# Patient Record
Sex: FEMALE | Race: WHITE | NOT HISPANIC OR LATINO | Employment: OTHER | URBAN - NONMETROPOLITAN AREA
[De-identification: names, ages, dates, MRNs, and addresses within clinical notes are randomized per-mention and may not be internally consistent; named-entity substitution may affect disease eponyms.]

---

## 2017-01-05 ENCOUNTER — OFFICE VISIT (OUTPATIENT)
Dept: OBSTETRICS AND GYNECOLOGY | Facility: CLINIC | Age: 52
End: 2017-01-05

## 2017-01-05 VITALS
DIASTOLIC BLOOD PRESSURE: 64 MMHG | HEIGHT: 63 IN | SYSTOLIC BLOOD PRESSURE: 132 MMHG | WEIGHT: 135 LBS | BODY MASS INDEX: 23.92 KG/M2

## 2017-01-05 DIAGNOSIS — N83.201 CYST OF RIGHT OVARY: Primary | ICD-10-CM

## 2017-01-05 DIAGNOSIS — R10.2 PELVIC PAIN: Primary | ICD-10-CM

## 2017-01-05 DIAGNOSIS — Z87.42 HISTORY OF OVARIAN CYST: ICD-10-CM

## 2017-01-05 PROCEDURE — 76830 TRANSVAGINAL US NON-OB: CPT | Performed by: OBSTETRICS & GYNECOLOGY

## 2017-01-05 PROCEDURE — 99213 OFFICE O/P EST LOW 20 MIN: CPT | Performed by: NURSE PRACTITIONER

## 2017-01-05 RX ORDER — LORATADINE 10 MG/1
10 TABLET ORAL DAILY
COMMUNITY

## 2017-01-05 RX ORDER — ASPIRIN 81 MG/1
81 TABLET, CHEWABLE ORAL DAILY
COMMUNITY
End: 2018-04-03

## 2017-01-05 RX ORDER — ATORVASTATIN CALCIUM 40 MG/1
40 TABLET, FILM COATED ORAL DAILY
COMMUNITY
End: 2017-05-15 | Stop reason: SDUPTHER

## 2017-01-05 NOTE — MR AVS SNAPSHOT
Shawnee Suarez   1/5/2017 10:15 AM   Office Visit    Dept Phone:  [phone]   Encounter #:  [id number]    Provider:  MICHELLE Hassan   Department:  [facility]                Your Full Care Plan              Your Updated Medication List          This list is accurate as of: 1/5/17 11:46 AM.  Always use your most recent med list.                ALLERGY 10 MG tablet   Generic drug:  loratadine       aspirin 81 MG chewable tablet       atorvastatin 40 MG tablet   Commonly known as:  LIPITOR       clonazePAM 1 MG tablet   Commonly known as:  KlonoPIN   Take 1 tablet by mouth 2 (Two) Times a Day As Needed for seizures.       REPHRESH PRO-B PO       TRINTELLIX 20 MG tablet   Generic drug:  Vortioxetine HBr       WOMENS 50+ MULTI VITAMIN/MIN PO       ZOLMitriptan 2.5 MG tablet   Commonly known as:  ZOMIG               You Were Diagnosed With        Codes Comments    Pelvic pain    -  Primary ICD-10-CM: R10.2  ICD-9-CM: [id number]     History of ovarian cyst     ICD-10-CM: Z87.42  ICD-9-CM: V13.29       Instructions     None    Patient Instructions History      Upcoming Appointments     Visit Type Date Time Department    [facility] V[facility]2017 10:15 [facility]    [facility]    [facility] V[facility] 1/13/2017 11:30 AM [facility]      MyChart Signup     Our records indicate that you have declined [facility]. If you would like to sign up for [employer], please email [email or web address] at work or call [phone] to obtain an activation code.             Other Info from Your Visit           Your Appointments     Jan 13, 2017 11:30 AM CST   Office Visit with Ana Barrios MD   [facility] (--)    [place] [address] [id number] [phone]           Arrive 15 minutes prior to appointment.              Allergies     No Known Allergies      Reason for Visit     Abdominal  "Pain Pt had a difficult bowel movenment on Lj Day, Pt states she has had pain on her right side that would bother her, She had a CT done that showed a Rt Ovarian Cyst that had Ruptured , Pt is still having occasional soreness.        Vital Signs     Blood Pressure Height Weight Last Menstrual Period Breastfeeding? Body Mass Index    132/64 (BP Location: Left arm, Patient Position: Sitting) 63\" (160 cm) 135 lb (61.2 kg) 12/25/2016 (Exact Date) No 23.91 kg/m2    Smoking Status                   Never Smoker           Problems and Diagnoses Noted     Pelvic pain    -  Primary    History of ovarian cyst            "

## 2017-01-05 NOTE — PROGRESS NOTES
Stephanie Suarez is a 51 y.o. female.     HPI Comments: Right sided pelvic pain started on [place]. CT identified ovarian cyst.  Pain continues intermittently.        The following portions of the patient's history were reviewed and updated as appropriate: allergies, current medications, past family history, past medical history, past social history, past surgical history and problem list.    Review of Systems   Constitutional: Negative for activity change, appetite change, fatigue and fever.   Respiratory: Negative for apnea and shortness of breath.    Cardiovascular: Negative for chest pain and palpitations.   Gastrointestinal: Positive for constipation (started fiber supplement, BMs now approx every 3 days, before they were every 7 days). Negative for abdominal distention, abdominal pain, diarrhea, nausea and vomiting.   Endocrine: Negative for cold intolerance and heat intolerance.   Genitourinary: Positive for menstrual problem (periods continue, every 23 days for 10 days (2 days heavy bleeding pad and tampon)) and pelvic pain. Negative for difficulty urinating, frequency, vaginal discharge and vaginal pain.   Neurological: Negative for headaches.   Psychiatric/Behavioral: Negative for agitation and sleep disturbance.       Objective   Physical Exam   Constitutional: She is oriented to person, place, and time. She appears well-developed.   HENT:   Head: Normocephalic.   Neck: No tracheal deviation present.   Cardiovascular: Normal rate.    Pulmonary/Chest: Breath sounds normal. She has no wheezes.   Abdominal: Soft. There is no tenderness.   Genitourinary: Rectum normal. There is no rash, tenderness or lesion on the right labia. There is no rash, tenderness or lesion on the left labia. Uterus is tender. Uterus is not deviated, not enlarged and not fixed. Cervix exhibits no motion tenderness, no discharge and no friability. Right adnexum displays tenderness (mild). Right adnexum displays no mass and no  fullness. Left adnexum displays no mass, no tenderness and no fullness. No erythema, tenderness or bleeding in the vagina. No foreign body in the vagina. No signs of injury around the vagina. No vaginal discharge found.   Genitourinary Comments: Firm uterus     Lymphadenopathy:        Right: No inguinal adenopathy present.        Left: No inguinal adenopathy present.   Neurological: She is alert and oriented to person, place, and time. She has normal strength.   Skin: Skin is warm and dry. No rash noted. No cyanosis.   Psychiatric: She has a normal mood and affect. Her speech is normal and behavior is normal.       Assessment/Plan    US today indicates 11 mm endometrial lining, follicular cyst on right ovary.   Reviewed US with pt.   Pt desires to discuss all options to help with periods. Will schedule with Dr. Barrios for consult r/t to possible surgical options.     Shawnee was seen today for abdominal pain.    Diagnoses and all orders for this visit:    Pelvic pain    History of ovarian cyst

## 2017-01-13 ENCOUNTER — TELEPHONE (OUTPATIENT)
Dept: OBSTETRICS AND GYNECOLOGY | Facility: CLINIC | Age: 52
End: 2017-01-13

## 2017-01-13 ENCOUNTER — OFFICE VISIT (OUTPATIENT)
Dept: OBSTETRICS AND GYNECOLOGY | Facility: CLINIC | Age: 52
End: 2017-01-13

## 2017-01-13 VITALS
WEIGHT: 134 LBS | BODY MASS INDEX: 23.74 KG/M2 | HEIGHT: 63 IN | DIASTOLIC BLOOD PRESSURE: 76 MMHG | SYSTOLIC BLOOD PRESSURE: 104 MMHG

## 2017-01-13 DIAGNOSIS — N93.8 DYSFUNCTIONAL UTERINE BLEEDING: ICD-10-CM

## 2017-01-13 DIAGNOSIS — R93.89 THICKENED ENDOMETRIUM: Primary | ICD-10-CM

## 2017-01-13 DIAGNOSIS — Z78.9 NONSMOKER: ICD-10-CM

## 2017-01-13 PROCEDURE — 88305 TISSUE EXAM BY PATHOLOGIST: CPT | Performed by: OBSTETRICS & GYNECOLOGY

## 2017-01-13 PROCEDURE — 99214 OFFICE O/P EST MOD 30 MIN: CPT | Performed by: OBSTETRICS & GYNECOLOGY

## 2017-01-13 RX ORDER — SODIUM CHLORIDE 0.9 % (FLUSH) 0.9 %
1-10 SYRINGE (ML) INJECTION AS NEEDED
Status: CANCELLED | OUTPATIENT
Start: 2017-01-13

## 2017-01-13 NOTE — TELEPHONE ENCOUNTER
Called pt to let her know that surgery is scheduled for 2-6-17 @ 9:30am. Pt to arrive at 7:30am. Pt also has preop labs scheduled for 1-23-17 @ 8:00am. Pt understood. Sent surgery instructions to pt in the mail. ANGELA

## 2017-01-13 NOTE — PROGRESS NOTES
[facility]  Shawnee Suarez  : 1965  MRN: [id number]  CSN: [id number]    History and Physical    Subjective   Shawnee Suarez is a 51 y.o. year old  who present for consultation about surgery due to pelvic pain caused by a ruptured ovarian cyst on 12/15/16.  She reports a h/o surgery for ovarian cysts 30 years ago, but none since then.  She reports her last period was just in December, starting the day after she was in the ER.  The patient's predominant concern is that her bleeding has become dysfunctional.  Over the past couple of years her bleeding has become heavier and heavier; it is now heavier than it ever been in her entire life, especially the first 2 days.  In addition the patient's periods are also becoming progressively closer together, and for the last 2 years they have occurred at 21 day intervals.  The patient is not interested in having a hysterectomy, because she is worried about pelvic organ prolapse, but would like to discuss an endometrial ablation.    Past Medical History   Diagnosis Date   • Anxiety    • Depression    • Ovarian cyst      Past Surgical History   Procedure Laterality Date   • Cholecystectomy     • Ovarian cyst removal       Smoking status: Never Smoker                                                              Smokeless status: Never Used                          Current Outpatient Prescriptions:   •  aspirin 81 MG chewable tablet, Chew 81 mg Daily., Disp: , Rfl:   •  atorvastatin (LIPITOR) 40 MG tablet, Take 40 mg by mouth Daily., Disp: , Rfl:   •  clonazePAM (KlonoPIN) 1 MG tablet, Take 1 tablet by mouth 2 (Two) Times a Day As Needed for seizures., Disp: 60 tablet, Rfl: 2  •  Lactobacillus (REPHRESH PRO-B PO), Take  by mouth., Disp: , Rfl:   •  loratadine (ALLERGY) 10 MG tablet, Take 10 mg by mouth Daily., Disp: , Rfl:   •  Multiple Vitamins-Minerals (WOMENS 50+ MULTI VITAMIN/MIN PO), Take  by mouth., Disp: , Rfl:   •  TRINTELLIX 20 MG tablet, , Disp: , Rfl:   •   "ZOLMitriptan (ZOMIG) 2.5 MG tablet, , Disp: , Rfl:     No Known Allergies    Family History   Problem Relation Age of Onset   • No Known Problems Mother    • Brain cancer Father    • No Known Problems Brother    • No Known Problems Daughter    • No Known Problems Maternal Grandmother    • No Known Problems Maternal Grandfather    • No Known Problems Paternal Grandmother    • Brain cancer Paternal Grandfather    • Heart disease Brother    • No Known Problems Daughter      Review of Systems   Constitutional: Negative for activity change and unexpected weight change.   HENT: Negative for congestion.    Respiratory: Negative for shortness of breath.    Cardiovascular: Negative for chest pain.   Gastrointestinal: Negative for abdominal pain, blood in stool, constipation and diarrhea.   Endocrine: Negative for cold intolerance and heat intolerance.   Genitourinary: Negative for dyspareunia, pelvic pain and vaginal discharge.   Musculoskeletal: Negative for arthralgias, back pain, neck pain and neck stiffness.   Skin: Negative for rash.   Neurological: Negative for dizziness and headaches.   Psychiatric/Behavioral: Negative for sleep disturbance. The patient is not nervous/anxious.          Objective   Visit Vitals   • /76   • Ht 63\" (160 cm)   • Wt 134 lb (60.8 kg)   • LMP 12/25/2016 (Exact Date)   • BMI 23.74 kg/m2       Physical Exam   Physical Exam   Constitutional: She appears well-developed and well-nourished. No distress.   HENT:   Head: Normocephalic and atraumatic.   Pulmonary/Chest: Effort normal.   Abdominal: Soft. There is no tenderness.   Genitourinary: Vagina normal and uterus normal. Pelvic exam was performed with patient supine. There is no tenderness or lesion on the right labia. There is no tenderness or lesion on the left labia. Uterus is not enlarged and not tender. Cervix exhibits no motion tenderness, no discharge and no friability. Right adnexum displays no tenderness and no fullness. Left " adnexum displays no tenderness and no fullness. No tenderness or bleeding in the vagina. No signs of injury around the vagina. No vaginal discharge found.   Genitourinary Comments: The cx was grasp with a tenac and washed with betadine.  The endo bx pipelle was easily passed into the uterine cavity, and two passes were made in order to ensure adequate specimen was obtained.  The patient tolerated well and the tenac site was hemostatic.   Skin: Skin is warm and dry.   Psychiatric: She has a normal mood and affect. Her behavior is normal. Judgment and thought content normal.   Nursing note and vitals reviewed.      Labs  Lab Results   Component Value Date     12/25/2016    HGB 13.0 12/25/2016    HCT 38.9 12/25/2016    WBC 7.25 12/25/2016     12/25/2016    K 3.9 12/25/2016     12/25/2016    CO2 27.0 12/25/2016    BUN 17 12/25/2016    CREATININE 0.68 12/25/2016    GLUCOSE 87 12/25/2016    ALBUMIN 4.00 12/25/2016    CALCIUM 8.9 12/25/2016    AST 33 12/25/2016    ALT 42 12/25/2016    BILITOT 0.6 12/25/2016        Assessment & Plan  Shawnee was seen today for pelvic pain.    Diagnoses and all orders for this visit:    Thickened endometrium  -     Tissue Exam    Dysfunctional uterine bleeding: The patient has no risk factors for uterine cancer, however an endometrial biopsy was performed in the office today due to her age and changing bleeding pattern.  Patient is not interested in a hysterectomy due to increased risk of pelvic organ prolapse; however, she desires endometrial ablation due to the changes in her bleeding.  A NovaSure endometrial ablation was described to the patient and her , the postoperative course was reviewed, and all questions were answered to her satisfaction.  Patient is being scheduled for surgery on 2/6/17.  On u/s last week, the patient has a normal-sized uterus with no fibroids.  The endo lining is 11 mm.  Ovaries were unremarkable.  -     Tissue Exam    Nonsmoker    Body  mass index (BMI) 23.0-23.9, adult    There are no diagnoses linked to this encounter.    Ana Barrios MD  1/13/2017  12:15 PM

## 2017-01-13 NOTE — MR AVS SNAPSHOT
Shawnee Suarez   1/13/2017 11:30 AM   Office Visit    Dept Phone:  [phone]   Encounter #:  [id number]    Provider:  Ana Barrios MD   Department:  [facility]                Your Full Care Plan              Your Updated Medication List          This list is accurate as of: 1/13/17 12:41 PM.  Always use your most recent med list.                ALLERGY 10 MG tablet   Generic drug:  loratadine       aspirin 81 MG chewable tablet       atorvastatin 40 MG tablet   Commonly known as:  LIPITOR       clonazePAM 1 MG tablet   Commonly known as:  KlonoPIN   Take 1 tablet by mouth 2 (Two) Times a Day As Needed for seizures.       REPHRESH PRO-B PO       TRINTELLIX 20 MG tablet   Generic drug:  Vortioxetine HBr       WOMENS 50+ MULTI VITAMIN/MIN PO       ZOLMitriptan 2.5 MG tablet   Commonly known as:  ZOMIG               We Performed the Following     Tissue Exam       You Were Diagnosed With        Codes Comments    Thickened endometrium    -  Primary ICD-10-CM: R93.8  ICD-9-CM: 793.5     Dysfunctional uterine bleeding     ICD-10-CM: N93.8  ICD-9-CM: 626.8     Nonsmoker     ICD-10-CM: Z78.9  ICD-9-CM: V49.89     Body mass index (BMI) 23.0-23.9, adult     ICD-10-CM: Z68.23  ICD-9-CM: V85.1       Instructions     None    Patient Instructions History      Upcoming Appointments     Visit Type Date Time Department    OFFICE VISIT 1/13/2017 11:30 AM [facility]    POST-OP 2/21/2017 10:00 AM [facility] [place]      MyChart Signup     Our records indicate that you have declined [facility] MyChart signup. If you would like to sign up for [employer], please email [email or web address] or call [phone] to obtain an activation code.             Other Info from Your Visit           Your Appointments     Feb 21, 2017 10:00 AM CST   Post-Op with Ana Barrios MD   [facility] (--)    [address] [place] [address] [address]  [place] [address]   [phone]  "             Allergies     No Known Allergies      Reason for Visit     Pelvic Pain pt here today with c/o pelvic pain. pt says that it started 12-15-16 and says that it lasted for a couple of days. pt says that it hasn't happened as often and has just noticed it here and there. pt had ultrasound on 1-5-17. pt voices no other concerns.       Vital Signs     Blood Pressure Height Weight Last Menstrual Period Body Mass Index Smoking Status    104/76 63\" (160 cm) 134 lb (60.8 kg) 12/25/2016 (Exact Date) 23.74 kg/m2 Never Smoker      Problems and Diagnoses Noted     Thickened endometrium    -  Primary    Dysfunctional uterine bleeding        Nonsmoker        Body mass index between 19-24, adult            "

## 2017-01-17 ENCOUNTER — TELEPHONE (OUTPATIENT)
Dept: OBSTETRICS AND GYNECOLOGY | Facility: CLINIC | Age: 52
End: 2017-01-17

## 2017-01-17 LAB
CYTO UR: NORMAL
LAB AP CASE REPORT: NORMAL
LAB AP CLINICAL INFORMATION: NORMAL
Lab: NORMAL
PATH REPORT.FINAL DX SPEC: NORMAL
PATH REPORT.GROSS SPEC: NORMAL

## 2017-01-17 NOTE — TELEPHONE ENCOUNTER
Called pt to let her know that on the day of her surgery 2-6-17 that her surgery time got moved to later in the day because of it being Dr. Barrios robotic day. Pt surgery scheduled at 11:30am and pt to arrive at 9:30am. Pt understood. BS

## 2017-01-18 ENCOUNTER — RESULTS ENCOUNTER (OUTPATIENT)
Dept: OBSTETRICS AND GYNECOLOGY | Facility: CLINIC | Age: 52
End: 2017-01-18

## 2017-01-18 DIAGNOSIS — N93.8 DYSFUNCTIONAL UTERINE BLEEDING: ICD-10-CM

## 2017-01-23 ENCOUNTER — APPOINTMENT (OUTPATIENT)
Dept: PREADMISSION TESTING | Facility: HOSPITAL | Age: 52
End: 2017-01-23

## 2017-01-23 ENCOUNTER — TELEPHONE (OUTPATIENT)
Dept: OBSTETRICS AND GYNECOLOGY | Facility: CLINIC | Age: 52
End: 2017-01-23

## 2017-01-23 ENCOUNTER — HOSPITAL ENCOUNTER (OUTPATIENT)
Dept: GENERAL RADIOLOGY | Facility: HOSPITAL | Age: 52
Discharge: HOME | End: 2017-01-23
Admitting: OBSTETRICS & GYNECOLOGY

## 2017-01-23 VITALS
HEART RATE: 70 BPM | WEIGHT: 138 LBS | HEIGHT: 65 IN | OXYGEN SATURATION: 99 % | DIASTOLIC BLOOD PRESSURE: 67 MMHG | RESPIRATION RATE: 18 BRPM | BODY MASS INDEX: 22.99 KG/M2 | SYSTOLIC BLOOD PRESSURE: 102 MMHG

## 2017-01-23 DIAGNOSIS — N93.8 DYSFUNCTIONAL UTERINE BLEEDING: ICD-10-CM

## 2017-01-23 LAB
BASOPHILS # BLD AUTO: 0.03 10*3/MM3 (ref 0–0.2)
BASOPHILS NFR BLD AUTO: 0.6 % (ref 0–2)
DEPRECATED RDW RBC AUTO: 44 FL (ref 40–54)
EOSINOPHIL # BLD AUTO: 0.12 10*3/MM3 (ref 0–0.7)
EOSINOPHIL NFR BLD AUTO: 2.3 % (ref 0–4)
ERYTHROCYTE [DISTWIDTH] IN BLOOD BY AUTOMATED COUNT: 13.5 % (ref 12–15)
HCT VFR BLD AUTO: 40.7 % (ref 37–47)
HGB BLD-MCNC: 13.4 G/DL (ref 12–16)
IMM GRANULOCYTES # BLD: 0 10*3/MM3 (ref 0–0.03)
IMM GRANULOCYTES NFR BLD: 0 % (ref 0–5)
LYMPHOCYTES # BLD AUTO: 1.45 10*3/MM3 (ref 0.72–4.86)
LYMPHOCYTES NFR BLD AUTO: 27.9 % (ref 15–45)
MCH RBC QN AUTO: 29.6 PG (ref 28–32)
MCHC RBC AUTO-ENTMCNC: 32.9 G/DL (ref 33–36)
MCV RBC AUTO: 89.8 FL (ref 82–98)
MONOCYTES # BLD AUTO: 0.68 10*3/MM3 (ref 0.19–1.3)
MONOCYTES NFR BLD AUTO: 13.1 % (ref 4–12)
NEUTROPHILS # BLD AUTO: 2.91 10*3/MM3 (ref 1.87–8.4)
NEUTROPHILS NFR BLD AUTO: 56.1 % (ref 39–78)
PLATELET # BLD AUTO: 166 10*3/MM3 (ref 130–400)
PMV BLD AUTO: 13 FL (ref 6–12)
RBC # BLD AUTO: 4.53 10*6/MM3 (ref 4.2–5.4)
WBC NRBC COR # BLD: 5.19 10*3/MM3 (ref 4.8–10.8)

## 2017-01-23 PROCEDURE — 85025 COMPLETE CBC W/AUTO DIFF WBC: CPT | Performed by: OBSTETRICS & GYNECOLOGY

## 2017-01-23 PROCEDURE — 93005 ELECTROCARDIOGRAM TRACING: CPT

## 2017-01-23 PROCEDURE — 71010 HC CHEST PA OR AP: CPT

## 2017-01-23 PROCEDURE — 36415 COLL VENOUS BLD VENIPUNCTURE: CPT | Performed by: OBSTETRICS & GYNECOLOGY

## 2017-01-23 PROCEDURE — 93010 ELECTROCARDIOGRAM REPORT: CPT | Performed by: INTERNAL MEDICINE

## 2017-01-23 NOTE — TELEPHONE ENCOUNTER
Called pt to inform her that surgery had to be bumped down a little due to it being Dr. Barrios robotic day on 2-6-17. Pt surgery scheduled for 2:30pm and pt is to arrive at 12:30pm. Pt understood. BS

## 2017-01-23 NOTE — DISCHARGE INSTRUCTIONS
DAY OF SURGERY INSTRUCTIONS        YOUR SURGEON: CHRISTEL MANCIA    PROCEDURE: DILATION AND CURETTAGE, HYSTEROSCOPY,NOVASURE ENDOMETRIAL ABLATION    DATE OF SURGERY: 2/6/2017    ARRIVAL TIME: AS DIRECTED BY OFFICE    DAY OF SURGERY TAKE ONLY THESE MEDICATIONS:             BEFORE YOU COME TO THE HOSPITAL  (Pre-op instructions)  • Do not eat, drink, smoke or chew gum after midnight the night before surgery.  This also includes no mints.  • Morning of surgery take only the medicines you have been instructed with a sip of water unless otherwise instructed  by your physician.  • Do not shave, wear makeup or dark nail polish.  • Remove all jewelry including rings.  • Leave anything you consider valuable at home.  • Leave your suitcase in the car until after your surgery.  • Bring the following with you if applicable:  o Picture ID and insurance, Medicare or Medicaid cards  o Co-pay/deductible required by insurance (cash, check, credit card)  o Medications (no narcotics) in original bottles (not a list) including all over-the-counter medications.  o Copy of advance directive, living will or power-of- documents if not brought to PAT  o CPAP or BIPAP mask and tubing  o Skin prep instruction sheet  o Relaxation aids (MP3 player, book, magazine)  • Confirm your arrival time with you surgeon the day before your surgery (surgery times are subject to change)  • On the day of surgery check in at registration located at the main entrance of the hospital.       Outpatient Surgery Guidelines, Adult  Outpatient procedures are those for which the person having the procedure is allowed to go home the same day as the procedure. Various procedures are done on an outpatient basis. You should follow some general guidelines if you will be having an outpatient procedure.  LET YOUR HEALTH CARE PROVIDER KNOW ABOUT:  · Any allergies you have.  · All medicines you are taking, including vitamins, herbs, eye drops, creams, and  over-the-counter medicines.  · Previous problems you or members of your family have had with the use of anesthetics.  · Any blood disorders you have.  · Previous surgeries you have had.  · Medical conditions you have.  RISKS AND COMPLICATIONS  Your health care provider will discuss possible risks and complications with you before surgery. Common risks and complications include:    · Problems due to the use of anesthetics.  · Blood loss and replacement (does not apply to minor surgical procedures).  · Temporary increase in pain due to surgery.  · Uncorrected pain or problems that the surgery was meant to correct.  · Infection.  · New damage.  BEFORE THE PROCEDURE  · Ask your health care provider about changing or stopping your regular medicines. You may need to stop taking certain medicines in the days or weeks before the procedure.  · Stop smoking at least 2 weeks before surgery. This lowers your risk for complications during and after surgery. Ask your health care provider for help with this if needed.  · Eat your usual meals and a light supper the day before surgery. Continue fluid intake. Do not drink alcohol.  · Do not eat or drink after midnight the night before your surgery.   · Arrange for someone to take you home and to stay with you for 24 hours after the procedure. Medicine given for your procedure may affect your ability to drive or to care for yourself.  · Call your health care provider's office if you develop an illness or problem that may prevent you from safely having your procedure.  AFTER THE PROCEDURE  After surgery, you will be taken to a recovery area, where your progress will be monitored. If there are no complications, you will be allowed to go home when you are awake, stable, and taking fluids well. You may have numbness around the surgical site. Healing will take some time. You will have tenderness at the surgical site and may have some swelling and bruising. You may also have some  nausea.  HOME CARE INSTRUCTIONS  · Do not drive for 24 hours, or as directed by your health care provider. Do not drive while taking prescription pain medicines.  · Do not drink alcohol for 24 hours.  · Do not make important decisions or sign legal documents for 24 hours.  · You may resume a normal diet and activities as directed.  · Do not lift anything heavier than 10 pounds (4.5 kg) or play contact sports until your health care provider says it is okay.  · Change your bandages (dressings) as directed.  · Only take over-the-counter or prescription medicines as directed by your health care provider.  · Follow up with your health care provider as directed.  SEEK MEDICAL CARE IF:  · You have increased bleeding (more than a small spot) from the surgical site.  · You have redness, swelling, or increasing pain in the wound.  · You see pus coming from the wound.  · You have a fever.  · You notice a bad smell coming from the wound or dressing.  · You feel lightheaded or faint.  · You develop a rash.  · You have trouble breathing.  · You develop allergies.  MAKE SURE YOU:  · Understand these instructions.  · Will watch your condition.  · Will get help right away if you are not doing well or get worse.     This information is not intended to replace advice given to you by your health care provider. Make sure you discuss any questions you have with your health care provider.     Document Released: 09/12/2002 Document Revised: 05/03/2016 Document Reviewed: 05/22/2014  [employer] Interactive Patient Education ©2016 [employer]       Fall Prevention in Hospitals, Adult  As a hospital patient, your condition and the treatments you receive can increase your risk for falls. Some additional risk factors for falls in a hospital include:  · Being in an unfamiliar environment.  · Being on bed rest.  · Your surgery.  · Taking certain medicines.  · Your tubing requirements, such as intravenous (IV) therapy or catheters.  It is important  that you learn how to decrease fall risks while at the hospital. Below are important tips that can help prevent falls.  SAFETY TIPS FOR PREVENTING FALLS  Talk about your risk of falling.  · Ask your health care provider why you are at risk for falling. Is it your medicine, illness, tubing placement, or something else?  · Make a plan with your health care provider to keep you safe from falls.  · Ask your health care provider or pharmacist about side effects of your medicines. Some medicines can make you dizzy or affect your coordination.  Ask for help.  · Ask for help before getting out of bed. You may need to press your call button.  · Ask for assistance in getting safely to the toilet.  · Ask for a walker or cane to be put at your bedside. Ask that most of the side rails on your bed be placed up before your health care provider leaves the room.  · Ask family or friends to sit with you.  · Ask for things that are out of your reach, such as your glasses, hearing aids, telephone, bedside table, or call button.  Follow these tips to avoid falling:  · Stay lying or seated, rather than standing, while waiting for help.  · Wear rubber-soled slippers or shoes whenever you walk in the hospital.  · Avoid quick, sudden movements.  ¨ Change positions slowly.  ¨ Sit on the side of your bed before standing.  ¨ Stand up slowly and wait before you start to walk.  · Let your health care provider know if there is a spill on the floor.  · Pay careful attention to the medical equipment, electrical cords, and tubes around you.  · When you need help, use your call button by your bed or in the bathroom. Wait for one of your health care providers to help you.  · If you feel dizzy or unsure of your footing, return to bed and wait for assistance.  · Avoid being distracted by the TV, telephone, or another person in your room.  · Do not lean or support yourself on rolling objects, such as IV poles or bedside tables.     This information is  not intended to replace advice given to you by your health care provider. Make sure you discuss any questions you have with your health care provider.     Document Released: 12/15/2001 Document Revised: 01/08/2016 Document Reviewed: 08/25/2013  [employer] Interactive Patient Education ©2016 [employer]       Surgical Site Infections FAQs  What is a Surgical Site Infection (SSI)?  A surgical site infection is an infection that occurs after surgery in the part of the body where the surgery took place. Most patients who have surgery do not develop an infection. However, infections develop in about 1 to 3 out of every 100 patients who have surgery.  Some of the common symptoms of a surgical site infection are:  · Redness and pain around the area where you had surgery  · Drainage of cloudy fluid from your surgical wound  · Fever  Can SSIs be treated?  Yes. Most surgical site infections can be treated with antibiotics. The antibiotic given to you depends on the bacteria (germs) causing the infection. Sometimes patients with SSIs also need another surgery to treat the infection.  What are some of the things that hospitals are doing to prevent SSIs?  To prevent SSIs, doctors, nurses, and other healthcare providers:  · Clean their hands and arms up to their elbows with an antiseptic agent just before the surgery.  · Clean their hands with soap and water or an alcohol-based hand rub before and after caring for each patient.  · May remove some of your hair immediately before your surgery using electric clippers if the hair is in the same area where the procedure will occur. They should not shave you with a razor.  · Wear special hair covers, masks, gowns, and gloves during surgery to keep the surgery area clean.  · Give you antibiotics before your surgery starts. In most cases, you should get antibiotics within 60 minutes before the surgery starts and the antibiotics should be stopped within 24 hours after surgery.  · Clean  the skin at the site of your surgery with a special soap that kills germs.  What can I do to help prevent SSIs?  Before your surgery:  · Tell your doctor about other medical problems you may have. Health problems such as allergies, diabetes, and obesity could affect your surgery and your treatment.  · Quit smoking. Patients who smoke get more infections. Talk to your doctor about how you can quit before your surgery.  · Do not shave near where you will have surgery. Shaving with a razor can irritate your skin and make it easier to develop an infection.  At the time of your surgery:  · Speak up if someone tries to shave you with a razor before surgery. Ask why you need to be shaved and talk with your surgeon if you have any concerns.  · Ask if you will get antibiotics before surgery.  After your surgery:  · Make sure that your healthcare providers clean their hands before examining you, either with soap and water or an alcohol-based hand rub.  · If you do not see your providers clean their hands, please ask them to do so.  · Family and friends who visit you should not touch the surgical wound or dressings.  · Family and friends should clean their hands with soap and water or an alcohol-based hand rub before and after visiting you. If you do not see them clean their hands, ask them to clean their hands.  What do I need to do when I go home from the hospital?  · Before you go home, your doctor or nurse should explain everything you need to know about taking care of your wound. Make sure you understand how to care for your wound before you leave the hospital.  · Always clean your hands before and after caring for your wound.  · Before you go home, make sure you know who to contact if you have questions or problems after you get home.  · If you have any symptoms of an infection, such as redness and pain at the surgery site, drainage, or fever, call your doctor immediately.  If you have additional questions, please ask  your doctor or nurse.  Developed and co-sponsored by The Society for Healthcare Epidemiology of [place] (SHEA); Infectious Diseases Society of [place] (IDSA); [employer] Hospital Association; Association for Professionals in Infection Control and Epidemiology (APIC); Centers for Disease Control and Prevention (CDC); and The Joint Commission.     This information is not intended to replace advice given to you by your health care provider. Make sure you discuss any questions you have with your health care provider.     Document Released: 12/23/2014 Document Revised: 01/08/2016 Document Reviewed: 03/02/2016  [employer] Interactive Patient Education ©2016 [employer]     PATIENT/FAMILY/RESPONSIBLE PARTY VERBALIZES UNDERSTANDING OF ABOVE EDUCATION

## 2017-01-23 NOTE — MR AVS SNAPSHOT
"                        Shawnee Erick   1/23/2017 8:30 AM   Appointment    Provider:  [facility] PAD PAT 32 RM 3   Department:  [facility] PREADMISSION T   Dept Phone:  [phone]                Your Full Care Plan           To Do List     2/21/2017 10:00 AM     Appointment with Ana Barrios MD at [facility] ([phone])   [address] [place] [id number]            Your Updated Medication List          This list is accurate as of: 1/23/17  8:36 AM.  Always use your most recent med list.                ALLERGY 10 MG tablet   Generic drug:  loratadine       aspirin 81 MG chewable tablet       atorvastatin 40 MG tablet   Commonly known as:  LIPITOR       clonazePAM 1 MG tablet   Commonly known as:  KlonoPIN   Take 1 tablet by mouth 2 (Two) Times a Day As Needed for seizures.       REPHRESH PRO-B PO       TRINTELLIX 20 MG tablet   Generic drug:  Vortioxetine HBr       WOMENS 50+ MULTI VITAMIN/MIN PO       ZOLMitriptan 2.5 MG tablet   Commonly known as:  ZOMIG               Splitforcet Signup     Our records indicate that you have declined [facility] Splitforcet signup. If you would like to sign up for [employer], please email [email or web address] or call [phone] to obtain an activation code.             Other Info from Your Visit           Allergies     No Known Allergies      Vital Signs     Blood Pressure Pulse Respirations Height Weight Last Menstrual Period    102/67 (BP Location: Right arm, Patient Position: Sitting) 70 18 64.5\" (163.8 cm) 138 lb (62.6 kg) 01/22/2017    Oxygen Saturation Body Mass Index Smoking Status             99% 23.32 kg/m2 Never Smoker           Discharge Instructions       DAY OF SURGERY INSTRUCTIONS        YOUR SURGEON: ANA BARRIOS    PROCEDURE: DILATION AND CURETTAGE, HYSTEROSCOPY,NOVASURE ENDOMETRIAL ABLATION    DATE OF SURGERY: 2/6/2017    ARRIVAL TIME: AS DIRECTED BY OFFICE    DAY OF SURGERY TAKE ONLY THESE MEDICATIONS:           "             BEFORE YOU COME TO THE HOSPITAL  (Pre-op instructions)  • Do not eat, drink, smoke or chew gum after midnight the night before surgery.  This also includes no mints.  • Morning of surgery take only the medicines you have been instructed with a sip of water unless otherwise instructed  by your physician.  • Do not shave, wear makeup or dark nail polish.  • Remove all jewelry including rings.  • Leave anything you consider valuable at home.  • Leave your suitcase in the car until after your surgery.  • Bring the following with you if applicable:  o Picture ID and insurance, Medicare or Medicaid cards  o Co-pay/deductible required by insurance (cash, check, credit card)  o Medications (no narcotics) in original bottles (not a list) including all over-the-counter medications.  o Copy of advance directive, living will or power-of- documents if not brought to PAT  o CPAP or BIPAP mask and tubing  o Skin prep instruction sheet  o Relaxation aids (MP3 player, book, magazine)  • Confirm your arrival time with you surgeon the day before your surgery (surgery times are subject to change)  • On the day of surgery check in at registration located at the main entrance of the hospital.       Outpatient Surgery Guidelines, Adult  Outpatient procedures are those for which the person having the procedure is allowed to go home the same day as the procedure. Various procedures are done on an outpatient basis. You should follow some general guidelines if you will be having an outpatient procedure.  LET YOUR HEALTH CARE PROVIDER KNOW ABOUT:  · Any allergies you have.  · All medicines you are taking, including vitamins, herbs, eye drops, creams, and over-the-counter medicines.  · Previous problems you or members of your family have had with the use of anesthetics.  · Any blood disorders you have.  · Previous surgeries you have had.  · Medical conditions you have.  RISKS AND COMPLICATIONS  Your health care provider will  discuss possible risks and complications with you before surgery. Common risks and complications include:    · Problems due to the use of anesthetics.  · Blood loss and replacement (does not apply to minor surgical procedures).  · Temporary increase in pain due to surgery.  · Uncorrected pain or problems that the surgery was meant to correct.  · Infection.  · New damage.  BEFORE THE PROCEDURE  · Ask your health care provider about changing or stopping your regular medicines. You may need to stop taking certain medicines in the days or weeks before the procedure.  · Stop smoking at least 2 weeks before surgery. This lowers your risk for complications during and after surgery. Ask your health care provider for help with this if needed.  · Eat your usual meals and a light supper the day before surgery. Continue fluid intake. Do not drink alcohol.  · Do not eat or drink after midnight the night before your surgery.   · Arrange for someone to take you home and to stay with you for 24 hours after the procedure. Medicine given for your procedure may affect your ability to drive or to care for yourself.  · Call your health care provider's office if you develop an illness or problem that may prevent you from safely having your procedure.  AFTER THE PROCEDURE  After surgery, you will be taken to a recovery area, where your progress will be monitored. If there are no complications, you will be allowed to go home when you are awake, stable, and taking fluids well. You may have numbness around the surgical site. Healing will take some time. You will have tenderness at the surgical site and may have some swelling and bruising. You may also have some nausea.  HOME CARE INSTRUCTIONS  · Do not drive for 24 hours, or as directed by your health care provider. Do not drive while taking prescription pain medicines.  · Do not drink alcohol for 24 hours.  · Do not make important decisions or sign legal documents for 24 hours.  · You may  resume a normal diet and activities as directed.  · Do not lift anything heavier than 10 pounds (4.5 kg) or play contact sports until your health care provider says it is okay.  · Change your bandages (dressings) as directed.  · Only take over-the-counter or prescription medicines as directed by your health care provider.  · Follow up with your health care provider as directed.  SEEK MEDICAL CARE IF:  · You have increased bleeding (more than a small spot) from the surgical site.  · You have redness, swelling, or increasing pain in the wound.  · You see pus coming from the wound.  · You have a fever.  · You notice a bad smell coming from the wound or dressing.  · You feel lightheaded or faint.  · You develop a rash.  · You have trouble breathing.  · You develop allergies.  MAKE SURE YOU:  · Understand these instructions.  · Will watch your condition.  · Will get help right away if you are not doing well or get worse.     This information is not intended to replace advice given to you by your health care provider. Make sure you discuss any questions you have with your health care provider.     Document Released: 09/12/2002 Document Revised: 05/03/2016 Document Reviewed: 05/22/2014  [employer] Interactive Patient Education ©2016 [employer].       Fall Prevention in Hospitals, Adult  As a hospital patient, your condition and the treatments you receive can increase your risk for falls. Some additional risk factors for falls in a hospital include:  · Being in an unfamiliar environment.  · Being on bed rest.  · Your surgery.  · Taking certain medicines.  · Your tubing requirements, such as intravenous (IV) therapy or catheters.  It is important that you learn how to decrease fall risks while at the hospital. Below are important tips that can help prevent falls.  SAFETY TIPS FOR PREVENTING FALLS  Talk about your risk of falling.  · Ask your health care provider why you are at risk for falling. Is it your medicine, illness,  tubing placement, or something else?  · Make a plan with your health care provider to keep you safe from falls.  · Ask your health care provider or pharmacist about side effects of your medicines. Some medicines can make you dizzy or affect your coordination.  Ask for help.  · Ask for help before getting out of bed. You may need to press your call button.  · Ask for assistance in getting safely to the toilet.  · Ask for a walker or cane to be put at your bedside. Ask that most of the side rails on your bed be placed up before your health care provider leaves the room.  · Ask family or friends to sit with you.  · Ask for things that are out of your reach, such as your glasses, hearing aids, telephone, bedside table, or call button.  Follow these tips to avoid falling:  · Stay lying or seated, rather than standing, while waiting for help.  · Wear rubber-soled slippers or shoes whenever you walk in the hospital.  · Avoid quick, sudden movements.  ¨ Change positions slowly.  ¨ Sit on the side of your bed before standing.  ¨ Stand up slowly and wait before you start to walk.  · Let your health care provider know if there is a spill on the floor.  · Pay careful attention to the medical equipment, electrical cords, and tubes around you.  · When you need help, use your call button by your bed or in the bathroom. Wait for one of your health care providers to help you.  · If you feel dizzy or unsure of your footing, return to bed and wait for assistance.  · Avoid being distracted by the TV, telephone, or another person in your room.  · Do not lean or support yourself on rolling objects, such as IV poles or bedside tables.     This information is not intended to replace advice given to you by your health care provider. Make sure you discuss any questions you have with your health care provider.     Document Released: 12/15/2001 Document Revised: 01/08/2016 Document Reviewed: 08/25/2013  [employer] Interactive Patient Education  ©2016 [employer] Inc.       Surgical Site Infections FAQs  What is a Surgical Site Infection (SSI)?  A surgical site infection is an infection that occurs after surgery in the part of the body where the surgery took place. Most patients who have surgery do not develop an infection. However, infections develop in about 1 to 3 out of every 100 patients who have surgery.  Some of the common symptoms of a surgical site infection are:  · Redness and pain around the area where you had surgery  · Drainage of cloudy fluid from your surgical wound  · Fever  Can SSIs be treated?  Yes. Most surgical site infections can be treated with antibiotics. The antibiotic given to you depends on the bacteria (germs) causing the infection. Sometimes patients with SSIs also need another surgery to treat the infection.  What are some of the things that hospitals are doing to prevent SSIs?  To prevent SSIs, doctors, nurses, and other healthcare providers:  · Clean their hands and arms up to their elbows with an antiseptic agent just before the surgery.  · Clean their hands with soap and water or an alcohol-based hand rub before and after caring for each patient.  · May remove some of your hair immediately before your surgery using electric clippers if the hair is in the same area where the procedure will occur. They should not shave you with a razor.  · Wear special hair covers, masks, gowns, and gloves during surgery to keep the surgery area clean.  · Give you antibiotics before your surgery starts. In most cases, you should get antibiotics within 60 minutes before the surgery starts and the antibiotics should be stopped within 24 hours after surgery.  · Clean the skin at the site of your surgery with a special soap that kills germs.  What can I do to help prevent SSIs?  Before your surgery:  · Tell your doctor about other medical problems you may have. Health problems such as allergies, diabetes, and obesity could affect your surgery and  your treatment.  · Quit smoking. Patients who smoke get more infections. Talk to your doctor about how you can quit before your surgery.  · Do not shave near where you will have surgery. Shaving with a razor can irritate your skin and make it easier to develop an infection.  At the time of your surgery:  · Speak up if someone tries to shave you with a razor before surgery. Ask why you need to be shaved and talk with your surgeon if you have any concerns.  · Ask if you will get antibiotics before surgery.  After your surgery:  · Make sure that your healthcare providers clean their hands before examining you, either with soap and water or an alcohol-based hand rub.  · If you do not see your providers clean their hands, please ask them to do so.  · Family and friends who visit you should not touch the surgical wound or dressings.  · Family and friends should clean their hands with soap and water or an alcohol-based hand rub before and after visiting you. If you do not see them clean their hands, ask them to clean their hands.  What do I need to do when I go home from the hospital?  · Before you go home, your doctor or nurse should explain everything you need to know about taking care of your wound. Make sure you understand how to care for your wound before you leave the hospital.  · Always clean your hands before and after caring for your wound.  · Before you go home, make sure you know who to contact if you have questions or problems after you get home.  · If you have any symptoms of an infection, such as redness and pain at the surgery site, drainage, or fever, call your doctor immediately.  If you have additional questions, please ask your doctor or nurse.  Developed and co-sponsored by The Society for Healthcare Epidemiology of [place] (SHEA); Infectious Diseases Society of [place] (IDSA); American Hospital Association; Association for Professionals in Infection Control and Epidemiology (APIC); Centers for Disease  Control and Prevention (CDC); and The Joint Commission.     This information is not intended to replace advice given to you by your health care provider. Make sure you discuss any questions you have with your health care provider.     Document Released: 12/23/2014 Document Revised: 01/08/2016 Document Reviewed: 03/02/2016  [employer] Interactive Patient Education ©2016 [employer].     PATIENT/FAMILY/RESPONSIBLE PARTY VERBALIZES UNDERSTANDING OF ABOVE EDUCATION       SYMPTOMS OF A STROKE    Call 911 or have someone take you to the Emergency Department if you have any of the following:    · Sudden numbness or weakness of your face, arm or leg especially on one side of the body  · Sudden confusion, diffiiculty speaking or trouble understanding   · Changes in your vision or loss of sight in one eye  · Sudden severe headache with no known cause  · sudden dizziness, trouble walking, loss of balance or coordination    It is important to seek emergency care right away if you have further stroke symptoms. If you get emergency help quickly, the powerful clot-dissolving medicines can reduce the disabilities caused by a stroke.     For more information:    American Stroke Association  9-559-6-STROKE  www.strokeassociation.org           IF YOU SMOKE OR USE TOBACCO PLEASE READ THE FOLLOWING:    Why is smoking bad for me?  Smoking increases the risk of heart disease, lung disease, vascular disease, stroke, and cancer.     If you smoke, STOP!    If you would like more information on quitting smoking, please visit the [employer]WAX website: [email or web address]/[employer]/healthier-together/smoke   This link will provide additional resources including the QUIT line and the Beat the Pack support groups.     For more information:    American Cancer Society  ([phone]    American Heart Association  1-338.632.5640

## 2017-01-25 ENCOUNTER — OFFICE VISIT (OUTPATIENT)
Dept: FAMILY MEDICINE CLINIC | Facility: CLINIC | Age: 52
End: 2017-01-25

## 2017-01-25 VITALS
BODY MASS INDEX: 22.69 KG/M2 | WEIGHT: 136.2 LBS | HEIGHT: 65 IN | SYSTOLIC BLOOD PRESSURE: 100 MMHG | OXYGEN SATURATION: 98 % | HEART RATE: 80 BPM | DIASTOLIC BLOOD PRESSURE: 67 MMHG | TEMPERATURE: 99.1 F

## 2017-01-25 DIAGNOSIS — J06.9 ACUTE URI: Primary | ICD-10-CM

## 2017-01-25 PROCEDURE — 96372 THER/PROPH/DIAG INJ SC/IM: CPT | Performed by: FAMILY MEDICINE

## 2017-01-25 PROCEDURE — 99213 OFFICE O/P EST LOW 20 MIN: CPT | Performed by: FAMILY MEDICINE

## 2017-01-25 RX ORDER — AMOXICILLIN 500 MG/1
500 CAPSULE ORAL 3 TIMES DAILY
Qty: 21 CAPSULE | Refills: 0 | Status: SHIPPED | OUTPATIENT
Start: 2017-01-25 | End: 2017-02-01

## 2017-01-25 RX ORDER — METHYLPREDNISOLONE ACETATE 40 MG/ML
20 INJECTION, SUSPENSION INTRA-ARTICULAR; INTRALESIONAL; INTRAMUSCULAR; SOFT TISSUE ONCE
Status: COMPLETED | OUTPATIENT
Start: 2017-01-25 | End: 2017-01-25

## 2017-01-25 RX ADMIN — METHYLPREDNISOLONE ACETATE 20 MG: 40 INJECTION, SUSPENSION INTRA-ARTICULAR; INTRALESIONAL; INTRAMUSCULAR; SOFT TISSUE at 15:49

## 2017-01-25 NOTE — PROGRESS NOTES
"Sahwnee Suarez    1965    Chief Complaint   Patient presents with   • URI       Vitals:    01/25/17 1527   BP: 100/67   Pulse: 80   Temp: 99.1 °F (37.3 °C)   SpO2: 98%   Weight: 136 lb 3.2 oz (61.8 kg)   Height: 64.5\" (163.8 cm)       URI    This is a new problem. The current episode started in the past 7 days. The problem has been unchanged. The maximum temperature recorded prior to her arrival was 100.4 - 100.9 F. The fever has been present for 1 to 2 days. Associated symptoms include congestion, rhinorrhea, sinus pain and a sore throat. Pertinent negatives include no coughing. She has tried acetaminophen and antihistamine for the symptoms. The treatment provided no relief.       Review of Systems   HENT: Positive for congestion, rhinorrhea and sore throat.    Respiratory: Negative for cough, choking and shortness of breath.        Past Medical History   Diagnosis Date   • Anxiety    • Depression    • Ovarian cyst          Current Outpatient Prescriptions:   •  aspirin 81 MG chewable tablet, Chew 81 mg Daily., Disp: , Rfl:   •  atorvastatin (LIPITOR) 40 MG tablet, Take 40 mg by mouth Daily., Disp: , Rfl:   •  clonazePAM (KlonoPIN) 1 MG tablet, Take 1 tablet by mouth 2 (Two) Times a Day As Needed for seizures., Disp: 60 tablet, Rfl: 2  •  Lactobacillus (REPHRESH PRO-B PO), Take  by mouth., Disp: , Rfl:   •  loratadine (ALLERGY) 10 MG tablet, Take 10 mg by mouth Daily., Disp: , Rfl:   •  Multiple Vitamins-Minerals (WOMENS 50+ MULTI VITAMIN/MIN PO), Take  by mouth., Disp: , Rfl:   •  TRINTELLIX 20 MG tablet, Take 1 tablet by mouth Daily., Disp: , Rfl:   •  ZOLMitriptan (ZOMIG) 2.5 MG tablet, Take 2.5 mg by mouth 1 (One) Time As Needed., Disp: , Rfl:   •  amoxicillin (AMOXIL) 500 MG capsule, Take 1 capsule by mouth 3 (Three) Times a Day for 7 days., Disp: 21 capsule, Rfl: 0    No Known Allergies    There is no problem list on file for this patient.      Social History     Social History   • Marital status:  " "    Spouse name: N/A   • Number of children: N/A   • Years of education: N/A     Social History Main Topics   • Smoking status: Never Smoker   • Smokeless tobacco: Never Used   • Alcohol use No   • Drug use: No   • Sexual activity: Yes     Partners: Male     Birth control/ protection: None     Other Topics Concern   • None     Social History Narrative       Past Surgical History   Procedure Laterality Date   • Cholecystectomy     • Ovarian cyst removal     • Appendectomy         Physical Exam   Constitutional: She is oriented to person, place, and time. She appears well-developed and well-nourished.   HENT:   Head: Normocephalic.   Right Ear: External ear normal.   Left Ear: External ear normal.   Mouth/Throat: Oropharynx is clear and moist.   Eyes: Conjunctivae are normal.   Cardiovascular: Normal rate and regular rhythm.    Pulmonary/Chest: Effort normal and breath sounds normal.   Lymphadenopathy:     She has no cervical adenopathy.   Neurological: She is alert and oriented to person, place, and time.   Psychiatric: She has a normal mood and affect.   Vitals reviewed.      Vitals:    01/25/17 1527   BP: 100/67   Pulse: 80   Temp: 99.1 °F (37.3 °C)   SpO2: 98%   Weight: 136 lb 3.2 oz (61.8 kg)   Height: 64.5\" (163.8 cm)       Shawnee was seen today for uri.    Diagnoses and all orders for this visit:    Acute URI    Other orders  -     amoxicillin (AMOXIL) 500 MG capsule; Take 1 capsule by mouth 3 (Three) Times a Day for 7 days.        Problem List Items Addressed This Visit     None      Visit Diagnoses     Acute URI    -  Primary    Relevant Medications    amoxicillin (AMOXIL) 500 MG capsule          Sinusitis-above    Getting ready to have an endometrial ablation-no hyperplasia on biopsy                            Marlon Tran MD  1/25/2017  "

## 2017-01-25 NOTE — MR AVS SNAPSHOT
Shawnee Suarez   1/25/2017 3:30 PM   Office Visit    Dept Phone:  [phone]   Encounter #:  [id number]    Provider:  Marlon Tran MD   Department:  [facility]                Your Full Care Plan              Today's Medication Changes          These changes are accurate as of: 1/25/17  3:42 PM.  If you have any questions, ask your nurse or doctor.               New Medication(s)Ordered:     amoxicillin 500 MG capsule   Commonly known as:  AMOXIL   Take 1 capsule by mouth 3 (Three) Times a Day for 7 days.   Started by:  Marlon Tran MD            Where to Get Your Medications      These medications were sent to [facility] [address] [phone] [facility] [phone] [address] [address]     Phone:  [phone]     amoxicillin 500 MG capsule                  Your Updated Medication List          This list is accurate as of: 1/25/17  3:42 PM.  Always use your most recent med list.                ALLERGY 10 MG tablet   Generic drug:  loratadine       amoxicillin 500 MG capsule   Commonly known as:  AMOXIL   Take 1 capsule by mouth 3 (Three) Times a Day for 7 days.       aspirin 81 MG chewable tablet       atorvastatin 40 MG tablet   Commonly known as:  LIPITOR       clonazePAM 1 MG tablet   Commonly known as:  KlonoPIN   Take 1 tablet by mouth 2 (Two) Times a Day As Needed for seizures.       REPHRESH PRO-B PO       TRINTELLIX 20 MG tablet   Generic drug:  Vortioxetine HBr       WOMENS 50+ MULTI VITAMIN/MIN PO       ZOLMitriptan 2.5 MG tablet   Commonly known as:  ZOMIG               You Were Diagnosed With        Codes Comments    Acute URI    -  Primary ICD-10-CM: J06.9  ICD-9-CM: 465.9       Instructions     None    Patient Instructions History      Upcoming Appointments     Visit Type Date Time Department    OFFICE VISIT 1/25/2017  3:30 PM W HUMBERTO GONZALES    POST-OP 2/21/2017 10:00 AM W KYLIE  "[facility] Signup     Our records indicate that you have declined [facility] signup. If you would like to sign up for [employer], please email [facility] [email or web address] or call [phone] to obtain an activation code.             Other Info from Your Visit           Your Appointments     Feb 21, 2017 10:00 AM CST   Post-Op with Ana Barrios MD   [facility] (--)    [address] [place] [address]  [facility] [id number] [phone]              Allergies     No Known Allergies      Reason for Visit     URI           Vital Signs     Blood Pressure Pulse Temperature Height Weight Last Menstrual Period    100/67 80 99.1 °F (37.3 °C) 64.5\" (163.8 cm) 136 lb 3.2 oz (61.8 kg) 01/22/2017 (LMP Unknown)    Oxygen Saturation Body Mass Index Smoking Status             98% 23.02 kg/m2 Never Smoker         Problems and Diagnoses Noted     Acute upper respiratory infection    -  Primary        "

## 2017-02-06 ENCOUNTER — ANESTHESIA EVENT (OUTPATIENT)
Dept: PERIOP | Facility: HOSPITAL | Age: 52
End: 2017-02-06

## 2017-02-06 ENCOUNTER — HOSPITAL ENCOUNTER (OUTPATIENT)
Facility: HOSPITAL | Age: 52
Setting detail: HOSPITAL OUTPATIENT SURGERY
Discharge: HOME | End: 2017-02-06
Attending: OBSTETRICS & GYNECOLOGY | Admitting: OBSTETRICS & GYNECOLOGY

## 2017-02-06 ENCOUNTER — ANESTHESIA (OUTPATIENT)
Dept: PERIOP | Facility: HOSPITAL | Age: 52
End: 2017-02-06

## 2017-02-06 VITALS
TEMPERATURE: 98.6 F | RESPIRATION RATE: 16 BRPM | DIASTOLIC BLOOD PRESSURE: 71 MMHG | OXYGEN SATURATION: 97 % | HEART RATE: 79 BPM | SYSTOLIC BLOOD PRESSURE: 113 MMHG

## 2017-02-06 DIAGNOSIS — N93.8 DYSFUNCTIONAL UTERINE BLEEDING: ICD-10-CM

## 2017-02-06 LAB
ABO GROUP BLD: NORMAL
B-HCG UR QL: NEGATIVE
BLD GP AB SCN SERPL QL: NEGATIVE
RH BLD: POSITIVE

## 2017-02-06 PROCEDURE — 58563 HYSTEROSCOPY ABLATION: CPT | Performed by: OBSTETRICS & GYNECOLOGY

## 2017-02-06 PROCEDURE — 81025 URINE PREGNANCY TEST: CPT | Performed by: OBSTETRICS & GYNECOLOGY

## 2017-02-06 PROCEDURE — 86900 BLOOD TYPING SEROLOGIC ABO: CPT

## 2017-02-06 PROCEDURE — 88305 TISSUE EXAM BY PATHOLOGIST: CPT | Performed by: OBSTETRICS & GYNECOLOGY

## 2017-02-06 PROCEDURE — 86850 RBC ANTIBODY SCREEN: CPT

## 2017-02-06 PROCEDURE — 86901 BLOOD TYPING SEROLOGIC RH(D): CPT

## 2017-02-06 PROCEDURE — 25010000002 DEXAMETHASONE PER 1 MG: Performed by: ANESTHESIOLOGY

## 2017-02-06 PROCEDURE — 25010000002 MIDAZOLAM PER 1 MG: Performed by: ANESTHESIOLOGY

## 2017-02-06 PROCEDURE — 25010000002 PROPOFOL 10 MG/ML EMULSION: Performed by: NURSE ANESTHETIST, CERTIFIED REGISTERED

## 2017-02-06 PROCEDURE — 25010000002 FENTANYL CITRATE (PF) 100 MCG/2ML SOLUTION: Performed by: NURSE ANESTHETIST, CERTIFIED REGISTERED

## 2017-02-06 RX ORDER — PROPOFOL 10 MG/ML
VIAL (ML) INTRAVENOUS AS NEEDED
Status: DISCONTINUED | OUTPATIENT
Start: 2017-02-06 | End: 2017-02-06 | Stop reason: SURG

## 2017-02-06 RX ORDER — ONDANSETRON 2 MG/ML
4 INJECTION INTRAMUSCULAR; INTRAVENOUS ONCE AS NEEDED
Status: DISCONTINUED | OUTPATIENT
Start: 2017-02-06 | End: 2017-02-06 | Stop reason: HOSPADM

## 2017-02-06 RX ORDER — SODIUM CHLORIDE 0.9 % (FLUSH) 0.9 %
1-10 SYRINGE (ML) INJECTION AS NEEDED
Status: DISCONTINUED | OUTPATIENT
Start: 2017-02-06 | End: 2017-02-06 | Stop reason: HOSPADM

## 2017-02-06 RX ORDER — METOPROLOL TARTRATE 5 MG/5ML
2.5 INJECTION INTRAVENOUS
Status: DISCONTINUED | OUTPATIENT
Start: 2017-02-06 | End: 2017-02-06 | Stop reason: HOSPADM

## 2017-02-06 RX ORDER — DIPHENHYDRAMINE HYDROCHLORIDE 50 MG/ML
12.5 INJECTION INTRAMUSCULAR; INTRAVENOUS
Status: DISCONTINUED | OUTPATIENT
Start: 2017-02-06 | End: 2017-02-06 | Stop reason: HOSPADM

## 2017-02-06 RX ORDER — MORPHINE SULFATE 2 MG/ML
2 INJECTION, SOLUTION INTRAMUSCULAR; INTRAVENOUS
Status: DISCONTINUED | OUTPATIENT
Start: 2017-02-06 | End: 2017-02-06 | Stop reason: HOSPADM

## 2017-02-06 RX ORDER — IPRATROPIUM BROMIDE AND ALBUTEROL SULFATE 2.5; .5 MG/3ML; MG/3ML
3 SOLUTION RESPIRATORY (INHALATION) ONCE AS NEEDED
Status: DISCONTINUED | OUTPATIENT
Start: 2017-02-06 | End: 2017-02-06 | Stop reason: HOSPADM

## 2017-02-06 RX ORDER — OXYCODONE HYDROCHLORIDE AND ACETAMINOPHEN 5; 325 MG/1; MG/1
1 TABLET ORAL EVERY 4 HOURS PRN
Status: DISCONTINUED | OUTPATIENT
Start: 2017-02-06 | End: 2017-02-06 | Stop reason: HOSPADM

## 2017-02-06 RX ORDER — FENTANYL CITRATE 50 UG/ML
INJECTION, SOLUTION INTRAMUSCULAR; INTRAVENOUS AS NEEDED
Status: DISCONTINUED | OUTPATIENT
Start: 2017-02-06 | End: 2017-02-06 | Stop reason: SURG

## 2017-02-06 RX ORDER — MEPERIDINE HYDROCHLORIDE 25 MG/ML
12.5 INJECTION INTRAMUSCULAR; INTRAVENOUS; SUBCUTANEOUS
Status: DISCONTINUED | OUTPATIENT
Start: 2017-02-06 | End: 2017-02-06 | Stop reason: HOSPADM

## 2017-02-06 RX ORDER — DEXAMETHASONE SODIUM PHOSPHATE 4 MG/ML
4 INJECTION, SOLUTION INTRA-ARTICULAR; INTRALESIONAL; INTRAMUSCULAR; INTRAVENOUS; SOFT TISSUE ONCE AS NEEDED
Status: COMPLETED | OUTPATIENT
Start: 2017-02-06 | End: 2017-02-06

## 2017-02-06 RX ORDER — FENTANYL CITRATE 50 UG/ML
25 INJECTION, SOLUTION INTRAMUSCULAR; INTRAVENOUS
Status: DISCONTINUED | OUTPATIENT
Start: 2017-02-06 | End: 2017-02-06 | Stop reason: HOSPADM

## 2017-02-06 RX ORDER — DEXTROSE MONOHYDRATE 25 G/50ML
12.5 INJECTION, SOLUTION INTRAVENOUS AS NEEDED
Status: DISCONTINUED | OUTPATIENT
Start: 2017-02-06 | End: 2017-02-06 | Stop reason: HOSPADM

## 2017-02-06 RX ORDER — LIDOCAINE HYDROCHLORIDE 20 MG/ML
INJECTION, SOLUTION INFILTRATION; PERINEURAL AS NEEDED
Status: DISCONTINUED | OUTPATIENT
Start: 2017-02-06 | End: 2017-02-06 | Stop reason: SURG

## 2017-02-06 RX ORDER — HYDRALAZINE HYDROCHLORIDE 20 MG/ML
5 INJECTION INTRAMUSCULAR; INTRAVENOUS
Status: DISCONTINUED | OUTPATIENT
Start: 2017-02-06 | End: 2017-02-06 | Stop reason: HOSPADM

## 2017-02-06 RX ORDER — LABETALOL HYDROCHLORIDE 5 MG/ML
5 INJECTION, SOLUTION INTRAVENOUS
Status: DISCONTINUED | OUTPATIENT
Start: 2017-02-06 | End: 2017-02-06 | Stop reason: HOSPADM

## 2017-02-06 RX ORDER — MIDAZOLAM HYDROCHLORIDE 1 MG/ML
2 INJECTION INTRAMUSCULAR; INTRAVENOUS
Status: DISCONTINUED | OUTPATIENT
Start: 2017-02-06 | End: 2017-02-06 | Stop reason: HOSPADM

## 2017-02-06 RX ORDER — MIDAZOLAM HYDROCHLORIDE 1 MG/ML
1 INJECTION INTRAMUSCULAR; INTRAVENOUS
Status: DISCONTINUED | OUTPATIENT
Start: 2017-02-06 | End: 2017-02-06 | Stop reason: HOSPADM

## 2017-02-06 RX ORDER — OXYCODONE HYDROCHLORIDE AND ACETAMINOPHEN 5; 325 MG/1; MG/1
1-2 TABLET ORAL EVERY 4 HOURS PRN
Qty: 30 TABLET | Refills: 0 | Status: SHIPPED | OUTPATIENT
Start: 2017-02-06 | End: 2017-06-27

## 2017-02-06 RX ORDER — NALOXONE HCL 0.4 MG/ML
0.4 VIAL (ML) INJECTION AS NEEDED
Status: DISCONTINUED | OUTPATIENT
Start: 2017-02-06 | End: 2017-02-06 | Stop reason: HOSPADM

## 2017-02-06 RX ORDER — SCOLOPAMINE TRANSDERMAL SYSTEM 1 MG/1
1 PATCH, EXTENDED RELEASE TRANSDERMAL ONCE
Status: DISCONTINUED | OUTPATIENT
Start: 2017-02-06 | End: 2017-02-06 | Stop reason: HOSPADM

## 2017-02-06 RX ORDER — SODIUM CHLORIDE, SODIUM LACTATE, POTASSIUM CHLORIDE, CALCIUM CHLORIDE 600; 310; 30; 20 MG/100ML; MG/100ML; MG/100ML; MG/100ML
9 INJECTION, SOLUTION INTRAVENOUS CONTINUOUS
Status: DISCONTINUED | OUTPATIENT
Start: 2017-02-06 | End: 2017-02-06 | Stop reason: HOSPADM

## 2017-02-06 RX ADMIN — LIDOCAINE HYDROCHLORIDE 0.5 ML: 10 INJECTION, SOLUTION EPIDURAL; INFILTRATION; INTRACAUDAL; PERINEURAL at 14:14

## 2017-02-06 RX ADMIN — MIDAZOLAM HYDROCHLORIDE 2 MG: 1 INJECTION, SOLUTION INTRAMUSCULAR; INTRAVENOUS at 14:21

## 2017-02-06 RX ADMIN — FENTANYL CITRATE 50 MCG: 50 INJECTION, SOLUTION INTRAMUSCULAR; INTRAVENOUS at 14:31

## 2017-02-06 RX ADMIN — LIDOCAINE HYDROCHLORIDE 60 MG: 20 INJECTION, SOLUTION INFILTRATION; PERINEURAL at 14:31

## 2017-02-06 RX ADMIN — PROPOFOL 180 MG: 10 INJECTION, EMULSION INTRAVENOUS at 14:31

## 2017-02-06 RX ADMIN — SODIUM CHLORIDE, POTASSIUM CHLORIDE, SODIUM LACTATE AND CALCIUM CHLORIDE 9 ML/HR: 600; 310; 30; 20 INJECTION, SOLUTION INTRAVENOUS at 14:14

## 2017-02-06 RX ADMIN — DEXAMETHASONE SODIUM PHOSPHATE 4 MG: 4 INJECTION, SOLUTION INTRA-ARTICULAR; INTRALESIONAL; INTRAMUSCULAR; INTRAVENOUS; SOFT TISSUE at 14:16

## 2017-02-06 RX ADMIN — OXYCODONE HYDROCHLORIDE AND ACETAMINOPHEN 1 TABLET: 5; 325 TABLET ORAL at 16:52

## 2017-02-06 RX ADMIN — SCOPALAMINE 1 PATCH: 1 PATCH, EXTENDED RELEASE TRANSDERMAL at 14:15

## 2017-02-06 RX ADMIN — MIDAZOLAM HYDROCHLORIDE 2 MG: 1 INJECTION, SOLUTION INTRAMUSCULAR; INTRAVENOUS at 14:16

## 2017-02-06 NOTE — ANESTHESIA PREPROCEDURE EVALUATION
Anesthesia Evaluation     Patient summary reviewed    No history of anesthetic complications   Airway   Mallampati: II  TM distance: >3 FB  Neck ROM: full  Dental      Pulmonary    (-) asthma, sleep apnea, not a smoker  Cardiovascular - negative cardio ROS  Exercise tolerance: good (4-7 METS)    ECG reviewed    Neuro/Psych  (-) seizures, TIA, CVA  GI/Hepatic/Renal/Endo    (-) GERD, liver disease, renal disease, diabetes    Musculoskeletal     Abdominal    Substance History      OB/GYN          Other                             Anesthesia Plan    ASA 1     general     intravenous induction   Anesthetic plan and risks discussed with patient.

## 2017-02-06 NOTE — INTERVAL H&P NOTE
H&P reviewed. The patient was examined and there are no changes to the H&P.  No questions.  No concerns.

## 2017-02-06 NOTE — OP NOTE
[facility] Jacquelin Suarez  : 1965  MRN: [id number]  CSN: [id number]  Date: 2017    Operative Note    DILATATION AND CURETTAGE HYSTEROSCOPY NOVASURE ENDOMETRIAL ABLATION      Pre-op Diagnosis:  Dysfunctional uterine bleeding [N93.8]   Post-op Diagnosis:  Post-Op Diagnosis Codes:     * Dysfunctional uterine bleeding [N93.8]   Procedure: Procedure(s):  DILATATION AND CURETTAGE HYSTEROSCOPY NOVASURE ENDOMETRIAL ABLATION   Surgeon: Surgeon(s):  Ana Barrios MD       Anesthesia: General   Estimated Blood Loss: scant   mls   Fluids: See anesthesia record   UOP: See anesthesia record   ABx: none   Specimens:  ECC & EMC   Findings: Normal uterine cavity.  Both tubal ostia visualized   Complications: none     Description of Procedure:        After informed consent was obtained, the patient was taken to the operating room, where general anesthesia was administered. She was placed in the lithotomy [facility] [place], and her perineum and vagina were prepped and draped in normal sterile fashion.  Her bladder drained with a latex free catheter, and a bivalve speculum was placed in the vagina.   A tenaculum was placed on the anterior lip of the cervix.  The cervix was then gently dilated using serial Hegar dilators until a hysteroscope could be introduced.  Hysteroscopy of the uterine cavity revealed a normal cavity with proliferative endometrium.  Both tubal ostia were easily visualized.  The hysteroscope was then withdrawn.  Sharp curettage of the endocervical canal was performed first and labeled endocervical curettage.  Next, curettage of the uterine cavity was performed, and the specimen labeled endometrial curettage.  The sound provided with the NovaSure endometrial ablation device was then used to measure the cavity length at 5.5 cm.  The NovaSure device was inserted through the patient's cervical os, deployed, and used to measure the cavity width at 2.7 cm.  The uterine cavity was ablated per the  parameters of the power generator, with a power of 82w, for 1:29.  The NovaSure device was collapsed and withdrawn.  A good celeste was noted on both the anterior and posterior surface of the NovaSure device.  The tenaculum was removed and the anterior lip of the cervix and the site noted to be hemostatic.       The patient was then awakened and taken to the recovery room in stable condition.  She tolerated the procedure well and without complications.  All sponge needle and instrument counts were correct times 2 per the OR staff.      Ana Barrios MD   2/6/2017  3:05 PM

## 2017-02-06 NOTE — PLAN OF CARE
Problem: Patient Care Overview (Adult)  Goal: Plan of Care Review  Outcome: Outcome(s) achieved Date Met:  02/06/17 02/06/17 8984   Coping/Psychosocial Response Interventions   Plan Of Care Reviewed With spouse;patient   Patient Care Overview   Progress improving   Outcome Evaluation   Outcome Summary/Follow up Plan DISCHARED HOME

## 2017-02-06 NOTE — PLAN OF CARE
Problem: Perioperative Period (Adult)  Goal: Signs and Symptoms of Listed Potential Problems Will be Absent or Manageable (Perioperative Period)  Outcome: Outcome(s) achieved Date Met:  02/06/17 02/06/17 1425   Perioperative Period   Problems Present (Perioperative Period) none

## 2017-02-06 NOTE — H&P (VIEW-ONLY)
[facility]  Shawnee Suarez  : 1965  MRN: [id number]  CSN: [id number]     History and Physical     Subjective     Shawnee Suarez is a 51 y.o. year old  who present for consultation about surgery due to pelvic pain caused by a ruptured ovarian cyst on 12/15/16. She reports a h/o surgery for ovarian cysts 30 years ago, but none since then. She reports her last period was just in December, starting the day after she was in the ER. The patient's predominant concern is that her bleeding has become dysfunctional. Over the past couple of years her bleeding has become heavier and heavier; it is now heavier than it ever been in her entire life, especially the first 2 days. In addition the patient's periods are also becoming progressively closer together, and for the last 2 years they have occurred at 21 day intervals. The patient is not interested in having a hysterectomy, because she is worried about pelvic organ prolapse, but would like to discuss an endometrial ablation.      Medical History         Past Medical History   Diagnosis Date   • Anxiety     • Depression     • Ovarian cyst            Surgical History          Past Surgical History   Procedure Laterality Date   • Cholecystectomy       • Ovarian cyst removal             Smoking status: Never Smoker      Smokeless status: Never Used         Current Outpatient Prescriptions:   • aspirin 81 MG chewable tablet, Chew 81 mg Daily., Disp: , Rfl:   • atorvastatin (LIPITOR) 40 MG tablet, Take 40 mg by mouth Daily., Disp: , Rfl:   • clonazePAM (KlonoPIN) 1 MG tablet, Take 1 tablet by mouth 2 (Two) Times a Day As Needed for seizures., Disp: 60 tablet, Rfl: 2  • Lactobacillus (REPHRESH PRO-B PO), Take by mouth., Disp: , Rfl:   • loratadine (ALLERGY) 10 MG tablet, Take 10 mg by mouth Daily., Disp: , Rfl:   • Multiple Vitamins-Minerals (WOMENS 50+ MULTI VITAMIN/MIN PO), Take by mouth., Disp: , Rfl:   • TRINTELLIX 20 MG tablet, , Disp: , Rfl:   • ZOLMitriptan (ZOMIG)  "2.5 MG tablet, , Disp: , Rfl:      No Known Allergies           Family History   Problem Relation Age of Onset   • No Known Problems Mother     • Brain cancer Father     • No Known Problems Brother     • No Known Problems Daughter     • No Known Problems Maternal Grandmother     • No Known Problems Maternal Grandfather     • No Known Problems Paternal Grandmother     • Brain cancer Paternal Grandfather     • Heart disease Brother     • No Known Problems Daughter        Review of Systems   Constitutional: Negative for activity change and unexpected weight change.   HENT: Negative for congestion.   Respiratory: Negative for shortness of breath.   Cardiovascular: Negative for chest pain.   Gastrointestinal: Negative for abdominal pain, blood in stool, constipation and diarrhea.   Endocrine: Negative for cold intolerance and heat intolerance.   Genitourinary: Negative for dyspareunia, pelvic pain and vaginal discharge.   Musculoskeletal: Negative for arthralgias, back pain, neck pain and neck stiffness.   Skin: Negative for rash.   Neurological: Negative for dizziness and headaches.   Psychiatric/Behavioral: Negative for sleep disturbance. The patient is not nervous/anxious.          Objective          Visit Vitals   • /76   • Ht 63\" (160 cm)   • Wt 134 lb (60.8 kg)   • LMP 12/25/2016 (Exact Date)   • BMI 23.74 kg/m2         Physical Exam  Physical Exam   Constitutional: She appears well-developed and well-nourished. No distress.   HENT:   Head: Normocephalic and atraumatic.   Pulmonary/Chest: Effort normal.   Abdominal: Soft. There is no tenderness.   Genitourinary: Vagina normal and uterus normal. Pelvic exam was performed with patient supine. There is no tenderness or lesion on the right labia. There is no tenderness or lesion on the left labia. Uterus is not enlarged and not tender. Cervix exhibits no motion tenderness, no discharge and no friability. Right adnexum displays no tenderness and no fullness. Left " adnexum displays no tenderness and no fullness. No tenderness or bleeding in the vagina. No signs of injury around the vagina. No vaginal discharge found.   Genitourinary Comments: The cx was grasp with a tenac and washed with betadine. The endo bx pipelle was easily passed into the uterine cavity, and two passes were made in order to ensure adequate specimen was obtained. The patient tolerated well and the tenac site was hemostatic.   Skin: Skin is warm and dry.   Psychiatric: She has a normal mood and affect. Her behavior is normal. Judgment and thought content normal.   Nursing note and vitals reviewed.        Labs        Lab Results   Component Value Date      12/25/2016     HGB 13.0 12/25/2016     HCT 38.9 12/25/2016     WBC 7.25 12/25/2016      12/25/2016     K 3.9 12/25/2016      12/25/2016     CO2 27.0 12/25/2016     BUN 17 12/25/2016     CREATININE 0.68 12/25/2016     GLUCOSE 87 12/25/2016     ALBUMIN 4.00 12/25/2016     CALCIUM 8.9 12/25/2016     AST 33 12/25/2016     ALT 42 12/25/2016     BILITOT 0.6 12/25/2016         Assessment & Plan  Shawnee was seen today for pelvic pain.     Diagnoses and all orders for this visit:     Thickened endometrium  - Tissue Exam     Dysfunctional uterine bleeding: The patient has no risk factors for uterine cancer, however an endometrial biopsy was performed in the office today due to her age and changing bleeding pattern. Patient is not interested in a hysterectomy due to increased risk of pelvic organ prolapse; however, she desires endometrial ablation due to the changes in her bleeding. A NovaSure endometrial ablation was described to the patient and her , the postoperative course was reviewed, and all questions were answered to her satisfaction. Patient is being scheduled for surgery on 2/6/17. On u/s last week, the patient has a normal-sized uterus with no fibroids. The endo lining is 11 mm. Ovaries were unremarkable.  - Tissue  Exam     Nonsmoker     Body mass index (BMI) 23.0-23.9, adult     There are no diagnoses linked to this encounter.     Ana Barrios MD  1/13/2017  12:15 PM

## 2017-02-06 NOTE — ANESTHESIA POSTPROCEDURE EVALUATION
Patient: Shawnee Suarez    Procedure Summary     Date Anesthesia Start Anesthesia Stop Room / Location    02/06/17 1427 1513 [facility] OR 14 / [facility] OR       Procedure Diagnosis Surgeon Provider    DILATATION AND CURETTAGE HYSTEROSCOPY NOVASURE ENDOMETRIAL ABLATION (N/A Vagina) Dysfunctional uterine bleeding  (Dysfunctional uterine bleeding [N93.8]) MD Brennan Ortega CRNA          Anesthesia Type: general  Last vitals  /59 (02/06/17 1600)    Temp 98.6 °F (37 °C) (02/06/17 1600)    Pulse 67 (02/06/17 1600)   Resp 14 (02/06/17 1600)    SpO2 95 % (02/06/17 1600)      Post Anesthesia Care and Evaluation    Patient location during evaluation: PACU  Patient participation: complete - patient participated  Level of consciousness: awake and alert  Pain management: adequate  Airway patency: patent  Anesthetic complications: No anesthetic complications    Cardiovascular status: acceptable  Respiratory status: acceptable  Hydration status: acceptable

## 2017-02-06 NOTE — ANESTHESIA PROCEDURE NOTES
Airway  Urgency: elective    Date/Time: 2/6/2017 2:37 PM  Airway not difficult    General Information and Staff    Patient location during procedure: OR    Indications and Patient Condition  Indications for airway management: airway protection    Preoxygenated: yes  Mask difficulty assessment: 1 - vent by mask    Final Airway Details  Final airway type: supraglottic airway      Successful airway: classic  Size 4    Number of attempts at approach: 1

## 2017-02-06 NOTE — H&P
[facility]  Shawnee Suarez  : 1965  MRN: [id number]  CSN: [id number]     History and Physical     Subjective     Shawnee Suarez is a 51 y.o. year old  who present for consultation about surgery due to pelvic pain caused by a ruptured ovarian cyst on 12/15/16. She reports a h/o surgery for ovarian cysts 30 years ago, but none since then. She reports her last period was just in December, starting the day after she was in the ER. The patient's predominant concern is that her bleeding has become dysfunctional. Over the past couple of years her bleeding has become heavier and heavier; it is now heavier than it ever been in her entire life, especially the first 2 days. In addition the patient's periods are also becoming progressively closer together, and for the last 2 years they have occurred at 21 day intervals. The patient is not interested in having a hysterectomy, because she is worried about pelvic organ prolapse, but would like to discuss an endometrial ablation.      Medical History         Past Medical History   Diagnosis Date   • Anxiety     • Depression     • Ovarian cyst            Surgical History          Past Surgical History   Procedure Laterality Date   • Cholecystectomy       • Ovarian cyst removal             Smoking status: Never Smoker      Smokeless status: Never Used         Current Outpatient Prescriptions:   • aspirin 81 MG chewable tablet, Chew 81 mg Daily., Disp: , Rfl:   • atorvastatin (LIPITOR) 40 MG tablet, Take 40 mg by mouth Daily., Disp: , Rfl:   • clonazePAM (KlonoPIN) 1 MG tablet, Take 1 tablet by mouth 2 (Two) Times a Day As Needed for seizures., Disp: 60 tablet, Rfl: 2  • Lactobacillus (REPHRESH PRO-B PO), Take by mouth., Disp: , Rfl:   • loratadine (ALLERGY) 10 MG tablet, Take 10 mg by mouth Daily., Disp: , Rfl:   • Multiple Vitamins-Minerals (WOMENS 50+ MULTI VITAMIN/MIN PO), Take by mouth., Disp: , Rfl:   • TRINTELLIX 20 MG tablet, , Disp: , Rfl:   • ZOLMitriptan (ZOMIG)  "2.5 MG tablet, , Disp: , Rfl:      No Known Allergies           Family History   Problem Relation Age of Onset   • No Known Problems Mother     • Brain cancer Father     • No Known Problems Brother     • No Known Problems Daughter     • No Known Problems Maternal Grandmother     • No Known Problems Maternal Grandfather     • No Known Problems Paternal Grandmother     • Brain cancer Paternal Grandfather     • Heart disease Brother     • No Known Problems Daughter        Review of Systems   Constitutional: Negative for activity change and unexpected weight change.   HENT: Negative for congestion.   Respiratory: Negative for shortness of breath.   Cardiovascular: Negative for chest pain.   Gastrointestinal: Negative for abdominal pain, blood in stool, constipation and diarrhea.   Endocrine: Negative for cold intolerance and heat intolerance.   Genitourinary: Negative for dyspareunia, pelvic pain and vaginal discharge.   Musculoskeletal: Negative for arthralgias, back pain, neck pain and neck stiffness.   Skin: Negative for rash.   Neurological: Negative for dizziness and headaches.   Psychiatric/Behavioral: Negative for sleep disturbance. The patient is not nervous/anxious.          Objective          Visit Vitals   • /76   • Ht 63\" (160 cm)   • Wt 134 lb (60.8 kg)   • LMP 12/25/2016 (Exact Date)   • BMI 23.74 kg/m2         Physical Exam  Physical Exam   Constitutional: She appears well-developed and well-nourished. No distress.   HENT:   Head: Normocephalic and atraumatic.   Pulmonary/Chest: Effort normal.   Abdominal: Soft. There is no tenderness.   Genitourinary: Vagina normal and uterus normal. Pelvic exam was performed with patient supine. There is no tenderness or lesion on the right labia. There is no tenderness or lesion on the left labia. Uterus is not enlarged and not tender. Cervix exhibits no motion tenderness, no discharge and no friability. Right adnexum displays no tenderness and no fullness. Left " adnexum displays no tenderness and no fullness. No tenderness or bleeding in the vagina. No signs of injury around the vagina. No vaginal discharge found.   Genitourinary Comments: The cx was grasp with a tenac and washed with betadine. The endo bx pipelle was easily passed into the uterine cavity, and two passes were made in order to ensure adequate specimen was obtained. The patient tolerated well and the tenac site was hemostatic.   Skin: Skin is warm and dry.   Psychiatric: She has a normal mood and affect. Her behavior is normal. Judgment and thought content normal.   Nursing note and vitals reviewed.        Labs        Lab Results   Component Value Date      12/25/2016     HGB 13.0 12/25/2016     HCT 38.9 12/25/2016     WBC 7.25 12/25/2016      12/25/2016     K 3.9 12/25/2016      12/25/2016     CO2 27.0 12/25/2016     BUN 17 12/25/2016     CREATININE 0.68 12/25/2016     GLUCOSE 87 12/25/2016     ALBUMIN 4.00 12/25/2016     CALCIUM 8.9 12/25/2016     AST 33 12/25/2016     ALT 42 12/25/2016     BILITOT 0.6 12/25/2016         Assessment & Plan  Shawnee was seen today for pelvic pain.     Diagnoses and all orders for this visit:     Thickened endometrium  - Tissue Exam     Dysfunctional uterine bleeding: The patient has no risk factors for uterine cancer, however an endometrial biopsy was performed in the office today due to her age and changing bleeding pattern. Patient is not interested in a hysterectomy due to increased risk of pelvic organ prolapse; however, she desires endometrial ablation due to the changes in her bleeding. A NovaSure endometrial ablation was described to the patient and her , the postoperative course was reviewed, and all questions were answered to her satisfaction. Patient is being scheduled for surgery on 2/6/17. On u/s last week, the patient has a normal-sized uterus with no fibroids. The endo lining is 11 mm. Ovaries were unremarkable.  - Tissue  Exam     Nonsmoker     Body mass index (BMI) 23.0-23.9, adult     There are no diagnoses linked to this encounter.     nAa Barrios MD  1/13/2017  12:15 PM

## 2017-02-06 NOTE — DISCHARGE INSTRUCTIONS

## 2017-02-22 ENCOUNTER — OFFICE VISIT (OUTPATIENT)
Dept: OBSTETRICS AND GYNECOLOGY | Facility: CLINIC | Age: 52
End: 2017-02-22

## 2017-02-22 VITALS
SYSTOLIC BLOOD PRESSURE: 116 MMHG | BODY MASS INDEX: 22.33 KG/M2 | DIASTOLIC BLOOD PRESSURE: 86 MMHG | WEIGHT: 134 LBS | HEIGHT: 65 IN

## 2017-02-22 DIAGNOSIS — Z09 S/P GYNECOLOGICAL SURGERY, FOLLOW-UP EXAM: Primary | ICD-10-CM

## 2017-02-22 PROCEDURE — 99212 OFFICE O/P EST SF 10 MIN: CPT | Performed by: OBSTETRICS & GYNECOLOGY

## 2017-02-22 NOTE — PROGRESS NOTES
"Subjective   Chief Complaint   Patient presents with   • Post-op     pt is here today for 2 week post op d & c hysteroscopy. pt voices no concerns.      Shawnee Suarez is a 51 y.o. year old  presenting to be seen for her post-operative visit.  She had a hysteroscopy/D&C.  Currently she reports no problems or concerns.  She is still having scant discharge.    No Additional Complaints Reported    The following portions of the patient's history were reviewed and updated as appropriate:current medications, allergies and past surgical history    Review of Systems   Respiratory: Negative for shortness of breath.    Cardiovascular: Negative for chest pain.   Gastrointestinal: Negative for abdominal pain.   Genitourinary: Positive for vaginal bleeding and vaginal discharge. Negative for pelvic pain.         Objective   Visit Vitals   • /86   • Ht 64.5\" (163.8 cm)   • Wt 134 lb (60.8 kg)   • LMP 2017 (LMP Unknown)   • BMI 22.65 kg/m2       General:  well developed; well nourished  no acute distress   Abdomen: Not performed.   Pelvis: Not performed.          Assessment   1. Pt is 2 weeks s/p hysteroscopy/D&C for DUB  2. Patient does not desire a hysterectomy at this time, she would prefer to see how much D&C helps     Plan   1. Will RTO prn    No orders of the defined types were placed in this encounter.         This note was electronically signed.    Ana Barrios MD  2017  "

## 2017-03-27 ENCOUNTER — OFFICE VISIT (OUTPATIENT)
Dept: FAMILY MEDICINE CLINIC | Facility: CLINIC | Age: 52
End: 2017-03-27

## 2017-03-27 VITALS
TEMPERATURE: 98.2 F | HEART RATE: 79 BPM | HEIGHT: 65 IN | OXYGEN SATURATION: 97 % | SYSTOLIC BLOOD PRESSURE: 116 MMHG | WEIGHT: 136.4 LBS | DIASTOLIC BLOOD PRESSURE: 80 MMHG | BODY MASS INDEX: 22.73 KG/M2

## 2017-03-27 DIAGNOSIS — F41.9 ANXIETY: ICD-10-CM

## 2017-03-27 DIAGNOSIS — Z00.00 ROUTINE GENERAL MEDICAL EXAMINATION AT A HEALTH CARE FACILITY: Primary | ICD-10-CM

## 2017-03-27 PROCEDURE — 99212 OFFICE O/P EST SF 10 MIN: CPT | Performed by: FAMILY MEDICINE

## 2017-03-27 RX ORDER — ZOLMITRIPTAN 2.5 MG/1
2.5 TABLET, FILM COATED ORAL TAKE AS DIRECTED
Qty: 6 TABLET | Refills: 3 | Status: SHIPPED | OUTPATIENT
Start: 2017-03-27 | End: 2018-01-02 | Stop reason: SDUPTHER

## 2017-03-27 RX ORDER — VORTIOXETINE 20 MG/1
1 TABLET, FILM COATED ORAL DAILY
Qty: 90 TABLET | Refills: 2 | Status: SHIPPED | OUTPATIENT
Start: 2017-03-27 | End: 2017-05-15 | Stop reason: SDUPTHER

## 2017-03-27 RX ORDER — CLONAZEPAM 1 MG/1
1 TABLET ORAL 2 TIMES DAILY PRN
Qty: 60 TABLET | Refills: 2 | Status: SHIPPED | OUTPATIENT
Start: 2017-03-27 | End: 2017-06-27 | Stop reason: SDUPTHER

## 2017-03-28 LAB — HCV AB S/CO SERPL IA: <0.1 S/CO RATIO (ref 0–0.9)

## 2017-04-11 ENCOUNTER — OFFICE VISIT (OUTPATIENT)
Dept: FAMILY MEDICINE CLINIC | Facility: CLINIC | Age: 52
End: 2017-04-11

## 2017-04-11 VITALS
HEIGHT: 65 IN | HEART RATE: 86 BPM | OXYGEN SATURATION: 98 % | BODY MASS INDEX: 22.92 KG/M2 | TEMPERATURE: 98.5 F | SYSTOLIC BLOOD PRESSURE: 102 MMHG | WEIGHT: 137.6 LBS | DIASTOLIC BLOOD PRESSURE: 76 MMHG

## 2017-04-11 DIAGNOSIS — IMO0002 SKIN LACERATION: Primary | ICD-10-CM

## 2017-04-11 PROCEDURE — 99212 OFFICE O/P EST SF 10 MIN: CPT | Performed by: FAMILY MEDICINE

## 2017-04-11 NOTE — PROGRESS NOTES
1 x 3 cm skin flap Laceration of right forearm.  TD up-to-date, happened 3 days ago, skin flap looks viable, observation see what happens    Wound care instructions given

## 2017-05-11 RX ORDER — FLUCONAZOLE 150 MG/1
150 TABLET ORAL ONCE
Qty: 2 TABLET | Refills: 0 | Status: SHIPPED | OUTPATIENT
Start: 2017-05-11 | End: 2017-05-11

## 2017-05-15 RX ORDER — ATORVASTATIN CALCIUM 40 MG/1
40 TABLET, FILM COATED ORAL DAILY
Qty: 2 TABLET | Refills: 0 | Status: SHIPPED | OUTPATIENT
Start: 2017-05-15 | End: 2017-08-08 | Stop reason: SDUPTHER

## 2017-06-27 ENCOUNTER — OFFICE VISIT (OUTPATIENT)
Dept: FAMILY MEDICINE CLINIC | Facility: CLINIC | Age: 52
End: 2017-06-27

## 2017-06-27 VITALS
SYSTOLIC BLOOD PRESSURE: 100 MMHG | OXYGEN SATURATION: 97 % | HEART RATE: 70 BPM | HEIGHT: 65 IN | DIASTOLIC BLOOD PRESSURE: 70 MMHG | BODY MASS INDEX: 23.59 KG/M2 | WEIGHT: 141.6 LBS | TEMPERATURE: 98.3 F

## 2017-06-27 DIAGNOSIS — F41.9 ANXIETY: Primary | ICD-10-CM

## 2017-06-27 LAB
POC AMPHETAMINES: NEGATIVE
POC BARBITURATES: NEGATIVE
POC BENZODIAZEPHINES: POSITIVE
POC COCAINE: NEGATIVE
POC METHADONE: NEGATIVE
POC METHAMPHETAMINE SCREEN URINE: NEGATIVE
POC OPIATES: NEGATIVE
POC OXYCODONE: NEGATIVE
POC PHENCYCLIDINE: NEGATIVE
POC PROPOXYPHENE: NEGATIVE
POC THC: NEGATIVE
POC TRICYCLIC ANTIDEPRESSANTS: POSITIVE

## 2017-06-27 PROCEDURE — 80307 DRUG TEST PRSMV CHEM ANLYZR: CPT | Performed by: FAMILY MEDICINE

## 2017-06-27 PROCEDURE — 99213 OFFICE O/P EST LOW 20 MIN: CPT | Performed by: FAMILY MEDICINE

## 2017-06-27 RX ORDER — CLONAZEPAM 1 MG/1
1 TABLET ORAL 2 TIMES DAILY PRN
Qty: 60 TABLET | Refills: 2 | Status: SHIPPED | OUTPATIENT
Start: 2017-06-27 | End: 2017-10-02 | Stop reason: SDUPTHER

## 2017-06-27 NOTE — PROGRESS NOTES
"Shawnee Suarez    1965    Chief Complaint   Patient presents with   • Med Refill     Klonopin       Vitals:    06/27/17 1251   BP: 100/70   BP Location: Right arm   Patient Position: Sitting   Cuff Size: Adult   Pulse: 70   Temp: 98.3 °F (36.8 °C)   TempSrc: Oral   SpO2: 97%   Weight: 141 lb 9.6 oz (64.2 kg)   Height: 64.5\" (163.8 cm)       Anxiety   Presents for follow-up visit. Symptoms include nervous/anxious behavior and obsessions. Patient reports no suicidal ideas. Symptoms occur most days. The quality of sleep is good. Nighttime awakenings: occasional.           Review of Systems   Psychiatric/Behavioral: Negative for suicidal ideas. The patient is nervous/anxious.         Mood stable       Past Medical History:   Diagnosis Date   • Anxiety    • Depression    • Ovarian cyst          Current Outpatient Prescriptions:   •  aspirin 81 MG chewable tablet, Chew 81 mg Daily., Disp: , Rfl:   •  atorvastatin (LIPITOR) 40 MG tablet, Take 1 tablet by mouth Daily., Disp: 2 tablet, Rfl: 0  •  clonazePAM (KlonoPIN) 1 MG tablet, Take 1 tablet by mouth 2 (Two) Times a Day As Needed for Seizures., Disp: 60 tablet, Rfl: 2  •  FIBER SELECT GUMMIES PO, Take  by mouth Daily., Disp: , Rfl:   •  Lactobacillus (REPHRESH PRO-B PO), Take 1 tablet by mouth Daily., Disp: , Rfl:   •  loratadine (ALLERGY) 10 MG tablet, Take 10 mg by mouth Daily., Disp: , Rfl:   •  Multiple Vitamins-Minerals (WOMENS 50+ MULTI VITAMIN/MIN PO), Take  by mouth., Disp: , Rfl:   •  Vortioxetine HBr (TRINTELLIX) 20 MG tablet, Take 20 mg by mouth Daily., Disp: 2 tablet, Rfl: 0  •  ZOLMitriptan (ZOMIG) 2.5 MG tablet, Take 1 tablet by mouth Take As Directed., Disp: 6 tablet, Rfl: 3    No Known Allergies    There is no problem list on file for this patient.      Social History     Social History   • Marital status:      Spouse name: N/A   • Number of children: N/A   • Years of education: N/A     Social History Main Topics   • Smoking status: Never Smoker " "  • Smokeless tobacco: Never Used   • Alcohol use No   • Drug use: No   • Sexual activity: Defer     Other Topics Concern   • None     Social History Narrative       Past Surgical History:   Procedure Laterality Date   • APPENDECTOMY     • CHOLECYSTECTOMY     • D&C HYSTEROSCOPY ENDOMETRIAL ABLATION N/A 2/6/2017    Procedure: DILATATION AND CURETTAGE HYSTEROSCOPY NOVASURE ENDOMETRIAL ABLATION;  Surgeon: Ana Barrios MD;  Location: [facility];  Service:    • OVARIAN CYST REMOVAL         Physical Exam   Constitutional: She is oriented to person, place, and time. She appears well-developed and well-nourished.   Neurological: She is alert and oriented to person, place, and time.   Psychiatric: She has a normal mood and affect. Her behavior is normal. Thought content normal.   Vitals reviewed.      Vitals:    06/27/17 1251   BP: 100/70   BP Location: Right arm   Patient Position: Sitting   Cuff Size: Adult   Pulse: 70   Temp: 98.3 °F (36.8 °C)   TempSrc: Oral   SpO2: 97%   Weight: 141 lb 9.6 oz (64.2 kg)   Height: 64.5\" (163.8 cm)       Shawnee was seen today for med refill.    Diagnoses and all orders for this visit:    Anxiety    Other orders  -     clonazePAM (KlonoPIN) 1 MG tablet; Take 1 tablet by mouth 2 (Two) Times a Day As Needed for Seizures.        Problem List Items Addressed This Visit     None      Visit Diagnoses     Anxiety    -  Primary              Plan-stressed exercise, CBT therapy                        Marlon Tran MD  6/27/2017  "

## 2017-08-09 RX ORDER — ATORVASTATIN CALCIUM 40 MG/1
40 TABLET, FILM COATED ORAL NIGHTLY
Qty: 30 TABLET | Refills: 0 | Status: SHIPPED | OUTPATIENT
Start: 2017-08-09 | End: 2017-09-04 | Stop reason: SDUPTHER

## 2017-08-28 ENCOUNTER — LAB (OUTPATIENT)
Dept: FAMILY MEDICINE CLINIC | Facility: CLINIC | Age: 52
End: 2017-08-28

## 2017-08-28 DIAGNOSIS — Z00.00 ROUTINE GENERAL MEDICAL EXAMINATION AT A HEALTH CARE FACILITY: Primary | ICD-10-CM

## 2017-08-28 DIAGNOSIS — E55.9 UNSPECIFIED VITAMIN D DEFICIENCY: ICD-10-CM

## 2017-08-28 LAB
25(OH)D3+25(OH)D2 SERPL-MCNC: 29.5 NG/ML (ref 30–100)
ALBUMIN SERPL-MCNC: 4.5 G/DL (ref 3.5–5)
ALBUMIN/GLOB SERPL: 1.5 G/DL (ref 1.1–2.5)
ALP SERPL-CCNC: 52 U/L (ref 24–120)
ALT SERPL-CCNC: 41 U/L (ref 0–54)
AST SERPL-CCNC: 31 U/L (ref 7–45)
BASOPHILS # BLD AUTO: 0.03 10*3/MM3 (ref 0–0.2)
BASOPHILS NFR BLD AUTO: 0.5 % (ref 0–2)
BILIRUB SERPL-MCNC: 0.9 MG/DL (ref 0.1–1)
BUN SERPL-MCNC: 14 MG/DL (ref 5–21)
BUN/CREAT SERPL: 20 (ref 7–25)
CALCIUM SERPL-MCNC: 9.8 MG/DL (ref 8.4–10.4)
CHLORIDE SERPL-SCNC: 105 MMOL/L (ref 98–110)
CHOLEST SERPL-MCNC: 161 MG/DL (ref 130–200)
CO2 SERPL-SCNC: 29 MMOL/L (ref 24–31)
CREAT SERPL-MCNC: 0.7 MG/DL (ref 0.5–1.4)
EOSINOPHIL # BLD AUTO: 0.11 10*3/MM3 (ref 0–0.7)
EOSINOPHIL NFR BLD AUTO: 1.8 % (ref 0–4)
ERYTHROCYTE [DISTWIDTH] IN BLOOD BY AUTOMATED COUNT: 13.1 % (ref 12–15)
GLOBULIN SER CALC-MCNC: 3 GM/DL
GLUCOSE SERPL-MCNC: 93 MG/DL (ref 70–100)
HCT VFR BLD AUTO: 46.6 % (ref 37–47)
HDLC SERPL-MCNC: 73 MG/DL
HGB BLD-MCNC: 15.1 G/DL (ref 12–16)
IMM GRANULOCYTES # BLD: 0.01 10*3/MM3 (ref 0–0.03)
IMM GRANULOCYTES NFR BLD: 0.2 % (ref 0–5)
LDLC SERPL CALC-MCNC: 67 MG/DL (ref 0–99)
LYMPHOCYTES # BLD AUTO: 1.6 10*3/MM3 (ref 0.72–4.86)
LYMPHOCYTES NFR BLD AUTO: 25.9 % (ref 15–45)
MCH RBC QN AUTO: 30.9 PG (ref 28–32)
MCHC RBC AUTO-ENTMCNC: 32.4 G/DL (ref 33–36)
MCV RBC AUTO: 95.5 FL (ref 82–98)
MONOCYTES # BLD AUTO: 0.55 10*3/MM3 (ref 0.19–1.3)
MONOCYTES NFR BLD AUTO: 8.9 % (ref 4–12)
NEUTROPHILS # BLD AUTO: 3.88 10*3/MM3 (ref 1.87–8.4)
NEUTROPHILS NFR BLD AUTO: 62.7 % (ref 39–78)
PLATELET # BLD AUTO: 190 10*3/MM3 (ref 130–400)
POTASSIUM SERPL-SCNC: 5 MMOL/L (ref 3.5–5.3)
PROT SERPL-MCNC: 7.5 G/DL (ref 6.3–8.7)
RBC # BLD AUTO: 4.88 10*6/MM3 (ref 4.2–5.4)
SODIUM SERPL-SCNC: 143 MMOL/L (ref 135–145)
T4 FREE SERPL-MCNC: 0.98 NG/DL (ref 0.78–2.19)
TRIGL SERPL-MCNC: 104 MG/DL (ref 0–149)
TSH SERPL DL<=0.005 MIU/L-ACNC: 0.98 MIU/ML (ref 0.47–4.68)
VLDLC SERPL CALC-MCNC: 20.8 MG/DL
WBC # BLD AUTO: 6.18 10*3/MM3 (ref 4.8–10.8)

## 2017-08-29 ENCOUNTER — OFFICE VISIT (OUTPATIENT)
Dept: FAMILY MEDICINE CLINIC | Facility: CLINIC | Age: 52
End: 2017-08-29

## 2017-08-29 VITALS
HEART RATE: 82 BPM | WEIGHT: 140.8 LBS | TEMPERATURE: 98.5 F | SYSTOLIC BLOOD PRESSURE: 104 MMHG | DIASTOLIC BLOOD PRESSURE: 70 MMHG | HEIGHT: 65 IN | BODY MASS INDEX: 23.46 KG/M2 | OXYGEN SATURATION: 96 %

## 2017-08-29 DIAGNOSIS — Z00.00 ANNUAL PHYSICAL EXAM: Primary | ICD-10-CM

## 2017-08-29 DIAGNOSIS — Z12.12 SCREENING FOR MALIGNANT NEOPLASM OF THE RECTUM: ICD-10-CM

## 2017-08-29 DIAGNOSIS — Z23 NEED FOR PROPHYLACTIC VACCINATION AGAINST STREPTOCOCCUS PNEUMONIAE (PNEUMOCOCCUS): ICD-10-CM

## 2017-08-29 LAB
BILIRUB BLD-MCNC: NEGATIVE MG/DL
CLARITY, POC: CLEAR
COLOR UR: YELLOW
GLUCOSE UR STRIP-MCNC: NEGATIVE MG/DL
KETONES UR QL: NEGATIVE
LEUKOCYTE EST, POC: NEGATIVE
NITRITE UR-MCNC: NEGATIVE MG/ML
PH UR: 5 [PH] (ref 5–8)
PROT UR STRIP-MCNC: ABNORMAL MG/DL
RBC # UR STRIP: NEGATIVE /UL
SP GR UR: 1.03 (ref 1–1.03)
UROBILINOGEN UR QL: NORMAL

## 2017-08-29 PROCEDURE — 81003 URINALYSIS AUTO W/O SCOPE: CPT | Performed by: FAMILY MEDICINE

## 2017-08-29 PROCEDURE — 99396 PREV VISIT EST AGE 40-64: CPT | Performed by: FAMILY MEDICINE

## 2017-08-29 PROCEDURE — 90471 IMMUNIZATION ADMIN: CPT | Performed by: FAMILY MEDICINE

## 2017-08-29 PROCEDURE — 90732 PPSV23 VACC 2 YRS+ SUBQ/IM: CPT | Performed by: FAMILY MEDICINE

## 2017-08-29 NOTE — PROGRESS NOTES
Stephanie Suarez is a 52 y.o. female.     Hyperlipidemia   This is a chronic problem. The current episode started more than 1 year ago. The problem is controlled. Recent lipid tests were reviewed and are normal. Pertinent negatives include no chest pain. Current antihyperlipidemic treatment includes statins. The current treatment provides significant improvement of lipids. Compliance problems include adherence to exercise.  Risk factors for coronary artery disease include family history and post-menopausal.        The following portions of the patient's history were reviewed and updated as appropriate: allergies, current medications, past family history, past medical history, past social history, past surgical history and problem list.    Review of Systems   Cardiovascular: Negative for chest pain.   Genitourinary:        Mammograms gynecologic checks by other providers   Musculoskeletal: Negative.    Psychiatric/Behavioral:        Mood stable on antidepressants and minor tranquilizers   All other systems reviewed and are negative.      Objective   Physical Exam   Constitutional: She is oriented to person, place, and time. She appears well-developed and well-nourished.   HENT:   Head: Normocephalic.   Right Ear: External ear normal.   Left Ear: External ear normal.   Mouth/Throat: Oropharynx is clear and moist.   Eyes: EOM are normal. Pupils are equal, round, and reactive to light.   Neck: Normal range of motion. Neck supple. No thyromegaly present.   Cardiovascular: Normal rate and regular rhythm.    Pulmonary/Chest: Effort normal and breath sounds normal.   Abdominal: Soft. Bowel sounds are normal. She exhibits no distension. There is no tenderness.   Musculoskeletal: Normal range of motion.   Lymphadenopathy:     She has no cervical adenopathy.   Neurological: She is alert and oriented to person, place, and time.   Skin: Skin is warm and dry.   Psychiatric: She has a normal mood and affect. Her behavior  is normal. Judgment and thought content normal.   Vitals reviewed.      Assessment/Plan   Problems Addressed this Visit     None      Visit Diagnoses     Annual physical exam    -  Primary    Relevant Orders    POCT urinalysis dipstick, automated (Completed)    Screening for malignant neoplasm of the rectum        Relevant Orders    POC Occult Blood X 3, Stool    Need for prophylactic vaccination against Streptococcus pneumoniae (pneumococcus)        Relevant Orders    Pneumococcal Polysaccharide Vaccine 23-Valent Greater Than or Equal To 3yo Subcutaneous / IM          T walking same meds etc.

## 2017-09-05 RX ORDER — ATORVASTATIN CALCIUM 40 MG/1
40 TABLET, FILM COATED ORAL NIGHTLY
Qty: 90 TABLET | Refills: 1 | Status: SHIPPED | OUTPATIENT
Start: 2017-09-05 | End: 2017-09-28 | Stop reason: SDUPTHER

## 2017-09-18 ENCOUNTER — OFFICE VISIT (OUTPATIENT)
Dept: FAMILY MEDICINE CLINIC | Facility: CLINIC | Age: 52
End: 2017-09-18

## 2017-09-18 VITALS
TEMPERATURE: 98.5 F | HEART RATE: 77 BPM | SYSTOLIC BLOOD PRESSURE: 98 MMHG | HEIGHT: 65 IN | OXYGEN SATURATION: 97 % | BODY MASS INDEX: 23.66 KG/M2 | DIASTOLIC BLOOD PRESSURE: 64 MMHG | WEIGHT: 142 LBS

## 2017-09-18 DIAGNOSIS — J06.9 ACUTE URI: Primary | ICD-10-CM

## 2017-09-18 PROCEDURE — 99213 OFFICE O/P EST LOW 20 MIN: CPT | Performed by: FAMILY MEDICINE

## 2017-09-18 PROCEDURE — 96372 THER/PROPH/DIAG INJ SC/IM: CPT | Performed by: FAMILY MEDICINE

## 2017-09-18 RX ORDER — METHYLPREDNISOLONE ACETATE 40 MG/ML
40 INJECTION, SUSPENSION INTRA-ARTICULAR; INTRALESIONAL; INTRAMUSCULAR; SOFT TISSUE ONCE
Status: COMPLETED | OUTPATIENT
Start: 2017-09-18 | End: 2017-09-18

## 2017-09-18 RX ADMIN — METHYLPREDNISOLONE ACETATE 40 MG: 40 INJECTION, SUSPENSION INTRA-ARTICULAR; INTRALESIONAL; INTRAMUSCULAR; SOFT TISSUE at 09:13

## 2017-09-18 NOTE — PROGRESS NOTES
Subjective   Shawnee Suarez is a 52 y.o. female.     URI    This is a new problem. The current episode started 1 to 4 weeks ago. The problem has been unchanged. There has been no fever. Associated symptoms include congestion, sinus pain and sneezing. She has tried antihistamine and inhaler use for the symptoms. The treatment provided mild relief.       The following portions of the patient's history were reviewed and updated as appropriate: allergies, current medications, past family history, past medical history, past social history, past surgical history and problem list.    Review of Systems   Constitutional: Negative for chills, fatigue and fever.   HENT: Positive for congestion and sneezing.        Objective   Physical Exam   Constitutional: She is oriented to person, place, and time. She appears well-developed and well-nourished.   HENT:   Right Ear: External ear normal.   Left Ear: External ear normal.   Nose: Nose normal.   Mouth/Throat: Oropharynx is clear and moist.   Eyes: Conjunctivae are normal.   Cardiovascular: Normal rate and regular rhythm.    Pulmonary/Chest: Effort normal and breath sounds normal.   Lymphadenopathy:     She has no cervical adenopathy.   Neurological: She is alert and oriented to person, place, and time.   Skin: Skin is warm.   Psychiatric: She has a normal mood and affect. Her behavior is normal. Thought content normal.   Nursing note and vitals reviewed.      Assessment/Plan   Shawnee was seen today for uri.    Diagnoses and all orders for this visit:    Acute URI  -     methylPREDNISolone acetate (DEPO-medrol) injection 40 mg; Inject 1 mL into the shoulder, thigh, or buttocks 1 (One) Time.       Allergic sinusitis-above plus continue Zyrtec and Flonase

## 2017-09-28 RX ORDER — ATORVASTATIN CALCIUM 40 MG/1
40 TABLET, FILM COATED ORAL NIGHTLY
Qty: 90 TABLET | Refills: 1 | Status: SHIPPED | OUTPATIENT
Start: 2017-09-28 | End: 2018-04-13 | Stop reason: SDUPTHER

## 2017-10-02 ENCOUNTER — OFFICE VISIT (OUTPATIENT)
Dept: FAMILY MEDICINE CLINIC | Facility: CLINIC | Age: 52
End: 2017-10-02

## 2017-10-02 VITALS
HEIGHT: 65 IN | TEMPERATURE: 98.2 F | WEIGHT: 143.2 LBS | DIASTOLIC BLOOD PRESSURE: 68 MMHG | BODY MASS INDEX: 23.86 KG/M2 | OXYGEN SATURATION: 97 % | HEART RATE: 78 BPM | SYSTOLIC BLOOD PRESSURE: 106 MMHG

## 2017-10-02 DIAGNOSIS — Z23 NEED FOR INFLUENZA VACCINATION: ICD-10-CM

## 2017-10-02 DIAGNOSIS — F41.9 ANXIETY: Primary | ICD-10-CM

## 2017-10-02 PROCEDURE — 90630 INFLUENZA VAC INTRADERMAL QUADRIVALENT: CPT | Performed by: FAMILY MEDICINE

## 2017-10-02 PROCEDURE — 90471 IMMUNIZATION ADMIN: CPT | Performed by: FAMILY MEDICINE

## 2017-10-02 PROCEDURE — 99213 OFFICE O/P EST LOW 20 MIN: CPT | Performed by: FAMILY MEDICINE

## 2017-10-02 RX ORDER — CLONAZEPAM 1 MG/1
1 TABLET ORAL 2 TIMES DAILY PRN
Qty: 60 TABLET | Refills: 2 | Status: SHIPPED | OUTPATIENT
Start: 2017-10-02 | End: 2017-12-07 | Stop reason: SDUPTHER

## 2017-10-02 NOTE — PROGRESS NOTES
Subjective   Shawnee Suarez is a 52 y.o. female.     Anxiety   Presents for follow-up visit. Symptoms include depressed mood, excessive worry, nervous/anxious behavior and restlessness. Symptoms occur occasionally. The severity of symptoms is moderate. The quality of sleep is fair. Nighttime awakenings: occasional.           The following portions of the patient's history were reviewed and updated as appropriate: allergies, current medications, past family history, past medical history, past social history, past surgical history and problem list.    Review of Systems   Psychiatric/Behavioral: Positive for dysphoric mood. The patient is nervous/anxious.        Objective   Physical Exam   Constitutional: She is oriented to person, place, and time. She appears well-developed and well-nourished.   Neurological: She is alert and oriented to person, place, and time.   Psychiatric: She has a normal mood and affect. Her behavior is normal. Thought content normal.   Nursing note and vitals reviewed.      Assessment/Plan   Shawnee was seen today for med refill.    Diagnoses and all orders for this visit:    Anxiety    Other orders  -     clonazePAM (KlonoPIN) 1 MG tablet; Take 1 tablet by mouth 2 (Two) Times a Day As Needed for Seizures.       Much improved on combination of above medicines

## 2017-12-07 RX ORDER — CLONAZEPAM 1 MG/1
1 TABLET ORAL 2 TIMES DAILY PRN
Qty: 60 TABLET | Refills: 0 | Status: SHIPPED | OUTPATIENT
Start: 2017-12-07 | End: 2018-01-15 | Stop reason: SDUPTHER

## 2018-01-02 RX ORDER — ZOLMITRIPTAN 2.5 MG/1
TABLET, FILM COATED ORAL
Qty: 6 TABLET | Refills: 3 | Status: SHIPPED | OUTPATIENT
Start: 2018-01-02 | End: 2018-01-17 | Stop reason: SDUPTHER

## 2018-01-15 ENCOUNTER — OFFICE VISIT (OUTPATIENT)
Dept: FAMILY MEDICINE CLINIC | Facility: CLINIC | Age: 53
End: 2018-01-15

## 2018-01-15 VITALS
SYSTOLIC BLOOD PRESSURE: 110 MMHG | OXYGEN SATURATION: 95 % | WEIGHT: 150.4 LBS | TEMPERATURE: 98.1 F | DIASTOLIC BLOOD PRESSURE: 62 MMHG | BODY MASS INDEX: 25.68 KG/M2 | HEART RATE: 79 BPM | HEIGHT: 64 IN

## 2018-01-15 DIAGNOSIS — F41.9 ANXIETY: Primary | ICD-10-CM

## 2018-01-15 PROCEDURE — 99212 OFFICE O/P EST SF 10 MIN: CPT | Performed by: FAMILY MEDICINE

## 2018-01-15 RX ORDER — CLONAZEPAM 1 MG/1
1 TABLET ORAL 2 TIMES DAILY PRN
Qty: 60 TABLET | Refills: 0 | Status: SHIPPED | OUTPATIENT
Start: 2018-01-15 | End: 2018-04-03 | Stop reason: SDUPTHER

## 2018-01-15 NOTE — PROGRESS NOTES
Subjective   Shawnee Suarez is a 52 y.o. female.     Anxiety   Presents for follow-up visit. Symptoms include depressed mood, excessive worry, nervous/anxious behavior and obsessions. Patient reports no suicidal ideas. Symptoms occur occasionally. The severity of symptoms is mild. The quality of sleep is fair. Nighttime awakenings: occasional.           The following portions of the patient's history were reviewed and updated as appropriate: allergies, current medications, past family history, past medical history, past social history, past surgical history and problem list.    Review of Systems   Gastrointestinal: Negative for diarrhea.   Psychiatric/Behavioral: Positive for dysphoric mood. Negative for suicidal ideas. The patient is nervous/anxious.        Objective   Physical Exam   Constitutional: She is oriented to person, place, and time. She appears well-developed and well-nourished.   Neurological: She is alert and oriented to person, place, and time.   Psychiatric: She has a normal mood and affect. Her behavior is normal. Judgment and thought content normal.   Nursing note and vitals reviewed.      Assessment/Plan   Shawnee was seen today for med refill.    Diagnoses and all orders for this visit:    Anxiety    Other orders  -     clonazePAM (KlonoPIN) 1 MG tablet; Take 1 tablet by mouth 2 (Two) Times a Day As Needed for Seizures.     Plan-above

## 2018-01-17 RX ORDER — ZOLMITRIPTAN 2.5 MG/1
2.5 TABLET, FILM COATED ORAL 2 TIMES DAILY
Qty: 6 TABLET | Refills: 3 | Status: SHIPPED | OUTPATIENT
Start: 2018-01-17 | End: 2018-04-12 | Stop reason: SDUPTHER

## 2018-01-17 NOTE — TELEPHONE ENCOUNTER
REFILL ZOLMITRIPTAN 2.5MG TAKE AS DIRECTED BY MOUTH.    #6    FAX TO [facility]

## 2018-04-03 ENCOUNTER — OFFICE VISIT (OUTPATIENT)
Dept: FAMILY MEDICINE CLINIC | Facility: CLINIC | Age: 53
End: 2018-04-03

## 2018-04-03 VITALS
SYSTOLIC BLOOD PRESSURE: 110 MMHG | BODY MASS INDEX: 25.88 KG/M2 | OXYGEN SATURATION: 98 % | HEIGHT: 64 IN | TEMPERATURE: 98.4 F | DIASTOLIC BLOOD PRESSURE: 66 MMHG | WEIGHT: 151.6 LBS | HEART RATE: 81 BPM

## 2018-04-03 DIAGNOSIS — F41.9 ANXIETY: Primary | ICD-10-CM

## 2018-04-03 DIAGNOSIS — Z79.899 ENCOUNTER FOR LONG-TERM (CURRENT) USE OF MEDICATIONS: ICD-10-CM

## 2018-04-03 PROCEDURE — 99213 OFFICE O/P EST LOW 20 MIN: CPT | Performed by: FAMILY MEDICINE

## 2018-04-03 RX ORDER — CLONAZEPAM 1 MG/1
1 TABLET ORAL 2 TIMES DAILY PRN
Qty: 60 TABLET | Refills: 0 | Status: SHIPPED | OUTPATIENT
Start: 2018-04-03 | End: 2018-05-16 | Stop reason: SDUPTHER

## 2018-04-03 NOTE — PROGRESS NOTES
Subjective   Shawnee Suarez is a 52 y.o. female.     Anxiety   Presents for follow-up visit. Symptoms include depressed mood, excessive worry and nervous/anxious behavior. Patient reports no suicidal ideas. Symptoms occur most days. The quality of sleep is fair. Nighttime awakenings: occasional.     Compliance with medications is %.       The following portions of the patient's history were reviewed and updated as appropriate: allergies, current medications, past family history, past medical history, past social history, past surgical history and problem list.    Review of Systems   Genitourinary:        Mother has ovarian cancer talked about being tested for BRCA1 and 2   Psychiatric/Behavioral: Negative for suicidal ideas. The patient is nervous/anxious.         Extensive conversation about CBT therapy and mindfulness concept       Objective   Physical Exam   Constitutional: She is oriented to person, place, and time. She appears well-developed and well-nourished.   Neurological: She is alert and oriented to person, place, and time.   Psychiatric: She has a normal mood and affect. Her behavior is normal. Judgment and thought content normal.   Vitals reviewed.      Assessment/Plan   Shawnee was seen today for med refill.    Diagnoses and all orders for this visit:    Anxiety    Other orders  -     clonazePAM (KlonoPIN) 1 MG tablet; Take 1 tablet by mouth 2 (Two) Times a Day As Needed for Seizures.         Plan above-get time magazines addition of mindfulness

## 2018-04-06 LAB — DRUGS UR: NORMAL

## 2018-04-12 RX ORDER — ZOLMITRIPTAN 2.5 MG/1
2.5 TABLET, FILM COATED ORAL 2 TIMES DAILY
Qty: 6 TABLET | Refills: 3 | Status: SHIPPED | OUTPATIENT
Start: 2018-04-12 | End: 2018-06-20 | Stop reason: SDUPTHER

## 2018-04-13 RX ORDER — ATORVASTATIN CALCIUM 40 MG/1
40 TABLET, FILM COATED ORAL NIGHTLY
Qty: 90 TABLET | Refills: 0 | Status: SHIPPED | OUTPATIENT
Start: 2018-04-13 | End: 2018-04-17 | Stop reason: SDUPTHER

## 2018-04-17 RX ORDER — ATORVASTATIN CALCIUM 40 MG/1
40 TABLET, FILM COATED ORAL NIGHTLY
Qty: 90 TABLET | Refills: 1 | Status: SHIPPED | OUTPATIENT
Start: 2018-04-17 | End: 2018-06-20 | Stop reason: SDUPTHER

## 2018-04-18 ENCOUNTER — TELEPHONE (OUTPATIENT)
Dept: FAMILY MEDICINE CLINIC | Facility: CLINIC | Age: 53
End: 2018-04-18

## 2018-05-10 ENCOUNTER — OFFICE VISIT (OUTPATIENT)
Dept: OBSTETRICS AND GYNECOLOGY | Facility: CLINIC | Age: 53
End: 2018-05-10

## 2018-05-10 VITALS
HEIGHT: 64 IN | SYSTOLIC BLOOD PRESSURE: 102 MMHG | BODY MASS INDEX: 26.46 KG/M2 | WEIGHT: 155 LBS | DIASTOLIC BLOOD PRESSURE: 66 MMHG

## 2018-05-10 DIAGNOSIS — Z12.39 SCREENING FOR BREAST CANCER: ICD-10-CM

## 2018-05-10 DIAGNOSIS — Z13.820 SCREENING FOR OSTEOPOROSIS: ICD-10-CM

## 2018-05-10 DIAGNOSIS — Z80.41 FAMILY HISTORY OF OVARIAN CANCER: Primary | ICD-10-CM

## 2018-05-10 PROCEDURE — 99213 OFFICE O/P EST LOW 20 MIN: CPT | Performed by: NURSE PRACTITIONER

## 2018-05-10 NOTE — PROGRESS NOTES
"Subjective     Shawnee Suarez is a 53 y.o. female    Pt's mother was DX with Ovarian cancer and she wishes to have genetic screening. Pt is also due for yearly, Mammo and BD. That will be scheduled.       Other   This is a new problem. The current episode started 1 to 4 weeks ago. Pertinent negatives include no abdominal pain, anorexia, arthralgias, change in bowel habit, chest pain, chills, congestion, coughing, diaphoresis, fatigue, fever, headaches, joint swelling, myalgias, nausea, neck pain, numbness, rash, sore throat, swollen glands, urinary symptoms, vertigo, visual change, vomiting or weakness. Nothing aggravates the symptoms. She has tried nothing for the symptoms.         /66   Ht 162.6 cm (64\")   Wt 70.3 kg (155 lb)   LMP 01/10/2017 Comment: Ablation  BMI 26.61 kg/m²     Outpatient Encounter Prescriptions as of 5/10/2018   Medication Sig Dispense Refill   • atorvastatin (LIPITOR) 40 MG tablet Take 1 tablet by mouth Every Night. 90 tablet 1   • Cholecalciferol (VITAMIN D3) 5000 units capsule capsule Take 5,000 Units by mouth Daily.     • clonazePAM (KlonoPIN) 1 MG tablet Take 1 tablet by mouth 2 (Two) Times a Day As Needed for Seizures. 60 tablet 0   • FIBER SELECT GUMMIES PO Take  by mouth Daily.     • Lactobacillus (REPHRESH PRO-B PO) Take 1 tablet by mouth Daily.     • loratadine (ALLERGY) 10 MG tablet Take 10 mg by mouth Daily.     • Multiple Vitamins-Minerals (WOMENS 50+ MULTI VITAMIN/MIN PO) Take  by mouth.     • Vortioxetine HBr (TRINTELLIX) 20 MG tablet Take 20 mg by mouth Daily. 90 tablet 3   • ZOLMitriptan (ZOMIG) 2.5 MG tablet Take 1 tablet by mouth 2 (Two) Times a Day. 6 tablet 3     No facility-administered encounter medications on file as of 5/10/2018.        Surgical History  Past Surgical History:   Procedure Laterality Date   • APPENDECTOMY     • CHOLECYSTECTOMY     • D&C HYSTEROSCOPY ENDOMETRIAL ABLATION N/A 2/6/2017    Procedure: DILATATION AND CURETTAGE HYSTEROSCOPY NOVASURE " ENDOMETRIAL ABLATION;  Surgeon: Ana Barrios MD;  Location: [facility];  Service:    • OVARIAN CYST REMOVAL         Family History  Family History   Problem Relation Age of Onset   • Ovarian cancer Mother 72   • Brain cancer Father    • No Known Problems Brother    • No Known Problems Daughter    • No Known Problems Maternal Grandmother    • No Known Problems Maternal Grandfather    • No Known Problems Paternal Grandmother    • Brain cancer Paternal Grandfather    • Heart disease Brother    • No Known Problems Daughter    • Breast cancer Other 28   • Uterine cancer Neg Hx    • Colon cancer Neg Hx    • Melanoma Neg Hx    • Prostate cancer Neg Hx        The following portions of the patient's history were reviewed and updated as appropriate: allergies, current medications, past family history, past medical history, past social history, past surgical history and problem list.    Review of Systems   Constitutional: Negative for activity change, appetite change, chills, diaphoresis, fatigue, fever and unexpected weight change.   HENT: Negative for congestion, dental problem, drooling, ear discharge, ear pain, facial swelling, hearing loss, mouth sores, nosebleeds, postnasal drip, rhinorrhea, sinus pain, sinus pressure, sneezing, sore throat, tinnitus, trouble swallowing and voice change.    Eyes: Negative for photophobia, pain, discharge, redness, itching and visual disturbance.   Respiratory: Negative for apnea, cough, choking, chest tightness, shortness of breath, wheezing and stridor.    Cardiovascular: Negative for chest pain, palpitations and leg swelling.   Gastrointestinal: Negative for abdominal distention, abdominal pain, anal bleeding, anorexia, blood in stool, change in bowel habit, constipation, diarrhea, nausea, rectal pain and vomiting.   Endocrine: Negative for cold intolerance, heat intolerance, polydipsia, polyphagia and polyuria.   Genitourinary: Negative for decreased urine volume, difficulty  urinating, dyspareunia, dysuria, enuresis, flank pain, frequency, genital sores, hematuria, menstrual problem, pelvic pain, urgency, vaginal bleeding, vaginal discharge and vaginal pain.   Musculoskeletal: Negative for arthralgias, back pain, gait problem, joint swelling, myalgias, neck pain and neck stiffness.   Skin: Negative for color change, pallor, rash and wound.   Allergic/Immunologic: Negative for environmental allergies, food allergies and immunocompromised state.   Neurological: Negative for dizziness, vertigo, tremors, seizures, syncope, facial asymmetry, speech difficulty, weakness, light-headedness, numbness and headaches.   Hematological: Negative for adenopathy. Does not bruise/bleed easily.   Psychiatric/Behavioral: Negative for agitation, behavioral problems, confusion, decreased concentration, dysphoric mood, hallucinations, self-injury, sleep disturbance and suicidal ideas. The patient is not nervous/anxious and is not hyperactive.        Objective   Physical Exam   Constitutional: She is oriented to person, place, and time. She appears well-developed and well-nourished.   HENT:   Head: Normocephalic and atraumatic.   Eyes: Conjunctivae are normal. Right eye exhibits no discharge. Left eye exhibits no discharge.   Neck: Normal range of motion. Neck supple. No thyromegaly present.   Cardiovascular: Normal rate, regular rhythm and normal heart sounds.    Pulmonary/Chest: Effort normal and breath sounds normal.   Neurological: She is alert and oriented to person, place, and time.   Skin: Skin is warm and dry.   Psychiatric: She has a normal mood and affect. Her behavior is normal. Judgment and thought content normal.   Nursing note and vitals reviewed.      Assessment/Plan   Shawnee was seen today for genetic evaluation.    Diagnoses and all orders for this visit:    Family history of ovarian cancer  Comments:  Pt's mother was recently DX with Ovarian cancer and had surgery with Dr. Reddy. She is  doing well. Pt wishes to have genetic screening done.    Screening for osteoporosis  Comments:  Bone Density is scheduled and pt will RTO for yearly check up on the same day.  Orders:  -     DEXA Bone Density Axial; Future    Screening for breast cancer  Comments:  mammogram is scheduled at [facility].  Orders:  -     Mammo Screening Digital Tomosynthesis Bilateral With CAD; Future         Patient's Body mass index is 26.61 kg/m². BMI is above normal parameters. Recommendations include: educational material, exercise counseling and nutrition counseling.      Janett Davila, APRN  5/10/2018

## 2018-05-10 NOTE — PROGRESS NOTES
Attempted to obtain health maintenance information, patient unable to provide answers for the following items Mammogram, Pneumococcal Vaccine, HPV Vaccine, Chlamydia Screening  and Zoster Vaccine.

## 2018-05-10 NOTE — PATIENT INSTRUCTIONS

## 2018-05-14 ENCOUNTER — DOCUMENTATION (OUTPATIENT)
Dept: OBSTETRICS AND GYNECOLOGY | Facility: CLINIC | Age: 53
End: 2018-05-14

## 2018-05-16 RX ORDER — CLONAZEPAM 1 MG/1
1 TABLET ORAL 2 TIMES DAILY PRN
Qty: 60 TABLET | Refills: 1 | Status: SHIPPED | OUTPATIENT
Start: 2018-05-16 | End: 2018-05-16 | Stop reason: SDUPTHER

## 2018-05-16 RX ORDER — CLONAZEPAM 1 MG/1
1 TABLET ORAL 2 TIMES DAILY PRN
Qty: 60 TABLET | Refills: 1 | Status: SHIPPED | OUTPATIENT
Start: 2018-05-16 | End: 2018-06-20 | Stop reason: SDUPTHER

## 2018-05-30 ENCOUNTER — TELEPHONE (OUTPATIENT)
Dept: OBSTETRICS AND GYNECOLOGY | Facility: CLINIC | Age: 53
End: 2018-05-30

## 2018-05-30 NOTE — TELEPHONE ENCOUNTER
Talked with patient after she called back and is given results of genetic screening. Positive for colon cancer. Will call the Counsellor through [employer] and get her an appt to discuss with them. TRC

## 2018-06-07 ENCOUNTER — OFFICE VISIT (OUTPATIENT)
Dept: OBSTETRICS AND GYNECOLOGY | Facility: CLINIC | Age: 53
End: 2018-06-07

## 2018-06-07 ENCOUNTER — HOSPITAL ENCOUNTER (OUTPATIENT)
Dept: MAMMOGRAPHY | Facility: HOSPITAL | Age: 53
Discharge: HOME | End: 2018-06-07
Admitting: NURSE PRACTITIONER

## 2018-06-07 ENCOUNTER — HOSPITAL ENCOUNTER (OUTPATIENT)
Dept: BONE DENSITY | Facility: HOSPITAL | Age: 53
Discharge: HOME | End: 2018-06-07

## 2018-06-07 VITALS
HEIGHT: 64 IN | BODY MASS INDEX: 27.49 KG/M2 | SYSTOLIC BLOOD PRESSURE: 116 MMHG | DIASTOLIC BLOOD PRESSURE: 78 MMHG | WEIGHT: 161 LBS

## 2018-06-07 DIAGNOSIS — Z13.820 SCREENING FOR OSTEOPOROSIS: ICD-10-CM

## 2018-06-07 DIAGNOSIS — H92.03 OTALGIA OF BOTH EARS: ICD-10-CM

## 2018-06-07 DIAGNOSIS — D12.6 AUTOSOMAL RECESSIVE COLORECTAL ADENOMATOUS POLYPOSIS ASSOCIATED WITH MUTATION IN MUTYH GENE: ICD-10-CM

## 2018-06-07 DIAGNOSIS — Z12.39 SCREENING FOR BREAST CANCER: ICD-10-CM

## 2018-06-07 DIAGNOSIS — N95.1 MENOPAUSAL SYMPTOMS: ICD-10-CM

## 2018-06-07 DIAGNOSIS — Z12.11 SCREENING FOR COLON CANCER: ICD-10-CM

## 2018-06-07 DIAGNOSIS — Z01.419 WELL WOMAN EXAM WITH ROUTINE GYNECOLOGICAL EXAM: Primary | ICD-10-CM

## 2018-06-07 PROCEDURE — 77063 BREAST TOMOSYNTHESIS BI: CPT

## 2018-06-07 PROCEDURE — 99213 OFFICE O/P EST LOW 20 MIN: CPT | Performed by: NURSE PRACTITIONER

## 2018-06-07 PROCEDURE — 77080 DXA BONE DENSITY AXIAL: CPT

## 2018-06-07 PROCEDURE — 99396 PREV VISIT EST AGE 40-64: CPT | Performed by: NURSE PRACTITIONER

## 2018-06-07 PROCEDURE — 77067 SCR MAMMO BI INCL CAD: CPT

## 2018-06-07 PROCEDURE — G0123 SCREEN CERV/VAG THIN LAYER: HCPCS | Performed by: NURSE PRACTITIONER

## 2018-06-07 RX ORDER — AZITHROMYCIN 250 MG/1
TABLET, FILM COATED ORAL
Qty: 6 TABLET | Refills: 0 | Status: SHIPPED | OUTPATIENT
Start: 2018-06-07 | End: 2018-06-20

## 2018-06-07 RX ORDER — FLUTICASONE PROPIONATE 50 MCG
2 SPRAY, SUSPENSION (ML) NASAL DAILY
COMMUNITY

## 2018-06-07 NOTE — PROGRESS NOTES
Attempted to obtain health maintenance information, patient unable to provide answers for the following items Pneumococcal Vaccine, Diabetic Eye Exam, Diabetic Foot Exam, HPV Vaccine, Chlamydia Screening  and Zoster Vaccine.

## 2018-06-07 NOTE — PATIENT INSTRUCTIONS

## 2018-06-07 NOTE — PROGRESS NOTES
"Stephanie Suarez is a 53 y.o. female    Here for yearly check up. Has recently tested positive for the Colon cancer gene. Her mother was DX with ovarian cancer. She C/O an ear ache and sore throat.       Gynecologic Exam   The patient's pertinent negatives include no pelvic pain, vaginal bleeding or vaginal discharge. The patient is experiencing no pain. Associated symptoms include a sore throat. Pertinent negatives include no abdominal pain, anorexia, back pain, chills, constipation, diarrhea, discolored urine, dysuria, fever, flank pain, frequency, headaches, hematuria, joint pain, joint swelling, nausea, painful intercourse, rash, urgency or vomiting. She is sexually active. She is postmenopausal.   URI    This is a new problem. The current episode started in the past 7 days. The problem has been gradually worsening. There has been no fever. Associated symptoms include congestion, ear pain, a plugged ear sensation, sinus pain and a sore throat. Pertinent negatives include no abdominal pain, chest pain, coughing, diarrhea, dysuria, headaches, joint pain, joint swelling, nausea, neck pain, rash, rhinorrhea, sneezing, swollen glands, vomiting or wheezing. She has tried decongestant for the symptoms. The treatment provided no relief.         /78   Ht 162.6 cm (64\")   Wt 73 kg (161 lb)   LMP 01/07/2017 (Approximate)   BMI 27.64 kg/m²     Outpatient Encounter Prescriptions as of 6/7/2018   Medication Sig Dispense Refill   • atorvastatin (LIPITOR) 40 MG tablet Take 1 tablet by mouth Every Night. 90 tablet 1   • Cholecalciferol (VITAMIN D3) 5000 units capsule capsule Take 1,000 Units by mouth Daily.     • clonazePAM (KlonoPIN) 1 MG tablet Take 1 tablet by mouth 2 (Two) Times a Day As Needed for Seizures. 60 tablet 1   • FIBER SELECT GUMMIES PO Take  by mouth Daily.     • fluticasone (FLONASE) 50 MCG/ACT nasal spray 2 sprays into each nostril Daily.     • Lactobacillus (REPHRESH PRO-B PO) Take 1 tablet " by mouth Daily.     • loratadine (ALLERGY) 10 MG tablet Take 10 mg by mouth Daily.     • Multiple Vitamins-Minerals (WOMENS 50+ MULTI VITAMIN/MIN PO) Take  by mouth.     • Vortioxetine HBr (TRINTELLIX) 20 MG tablet Take 20 mg by mouth Daily. 90 tablet 3   • ZOLMitriptan (ZOMIG) 2.5 MG tablet Take 1 tablet by mouth 2 (Two) Times a Day. 6 tablet 3   • azithromycin (ZITHROMAX Z-VELVET) 250 MG tablet Take 2 tablets the first day, then 1 tablet daily for 4 days. 6 tablet 0     No facility-administered encounter medications on file as of 6/7/2018.        Surgical History  Past Surgical History:   Procedure Laterality Date   • APPENDECTOMY     • BREAST BIOPSY Right     unsure when   • CHOLECYSTECTOMY     • D&C HYSTEROSCOPY ENDOMETRIAL ABLATION N/A 2/6/2017    Procedure: DILATATION AND CURETTAGE HYSTEROSCOPY NOVASURE ENDOMETRIAL ABLATION;  Surgeon: Ana Barrios MD;  Location: [facility];  Service:    • OVARIAN CYST REMOVAL         Family History  Family History   Problem Relation Age of Onset   • Ovarian cancer Mother 72   • Brain cancer Father    • No Known Problems Brother    • No Known Problems Daughter    • No Known Problems Maternal Grandmother    • No Known Problems Maternal Grandfather    • No Known Problems Paternal Grandmother    • Brain cancer Paternal Grandfather    • Heart disease Brother    • No Known Problems Daughter    • Breast cancer Other 28   • No Known Problems Maternal Aunt    • No Known Problems Paternal Aunt    • Uterine cancer Neg Hx    • Colon cancer Neg Hx    • Melanoma Neg Hx    • Prostate cancer Neg Hx    • BRCA 1/2 Neg Hx    • Endometrial cancer Neg Hx        The following portions of the patient's history were reviewed and updated as appropriate: allergies, current medications, past family history, past medical history, past social history, past surgical history and problem list.    Review of Systems   Constitutional: Negative for activity change, appetite change, chills, diaphoresis, fatigue,  fever, unexpected weight gain and unexpected weight loss.   HENT: Positive for congestion, ear pain and sore throat. Negative for dental problem, drooling, ear discharge, facial swelling, hearing loss, mouth sores, nosebleeds, postnasal drip, rhinorrhea, sinus pressure, sneezing, tinnitus, trouble swallowing and voice change.    Eyes: Negative for blurred vision, double vision, photophobia, pain, discharge, redness, itching and visual disturbance.   Respiratory: Negative for apnea, cough, choking, chest tightness, shortness of breath, wheezing and stridor.    Cardiovascular: Negative for chest pain, palpitations and leg swelling.   Gastrointestinal: Negative for abdominal distention, abdominal pain, anal bleeding, anorexia, blood in stool, constipation, diarrhea, nausea, rectal pain, vomiting, GERD and indigestion.   Endocrine: Negative for cold intolerance, heat intolerance, polydipsia, polyphagia, polyuria and breast discharge.   Genitourinary: Negative for urinary incontinence, decreased libido, decreased urine volume, difficulty urinating, dyspareunia, dysuria, flank pain, frequency, genital sores, hematuria, menstrual problem, pelvic pain, urgency, vaginal bleeding, vaginal discharge, vaginal pain, breast lump and breast pain.   Musculoskeletal: Negative for arthralgias, back pain, gait problem, joint pain, joint swelling, myalgias, neck pain, neck stiffness and bursitis.   Skin: Negative for color change, dry skin, pallor, rash, skin lesions and bruise.   Allergic/Immunologic: Negative for environmental allergies, food allergies and immunocompromised state.   Neurological: Negative for dizziness, tremors, seizures, syncope, facial asymmetry, speech difficulty, weakness, light-headedness, numbness, headache, memory problem and confusion.   Hematological: Negative for adenopathy. Does not bruise/bleed easily.   Psychiatric/Behavioral: Negative for agitation, behavioral problems, decreased concentration,  dysphoric mood, hallucinations, self-injury, sleep disturbance, suicidal ideas, negative for hyperactivity, depressed mood and stress. The patient is not nervous/anxious.        Objective   Physical Exam   Constitutional: She is oriented to person, place, and time. She appears well-developed and well-nourished. No distress.   HENT:   Head: Normocephalic.   Right Ear: External ear normal. There is swelling and tenderness.   Left Ear: External ear normal. There is swelling and tenderness.   Nose: Nose normal.   Mouth/Throat: Posterior oropharyngeal erythema present.   Eyes: Conjunctivae are normal. Right eye exhibits no discharge. Left eye exhibits no discharge. No scleral icterus.   Neck: Normal range of motion. Neck supple. Carotid bruit is not present. No tracheal deviation present. No thyromegaly present.   Cardiovascular: Normal rate, regular rhythm, normal heart sounds and intact distal pulses.    No murmur heard.  Pulmonary/Chest: Effort normal and breath sounds normal. No respiratory distress. She has no wheezes. Right breast exhibits no inverted nipple, no mass, no nipple discharge, no skin change and no tenderness. Left breast exhibits no inverted nipple, no mass, no nipple discharge, no skin change and no tenderness. Breasts are symmetrical. There is no breast swelling.   Abdominal: Soft. She exhibits no distension and no mass. There is no tenderness. There is no guarding. No hernia. Hernia confirmed negative in the right inguinal area and confirmed negative in the left inguinal area.   Genitourinary: Rectum normal, vagina normal and uterus normal. Rectal exam shows no mass. No breast tenderness, discharge or bleeding. Pelvic exam was performed with patient supine. There is no rash, tenderness, lesion or injury on the right labia. There is no rash, tenderness, lesion or injury on the left labia. Uterus is not enlarged, not fixed and not tender. Cervix exhibits no motion tenderness, no discharge and no  friability. Right adnexum displays no mass, no tenderness and no fullness. Left adnexum displays no mass, no tenderness and no fullness. No erythema, tenderness or bleeding in the vagina. No foreign body in the vagina. No signs of injury around the vagina. No vaginal discharge found.   Genitourinary Comments:   BSU normal  Urethral meatus  Normal  Perineum  Normal   Musculoskeletal: Normal range of motion. She exhibits no edema or tenderness.   Lymphadenopathy:        Head (right side): No submental, no submandibular, no tonsillar, no preauricular, no posterior auricular and no occipital adenopathy present.        Head (left side): No submental, no submandibular, no tonsillar, no preauricular, no posterior auricular and no occipital adenopathy present.     She has no cervical adenopathy.        Right cervical: No superficial cervical, no deep cervical and no posterior cervical adenopathy present.       Left cervical: No superficial cervical, no deep cervical and no posterior cervical adenopathy present.     She has no axillary adenopathy.        Right: No inguinal adenopathy present.        Left: No inguinal adenopathy present.   Neurological: She is alert and oriented to person, place, and time. Coordination normal.   Skin: Skin is warm and dry. No bruising and no rash noted. She is not diaphoretic. No erythema.   Psychiatric: She has a normal mood and affect. Her behavior is normal. Judgment and thought content normal.   Nursing note and vitals reviewed.      Assessment/Plan   Shawnee was seen today for gynecologic exam.    Diagnoses and all orders for this visit:    Well woman exam with routine gynecological exam  Normal GYN exam. Will have lab work at PCP. Encouraged SBE, pt is aware how to do self breast exam and the importance of same. Discussed weight management and importance of maintaining a healthy weight. Discussed Vitamin D intake and the importance of adequate vitamin D for both Bone Health and a healthy  immune system.  Discussed Daily exercise and the importance of same, in regards to a healthy heart as well as helping to maintain her weight and improving her mental health.  BMI  27.6. Colonoscopy will be scheduled.  Mammogram and Bone Density were done today and are normal. Pap smear is done per [facility] guidelines.  -     Liquid-based Pap Smear, Screening    Menopausal symptoms  Comments:  Having a few hot flashes, but does not need any meds.    Screening for colon cancer  Comments:  Will see Gastro for Colonoscopy.  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 Will talk to Genetic Counselor.    Otalgia of both ears  Comments:  Both ears are red and fluid is noted. Zpack given.  Orders:  -     azithromycin (ZITHROMAX Z-VELVET) 250 MG tablet; Take 2 tablets the first day, then 1 tablet daily for 4 days.         Patient's Body mass index is 27.64 kg/m². BMI is above normal parameters. Recommendations include: educational material, exercise counseling and nutrition counseling.      Janett Davila, APRN  6/7/2018

## 2018-06-08 LAB
GEN CATEG CVX/VAG CYTO-IMP: NORMAL
LAB AP CASE REPORT: NORMAL
LAB AP GYN ADDITIONAL INFORMATION: NORMAL
Lab: NORMAL
PATH INTERP SPEC-IMP: NORMAL
STAT OF ADQ CVX/VAG CYTO-IMP: NORMAL

## 2018-06-20 ENCOUNTER — OFFICE VISIT (OUTPATIENT)
Dept: FAMILY MEDICINE CLINIC | Facility: CLINIC | Age: 53
End: 2018-06-20

## 2018-06-20 VITALS
DIASTOLIC BLOOD PRESSURE: 72 MMHG | BODY MASS INDEX: 27.35 KG/M2 | OXYGEN SATURATION: 98 % | HEIGHT: 64 IN | TEMPERATURE: 98.2 F | SYSTOLIC BLOOD PRESSURE: 110 MMHG | WEIGHT: 160.2 LBS | HEART RATE: 76 BPM

## 2018-06-20 DIAGNOSIS — F41.9 ANXIETY: Primary | ICD-10-CM

## 2018-06-20 PROCEDURE — 99213 OFFICE O/P EST LOW 20 MIN: CPT | Performed by: FAMILY MEDICINE

## 2018-06-20 RX ORDER — ATORVASTATIN CALCIUM 40 MG/1
40 TABLET, FILM COATED ORAL NIGHTLY
Qty: 90 TABLET | Refills: 0 | Status: SHIPPED | OUTPATIENT
Start: 2018-06-20 | End: 2018-09-10 | Stop reason: SDUPTHER

## 2018-06-20 RX ORDER — ZOLMITRIPTAN 2.5 MG/1
2.5 TABLET, FILM COATED ORAL 2 TIMES DAILY
Qty: 6 TABLET | Refills: 2 | Status: SHIPPED | OUTPATIENT
Start: 2018-06-20 | End: 2018-11-05 | Stop reason: SDUPTHER

## 2018-06-20 RX ORDER — CLONAZEPAM 1 MG/1
1 TABLET ORAL 2 TIMES DAILY PRN
Qty: 60 TABLET | Refills: 2 | Status: SHIPPED | OUTPATIENT
Start: 2018-06-20 | End: 2018-09-25 | Stop reason: SDUPTHER

## 2018-06-20 RX ORDER — CLONAZEPAM 1 MG/1
1 TABLET ORAL 2 TIMES DAILY PRN
Qty: 60 TABLET | Refills: 2 | Status: SHIPPED | OUTPATIENT
Start: 2018-06-20 | End: 2018-06-20 | Stop reason: SDUPTHER

## 2018-06-20 NOTE — PROGRESS NOTES
Subjective    CONTROLLED SUBSTANCE TRACKING 4/3/2018   Last [place] 4/3/2018   Report Number [id number]   Last UDS 4/3/2018   Last Controlled Substance Agreement 4/3/2018     Shawnee Suarez is a 53 y.o. female.     Anxiety   Presents for follow-up visit. Symptoms include depressed mood, excessive worry, nervous/anxious behavior and restlessness. Patient reports no suicidal ideas. Symptoms occur occasionally. The severity of symptoms is moderate. The quality of sleep is fair. Nighttime awakenings: occasional.     Side effects of treatment include headaches.       The following portions of the patient's history were reviewed and updated as appropriate: allergies, current medications, past family history, past medical history, past social history, past surgical history and problem list.    Review of Systems   Neurological: Positive for headaches.   Psychiatric/Behavioral: Positive for agitation. Negative for suicidal ideas. The patient is nervous/anxious.        Objective   Physical Exam   Constitutional: She is oriented to person, place, and time. She appears well-developed and well-nourished.   Overweight   Neurological: She is alert and oriented to person, place, and time.   Psychiatric: She has a normal mood and affect. Her behavior is normal. Thought content normal.   Nursing note and vitals reviewed.      Assessment/Plan   Shawnee was seen today for med refill.    Diagnoses and all orders for this visit:    Anxiety    Other orders  -     clonazePAM (KlonoPIN) 1 MG tablet; Take 1 tablet by mouth 2 (Two) Times a Day As Needed for Seizures.

## 2018-08-29 ENCOUNTER — OFFICE VISIT (OUTPATIENT)
Dept: GASTROENTEROLOGY | Facility: CLINIC | Age: 53
End: 2018-08-29

## 2018-08-29 VITALS
WEIGHT: 167 LBS | HEIGHT: 63 IN | BODY MASS INDEX: 29.59 KG/M2 | OXYGEN SATURATION: 98 % | DIASTOLIC BLOOD PRESSURE: 80 MMHG | SYSTOLIC BLOOD PRESSURE: 122 MMHG | HEART RATE: 79 BPM

## 2018-08-29 DIAGNOSIS — Z78.9 NONSMOKER: ICD-10-CM

## 2018-08-29 DIAGNOSIS — Z15.89 GENETIC SUSCEPTIBILITY TO OTHER DISEASE: Primary | ICD-10-CM

## 2018-08-29 PROBLEM — Z12.11 ENCOUNTER FOR SCREENING FOR MALIGNANT NEOPLASM OF COLON: Status: ACTIVE | Noted: 2018-08-29

## 2018-08-29 PROCEDURE — S0285 CNSLT BEFORE SCREEN COLONOSC: HCPCS | Performed by: CLINICAL NURSE SPECIALIST

## 2018-08-29 NOTE — PROGRESS NOTES
Shawnee Suarez  1965      8/29/2018  Chief Complaint   Patient presents with   • Colonoscopy     Subjective   HPI  Shawnee Suarez is a 53 y.o. female who presents as a referral for positive MUTYH-associated polyposis carrier with genetic testing performed by Janett Davila. Her last colonoscopy was in 2013. No polyps at that time. She has no complaints of nausea or vomiting. No change in bowels. No wt loss. No BRBPR. No melena. There is NO family hx for colon cancer. No abdominal pain.  Past Medical History:   Diagnosis Date   • Anxiety    • Depression    • Hyperlipidemia    • Ovarian cyst      Past Surgical History:   Procedure Laterality Date   • APPENDECTOMY     • BREAST BIOPSY Right     unsure when   • CHOLECYSTECTOMY     • COLONOSCOPY  11/22/2013    Normal exam repeat in 10 years   • D&C HYSTEROSCOPY ENDOMETRIAL ABLATION N/A 2/6/2017    Procedure: DILATATION AND CURETTAGE HYSTEROSCOPY NOVASURE ENDOMETRIAL ABLATION;  Surgeon: Ana Barrios MD;  Location: [facility] OR;  Service:    • OVARIAN CYST REMOVAL       Outpatient Prescriptions Marked as Taking for the 8/29/18 encounter (Office Visit) with Bree Fisher APRN   Medication Sig Dispense Refill   • atorvastatin (LIPITOR) 40 MG tablet Take 1 tablet by mouth Every Night. 90 tablet 0   • cholecalciferol (VITAMIN D3) 1000 units tablet Take 1,000 Units by mouth Daily.     • clonazePAM (KlonoPIN) 1 MG tablet Take 1 tablet by mouth 2 (Two) Times a Day As Needed for Seizures. 60 tablet 2   • FIBER SELECT GUMMIES PO Take  by mouth Daily.     • fluticasone (FLONASE) 50 MCG/ACT nasal spray 2 sprays into each nostril Daily.     • Lactobacillus (REPHRESH PRO-B PO) Take 1 tablet by mouth Daily.     • loratadine (ALLERGY) 10 MG tablet Take 10 mg by mouth Daily.     • Multiple Vitamins-Minerals (WOMENS 50+ MULTI VITAMIN/MIN PO) Take  by mouth.     • Vortioxetine HBr (TRINTELLIX) 20 MG tablet Take 20 mg by mouth Daily. 90 tablet 0   • ZOLMitriptan (ZOMIG) 2.5 MG tablet Take 1  tablet by mouth 2 (Two) Times a Day. 6 tablet 2     No Known Allergies  Social History     Social History   • Marital status:      Spouse name: N/A   • Number of children: N/A   • Years of education: N/A     Occupational History   • Not on file.     Social History Main Topics   • Smoking status: Never Smoker   • Smokeless tobacco: Never Used   • Alcohol use No   • Drug use: No   • Sexual activity: Yes     Birth control/ protection: Post-menopausal     Other Topics Concern   • Not on file     Social History Narrative   • No narrative on file     Family History   Problem Relation Age of Onset   • Ovarian cancer Mother 72   • Brain cancer Father    • No Known Problems Brother    • No Known Problems Daughter    • No Known Problems Maternal Grandmother    • No Known Problems Maternal Grandfather    • No Known Problems Paternal Grandmother    • Brain cancer Paternal Grandfather    • Heart disease Brother    • No Known Problems Daughter    • Breast cancer Other 28   • No Known Problems Maternal Aunt    • No Known Problems Paternal Aunt    • Uterine cancer Neg Hx    • Colon cancer Neg Hx    • Melanoma Neg Hx    • Prostate cancer Neg Hx    • BRCA 1/2 Neg Hx    • Endometrial cancer Neg Hx    • Colon polyps Neg Hx      Health Maintenance   Topic Date Due   • ZOSTER VACCINE (1 of 2) 04/15/2013   • LIPID PANEL  08/28/2018   • INFLUENZA VACCINE  08/01/2018   • ANNUAL PHYSICAL  08/30/2018   • MAMMOGRAM  06/07/2020   • PAP SMEAR  06/07/2021   • TDAP/TD VACCINES (2 - Td) 06/01/2025   • COLONOSCOPY  06/07/2028   • HEPATITIS C SCREENING  Completed       REVIEW OF SYSTEMS  General: well appearing, no fever chills or sweats, no unexplained wt loss  HEENT: no acute visual or hearing disturbances  Cardiovascular: No chest pain or palpitations  Pulmonary: No shortness of breath, coughing, wheezing or hemoptysis  : No burning, urgency, hematuria, or dysuria  Musculoskeletal: No joint pain or stiffness  Peripheral: no edema  Skin:  "No lesions or rashes  Neuro: No dizziness, headaches, stroke, syncope  Endocrine: No hot or cold intolerances  Hematological: No blood dyscrasias    Objective   Vitals:    08/29/18 0838   BP: 122/80   Pulse: 79   SpO2: 98%   Weight: 75.8 kg (167 lb)   Height: 160 cm (63\")     Body mass index is 29.58 kg/m².  Patient's Body mass index is 29.58 kg/m². BMI is above normal parameters. Recommendations include: no follow-up required.      PHYSICAL EXAM  General: age appropriate well nourished well appearing, no acute distress  Head: normocephalic and atraumatic  Global assessment-supple  Neck-No JVD noted, no lymphadenopathy  Pulmonary-clear to auscultation bilaterally, normal respiratory effort  Cardiovascular-normal rate and rhythm, normal heart sounds, S1 and S2 noted  Abdomen-soft, non tender, non distended, normal bowel sounds all 4 quadrants, no hepatosplenomegaly noted  Extremities-No clubbing cyanosis or edema  Neuro-Non focal, converses appropriately, awake, alert, oriented    Assessment/Plan     Shawnee was seen today for colonoscopy.    Diagnoses and all orders for this visit:    Genetic susceptibility to other disease  Comments:  Polyposis; increased risk for colon cancer  Orders:  -     Case Request; Standing  -     Follow Anesthesia Guidelines / Standing Orders; Future  -     Implement Anesthesia Orders Day of Procedure; Standing  -     Obtain Informed Consent; Standing  -     Verify bowel prep was successful; Standing  -     Case Request  -     Sod Picosulfate-Mag Ox-Cit Acd (CLENPIQ) 10-3.5-12 MG-GM -GM/160ML solution; Take 1 kit by mouth Take As Directed.        COLONOSCOPY WITH ANESTHESIA (N/A)  Body mass index is 29.58 kg/m².    Patient instructions on prep prior to procedure provided to the patient.    All risks, benefits, alternatives, and indications of colonoscopy procedure have been discussed with the patient. Risks to include perforation of the colon requiring possible surgery or colostomy, risk of " bleeding from biopsies or removal of colon tissue, possibility of missing a colon polyp or cancer, or adverse drug reaction.  Benefits to include the diagnosis and management of disease of the colon and rectum. Alternatives to include barium enema, radiographic evaluation, lab testing or no intervention. Pt verbalizes understanding and agrees.     Bree Fisher, MICHELLE  2018  9:01 AM      IF YOU SMOKE OR USE TOBACCO PLEASE READ THE FOLLOWIN minutes reading provided    Why is smoking bad for me?  Smoking increases the risk of heart disease, lung disease, vascular disease, stroke, and cancer.     If you smoke, STOP!    If you would like more information on quitting smoking, please visit the [employer] website: [email or web address]   This link will provide additional resources including the QUIT line and the Beat the Pack support groups.     For more information:    Quit Now [place]  -QUIT-NOW  [email or web address]/en-[email or web address]/    Obesity, Adult  Obesity is the condition of having too much total body fat. Being overweight or obese means that your weight is greater than what is considered healthy for your body size. Obesity is determined by a measurement called BMI. BMI is an estimate of body fat and is calculated from height and weight. For adults, a BMI of 30 or higher is considered obese.  Obesity can eventually lead to other health concerns and major illnesses, including:  · Stroke.  · Coronary artery disease (CAD).  · Type 2 diabetes.  · Some types of cancer, including cancers of the colon, breast, uterus, and gallbladder.  · Osteoarthritis.  · High blood pressure (hypertension).  · High cholesterol.  · Sleep apnea.  · Gallbladder stones.  · Infertility problems.  What are the causes?  The main cause of obesity is taking in (consuming) more calories than your body uses for energy. Other factors that contribute to this condition may  include:  · Being born with genes that make you more likely to become obese.  · Having a medical condition that causes obesity. These conditions include:  ¨ Hypothyroidism.  ¨ Polycystic ovarian syndrome (PCOS).  ¨ Binge-eating disorder.  ¨ Cushing syndrome.  · Taking certain medicines, such as steroids, antidepressants, and seizure medicines.  · Not being physically active (sedentary lifestyle).  · Living where there are limited places to exercise safely or buy healthy foods.  · Not getting enough sleep.  What increases the risk?  The following factors may increase your risk of this condition:  · Having a family history of obesity.  · Being a woman of -American descent.  · Being a man of  descent.  What are the signs or symptoms?  Having excessive body fat is the main symptom of this condition.  How is this diagnosed?  This condition may be diagnosed based on:  · Your symptoms.  · Your medical history.  · A physical exam. Your health care provider may measure:  ¨ Your BMI. If you are an adult with a BMI between 25 and less than 30, you are considered overweight. If you are an adult with a BMI of 30 or higher, you are considered obese.  ¨ The distances around your hips and your waist (circumferences). These may be compared to each other to help diagnose your condition.  ¨ Your skinfold thickness. Your health care provider may gently pinch a fold of your skin and measure it.  How is this treated?  Treatment for this condition often includes changing your lifestyle. Treatment may include some or all of the following:  · Dietary changes. Work with your health care provider and a dietitian to set a weight-loss goal that is healthy and reasonable for you. Dietary changes may include eating:  ¨ Smaller portions. A portion size is the amount of a particular food that is healthy for you to eat at one time. This varies from person to person.  ¨ Low-calorie or low-fat options.  ¨ More whole grains, fruits, and  vegetables.  · Regular physical activity. This may include aerobic activity (cardio) and strength training.  · Medicine to help you lose weight. Your health care provider may prescribe medicine if you are unable to lose 1 pound a week after 6 weeks of eating more healthily and doing more physical activity.  · Surgery. Surgical options may include gastric banding and gastric bypass. Surgery may be done if:  ¨ Other treatments have not helped to improve your condition.  ¨ You have a BMI of 40 or higher.  ¨ You have life-threatening health problems related to obesity.  Follow these instructions at home:     Eating and drinking     · Follow recommendations from your health care provider about what you eat and drink. Your health care provider may advise you to:  ¨ Limit fast foods, sweets, and processed snack foods.  ¨ Choose low-fat options, such as low-fat milk instead of whole milk.  ¨ Eat 5 or more servings of fruits or vegetables every day.  ¨ Eat at home more often. This gives you more control over what you eat.  ¨ Choose healthy foods when you eat out.  ¨ Learn what a healthy portion size is.  ¨ Keep low-fat snacks on hand.  ¨ Avoid sugary drinks, such as soda, fruit juice, iced tea sweetened with sugar, and flavored milk.  ¨ Eat a healthy breakfast.  · Drink enough water to keep your urine clear or pale yellow.  · Do not go without eating for long periods of time (do not fast) or follow a fad diet. Fasting and fad diets can be unhealthy and even dangerous.  Physical Activity   · Exercise regularly, as told by your health care provider. Ask your health care provider what types of exercise are safe for you and how often you should exercise.  · Warm up and stretch before being active.  · Cool down and stretch after being active.  · Rest between periods of activity.  Lifestyle   · Limit the time that you spend in front of your TV, computer, or video game system.  · Find ways to reward yourself that do not involve  food.  · Limit alcohol intake to no more than 1 drink a day for nonpregnant women and 2 drinks a day for men. One drink equals 12 oz of beer, 5 oz of wine, or 1½ oz of hard liquor.  General instructions   · Keep a weight loss journal to keep track of the food you eat and how much you exercise you get.  · Take over-the-counter and prescription medicines only as told by your health care provider.  · Take vitamins and supplements only as told by your health care provider.  · Consider joining a support group. Your health care provider may be able to recommend a support group.  · Keep all follow-up visits as told by your health care provider. This is important.  Contact a health care provider if:  · You are unable to meet your weight loss goal after 6 weeks of dietary and lifestyle changes.  This information is not intended to replace advice given to you by your health care provider. Make sure you discuss any questions you have with your health care provider.  Document Released: 01/25/2006 Document Revised: 05/22/2017 Document Reviewed: 10/05/2016  Elsevier Interactive Patient Education © 2017 Elsevier Inc.

## 2018-09-10 RX ORDER — ATORVASTATIN CALCIUM 40 MG/1
40 TABLET, FILM COATED ORAL NIGHTLY
Qty: 90 TABLET | Refills: 0 | Status: SHIPPED | OUTPATIENT
Start: 2018-09-10 | End: 2018-10-30

## 2018-09-10 NOTE — TELEPHONE ENCOUNTER
MED REFILL REQUEST      ATORVASTATIN 40MG (1) QHS  TRINTELLIX 20MG (1) QD      CONI        PT OVERDUE CPE-08/29/17

## 2018-09-25 ENCOUNTER — OFFICE VISIT (OUTPATIENT)
Dept: FAMILY MEDICINE CLINIC | Facility: CLINIC | Age: 53
End: 2018-09-25

## 2018-09-25 VITALS
SYSTOLIC BLOOD PRESSURE: 112 MMHG | HEIGHT: 63 IN | OXYGEN SATURATION: 97 % | WEIGHT: 165.2 LBS | HEART RATE: 84 BPM | TEMPERATURE: 98.2 F | BODY MASS INDEX: 29.27 KG/M2 | DIASTOLIC BLOOD PRESSURE: 77 MMHG

## 2018-09-25 DIAGNOSIS — Z00.00 ROUTINE GENERAL MEDICAL EXAMINATION AT A HEALTH CARE FACILITY: Primary | ICD-10-CM

## 2018-09-25 DIAGNOSIS — E55.9 VITAMIN D DEFICIENCY: ICD-10-CM

## 2018-09-25 DIAGNOSIS — Z00.00 ANNUAL PHYSICAL EXAM: ICD-10-CM

## 2018-09-25 DIAGNOSIS — Z23 NEED FOR SHINGLES VACCINE: ICD-10-CM

## 2018-09-25 DIAGNOSIS — Z23 NEED FOR INFLUENZA VACCINATION: ICD-10-CM

## 2018-09-25 LAB
25(OH)D3+25(OH)D2 SERPL-MCNC: 43.5 NG/ML (ref 30–100)
ALBUMIN SERPL-MCNC: 4.5 G/DL (ref 3.5–5)
ALBUMIN/GLOB SERPL: 1.5 G/DL (ref 1.1–2.5)
ALP SERPL-CCNC: 81 U/L (ref 24–120)
ALT SERPL-CCNC: 73 U/L (ref 0–54)
AST SERPL-CCNC: 41 U/L (ref 7–45)
BASOPHILS # BLD AUTO: 0.05 10*3/MM3 (ref 0–0.2)
BASOPHILS NFR BLD AUTO: 0.9 % (ref 0–2)
BILIRUB BLD-MCNC: NEGATIVE MG/DL
BILIRUB SERPL-MCNC: 0.8 MG/DL (ref 0.1–1)
BUN SERPL-MCNC: 18 MG/DL (ref 5–21)
BUN/CREAT SERPL: 27.7 (ref 7–25)
CALCIUM SERPL-MCNC: 10.2 MG/DL (ref 8.4–10.4)
CHLORIDE SERPL-SCNC: 103 MMOL/L (ref 98–110)
CHOLEST SERPL-MCNC: 208 MG/DL (ref 130–200)
CLARITY, POC: CLEAR
CO2 SERPL-SCNC: 26 MMOL/L (ref 24–31)
COLOR UR: YELLOW
CREAT SERPL-MCNC: 0.65 MG/DL (ref 0.5–1.4)
EOSINOPHIL # BLD AUTO: 0.13 10*3/MM3 (ref 0–0.7)
EOSINOPHIL NFR BLD AUTO: 2.4 % (ref 0–4)
ERYTHROCYTE [DISTWIDTH] IN BLOOD BY AUTOMATED COUNT: 12.6 % (ref 12–15)
GLOBULIN SER CALC-MCNC: 3.1 GM/DL
GLUCOSE SERPL-MCNC: 112 MG/DL (ref 70–100)
GLUCOSE UR STRIP-MCNC: NEGATIVE MG/DL
HCT VFR BLD AUTO: 49.2 % (ref 37–47)
HDLC SERPL-MCNC: 60 MG/DL
HGB BLD-MCNC: 16.1 G/DL (ref 12–16)
IMM GRANULOCYTES # BLD: 0.02 10*3/MM3 (ref 0–0.03)
IMM GRANULOCYTES NFR BLD: 0.4 % (ref 0–5)
KETONES UR QL: NEGATIVE
LDLC SERPL CALC-MCNC: 115 MG/DL (ref 0–99)
LEUKOCYTE EST, POC: NEGATIVE
LYMPHOCYTES # BLD AUTO: 1.57 10*3/MM3 (ref 0.72–4.86)
LYMPHOCYTES NFR BLD AUTO: 28.6 % (ref 15–45)
MCH RBC QN AUTO: 30.7 PG (ref 28–32)
MCHC RBC AUTO-ENTMCNC: 32.7 G/DL (ref 33–36)
MCV RBC AUTO: 93.7 FL (ref 82–98)
MONOCYTES # BLD AUTO: 0.62 10*3/MM3 (ref 0.19–1.3)
MONOCYTES NFR BLD AUTO: 11.3 % (ref 4–12)
NEUTROPHILS # BLD AUTO: 3.1 10*3/MM3 (ref 1.87–8.4)
NEUTROPHILS NFR BLD AUTO: 56.4 % (ref 39–78)
NITRITE UR-MCNC: NEGATIVE MG/ML
NRBC BLD AUTO-RTO: 0 /100 WBC (ref 0–0)
PH UR: 5 [PH] (ref 5–8)
PLATELET # BLD AUTO: 227 10*3/MM3 (ref 130–400)
POTASSIUM SERPL-SCNC: 4.5 MMOL/L (ref 3.5–5.3)
PROT SERPL-MCNC: 7.6 G/DL (ref 6.3–8.7)
PROT UR STRIP-MCNC: NEGATIVE MG/DL
RBC # BLD AUTO: 5.25 10*6/MM3 (ref 4.2–5.4)
RBC # UR STRIP: NEGATIVE /UL
SODIUM SERPL-SCNC: 143 MMOL/L (ref 135–145)
SP GR UR: 1.03 (ref 1–1.03)
T4 FREE SERPL-MCNC: 0.95 NG/DL (ref 0.78–2.19)
TRIGL SERPL-MCNC: 164 MG/DL (ref 0–149)
TSH SERPL DL<=0.005 MIU/L-ACNC: 0.26 MIU/ML (ref 0.47–4.68)
UROBILINOGEN UR QL: NORMAL
VLDLC SERPL CALC-MCNC: 32.8 MG/DL
WBC # BLD AUTO: 5.49 10*3/MM3 (ref 4.8–10.8)

## 2018-09-25 PROCEDURE — 90471 IMMUNIZATION ADMIN: CPT | Performed by: FAMILY MEDICINE

## 2018-09-25 PROCEDURE — 81003 URINALYSIS AUTO W/O SCOPE: CPT | Performed by: FAMILY MEDICINE

## 2018-09-25 PROCEDURE — 90674 CCIIV4 VAC NO PRSV 0.5 ML IM: CPT | Performed by: FAMILY MEDICINE

## 2018-09-25 PROCEDURE — 99396 PREV VISIT EST AGE 40-64: CPT | Performed by: FAMILY MEDICINE

## 2018-09-25 RX ORDER — CLONAZEPAM 1 MG/1
1 TABLET ORAL 2 TIMES DAILY PRN
Qty: 60 TABLET | Refills: 2 | Status: SHIPPED | OUTPATIENT
Start: 2018-09-25 | End: 2018-12-10 | Stop reason: SDUPTHER

## 2018-09-25 RX ORDER — CLONAZEPAM 1 MG/1
1 TABLET ORAL 2 TIMES DAILY PRN
Qty: 60 TABLET | Refills: 2 | Status: SHIPPED | OUTPATIENT
Start: 2018-09-25 | End: 2018-09-25 | Stop reason: SDUPTHER

## 2018-09-25 NOTE — PROGRESS NOTES
Stephanie Suarez is a 53 y.o. female.   CONTROLLED SUBSTANCE TRACKING 4/3/2018 9/25/2018   Last [place] 4/3/2018 9/25/2018   Report Number [id number]   Last UDS 4/3/2018 4/3/2018   Last Controlled Substance Agreement 4/3/2018 4/3/2018     Hyperlipidemia   This is a chronic problem. The current episode started more than 1 year ago. The problem is controlled. There are no known factors aggravating her hyperlipidemia. Pertinent negatives include no chest pain. Current antihyperlipidemic treatment includes diet change and statins. The current treatment provides moderate improvement of lipids. Compliance problems include adherence to diet and adherence to exercise.  Risk factors for coronary artery disease include dyslipidemia, family history, post-menopausal and a sedentary lifestyle.        The following portions of the patient's history were reviewed and updated as appropriate: allergies, current medications, past family history, past medical history, past social history, past surgical history and problem list.    Review of Systems   Cardiovascular: Negative for chest pain and leg swelling.   Psychiatric/Behavioral: The patient is nervous/anxious.    All other systems reviewed and are negative.      Objective   Physical Exam   Constitutional: She is oriented to person, place, and time.   Overweight   HENT:   Right Ear: External ear normal.   Left Ear: External ear normal.   Mouth/Throat: Oropharynx is clear and moist.   Eyes: Pupils are equal, round, and reactive to light. EOM are normal.   Neck: No thyromegaly present.   Good carotid pulses   Cardiovascular: Normal rate and regular rhythm.    Pulmonary/Chest: Effort normal and breath sounds normal.   Abdominal: Soft. Bowel sounds are normal.   Genitourinary:   Genitourinary Comments: GYN care by another provider   Musculoskeletal: She exhibits no edema.   Lymphadenopathy:     She has no cervical adenopathy.   Neurological: She is alert and oriented to  person, place, and time.   Skin: Skin is warm and dry. Capillary refill takes less than 2 seconds.   Psychiatric: She has a normal mood and affect. Her behavior is normal. Judgment and thought content normal.   Nursing note and vitals reviewed.      Assessment/Plan   Problems Addressed this Visit     None      Visit Diagnoses     Routine general medical examination at a health care facility    -  Primary    Relevant Orders    TSH    T4, free    CBC & Differential    Comprehensive Metabolic Panel    Lipid Panel    POC Urinalysis Dipstick, Automated    Vitamin D deficiency        Relevant Orders    Vitamin D 25 Hydroxy    Need for influenza vaccination        Relevant Orders    Flucelvax Quad=>4Years (PFS)    Annual physical exam            Plan above encouraged exercise-Mediterranean diet

## 2018-09-25 NOTE — PATIENT INSTRUCTIONS
"Fat and Cholesterol Restricted Diet  Getting too much fat and cholesterol in your diet may cause health problems. Following this diet helps keep your fat and cholesterol at normal levels. This can keep you from getting sick.  What types of fat should I choose?  · Choose monosaturated and polyunsaturated fats. These are found in foods such as olive oil, canola oil, flaxseeds, walnuts, almonds, and seeds.  · Eat more omega-3 fats. Good choices include salmon, mackerel, sardines, tuna, flaxseed oil, and ground flaxseeds.  · Limit saturated fats. These are in animal products such as meats, butter, and cream. They can also be in plant products such as palm oil, palm kernel oil, and coconut oil.  · Avoid foods with partially hydrogenated oils in them. These contain trans fats. Examples of foods that have trans fats are stick margarine, some tub margarines, cookies, crackers, and other baked goods.  What general guidelines do I need to follow?  · Check food labels. Look for the words \"trans fat\" and \"saturated fat.\"  · When preparing a meal:  ? Fill half of your plate with vegetables and green salads.  ? Fill one fourth of your plate with whole grains. Look for the word \"whole\" as the first word in the ingredient list.  ? Fill one fourth of your plate with lean protein foods.  · Eat more foods that have fiber, like apples, carrots, beans, peas, and barley.  · Eat more home-cooked foods. Eat less at restaurants and buffets.  · Limit or avoid alcohol.  · Limit foods high in starch and sugar.  · Limit fried foods.  · Cook foods without frying them. Baking, boiling, grilling, and broiling are all great options.  · Lose weight if you are overweight. Losing even a small amount of weight can help your overall health. It can also help prevent diseases such as diabetes and heart disease.  What foods can I eat?  Grains  Whole grains, such as whole wheat or whole grain breads, crackers, cereals, and pasta. Unsweetened oatmeal, " bulgur, barley, quinoa, or brown rice. Corn or whole wheat flour tortillas.  Vegetables  Fresh or frozen vegetables (raw, steamed, roasted, or grilled). Green salads.  Fruits  All fresh, canned (in natural juice), or frozen fruits.  Meat and Other Protein Products  Ground beef (85% or leaner), grass-fed beef, or beef trimmed of fat. Skinless chicken or turkey. Ground chicken or turkey. Pork trimmed of fat. All fish and seafood. Eggs. Dried beans, peas, or lentils. Unsalted nuts or seeds. Unsalted canned or dry beans.  Dairy  Low-fat dairy products, such as skim or 1% milk, 2% or reduced-fat cheeses, low-fat ricotta or cottage cheese, or plain low-fat yogurt.  Fats and Oils  Tub margarines without trans fats. Light or reduced-fat mayonnaise and salad dressings. Avocado. Olive, canola, sesame, or safflower oils. Natural peanut or almond butter (choose ones without added sugar and oil).  The items listed above may not be a complete list of recommended foods or beverages. Contact your dietitian for more options.  What foods are not recommended?  Grains  White bread. White pasta. White rice. Cornbread. Bagels, pastries, and croissants. Crackers that contain trans fat.  Vegetables  White potatoes. Corn. Creamed or fried vegetables. Vegetables in a cheese sauce.  Fruits  Dried fruits. Canned fruit in light or heavy syrup. Fruit juice.  Meat and Other Protein Products  Fatty cuts of meat. Ribs, chicken wings, gongora, sausage, bologna, salami, chitterlings, fatback, hot dogs, bratwurst, and packaged luncheon meats. Liver and organ meats.  Dairy  Whole or 2% milk, cream, half-and-half, and cream cheese. Whole milk cheeses. Whole-fat or sweetened yogurt. Full-fat cheeses. Nondairy creamers and whipped toppings. Processed cheese, cheese spreads, or cheese curds.  Sweets and Desserts  Corn syrup, sugars, honey, and molasses. Candy. Jam and jelly. Syrup. Sweetened cereals. Cookies, pies, cakes, donuts, muffins, and ice  cream.  Fats and Oils  Butter, stick margarine, lard, shortening, ghee, or gongora fat. Coconut, palm kernel, or palm oils.  Beverages  Alcohol. Sweetened drinks (such as sodas, lemonade, and fruit drinks or punches).  The items listed above may not be a complete list of foods and beverages to avoid. Contact your dietitian for more information.  This information is not intended to replace advice given to you by your health care provider. Make sure you discuss any questions you have with your health care provider.  Document Released: 06/18/2013 Document Revised: 08/24/2017 Document Reviewed: 03/19/2015  [employer] Interactive Patient Education © 2018 Elsevier Inc.

## 2018-09-26 DIAGNOSIS — R79.89 ELEVATED LFTS: Primary | ICD-10-CM

## 2018-09-27 ENCOUNTER — HOSPITAL ENCOUNTER (OUTPATIENT)
Dept: ULTRASOUND IMAGING | Facility: HOSPITAL | Age: 53
Discharge: HOME | End: 2018-09-27
Admitting: FAMILY MEDICINE

## 2018-09-27 DIAGNOSIS — R79.89 ELEVATED LFTS: Primary | ICD-10-CM

## 2018-09-27 DIAGNOSIS — R79.89 ELEVATED LFTS: ICD-10-CM

## 2018-09-27 PROCEDURE — 76705 ECHO EXAM OF ABDOMEN: CPT

## 2018-10-27 ENCOUNTER — RESULTS ENCOUNTER (OUTPATIENT)
Dept: FAMILY MEDICINE CLINIC | Facility: CLINIC | Age: 53
End: 2018-10-27

## 2018-10-27 DIAGNOSIS — R79.89 ELEVATED LFTS: ICD-10-CM

## 2018-10-29 LAB
ALBUMIN SERPL-MCNC: 4.6 G/DL (ref 3.5–5)
ALBUMIN/GLOB SERPL: 1.3 G/DL (ref 1.1–2.5)
ALP SERPL-CCNC: 76 U/L (ref 24–120)
ALT SERPL-CCNC: 73 U/L (ref 0–54)
AST SERPL-CCNC: 40 U/L (ref 7–45)
BILIRUB SERPL-MCNC: 0.7 MG/DL (ref 0.1–1)
BUN SERPL-MCNC: 16 MG/DL (ref 5–21)
BUN/CREAT SERPL: 20 (ref 7–25)
CALCIUM SERPL-MCNC: 10.8 MG/DL (ref 8.4–10.4)
CHLORIDE SERPL-SCNC: 103 MMOL/L (ref 98–110)
CHOLEST SERPL-MCNC: 363 MG/DL (ref 130–200)
CO2 SERPL-SCNC: 28 MMOL/L (ref 24–31)
CREAT SERPL-MCNC: 0.8 MG/DL (ref 0.5–1.4)
GLOBULIN SER CALC-MCNC: 3.5 GM/DL
GLUCOSE SERPL-MCNC: 102 MG/DL (ref 70–100)
HDLC SERPL-MCNC: 61 MG/DL
LDLC SERPL CALC-MCNC: 264 MG/DL (ref 0–99)
POTASSIUM SERPL-SCNC: 5.1 MMOL/L (ref 3.5–5.3)
PROT SERPL-MCNC: 8.1 G/DL (ref 6.3–8.7)
SODIUM SERPL-SCNC: 143 MMOL/L (ref 135–145)
TRIGL SERPL-MCNC: 190 MG/DL (ref 0–149)
VLDLC SERPL CALC-MCNC: 38 MG/DL

## 2018-10-30 DIAGNOSIS — E78.2 MIXED HYPERLIPIDEMIA: Primary | ICD-10-CM

## 2018-10-30 RX ORDER — ROSUVASTATIN CALCIUM 10 MG/1
10 TABLET, COATED ORAL DAILY
Qty: 90 TABLET | Refills: 0 | Status: SHIPPED | OUTPATIENT
Start: 2018-10-30 | End: 2018-12-17 | Stop reason: SDUPTHER

## 2018-11-05 RX ORDER — ZOLMITRIPTAN 2.5 MG/1
2.5 TABLET, FILM COATED ORAL 2 TIMES DAILY
Qty: 6 TABLET | Refills: 3 | Status: SHIPPED | OUTPATIENT
Start: 2018-11-05 | End: 2018-12-12 | Stop reason: SDUPTHER

## 2018-11-13 ENCOUNTER — ANESTHESIA EVENT (OUTPATIENT)
Dept: GASTROENTEROLOGY | Facility: HOSPITAL | Age: 53
End: 2018-11-13

## 2018-11-13 ENCOUNTER — HOSPITAL ENCOUNTER (OUTPATIENT)
Facility: HOSPITAL | Age: 53
Setting detail: HOSPITAL OUTPATIENT SURGERY
Discharge: HOME | End: 2018-11-13
Attending: INTERNAL MEDICINE | Admitting: INTERNAL MEDICINE

## 2018-11-13 ENCOUNTER — ANESTHESIA (OUTPATIENT)
Dept: GASTROENTEROLOGY | Facility: HOSPITAL | Age: 53
End: 2018-11-13

## 2018-11-13 VITALS
RESPIRATION RATE: 16 BRPM | TEMPERATURE: 97.2 F | HEART RATE: 73 BPM | BODY MASS INDEX: 28.68 KG/M2 | WEIGHT: 168 LBS | OXYGEN SATURATION: 96 % | SYSTOLIC BLOOD PRESSURE: 102 MMHG | HEIGHT: 64 IN | DIASTOLIC BLOOD PRESSURE: 68 MMHG

## 2018-11-13 DIAGNOSIS — Z15.89 GENETIC SUSCEPTIBILITY TO OTHER DISEASE: ICD-10-CM

## 2018-11-13 LAB — B-HCG UR QL: NEGATIVE

## 2018-11-13 PROCEDURE — 81025 URINE PREGNANCY TEST: CPT | Performed by: ANESTHESIOLOGY

## 2018-11-13 PROCEDURE — G0105 COLORECTAL SCRN; HI RISK IND: HCPCS | Performed by: INTERNAL MEDICINE

## 2018-11-13 PROCEDURE — 25010000002 PROPOFOL 10 MG/ML EMULSION: Performed by: NURSE ANESTHETIST, CERTIFIED REGISTERED

## 2018-11-13 RX ORDER — SODIUM CHLORIDE 9 MG/ML
500 INJECTION, SOLUTION INTRAVENOUS CONTINUOUS PRN
Status: DISCONTINUED | OUTPATIENT
Start: 2018-11-13 | End: 2018-11-13 | Stop reason: HOSPADM

## 2018-11-13 RX ORDER — SODIUM CHLORIDE 0.9 % (FLUSH) 0.9 %
3 SYRINGE (ML) INJECTION AS NEEDED
Status: DISCONTINUED | OUTPATIENT
Start: 2018-11-13 | End: 2018-11-13 | Stop reason: HOSPADM

## 2018-11-13 RX ORDER — PROPOFOL 10 MG/ML
VIAL (ML) INTRAVENOUS AS NEEDED
Status: DISCONTINUED | OUTPATIENT
Start: 2018-11-13 | End: 2018-11-13 | Stop reason: SURG

## 2018-11-13 RX ADMIN — PROPOFOL 50 MG: 10 INJECTION, EMULSION INTRAVENOUS at 10:48

## 2018-11-13 RX ADMIN — PROPOFOL 50 MG: 10 INJECTION, EMULSION INTRAVENOUS at 10:42

## 2018-11-13 RX ADMIN — SODIUM CHLORIDE 500 ML: 9 INJECTION, SOLUTION INTRAVENOUS at 10:30

## 2018-11-13 RX ADMIN — PROPOFOL 50 MG: 10 INJECTION, EMULSION INTRAVENOUS at 10:43

## 2018-11-13 RX ADMIN — PROPOFOL 50 MG: 10 INJECTION, EMULSION INTRAVENOUS at 10:41

## 2018-11-13 RX ADMIN — PROPOFOL 50 MG: 10 INJECTION, EMULSION INTRAVENOUS at 10:52

## 2018-11-13 RX ADMIN — PROPOFOL 50 MG: 10 INJECTION, EMULSION INTRAVENOUS at 10:45

## 2018-11-13 NOTE — ANESTHESIA POSTPROCEDURE EVALUATION
Patient: Shawnee Suarez    Procedure Summary     Date:  11/13/18 Room / Location:  [facility] ENDOSCOPY 2 / [facility]    Anesthesia Start:  1038 Anesthesia Stop:  1059    Procedure:  COLONOSCOPY WITH ANESTHESIA (N/A ) Diagnosis:       Encounter for screening for malignant neoplasm of colon      (Encounter for screening for malignant neoplasm of colon [Z12.11])    Surgeon:  Da Miles MD Provider:  Clyde Leyva CRNA    Anesthesia Type:  general ASA Status:  2          Anesthesia Type: general  Last vitals  BP   104/65 (11/13/18 1110)   Temp   97.2 °F (36.2 °C) (11/13/18 1011)   Pulse   84 (11/13/18 1110)   Resp   19 (11/13/18 1110)     SpO2   93 % (11/13/18 1110)     Post Anesthesia Care and Evaluation    Patient location during evaluation: PHASE II  Patient participation: complete - patient participated  Level of consciousness: awake and sleepy but conscious  Pain score: 0  Pain management: adequate  Airway patency: patent  Anesthetic complications: No anesthetic complications    Cardiovascular status: acceptable  Respiratory status: acceptable  Hydration status: acceptable

## 2018-11-13 NOTE — ANESTHESIA PREPROCEDURE EVALUATION
Anesthesia Evaluation     Patient summary reviewed   no history of anesthetic complications:  NPO Solid Status: > 8 hours             Airway   Mallampati: I  TM distance: >3 FB  Neck ROM: full  Dental      Pulmonary - negative pulmonary ROS   Cardiovascular   Exercise tolerance: excellent (>7 METS)    (+) hyperlipidemia,       Neuro/Psych- negative ROS  GI/Hepatic/Renal/Endo - negative ROS     Musculoskeletal     Abdominal    Substance History      OB/GYN          Other                        Anesthesia Plan    ASA 2     general     intravenous induction   Anesthetic plan, all risks, benefits, and alternatives have been provided, discussed and informed consent has been obtained with: patient.

## 2018-11-26 ENCOUNTER — TELEPHONE (OUTPATIENT)
Dept: GASTROENTEROLOGY | Facility: CLINIC | Age: 53
End: 2018-11-26

## 2018-12-05 ENCOUNTER — NURSE TRIAGE (OUTPATIENT)
Dept: CALL CENTER | Facility: HOSPITAL | Age: 53
End: 2018-12-05

## 2018-12-05 ENCOUNTER — OFFICE VISIT (OUTPATIENT)
Dept: FAMILY MEDICINE CLINIC | Facility: CLINIC | Age: 53
End: 2018-12-05

## 2018-12-05 VITALS
HEIGHT: 64 IN | DIASTOLIC BLOOD PRESSURE: 73 MMHG | BODY MASS INDEX: 28.95 KG/M2 | WEIGHT: 169.6 LBS | OXYGEN SATURATION: 98 % | HEART RATE: 80 BPM | TEMPERATURE: 98.5 F | SYSTOLIC BLOOD PRESSURE: 112 MMHG

## 2018-12-05 DIAGNOSIS — R52 PAIN: Primary | ICD-10-CM

## 2018-12-05 DIAGNOSIS — R53.1 WEAKNESS: ICD-10-CM

## 2018-12-05 PROCEDURE — 99214 OFFICE O/P EST MOD 30 MIN: CPT | Performed by: FAMILY MEDICINE

## 2018-12-05 NOTE — PROGRESS NOTES
Subjective   Shawnee Suarez is a 53 y.o. female.     Lower Extremity Issue   This is a new problem. The problem occurs daily. The problem has been gradually improving. Associated symptoms include arthralgias. Associated symptoms comments: Right ankle strain. The symptoms are aggravated by walking. She has tried ice for the symptoms. The treatment provided mild relief.       The following portions of the patient's history were reviewed and updated as appropriate: allergies, current medications, past family history, past medical history, past social history, past surgical history and problem list.    Review of Systems   Musculoskeletal: Positive for arthralgias.        Medial menisci tenderness bilaterally       Objective   Physical Exam   Constitutional: She is oriented to person, place, and time.   Overweight   Cardiovascular: Normal rate and regular rhythm.   Pulmonary/Chest: Effort normal and breath sounds normal.   Musculoskeletal: She exhibits tenderness. She exhibits no deformity.   Right ankle and gastrocnemius mechanism tenderness   Neurological: She is alert and oriented to person, place, and time.   Skin: Skin is warm.   Psychiatric: She has a normal mood and affect. Her behavior is normal. Judgment and thought content normal.   Nursing note and vitals reviewed.      Assessment/Plan   Shawnee was seen today for fall.    Diagnoses and all orders for this visit:    Pain           Plan-symptomatic treatment plus referred to physical therapy for an anaerobic exercise conditioning program

## 2018-12-06 NOTE — TELEPHONE ENCOUNTER
"Caller asking if she can give her prescribed headache medication to her daughter. Advised if daughter is having severe headache she should be taken to local ER.     Additional Information  • [1] Caller is not with the adult (patient) AND [2] reporting urgent symptoms    Answer Assessment - Initial Assessment Questions  1. REASON FOR CALL or QUESTION: \"What is your reason for calling today?\" or \"How can I best help you?\" or \"What question do you have that I can help answer?\"      Mother asking if she can give her daughter her prescribed headache medication for a headache? Mother reporting it's a bad headache and is unable to give further information. Advised if daughter is having severe headache someone should take her to the ER.    Protocols used: INFORMATION ONLY CALL-ADULT-      "

## 2018-12-10 RX ORDER — CLONAZEPAM 1 MG/1
1 TABLET ORAL 2 TIMES DAILY PRN
Qty: 60 TABLET | Refills: 2 | Status: SHIPPED | OUTPATIENT
Start: 2018-12-10 | End: 2018-12-12 | Stop reason: SDUPTHER

## 2018-12-11 ENCOUNTER — RESULTS ENCOUNTER (OUTPATIENT)
Dept: FAMILY MEDICINE CLINIC | Facility: CLINIC | Age: 53
End: 2018-12-11

## 2018-12-11 DIAGNOSIS — E78.2 MIXED HYPERLIPIDEMIA: ICD-10-CM

## 2018-12-12 RX ORDER — ZOLMITRIPTAN 2.5 MG/1
2.5 TABLET, FILM COATED ORAL 2 TIMES DAILY
Qty: 6 TABLET | Refills: 3 | Status: SHIPPED | OUTPATIENT
Start: 2018-12-12 | End: 2019-02-01 | Stop reason: SDUPTHER

## 2018-12-12 RX ORDER — CLONAZEPAM 1 MG/1
1 TABLET ORAL 2 TIMES DAILY PRN
Qty: 60 TABLET | Refills: 2 | Status: SHIPPED | OUTPATIENT
Start: 2018-12-12 | End: 2019-04-10 | Stop reason: SDUPTHER

## 2018-12-12 RX ORDER — CLONAZEPAM 1 MG/1
1 TABLET ORAL 2 TIMES DAILY PRN
Qty: 60 TABLET | Refills: 2 | Status: SHIPPED | OUTPATIENT
Start: 2018-12-12 | End: 2018-12-12 | Stop reason: SDUPTHER

## 2018-12-12 NOTE — TELEPHONE ENCOUNTER
MED REFILL REQUEST-CLONAZEPAM 1MG  (1) BID # 60.      DEMARCUS WAS NOTIFIED-SPOKE WITH PHILOMENA TO CANCEL REFILL SENT YESTERDAY.      PT STATES SHE HAS NOT RECEIVED ZOMIG RX-UPDATED PHARMACY INFO.  WILL SEND NEW RX'S FOR EACH MED.   VERBALIZED UNDERSTANDING.      EVELYN UPDATED THIS DATE-PT WAS SEEN RECENTLY.

## 2018-12-14 LAB
ALBUMIN SERPL-MCNC: 4.4 G/DL (ref 3.5–5)
ALBUMIN/GLOB SERPL: 1.4 G/DL (ref 1.1–2.5)
ALP SERPL-CCNC: 71 U/L (ref 24–120)
ALT SERPL-CCNC: 43 U/L (ref 0–54)
AST SERPL-CCNC: 30 U/L (ref 7–45)
BILIRUB SERPL-MCNC: 0.4 MG/DL (ref 0.1–1)
BUN SERPL-MCNC: 14 MG/DL (ref 5–21)
BUN/CREAT SERPL: 21.9 (ref 7–25)
CALCIUM SERPL-MCNC: 10 MG/DL (ref 8.4–10.4)
CHLORIDE SERPL-SCNC: 103 MMOL/L (ref 98–110)
CHOLEST SERPL-MCNC: 180 MG/DL (ref 130–200)
CK SERPL-CCNC: 58 U/L (ref 0–203)
CO2 SERPL-SCNC: 27 MMOL/L (ref 24–31)
CREAT SERPL-MCNC: 0.64 MG/DL (ref 0.5–1.4)
GLOBULIN SER CALC-MCNC: 3.2 GM/DL
GLUCOSE SERPL-MCNC: 101 MG/DL (ref 70–100)
HDLC SERPL-MCNC: 66 MG/DL
LDLC SERPL CALC-MCNC: 92 MG/DL (ref 0–99)
POTASSIUM SERPL-SCNC: 4.8 MMOL/L (ref 3.5–5.3)
PROT SERPL-MCNC: 7.6 G/DL (ref 6.3–8.7)
SODIUM SERPL-SCNC: 143 MMOL/L (ref 135–145)
TRIGL SERPL-MCNC: 109 MG/DL (ref 0–149)
VLDLC SERPL CALC-MCNC: 21.8 MG/DL

## 2018-12-17 RX ORDER — ROSUVASTATIN CALCIUM 10 MG/1
10 TABLET, COATED ORAL DAILY
Qty: 90 TABLET | Refills: 2 | Status: SHIPPED | OUTPATIENT
Start: 2018-12-17 | End: 2019-02-11 | Stop reason: SDUPTHER

## 2019-01-02 ENCOUNTER — HOSPITAL ENCOUNTER (OUTPATIENT)
Dept: PHYSICAL THERAPY | Facility: HOSPITAL | Age: 54
Setting detail: THERAPIES SERIES
Discharge: HOME | End: 2019-01-02

## 2019-01-02 DIAGNOSIS — M25.562 PAIN IN BOTH KNEES, UNSPECIFIED CHRONICITY: ICD-10-CM

## 2019-01-02 DIAGNOSIS — R53.1 WEAKNESS: Primary | ICD-10-CM

## 2019-01-02 DIAGNOSIS — M25.561 PAIN IN BOTH KNEES, UNSPECIFIED CHRONICITY: ICD-10-CM

## 2019-01-02 PROCEDURE — 97110 THERAPEUTIC EXERCISES: CPT | Performed by: PHYSICAL THERAPIST

## 2019-01-02 PROCEDURE — 97161 PT EVAL LOW COMPLEX 20 MIN: CPT | Performed by: PHYSICAL THERAPIST

## 2019-01-03 NOTE — THERAPY EVALUATION
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   • Nonsmoker   • Encounter for screening for malignant neoplasm of colon        Past Medical History:   Diagnosis Date   • Anxiety    • Depression    • History of transfusion    • Hyperlipidemia    • Ovarian cyst         Past Surgical History:   Procedure Laterality Date   • APPENDECTOMY     • BREAST BIOPSY Right     unsure when   • CHOLECYSTECTOMY     • COLONOSCOPY  2013    Normal exam repeat in 10 years   • COLONOSCOPY N/A 2018    Procedure: COLONOSCOPY WITH ANESTHESIA;  Surgeon: Da Miles MD;  Location: [facility] ENDOSCOPY;  Service: Gastroenterology   • D&C HYSTEROSCOPY ENDOMETRIAL ABLATION N/A 2017    Procedure: DILATATION AND CURETTAGE HYSTEROSCOPY NOVASURE ENDOMETRIAL ABLATION;  Surgeon: Ana Barrios MD;  Location: [facility] OR;  Service:    • OVARIAN CYST REMOVAL       No Known Allergies      Current Outpatient Medications:   •  cholecalciferol (VITAMIN D3) 1000 units tablet, Take 1,000 Units by mouth Daily., Disp: , Rfl:   •  clonazePAM (KlonoPIN) 1 MG tablet, Take 1 tablet by mouth 2 (Two) Times a Day As Needed for Seizures., Disp: 60 tablet, Rfl: 2  •  FIBER SELECT GUMMIES PO, Take  by mouth Daily., Disp: , Rfl:   •  fluticasone (FLONASE) 50 MCG/ACT nasal spray, 2 sprays into each nostril Daily., Disp: , Rfl:   •  Lactobacillus (REPHRESH PRO-B PO), Take 1 tablet by mouth Daily., Disp: , Rfl:   •  loratadine (ALLERGY) 10 MG tablet, Take 10 mg by mouth Daily., Disp: , Rfl:   •  Multiple Vitamins-Minerals (WOMENS 50+ MULTI VITAMIN/MIN PO), Take  by mouth., Disp: , Rfl:   •   rosuvastatin (CRESTOR) 10 MG tablet, Take 1 tablet by mouth Daily., Disp: 90 tablet, Rfl: 2  •  Sod Picosulfate-Mag Ox-Cit Acd (CLENPIQ) 10-3.5-12 MG-GM -GM/160ML solution, Take 1 kit by mouth Take As Directed., Disp: 2 bottle, Rfl: 0  •  Vortioxetine HBr (TRINTELLIX) 20 MG tablet, Take 20 mg by mouth Daily., Disp: 30 tablet, Rfl: 0  •  ZOLMitriptan (ZOMIG) 2.5 MG tablet, Take 1 tablet by mouth 2 (Two) Times a Day. As needed for headaches, Disp: 6 tablet, Rfl: 3      Visit Dx:     ICD-10-CM ICD-9-CM   1. Weakness R53.1 780.79   2. Pain in both knees, unspecified chronicity M25.561 719.46    M25.562        Patient History     Row Name 01/02/19 0900             History    Chief Complaint  Pain  -KG      Type of Pain  Knee pain  -KG      Date Current Problem(s) Began  -- ~3 months ago  -KG      Brief Description of Current Complaint  Pt presents with c/o bilateral knee pain R>L. Started about 3 months ago when she started exercising. Pt states she walks for exercise, pt states both of her knees have started to hurt, right one is worse. States it is really painful at nighttime. Pt states she has gained some weight over the past year which is what prompted her to start improving her health. States it's difficulty for her to participate in her daily walking activities due to her knee pain. States sitting for short periods causes increased stiffness in her knees when standing back up. States squatting has become difficult as well and sometimes needs to use her BUE's to help pull herself up back to a standing position. Pt states she injured both her knees while waterskiing when she was 16. States sometimes the right knee clicks. Pt lives in single level home with a basement. Doesn't go down to the basement much; only to exercise.  -KG      Patient/Caregiver Goals  Relieve pain;Improve mobility;Improve strength  -KG      Occupation/sports/leisure activities  Pt is retired; 35 minutes of walking per day prior to the holidays.   -KG      Results of Clinical Tests  No recent imaging completed on knees at this time.  -KG         Pain     Pain Location  Knee  -KG      Pain at Present  0  -KG      Pain at Best  0  -KG      Pain at Worst  5  -KG      Pain Frequency  Several days a week Pt states it hurts everyday; but has no pain at times  -KG      What Performance Factors Make the Current Problem(s) WORSE?  Squatting, going up/down stairs, walking on incline on treadmill,  -KG      What Performance Factors Make the Current Problem(s) BETTER?  Nothing; takes ibuprofen  -KG      Pain Comments  Pain worse/achey at night, kwan in R knee  -KG      Tolerance Time- Standing  No issues with static standing  -KG      Tolerance Time- Sitting  Increased stiffness/pain if sitting for long periods; pain getting back up  -KG      Tolerance Time- Walking  No issues for short periods; incline increases pain  -KG      Is your sleep disturbed?  Yes  -KG      Difficulties at work?  N/A; retired  -KG      Difficulties with ADL's?  Independent  -KG      Difficulties with recreational activities?  Able to complete but with increased pain  -KG        User Key  (r) = Recorded By, (t) = Taken By, (c) = Cosigned By    Initials Name Provider Type    KG Breanna Borja, PT Physical Therapist          PT Ortho     Row Name 01/02/19 0900       Subjective Comments    Subjective Comments  Refer to therapy pt history for details.  -KG       Precautions and Contraindications    Precautions/Limitations  no known precautions/limitations  -KG       Subjective Pain    Able to rate subjective pain?  yes  -KG    Pre-Treatment Pain Level  0  -KG       Posture/Observations    Posture/Observations Comments  No acute distress. Ambulates with non-antalgic gait with no AD. Very mild varus noted at knees bilaterally with some hyperextension present during stance.  -KG       Special Tests/Palpation    Special Tests/Palpation  Knee  -KG       Knee Palpation    Knee Palpation?  Yes  -KG     Pes Anserine Bursa  Right:;Tender  -KG    Medial Joint Line  Bilateral:;Tender R >L  -KG    ITB  Bilateral:;Tender;Guarded/taut R>L  -KG       Patellar Accessory Motions    Patellar Accessory Motions Tested?  Yes  -KG    Superior glide  Right:;Left:;WNL  -KG    Inferior glide  Right:;Left:;WNL  -KG    Medial glide  Left:;WNL Taut with medial glide on Right  -KG    Lateral glide  Right:;Left:;WNL  -KG       Knee Special Tests    Anterior drawer (ACL lesion)  Bilateral:;Negative  -KG    Posterior drawer (PCL lesion)  Bilateral:;Negative  -KG    Valgus stress (MCL lesion)  Bilateral:;Negative  -KG    Varus stress (LCL lesion)  Bilateral:;Negative  -KG    Mariaelena’s test (meniscal lesion)  Bilateral:;Negative Mild clicking R; no pain  -KG    Bounce home test (meniscal lesion)  Bilateral:;Negative  -KG    Patellar grind test (chondromalacia patella)  Right:;Positive;Left:;Negative  -KG       General ROM    RT Lower Ext  Rt Knee Extension/Flexion  -KG    LT Lower Ext  Lt Knee Extension/Flexion  -KG       Right Lower Ext    Rt Knee Extension/Flexion AROM  1-0-142  -KG    RT Lower Extremity Comments  Hip AROM WFL in all planes  -KG       Left Lower Ext    Lt Knee Extension/Flexion AROM  4-0-148  -KG    LT Lower Extremity Comments  Hip AROM WFL in all planes  -KG       MMT (Manual Muscle Testing)    Rt Lower Ext  Rt Hip Flexion;Rt Hip ABduction;Rt Hip ADduction;Rt Knee Extension;Rt Knee Flexion  -KG    Lt Lower Ext  Lt Hip Flexion;Lt Hip ABduction;Lt Hip ADduction;Lt Knee Extension;Lt Knee Flexion  -KG       MMT Right Lower Ext    Rt Hip Flexion MMT, Gross Movement  (4/5) good  -KG    Rt Hip ABduction MMT, Gross Movement  (4/5) good  -KG    Rt Hip ADduction MMT, Gross Movement  (4+/5) good plus  -KG    Rt Knee Extension MMT, Gross Movement  (4+/5) good plus  -KG    Rt Knee Flexion MMT, Gross Movement  (4/5) good  -KG       MMT Left Lower Ext    Lt Hip Flexion MMT, Gross Movement  (4+/5) good plus  -KG    Lt Hip ABduction  MMT, Gross Movement  (4+/5) good plus  -KG    Lt Hip ADduction MMT, Gross Movement  (4+/5) good plus  -KG    Lt Knee Extension MMT, Gross Movement  (5/5) normal  -KG    Lt Knee Flexion MMT, Gross Movement  (4+/5) good plus  -KG       Sensation    Sensation WNL?  WNL  -KG    Light Touch  No apparent deficits  -KG       Flexibility    Flexibility Tested?  Lower Extremity  -KG       Lower Extremity Flexibility    Hamstrings  Bilateral:;Mildly limited  -KG    ITB  Bilateral:;Mildly limited R>L  -KG       Gait/Stairs Assessment/Training    Comment (Gait/Stairs)  Able to ascend/descend stairs reciprocally. Does have increased pain in R knee anteriorly with increased reps (pt ascended/descended 5 training steps x 5)  -KG      User Key  (r) = Recorded By, (t) = Taken By, (c) = Cosigned By    Initials Name Provider Type    Breanna Moser, PT Physical Therapist                      Therapy Education  Education Details: POC education. Anatomy education related to condition. Educated to avoid increased intensity on treadmill with walking at this time. HEP: QS, SLR, SAQ, SL clamshells, SLR hip abd, hamstring stretch, gastroc stretch.  Given: HEP, Symptoms/condition management, Pain management, Posture/body mechanics  Program: New  How Provided: Verbal, Demonstration, Written  Provided to: Patient  Level of Understanding: Teach back education performed, Verbalized, Demonstrated     PT OP Goals     Row Name 01/02/19 0900          PT Short Term Goals    STG Date to Achieve  01/23/19  -KG     STG 1  Demonstrate independence/compliance with HEP  -KG     STG 1 Progress  New  -KG     STG 2  Tolerate 45 minute treatment session without increased pain  -KG     STG 2 Progress  New  -KG     STG 3  Perform active SLR flex on RLE 2x10 with no quad lag present, no increased pain, good eccentric control  -KG     STG 3 Progress  New  -KG     STG 4  Demonstrate R knee flex/ext MMT to 4+/5  -KG     STG 4 Progress  New  -KG        Long Term  Goals    LTG Date to Achieve  02/13/19  -KG     LTG 1  Subjectively report 60% improvement or greater  -KG     LTG 1 Progress  New  -KG     LTG 2  Improve LEFS score to 60/80 or greater  -KG     LTG 2 Progress  New  -KG     LTG 3  Demonstrate independence in aquatic treatment session for carryover into independent management  -KG     LTG 3 Progress  New  -KG     LTG 4  Demonstrate bilateral knee flex/ext MMT to 5/5  -KG     LTG 4 Progress  New  -KG     LTG 5  Ascend/descend 5 steps x5 with reciprocal pattern, no increased pain or compensation in LE's bilaterally  -KG     LTG 5 Progress  New  -KG     LTG 6  Demonstrate bilateral hip flex/abd MMT to 5/5  -KG     LTG 6 Progress  New  -KG     LTG 7  Demonstrate independence in final HEP and fitness/cybex equipment activities for carryover into independent management  -KG     LTG 7 Progress  New  -KG        Time Calculation    PT Goal Re-Cert Due Date  01/23/19  -KG       User Key  (r) = Recorded By, (t) = Taken By, (c) = Cosigned By    Initials Name Provider Type    KG Breanna Borja, PT Physical Therapist           PT Assessment/Plan     Row Name 01/02/19 0900          PT Assessment    Functional Limitations  Limitation in home management;Limitations in community activities;Limitations in functional capacity and performance;Performance in leisure activities;Impaired gait  -KG     Impairments  Balance;Impaired flexibility;Impaired muscle endurance;Muscle strength;Pain;Range of motion;Poor body mechanics  -KG     Assessment Comments  Pt is a 53 y.o. female presenting with bilateral knee pain R>L. Onset of increased knee pain began as pt has become more active and exercising. Knee stability testing negative at this time, possible arthritic issues present. Knee ROM is WFL but more painful in R knee vs L. Pt does demonstrate weakness in hips and knees bilaterally as well as decreased flexibility. Pt very interested in continuing her physical fitness progression but is  limited by her knee pain at this time. Pt has increased with ascending/descending stairs and managing incline surfaces. Pt will benefit skilled PT services to address these deficits for improvements in overall functional strength, mobility, and tolerance to daily activities. Pt will also benefit from addition of aquatic therapy for increased tolerance to standing activities as well as progression towards independent management and self care.  -KG     Please refer to paper survey for additional self-reported information  Yes  -KG     Rehab Potential  Good  -KG     Patient/caregiver participated in establishment of treatment plan and goals  Yes  -KG     Patient would benefit from skilled therapy intervention  Yes  -KG        PT Plan    PT Frequency  2x/week  -KG     Predicted Duration of Therapy Intervention (Therapy Eval)  4 weeks  -KG     Planned CPT's?  PT EVAL LOW COMPLEXITY: 91492;PT RE-EVAL: 08307;PT THER PROC EA 15 MIN: 65144;PT THER ACT EA 15 MIN: 39298;PT MANUAL THERAPY EA 15 MIN: 31258;PT NEUROMUSC RE-EDUCATION EA 15 MIN: 39900;PT AQUATIC THERAPY EA 15 MIN: 83936;PT SELF CARE/HOME MGMT/TRAIN EA 15: 70505;PT ELECTRICAL STIM UNATTEND: ;PT THER SUPP EA 15 MIN  -KG     Physical Therapy Interventions (Optional Details)  aquatics exercise;balance training;home exercise program;manual therapy techniques;modalities;neuromuscular re-education;patient/family education;ROM (Range of Motion);strengthening;stretching  -KG     PT Plan Comments  Will begin 1x/pool, 1x/land per week. Progress pt towards independent management in the pool and transtion towards all land therapy. Focus on overall knee & hip strengthening and stability. Hamstring/IT band stretching.  education. Transition towards independent management with fitness.  -KG       User Key  (r) = Recorded By, (t) = Taken By, (c) = Cosigned By    Initials Name Provider Type    KG Breanna Borja, PT Physical Therapist                Modalities   "   Row Name 01/02/19 0900             Ice    Patient denies application of Ice  Yes  -KG      Patient reports will apply ice at home to involved area  Yes  -KG        User Key  (r) = Recorded By, (t) = Taken By, (c) = Cosigned By    Initials Name Provider Type    Breanna Moser, PT Physical Therapist        Exercises     Row Name 01/02/19 0900             Precautions    Existing Precautions/Restrictions  no known precautions/restrictions  -KG         Subjective Comments    Subjective Comments  Refer to therapy pt history for details.  -KG         Subjective Pain    Able to rate subjective pain?  yes  -KG      Pre-Treatment Pain Level  0  -KG      Post-Treatment Pain Level  0  -KG         Aquatics    Aquatics performed?  No  -KG         Exercise 1    Exercise Name 1  Quad sets  -KG      Cueing 1  Verbal  -KG      Sets 1  1  -KG      Reps 1  10  -KG      Time 1  5\" hold  -KG      Additional Comments  Bilateral  -KG         Exercise 2    Exercise Name 2  SAQ  -KG      Cueing 2  Verbal  -KG      Sets 2  1  -KG      Reps 2  10  -KG      Additional Comments  Bilateral  -KG         Exercise 3    Exercise Name 3  SLR flex supine  -KG      Cueing 3  Verbal  -KG      Sets 3  1  -KG      Reps 3  10  -KG      Additional Comments  Bilateral  -KG         Exercise 4    Exercise Name 4  SL hip abd abd  -KG      Cueing 4  Verbal  -KG      Sets 4  1  -KG      Reps 4  10  -KG         Exercise 5    Exercise Name 5  SL clamshells  -KG      Cueing 5  Verbal  -KG      Sets 5  1  -KG      Reps 5  10  -KG         Exercise 6    Exercise Name 6  Long sitting hamstring stretch  -KG      Cueing 6  Verbal  -KG      Reps 6  2  -KG      Time 6  30\" hold  -KG         Exercise 7    Exercise Name 7  Gastroc stretch with strap  -KG      Cueing 7  Verbal  -KG      Reps 7  2  -KG      Time 7  30\" hold  -KG        User Key  (r) = Recorded By, (t) = Taken By, (c) = Cosigned By    Initials Name Provider Type    Breanna Moser, PT Physical " Therapist                        Outcome Measure Options: Lower Extremity Functional Scale (LEFS)  Lower Extremity Functional Index  Any of your usual work, housework or school activities: Moderate difficulty  Your usual hobbies, recreational or sporting activities: Quite a bit of difficulty  Getting into or out of the bath: Moderate difficulty  Walking between rooms: Moderate difficulty  Putting on your shoes or socks: No difficulty  Squatting: Extreme difficulty or unable to perform activity  Lifting an object, like a bag of groceries from the floor: Moderate difficulty  Performing light activities around your home: Moderate difficulty  Performing heavy activities around your home: Extreme difficulty or unable to perform activity  Getting into or out of a car: Quite a bit of difficulty  Walking 2 blocks: Quite a bit of difficulty  Walking a mile: Quite a bit of difficulty  Going up or down 10 stairs (about 1 flight of stairs): Extreme difficulty or unable to perform activity  Standing for 1 hour: Moderate difficulty  Sitting for 1 hour: Extreme difficulty or unable to perform activity  Running on even ground: Quite a bit of difficulty  Running on uneven ground: Extreme difficulty or unable to perform activity  Making sharp turns while running fast: Extreme difficulty or unable to perform activity  Hopping: Quite a bit of difficulty  Rolling over in bed: Quite a bit of difficulty  Total: 23      Time Calculation:     Therapy Suggested Charges     Code   Minutes Charges    None             Start Time: 0908  Stop Time: 0952  Time Calculation (min): 44 min  Total Timed Code Minutes- PT: 20 minute(s)     Therapy Charges for Today     Code Description Service Date Service Provider Modifiers Qty    53304471971 HC PT EVAL LOW COMPLEXITY 2 1/2/2019 Breanna Borja, PT GP 1    40446000650 HC PT THER PROC EA 15 MIN 1/2/2019 Breanna Borja, PT GP 1          PT G-Codes  Outcome Measure Options: Lower Extremity Functional  Scale (LEFS)  Total: 23         Breanna Borja, PT  1/2/2019

## 2019-01-07 ENCOUNTER — HOSPITAL ENCOUNTER (OUTPATIENT)
Dept: PHYSICAL THERAPY | Facility: HOSPITAL | Age: 54
Setting detail: THERAPIES SERIES
Discharge: HOME | End: 2019-01-07

## 2019-01-07 DIAGNOSIS — M25.562 PAIN IN BOTH KNEES, UNSPECIFIED CHRONICITY: ICD-10-CM

## 2019-01-07 DIAGNOSIS — M25.561 PAIN IN BOTH KNEES, UNSPECIFIED CHRONICITY: ICD-10-CM

## 2019-01-07 DIAGNOSIS — R53.1 WEAKNESS: Primary | ICD-10-CM

## 2019-01-07 PROCEDURE — 97113 AQUATIC THERAPY/EXERCISES: CPT

## 2019-01-07 NOTE — THERAPY TREATMENT NOTE
"    Outpatient Physical Therapy Ortho Treatment Note  [facility]     Patient Name: Shawnee Suarez  : 1965  MRN: [id number]  Today's Date: 2019      Visit Date: 2019  Subjective Improvement:  \"not yet\"     Attendance:   Approved:        30 visits   MD follow up:         PRN   date:     2019    Visit Dx:    ICD-10-CM ICD-9-CM   1. Weakness R53.1 780.79   2. 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   • Nonsmoker   • Encounter for screening for malignant neoplasm of colon        Past Medical History:   Diagnosis Date   • Anxiety    • Depression    • History of transfusion    • Hyperlipidemia    • Ovarian cyst         Past Surgical History:   Procedure Laterality Date   • APPENDECTOMY     • BREAST BIOPSY Right     unsure when   • CHOLECYSTECTOMY     • COLONOSCOPY  2013    Normal exam repeat in 10 years   • COLONOSCOPY N/A 2018    Procedure: COLONOSCOPY WITH ANESTHESIA;  Surgeon: Da Miles MD;  Location: [facility] ENDOSCOPY;  Service: Gastroenterology   • D&C HYSTEROSCOPY ENDOMETRIAL ABLATION N/A 2017    Procedure: DILATATION AND CURETTAGE HYSTEROSCOPY NOVASURE ENDOMETRIAL ABLATION;  Surgeon: Ana Barrios MD;  Location: [facility] OR;  Service:    • OVARIAN CYST REMOVAL         PT Ortho     Row Name 19 1300       Subjective Comments    Subjective Comments  \"Doing HEP; R has been sore and L has a weird vibrating feel.  -PM       Precautions and Contraindications    Precautions/Limitations  no known precautions/limitations  -PM       Subjective Pain    Able to rate subjective pain?  yes  -PM    Pre-Treatment Pain Level  0  -PM    Post-Treatment Pain Level  0  -PM      User Key  (r) = Recorded By, (t) = Taken By, (c) = Cosigned By    Initials Name Provider Type    Mary Pierson PTA Physical Therapy Assistant                      PT " "Assessment/Plan     Row Name 01/07/19 1300          PT Assessment    Assessment Comments  Pt did very well with AQ and other than fatigue most c/o's were with float steps and this was reported to be mild. Pt very enthusiastic re: exc.  -PM        PT Plan    PT Frequency  2x/week  -PM     Predicted Duration of Therapy Intervention (Therapy Eval)  4 weeks  -PM     PT Plan Comments  1L/1P per POC  -PM       User Key  (r) = Recorded By, (t) = Taken By, (c) = Cosigned By    Initials Name Provider Type    PM Mary Bray PTA Physical Therapy Assistant              Exercises     Row Name 01/07/19 1300             Subjective Comments    Subjective Comments  \"Doing HEP; R has been sore and L has a weird vibrating feel.  -PM         Subjective Pain    Able to rate subjective pain?  yes  -PM      Pre-Treatment Pain Level  0  -PM      Post-Treatment Pain Level  0  -PM         Aquatics    Aquatics performed?  Yes  -PM         Aquatics LE    Water Walk  forward;side;backward F/B x 5'; sidestepping x 3'; march walk x 2'  -PM      Hip Abd/Add  2x10 B  -PM      Hip Flex/Ext  2x10 B  -PM      Mini Squat  2x10 pt c/o's LBP with very sm minisquat  -PM      Toe/Heel Raises  20  -PM      Step Ups  defer  -PM      Bicycle  4'  -PM      Flutter/Scissor  2x1'  -PM         Exercise 1    Exercise Name 1  AQ ham curls B  -PM      Cueing 1  Verbal  -PM      Reps 1  2x10 B  -PM         Exercise 2    Exercise Name 2  AQ float steps B  -PM      Cueing 2  Verbal;Demo  -PM      Sets 2  2  -PM      Reps 2  10  -PM      Additional Comments  med yellow RF  -PM         Exercise 3    Exercise Name 3  std HS S at stairs  -PM      Cueing 3  Verbal  -PM      Reps 3  2  -PM      Time 3  30\"  -PM         Exercise 4    Exercise Name 4  std gastroc S at pool wall  -PM      Cueing 4  Verbal  -PM      Reps 4  2  -PM      Time 4  30\"  -PM        User Key  (r) = Recorded By, (t) = Taken By, (c) = Cosigned By    Initials Name Provider Type    PM Mary Bray " EMELY Vazquez Physical Therapy Assistant                         PT OP Goals     Row Name 01/07/19 1300          PT Short Term Goals    STG Date to Achieve  01/23/19  -PM     STG 1  Demonstrate independence/compliance with HEP  -PM     STG 1 Progress  Ongoing  -PM     STG 2  Tolerate 45 minute treatment session without increased pain  -PM     STG 2 Progress  Ongoing  -PM     STG 3  Perform active SLR flex on RLE 2x10 with no quad lag present, no increased pain, good eccentric control  -PM     STG 3 Progress  Ongoing  -PM     STG 4  Demonstrate R knee flex/ext MMT to 4+/5  -PM     STG 4 Progress  Ongoing  -PM        Long Term Goals    LTG Date to Achieve  02/13/19  -PM     LTG 1  Subjectively report 60% improvement or greater  -PM     LTG 1 Progress  Ongoing  -PM     LTG 2  Improve LEFS score to 60/80 or greater  -PM     LTG 2 Progress  Ongoing  -PM     LTG 3  Demonstrate independence in aquatic treatment session for carryover into independent management  -PM     LTG 3 Progress  Ongoing  -PM     LTG 4  Demonstrate bilateral knee flex/ext MMT to 5/5  -PM     LTG 4 Progress  Ongoing  -PM     LTG 5  Ascend/descend 5 steps x5 with reciprocal pattern, no increased pain or compensation in LE's bilaterally  -PM     LTG 5 Progress  Ongoing  -PM     LTG 6  Demonstrate bilateral hip flex/abd MMT to 5/5  -PM     LTG 6 Progress  Ongoing  -PM     LTG 7  Demonstrate independence in final HEP and fitness/cybex equipment activities for carryover into independent management  -PM     LTG 7 Progress  Ongoing  -PM        Time Calculation    PT Goal Re-Cert Due Date  01/23/18  -PM       User Key  (r) = Recorded By, (t) = Taken By, (c) = Cosigned By    Initials Name Provider Type    Mary Pierson PTA Physical Therapy Assistant          Therapy Education  Given: HEP, Symptoms/condition management, Pain management, Posture/body mechanics  Program: Reinforced  How Provided: Verbal, Demonstration, Written  Provided to: Patient  Level  of Understanding: Teach back education performed, Verbalized, Demonstrated              Time Calculation:   Start Time: 1304  Stop Time: 1346  Time Calculation (min): 42 min  Total Timed Code Minutes- PT: 42 minute(s)  Therapy Suggested Charges     Code   Minutes Charges    None           Therapy Charges for Today     Code Description Service Date Service Provider Modifiers Qty    33393049176 HC PT AQUATIC THERAPY EA 15 MIN 1/7/2019 Mary Bray, PTA GP 3                    Mary Bray PTA  1/7/2019

## 2019-01-09 ENCOUNTER — HOSPITAL ENCOUNTER (OUTPATIENT)
Dept: PHYSICAL THERAPY | Facility: HOSPITAL | Age: 54
Setting detail: THERAPIES SERIES
Discharge: HOME | End: 2019-01-09

## 2019-01-09 DIAGNOSIS — M25.561 PAIN IN BOTH KNEES, UNSPECIFIED CHRONICITY: ICD-10-CM

## 2019-01-09 DIAGNOSIS — M25.562 PAIN IN BOTH KNEES, UNSPECIFIED CHRONICITY: ICD-10-CM

## 2019-01-09 DIAGNOSIS — R53.1 WEAKNESS: Primary | ICD-10-CM

## 2019-01-09 PROCEDURE — 97110 THERAPEUTIC EXERCISES: CPT

## 2019-01-09 NOTE — THERAPY TREATMENT NOTE
"    Outpatient Physical Therapy Ortho Treatment Note  [facility]     Patient Name: Shawnee Suarez  : 1965  MRN: [id number]  Today's Date: 2019      Visit Date: 2019  Subjective Improvement:  \"not yet\"     Attendance: 3/3  Approved:        30 visits   MD follow up:         PRN   date:     2019      Visit Dx:    ICD-10-CM ICD-9-CM   1. Weakness R53.1 780.79   2. 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   • Nonsmoker   • Encounter for screening for malignant neoplasm of colon        Past Medical History:   Diagnosis Date   • Anxiety    • Depression    • History of transfusion    • Hyperlipidemia    • Ovarian cyst         Past Surgical History:   Procedure Laterality Date   • APPENDECTOMY     • BREAST BIOPSY Right     unsure when   • CHOLECYSTECTOMY     • COLONOSCOPY  2013    Normal exam repeat in 10 years   • COLONOSCOPY N/A 2018    Procedure: COLONOSCOPY WITH ANESTHESIA;  Surgeon: Da Miles MD;  Location: [facility] ENDOSCOPY;  Service: Gastroenterology   • D&C HYSTEROSCOPY ENDOMETRIAL ABLATION N/A 2017    Procedure: DILATATION AND CURETTAGE HYSTEROSCOPY NOVASURE ENDOMETRIAL ABLATION;  Surgeon: Ana Barrios MD;  Location: [facility] OR;  Service:    • OVARIAN CYST REMOVAL         PT Ortho     Row Name 19 1300       Subjective Comments    Subjective Comments  Pt notes she did ok after pool Rx; she did ice but was not overly sore.  -PM       Precautions and Contraindications    Precautions/Limitations  no known precautions/limitations  -PM       Subjective Pain    Able to rate subjective pain?  yes  -PM    Pre-Treatment Pain Level  0  -PM    Post-Treatment Pain Level  0 \"I just feel muscle soreness.\"  -PM    Row Name 19 1300       Subjective Comments    Subjective Comments  \"Doing HEP; R has been sore and L has a weird vibrating feel.  " "-PM       Precautions and Contraindications    Precautions/Limitations  no known precautions/limitations  -PM       Subjective Pain    Able to rate subjective pain?  yes  -PM    Pre-Treatment Pain Level  0  -PM    Post-Treatment Pain Level  0  -PM      User Key  (r) = Recorded By, (t) = Taken By, (c) = Cosigned By    Initials Name Provider Type    PM Mary Bray PTA Physical Therapy Assistant                      PT Assessment/Plan     Row Name 01/09/19 1300          PT Assessment    Assessment Comments  Pt [place] ther ex very well and [place] without significant incr c/o's. Minimal to no lag that improved w/cues and incr focus to control.  -PM     Rehab Potential  Good  -PM     Patient/caregiver participated in establishment of treatment plan and goals  Yes  -PM     Patient would benefit from skilled therapy intervention  Yes  -PM        PT Plan    PT Frequency  2x/week  -PM     Predicted Duration of Therapy Intervention (Therapy Eval)  4 weeks  -PM     PT Plan Comments  1L/1P; Land std abd and ext SLR, possible step up; AQ possible step up. Monitor post Rx Sx's  -PM       User Key  (r) = Recorded By, (t) = Taken By, (c) = Cosigned By    Initials Name Provider Type    PM Mary Bray PTA Physical Therapy Assistant          Modalities     Row Name 01/09/19 1300             Ice    Patient reports will apply ice at home to involved area  Yes  -PM        User Key  (r) = Recorded By, (t) = Taken By, (c) = Cosigned By    Initials Name Provider Type    PM Mary Bray PTA Physical Therapy Assistant          Exercises     Row Name 01/09/19 1300             Subjective Comments    Subjective Comments  Pt notes she did ok after pool Rx; she did ice but was not overly sore.  -PM         Subjective Pain    Able to rate subjective pain?  yes  -PM      Pre-Treatment Pain Level  0  -PM      Post-Treatment Pain Level  0 \"I just feel muscle soreness.\"  -PM         Exercise 1    Exercise Name 1  Pro II ROM  " "-PM      Time 1  10'  -PM      Additional Comments  L4.5 set per pt discretion  -PM         Exercise 2    Exercise Name 2  incline calf S  -PM      Cueing 2  Verbal  -PM      Reps 2  2  -PM      Time 2  30\"  -PM         Exercise 3    Exercise Name 3  long sit HS S  -PM      Cueing 3  Verbal  -PM      Reps 3  2  -PM      Time 3  30\"  -PM         Exercise 4    Exercise Name 4  std CR/TR  -PM      Cueing 4  Verbal  -PM      Sets 4  2  -PM      Reps 4  10  -PM         Exercise 5    Exercise Name 5  gentle MS  -PM      Cueing 5  Verbal  -PM      Sets 5  2  -PM      Reps 5  10  -PM         Exercise 6    Exercise Name 6  QS  -PM      Cueing 6  Verbal  -PM      Reps 6  20  -PM      Time 6  5\"  -PM         Exercise 7    Exercise Name 7  SLR FF B  -PM      Cueing 7  Verbal  -PM      Sets 7  2  -PM      Reps 7  10  -PM      Time 7  5\"  -PM      Additional Comments  very tiny lag ecc second set  -PM         Exercise 8    Exercise Name 8  SL SLR abd  -PM      Cueing 8  Verbal  -PM      Sets 8  2  -PM      Reps 8  10  -PM         Exercise 9    Exercise Name 9  SL clam  -PM      Cueing 9  Verbal  -PM      Sets 9  2  -PM      Reps 9  10  -PM         Exercise 10    Exercise Name 10  Adductor Ball squeezes  -PM      Cueing 10  Verbal  -PM      Reps 10  20  -PM      Time 10  5\"  -PM         Exercise 11    Exercise Name 11  SAQ B  -PM      Cueing 11  Verbal  -PM      Sets 11  2  -PM      Reps 11  10  -PM        User Key  (r) = Recorded By, (t) = Taken By, (c) = Cosigned By    Initials Name Provider Type    Mary Pierson, EMELY Physical Therapy Assistant                         PT OP Goals     Row Name 01/09/19 1300          PT Short Term Goals    STG Date to Achieve  01/23/19  -PM     STG 1  Demonstrate independence/compliance with HEP  -PM     STG 1 Progress  Progressing  -PM     STG 2  Tolerate 45 minute treatment session without increased pain  -PM     STG 2 Progress  Progressing  -PM     STG 3  Perform active SLR flex on RLE " 2x10 with no quad lag present, no increased pain, good eccentric control  -PM     STG 3 Progress  Progressing  -PM     STG 4  Demonstrate R knee flex/ext MMT to 4+/5  -PM     STG 4 Progress  Ongoing  -PM        Long Term Goals    LTG Date to Achieve  02/13/19  -PM     LTG 1  Subjectively report 60% improvement or greater  -PM     LTG 1 Progress  Ongoing  -PM     LTG 2  Improve LEFS score to 60/80 or greater  -PM     LTG 2 Progress  Ongoing  -PM     LTG 3  Demonstrate independence in aquatic treatment session for carryover into independent management  -PM     LTG 3 Progress  Ongoing  -PM     LTG 4  Demonstrate bilateral knee flex/ext MMT to 5/5  -PM     LTG 4 Progress  Ongoing  -PM     LTG 5  Ascend/descend 5 steps x5 with reciprocal pattern, no increased pain or compensation in LE's bilaterally  -PM     LTG 5 Progress  Ongoing  -PM     LTG 6  Demonstrate bilateral hip flex/abd MMT to 5/5  -PM     LTG 6 Progress  Ongoing  -PM     LTG 7  Demonstrate independence in final HEP and fitness/cybex equipment activities for carryover into independent management  -PM     LTG 7 Progress  Ongoing  -PM        Time Calculation    PT Goal Re-Cert Due Date  01/23/19  -PM       User Key  (r) = Recorded By, (t) = Taken By, (c) = Cosigned By    Initials Name Provider Type    Mary Pierson, EMELY Physical Therapy Assistant          Therapy Education  Given: HEP, Symptoms/condition management, Pain management, Posture/body mechanics  Program: Reinforced  How Provided: Verbal, Demonstration, Written  Provided to: Patient  Level of Understanding: Teach back education performed, Verbalized, Demonstrated              Time Calculation:   Start Time: 1302  Stop Time: 1345  Time Calculation (min): 43 min  Total Timed Code Minutes- PT: 43 minute(s)  Therapy Suggested Charges     Code   Minutes Charges    None           Therapy Charges for Today     Code Description Service Date Service Provider Modifiers Qty    78591423520 HC PT THER  PROC EA 15 MIN 1/9/2019 Mary Bray, PTA GP 3                    Mary Bray, PTA  1/9/2019

## 2019-01-14 ENCOUNTER — HOSPITAL ENCOUNTER (OUTPATIENT)
Dept: PHYSICAL THERAPY | Facility: HOSPITAL | Age: 54
Setting detail: THERAPIES SERIES
Discharge: HOME | End: 2019-01-14

## 2019-01-14 DIAGNOSIS — R53.1 WEAKNESS: Primary | ICD-10-CM

## 2019-01-14 DIAGNOSIS — M25.562 PAIN IN BOTH KNEES, UNSPECIFIED CHRONICITY: ICD-10-CM

## 2019-01-14 DIAGNOSIS — M25.561 PAIN IN BOTH KNEES, UNSPECIFIED CHRONICITY: ICD-10-CM

## 2019-01-14 PROCEDURE — 97110 THERAPEUTIC EXERCISES: CPT

## 2019-01-14 NOTE — THERAPY TREATMENT NOTE
"    Outpatient Physical Therapy Ortho Treatment Note  [facility]     Patient Name: Shawnee Suarez  : 1965  MRN: [id number]  Today's Date: 2019      Visit Date: 2019  Subjective Improvement:  \"15%\"     Attendance:   Approved:        30 visits   MD follow up:         PRN   date:     2019      Visit Dx:    ICD-10-CM ICD-9-CM   1. Weakness R53.1 780.79   2. 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   • Nonsmoker   • Encounter for screening for malignant neoplasm of colon        Past Medical History:   Diagnosis Date   • Anxiety    • Depression    • History of transfusion    • Hyperlipidemia    • Ovarian cyst         Past Surgical History:   Procedure Laterality Date   • APPENDECTOMY     • BREAST BIOPSY Right     unsure when   • CHOLECYSTECTOMY     • COLONOSCOPY  2013    Normal exam repeat in 10 years   • COLONOSCOPY N/A 2018    Procedure: COLONOSCOPY WITH ANESTHESIA;  Surgeon: Da Miles MD;  Location: [facility] ENDOSCOPY;  Service: Gastroenterology   • D&C HYSTEROSCOPY ENDOMETRIAL ABLATION N/A 2017    Procedure: DILATATION AND CURETTAGE HYSTEROSCOPY NOVASURE ENDOMETRIAL ABLATION;  Surgeon: Ana Barrios MD;  Location: [facility] OR;  Service:    • OVARIAN CYST REMOVAL         PT Ortho     Row Name 19 1500       Subjective Comments    Subjective Comments  Pt notes her knee pn not as bad going down basement stairs, but she did treadmill this a.m. (even though you advised not best) and it hurt. Pt states she is trying to incr caloric burn.  -PM       Precautions and Contraindications    Precautions/Limitations  no known precautions/limitations  -PM       Subjective Pain    Able to rate subjective pain?  yes  -PM    Pre-Treatment Pain Level  0  -PM    Post-Treatment Pain Level  0  -PM      User Key  (r) = Recorded By, (t) = Taken By, (c) = " "Cosigned By    Initials Name Provider Type    PM Mary Bray PTA Physical Therapy Assistant                      PT Assessment/Plan     Row Name 01/14/19 1500          PT Assessment    Assessment Comments  No lag with SLR; discussed again with pt that TM not best option. Some subjective improvement; [place] additions well.  -PM        PT Plan    PT Frequency  2x/week  -PM     Predicted Duration of Therapy Intervention (Therapy Eval)  4 weeks  -PM     PT Plan Comments  1L/1P  -PM       User Key  (r) = Recorded By, (t) = Taken By, (c) = Cosigned By    Initials Name Provider Type    PM Mary Bray PTA Physical Therapy Assistant              Exercises     Row Name 01/14/19 1500             Subjective Comments    Subjective Comments  Pt notes her knee pn not as bad going down basement stairs, but she did treadmill this a.m. (even though you advised not best) and it hurt. Pt states she is trying to incr caloric burn.  -PM         Subjective Pain    Able to rate subjective pain?  yes  -PM      Pre-Treatment Pain Level  0  -PM      Post-Treatment Pain Level  0  -PM         Exercise 1    Exercise Name 1  Pro II ROM  -PM      Time 1  10'  -PM      Additional Comments  L4.5  -PM         Exercise 2    Exercise Name 2  incline calf S  -PM      Cueing 2  Verbal  -PM      Reps 2  2  -PM      Time 2  30\"  -PM         Exercise 3    Exercise Name 3  long sit HS S  -PM      Cueing 3  Verbal  -PM      Reps 3  2  -PM      Time 3  30\"  -PM         Exercise 4    Exercise Name 4  AirEx std CR/TR  -PM      Cueing 4  Verbal  -PM      Sets 4  2  -PM      Reps 4  10  -PM         Exercise 5    Exercise Name 5  AirEx march; gentle MS  -PM      Cueing 5  Verbal  -PM      Sets 5  2  -PM      Reps 5  10  -PM         Exercise 6    Exercise Name 6  QS  -PM      Cueing 6  Verbal  -PM      Reps 6  20  -PM      Time 6  5\"  -PM         Exercise 7    Exercise Name 7  SLR FF B  -PM      Cueing 7  Verbal  -PM      Sets 7  2  -PM      Reps 7  " "10  -PM      Time 7  5\"  -PM         Exercise 8    Exercise Name 8  SL SLR abd  -PM      Cueing 8  Verbal  -PM      Sets 8  2  -PM      Reps 8  10  -PM         Exercise 9    Exercise Name 9  std hip abd SLR B  -PM      Cueing 9  Verbal  -PM      Sets 9  2  -PM      Reps 9  10  -PM         Exercise 10    Exercise Name 10  std hip ext SLR B  -PM      Cueing 10  Verbal  -PM      Sets 10  2  -PM      Reps 10  10  -PM      Time 10  5\"  -PM         Exercise 11    Exercise Name 11  LAQ w/add  -PM      Cueing 11  Verbal  -PM      Sets 11  2  -PM      Reps 11  10  -PM         Exercise 12    Exercise Name 12  step up 6\" B  -PM      Cueing 12  Verbal  -PM      Reps 12  2x10  -PM      Additional Comments  pt notes R knee began to hurt some the last few reps  -PM         Exercise 13    Exercise Name 13  std CC TKE  -PM      Cueing 13  Verbal  -PM      Sets 13  2  -PM      Reps 13  10  -PM      Time 13  5\"  -PM      Additional Comments  3 plate  -PM        User Key  (r) = Recorded By, (t) = Taken By, (c) = Cosigned By    Initials Name Provider Type    PM Mary Bray, EMELY Physical Therapy Assistant                         PT OP Goals     Row Name 01/14/19 1500          PT Short Term Goals    STG Date to Achieve  01/23/19  -PM     STG 1  Demonstrate independence/compliance with HEP  -PM     STG 1 Progress  Progressing  -PM     STG 2  Tolerate 45 minute treatment session without increased pain  -PM     STG 2 Progress  Progressing  -PM     STG 3  Perform active SLR flex on RLE 2x10 with no quad lag present, no increased pain, good eccentric control  -PM     STG 3 Progress  Met  -PM     STG 4  Demonstrate R knee flex/ext MMT to 4+/5  -PM     STG 4 Progress  Progressing  -PM        Long Term Goals    LTG Date to Achieve  02/13/19  -PM     LTG 1  Subjectively report 60% improvement or greater  -PM     LTG 1 Progress  Progressing  -PM     LTG 2  Improve LEFS score to 60/80 or greater  -PM     LTG 2 Progress  Ongoing  -PM     LTG " 3  Demonstrate independence in aquatic treatment session for carryover into independent management  -PM     LTG 3 Progress  Ongoing  -PM     LTG 4  Demonstrate bilateral knee flex/ext MMT to 5/5  -PM     LTG 4 Progress  Ongoing  -PM     LTG 5  Ascend/descend 5 steps x5 with reciprocal pattern, no increased pain or compensation in LE's bilaterally  -PM     LTG 5 Progress  Ongoing  -PM     LTG 6  Demonstrate bilateral hip flex/abd MMT to 5/5  -PM     LTG 6 Progress  Ongoing  -PM     LTG 7  Demonstrate independence in final HEP and fitness/cybex equipment activities for carryover into independent management  -PM     LTG 7 Progress  Ongoing  -PM        Time Calculation    PT Goal Re-Cert Due Date  01/23/19  -PM       User Key  (r) = Recorded By, (t) = Taken By, (c) = Cosigned By    Initials Name Provider Type    PM Mary Bray PTA Physical Therapy Assistant          Therapy Education  Given: HEP, Symptoms/condition management, Pain management, Posture/body mechanics  Program: Reinforced  How Provided: Verbal, Demonstration, Written  Provided to: Patient  Level of Understanding: Teach back education performed, Verbalized, Demonstrated              Time Calculation:   Start Time: 1512  Stop Time: 1557  Time Calculation (min): 45 min  Total Timed Code Minutes- PT: 45 minute(s)  Therapy Suggested Charges     Code   Minutes Charges    None           Therapy Charges for Today     Code Description Service Date Service Provider Modifiers Qty    [id number] HC PT THER PROC EA 15 MIN 1/14/2019 Mary Bray PTA GP 3                    Mary Bray PTA  1/14/2019

## 2019-01-16 ENCOUNTER — HOSPITAL ENCOUNTER (OUTPATIENT)
Dept: PHYSICAL THERAPY | Facility: HOSPITAL | Age: 54
Setting detail: THERAPIES SERIES
Discharge: HOME | End: 2019-01-16

## 2019-01-16 DIAGNOSIS — R53.1 WEAKNESS: Primary | ICD-10-CM

## 2019-01-16 DIAGNOSIS — M25.561 PAIN IN BOTH KNEES, UNSPECIFIED CHRONICITY: ICD-10-CM

## 2019-01-16 DIAGNOSIS — M25.562 PAIN IN BOTH KNEES, UNSPECIFIED CHRONICITY: ICD-10-CM

## 2019-01-16 PROCEDURE — 97110 THERAPEUTIC EXERCISES: CPT

## 2019-01-16 NOTE — THERAPY TREATMENT NOTE
"    Outpatient Physical Therapy Ortho Treatment Note  [facility]     Patient Name: Shawnee Suarez  : 1965  MRN: [id number]  Today's Date: 2019      Visit Date: 2019  Subjective Improvement:  \"15%\"     Attendance:   Approved:        30 visits   MD follow up:         PRN   date:     2019      Visit Dx:    ICD-10-CM ICD-9-CM   1. Weakness R53.1 780.79   2. 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   • Nonsmoker   • Encounter for screening for malignant neoplasm of colon        Past Medical History:   Diagnosis Date   • Anxiety    • Depression    • History of transfusion    • Hyperlipidemia    • Ovarian cyst         Past Surgical History:   Procedure Laterality Date   • APPENDECTOMY     • BREAST BIOPSY Right     unsure when   • CHOLECYSTECTOMY     • COLONOSCOPY  2013    Normal exam repeat in 10 years   • COLONOSCOPY N/A 2018    Procedure: COLONOSCOPY WITH ANESTHESIA;  Surgeon: Da Miles MD;  Location: [facility] ENDOSCOPY;  Service: Gastroenterology   • D&C HYSTEROSCOPY ENDOMETRIAL ABLATION N/A 2017    Procedure: DILATATION AND CURETTAGE HYSTEROSCOPY NOVASURE ENDOMETRIAL ABLATION;  Surgeon: Ana Barrios MD;  Location: [facility] OR;  Service:    • OVARIAN CYST REMOVAL         PT Ortho     Row Name 19 1300       Subjective Comments    Subjective Comments  Pt states she is doing ok; notes would like to try elliptical.  -PM       Precautions and Contraindications    Precautions/Limitations  no known precautions/limitations  -PM       Subjective Pain    Able to rate subjective pain?  yes  -PM    Pre-Treatment Pain Level  0  -PM    Post-Treatment Pain Level  1  -PM    Row Name 19 1500       Subjective Comments    Subjective Comments  Pt notes her knee pn not as bad going down basement stairs, but she did treadmill this a.m. (even though " "you advised not best) and it hurt. Pt states she is trying to incr caloric burn.  -PM       Precautions and Contraindications    Precautions/Limitations  no known precautions/limitations  -PM       Subjective Pain    Able to rate subjective pain?  yes  -PM    Pre-Treatment Pain Level  0  -PM    Post-Treatment Pain Level  0  -PM      User Key  (r) = Recorded By, (t) = Taken By, (c) = Cosigned By    Initials Name Provider Type    Mary Pierson PTA Physical Therapy Assistant                      PT Assessment/Plan     Row Name 01/16/19 1300          PT Assessment    Assessment Comments  Pt had no rept pn on elliptical but quite fatigued and somewhat shaky in LE's following that. R knee gets pn with step ups(mild, impr with lower step ht).  -PM        PT Plan    PT Frequency  2x/week  -PM     Predicted Duration of Therapy Intervention (Therapy Eval)  4 weeks  -PM     PT Plan Comments  1l/1P; nkechi nxt wk  -PM       User Key  (r) = Recorded By, (t) = Taken By, (c) = Cosigned By    Initials Name Provider Type    Mary Pierson PTA Physical Therapy Assistant              Exercises     Row Name 01/16/19 1300             Subjective Comments    Subjective Comments  Pt states she is doing ok; notes would like to try elliptical.  -PM         Subjective Pain    Able to rate subjective pain?  yes  -PM      Pre-Treatment Pain Level  0  -PM      Post-Treatment Pain Level  1  -PM         Exercise 1    Exercise Name 1  elliptical  -PM      Cueing 1  Verbal  -PM      Time 1  5'  -PM      Additional Comments  Incl 3 R1  -PM         Exercise 2    Exercise Name 2  incline calf S  -PM      Cueing 2  Verbal  -PM      Reps 2  2  -PM      Time 2  30\"  -PM         Exercise 3    Exercise Name 3  long sit HS S  -PM      Cueing 3  Verbal  -PM      Reps 3  2  -PM      Time 3  30\"  -PM         Exercise 4    Exercise Name 4  AirEx std CR/TR  -PM      Cueing 4  Verbal  -PM      Sets 4  2  -PM      Reps 4  10  -PM         Exercise 5 " "   Exercise Name 5  AirEx gentle MS  -PM      Cueing 5  Verbal  -PM      Sets 5  2  -PM      Reps 5  10  -PM         Exercise 6    Exercise Name 6  QS  -PM      Cueing 6  Verbal  -PM      Reps 6  --  -PM      Time 6  --  -PM      Additional Comments  verbal review HEP  -PM         Exercise 7    Exercise Name 7  SLR FF B  -PM      Cueing 7  Verbal  -PM      Sets 7  --  -PM      Reps 7  --  -PM      Time 7  --  -PM      Additional Comments  verbal review HEP  -PM         Exercise 8    Exercise Name 8  4way SLR w/YTB  -PM      Cueing 8  Verbal  -PM      Sets 8  1  -PM      Reps 8  15  -PM      Additional Comments  Bilateral  -PM         Exercise 9    Exercise Name 9  4\" Lat step up and over  -PM      Cueing 9  Verbal  -PM      Sets 9  2  -PM      Reps 9  10  -PM         Exercise 10    Cueing 10  Verbal  -PM      Sets 10  2  -PM      Reps 10  10  -PM      Time 10  5\"  -PM         Exercise 11    Exercise Name 11  LAQ w/add  -PM      Cueing 11  Verbal  -PM      Sets 11  2  -PM      Reps 11  10  -PM         Exercise 12    Exercise Name 12  step up 6\" L and 4\" R  -PM      Cueing 12  Verbal  -PM      Reps 12  2x10 ea  -PM         Exercise 13    Exercise Name 13  std CC TKE  -PM      Cueing 13  Verbal  -PM      Sets 13  2  -PM      Reps 13  10  -PM      Time 13  5\"  -PM      Additional Comments  3 plates  -PM        User Key  (r) = Recorded By, (t) = Taken By, (c) = Cosigned By    Initials Name Provider Type    PM Mary Bray PTA Physical Therapy Assistant                         PT OP Goals     Row Name 01/16/19 1300          Time Calculation    PT Goal Re-Cert Due Date  01/23/19  -PM       User Key  (r) = Recorded By, (t) = Taken By, (c) = Cosigned By    Initials Name Provider Type    PM Mary Bray PTA Physical Therapy Assistant          Therapy Education  Given: HEP, Symptoms/condition management, Pain management, Posture/body mechanics  Program: Reinforced  How Provided: Verbal, Demonstration, " Written  Provided to: Patient  Level of Understanding: Teach back education performed, Verbalized, Demonstrated              Time Calculation:   Start Time: 1345  Stop Time: 1430  Time Calculation (min): 45 min  Total Timed Code Minutes- PT: 45 minute(s)  Therapy Suggested Charges     Code   Minutes Charges    None           Therapy Charges for Today     Code Description Service Date Service Provider Modifiers Qty    42437846070 HC PT THER PROC EA 15 MIN 1/16/2019 Mary Bray, EMELY GP 3                    Mary Bray PTA  1/16/2019

## 2019-01-21 ENCOUNTER — HOSPITAL ENCOUNTER (OUTPATIENT)
Dept: PHYSICAL THERAPY | Facility: HOSPITAL | Age: 54
Setting detail: THERAPIES SERIES
Discharge: HOME | End: 2019-01-21

## 2019-01-21 DIAGNOSIS — M25.561 PAIN IN BOTH KNEES, UNSPECIFIED CHRONICITY: ICD-10-CM

## 2019-01-21 DIAGNOSIS — R53.1 WEAKNESS: Primary | ICD-10-CM

## 2019-01-21 DIAGNOSIS — M25.562 PAIN IN BOTH KNEES, UNSPECIFIED CHRONICITY: ICD-10-CM

## 2019-01-21 PROCEDURE — 97110 THERAPEUTIC EXERCISES: CPT

## 2019-01-21 NOTE — THERAPY TREATMENT NOTE
Outpatient Physical Therapy Ortho Treatment Note  [facility]  Sarahy Mann PTA  19  4:33 PM       Patient Name: Shawnee Suarez  : 1965  MRN: [id number]  Today's Date: 2019      Visit Date: 2019    Subjective Improvement: 30%       Attendance:    Approved: 30 visits           MD follow up:PRN            date: 19        Visit Dx:    ICD-10-CM ICD-9-CM   1. Weakness R53.1 780.79   2. 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   • Nonsmoker   • Encounter for screening for malignant neoplasm of colon        Past Medical History:   Diagnosis Date   • Anxiety    • Depression    • History of transfusion    • Hyperlipidemia    • Ovarian cyst         Past Surgical History:   Procedure Laterality Date   • APPENDECTOMY     • BREAST BIOPSY Right     unsure when   • CHOLECYSTECTOMY     • COLONOSCOPY  2013    Normal exam repeat in 10 years   • COLONOSCOPY N/A 2018    Procedure: COLONOSCOPY WITH ANESTHESIA;  Surgeon: Da Miles MD;  Location: [facility] ENDOSCOPY;  Service: Gastroenterology   • D&C HYSTEROSCOPY ENDOMETRIAL ABLATION N/A 2017    Procedure: DILATATION AND CURETTAGE HYSTEROSCOPY NOVASURE ENDOMETRIAL ABLATION;  Surgeon: Ana Barrios MD;  Location: [facility] OR;  Service:    • OVARIAN CYST REMOVAL         PT Ortho     Row Name 19 1500       Precautions and Contraindications    Precautions/Limitations  no known precautions/limitations  -KM       Subjective Pain    Post-Treatment Pain Level  0  -KM       Posture/Observations    Posture/Observations Comments  NAD. Ambulates with NAG  -KM      User Key  (r) = Recorded By, (t) = Taken By, (c) = Cosigned By    Initials Name Provider Type    Sarahy Worthy PTA Physical Therapy Assistant                      PT Assessment/Plan     Row Name 19 1600          PT  Assessment    Functional Limitations  Limitation in home management;Limitations in community activities;Limitations in functional capacity and performance;Performance in leisure activities;Impaired gait  -KM     Impairments  Balance;Impaired flexibility;Impaired muscle endurance;Muscle strength;Pain;Range of motion;Poor body mechanics  -KM     Assessment Comments  pt did very well with overall therex. Pt fatigues with EFX warmup and can only do 5 min at this time. pt had some audible popping in B knees with fwd and lat step ups but no pain reported. Updated HEP and pt verbalized understanding  -KM     Rehab Potential  Good  -KM     Patient/caregiver participated in establishment of treatment plan and goals  Yes  -KM     Patient would benefit from skilled therapy intervention  Yes  -KM        PT Plan    PT Frequency  2x/week  -KM     Predicted Duration of Therapy Intervention (Therapy Eval)  4 weeks  -KM     PT Plan Comments  Cont EFX warmup. Try airex beam activities  -KM       User Key  (r) = Recorded By, (t) = Taken By, (c) = Cosigned By    Initials Name Provider Type     Sarahy Lara, EMELY Physical Therapy Assistant              Exercises     Row Name 01/21/19 1500             Precautions    Existing Precautions/Restrictions  no known precautions/restrictions  -KM         Subjective Comments    Subjective Comments  pt reports no pain. States that she was sore after doing the EFX last treatment but wishes to continue  -KM         Subjective Pain    Able to rate subjective pain?  yes  -KM      Pre-Treatment Pain Level  0  -KM      Post-Treatment Pain Level  0  -KM         Aquatics    Aquatics performed?  No  -KM         Exercise 1    Exercise Name 1  EFX  -KM      Time 1  5 min  -KM      Additional Comments  L3.0  -KM         Exercise 2    Exercise Name 2  incline calf S  -KM      Reps 2  2  -KM      Time 2  30 sec hold  -KM         Exercise 3    Exercise Name 3  ST HS S  -KM      Reps 3  2  -KM       Time 3  30 sec hold  -KM         Exercise 4    Exercise Name 4  Airex CR/TR  -KM      Sets 4  2  -KM      Reps 4  10  -KM         Exercise 5    Exercise Name 5  Airex march  -KM      Sets 5  2  -KM      Reps 5  10  -KM         Exercise 6    Exercise Name 6  Airex mini squats  -KM      Sets 6  2  -KM      Reps 6  10  -KM         Exercise 7    Exercise Name 7  Step ups fwd  -KM      Sets 7  2  -KM      Reps 7  10  -KM      Additional Comments  6 inch  -KM         Exercise 8    Exercise Name 8  Step up lat  -KM      Sets 8  2  -KM      Reps 8  10  -KM      Additional Comments  4 inch  -KM         Exercise 9    Exercise Name 9  CC: TKE  -KM      Sets 9  2  -KM      Reps 9  10  -KM      Additional Comments  3 plates; bilaterally  -KM         Exercise 10    Exercise Name 10  ST SLR 4 way w/ tband  -KM      Sets 10  1  -KM      Reps 10  10  -KM      Additional Comments  yellow  -KM         Exercise 11    Exercise Name 11  LAQ w/add  -KM      Sets 11  2  -KM      Reps 11  10  -KM      Time 11  5 sec hold  -KM         Exercise 12    Exercise Name 12  Sit to stands  -KM      Reps 12  2x10  -KM      Additional Comments  no UE assist  -KM        User Key  (r) = Recorded By, (t) = Taken By, (c) = Cosigned By    Initials Name Provider Type    Sarahy Worthy, PTA Physical Therapy Assistant                         PT OP Goals     Row Name 01/21/19 1600 01/21/19 1500       PT Short Term Goals    STG Date to Achieve  --  01/23/19  -KM    STG 1  --  Demonstrate independence/compliance with HEP  -KM    STG 1 Progress  --  Progressing  -KM    STG 2  --  Tolerate 45 minute treatment session without increased pain  -KM    STG 2 Progress  --  Progressing  -KM    STG 3  --  Perform active SLR flex on RLE 2x10 with no quad lag present, no increased pain, good eccentric control  -KM    STG 3 Progress  --  Met  -KM    STG 4  --  Demonstrate R knee flex/ext MMT to 4+/5  -KM    STG 4 Progress  --  Progressing  -KM       Long Term  Goals    LTG Date to Achieve  --  02/13/19  -KM    LT 1  --  Subjectively report 60% improvement or greater  -    LT 1 Progress  --  Progressing  -KM    LT 2  --  Improve LEFS score to 60/80 or greater  -[facility] 2 Progress  --  Ongoing  -KM    LT 3  --  Demonstrate independence in aquatic treatment session for carryover into independent management  -    LT 3 Progress  --  Ongoing  -KM    LTG 4  --  Demonstrate bilateral knee flex/ext MMT to 5/5  -KM    LT 4 Progress  --  Ongoing  -KM    LT 5  --  Ascend/descend 5 steps x5 with reciprocal pattern, no increased pain or compensation in LE's bilaterally  -    LT 5 Progress  --  Ongoing  -KM    LT 6  --  Demonstrate bilateral hip flex/abd MMT to 5/5  -KM    [facility] 6 Progress  --  Ongoing  -KM    LT 7  --  Demonstrate independence in final HEP and fitness/cybex equipment activities for carryover into independent management  -[facility] 7 Progress  --  Ongoing  -KM       Time Calculation    PT Goal Re-Cert Due Date  01/23/19  -KM  --      User Key  (r) = Recorded By, (t) = Taken By, (c) = Cosigned By    Initials Name Provider Type     Sarahy Mann PTA Physical Therapy Assistant          Therapy Education  Given: HEP, Symptoms/condition management, Pain management, Posture/body mechanics  Program: Reinforced  How Provided: Verbal, Demonstration, Written  Provided to: Patient  Level of Understanding: Teach back education performed, Verbalized, Demonstrated              Time Calculation:   Start Time: 1520  Stop Time: 1602  Time Calculation (min): 42 min  Total Timed Code Minutes- PT: 42 minute(s)  Therapy Suggested Charges     Code   Minutes Charges    None           Therapy Charges for Today     Code Description Service Date Service Provider Modifiers Qty    07557591298 HC PT THER PROC EA 15 MIN 1/21/2019 Sarahy Mann PTA GP 3                    Sarahy Mann PTA  1/21/2019

## 2019-01-23 ENCOUNTER — HOSPITAL ENCOUNTER (OUTPATIENT)
Dept: PHYSICAL THERAPY | Facility: HOSPITAL | Age: 54
Setting detail: THERAPIES SERIES
Discharge: HOME | End: 2019-01-23

## 2019-01-23 DIAGNOSIS — M25.561 PAIN IN BOTH KNEES, UNSPECIFIED CHRONICITY: ICD-10-CM

## 2019-01-23 DIAGNOSIS — M25.562 PAIN IN BOTH KNEES, UNSPECIFIED CHRONICITY: ICD-10-CM

## 2019-01-23 DIAGNOSIS — R53.1 WEAKNESS: Primary | ICD-10-CM

## 2019-01-23 PROCEDURE — 97110 THERAPEUTIC EXERCISES: CPT | Performed by: PHYSICAL THERAPIST

## 2019-01-24 NOTE — THERAPY PROGRESS REPORT/RE-CERT
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   • Nonsmoker   • Encounter for screening for malignant neoplasm of colon        Past Medical History:   Diagnosis Date   • Anxiety    • Depression    • History of transfusion    • Hyperlipidemia    • Ovarian cyst         Past Surgical History:   Procedure Laterality Date   • APPENDECTOMY     • BREAST BIOPSY Right     unsure when   • CHOLECYSTECTOMY     • COLONOSCOPY  2013    Normal exam repeat in 10 years   • COLONOSCOPY N/A 2018    Procedure: COLONOSCOPY WITH ANESTHESIA;  Surgeon: Da Miles MD;  Location: [facility] ENDOSCOPY;  Service: Gastroenterology   • D&C HYSTEROSCOPY ENDOMETRIAL ABLATION N/A 2017    Procedure: DILATATION AND CURETTAGE HYSTEROSCOPY NOVASURE ENDOMETRIAL ABLATION;  Surgeon: Ana Barrios MD;  Location: [facility] OR;  Service:    • OVARIAN CYST REMOVAL         Visit Dx:     ICD-10-CM ICD-9-CM   1. Weakness R53.1 780.79   2. Pain in both knees, unspecified chronicity M25.561 719.46    M25.562            PT Ortho     Row Name 19 1400       Subjective Comments    Subjective Comments  Pt reports overall her knees are feeling much better. R one is still more painful than the L but she can definitely tell improvement. Stairs are getting much easier and not as painful.  -KG       Precautions and Contraindications    Precautions/Limitations  no known precautions/limitations  -KG       Posture/Observations    Posture/Observations Comments  No acute distress. Ambulates with non-antalgic gait with no AD.  -KG       Right Lower Ext    Rt Knee Extension/Flexion AROM  1-0-146   "-KG    RT Lower Extremity Comments  Hip AROM WFL in all planes  -KG       Left Lower Ext    Lt Knee Extension/Flexion AROM  4-0-148  -KG    LT Lower Extremity Comments  Hip AROM WFL in all planes  -KG       MMT Right Lower Ext    Rt Hip Flexion MMT, Gross Movement  (5/5) normal  -KG    Rt Hip ABduction MMT, Gross Movement  (4+/5) good plus  -KG    Rt Hip ADduction MMT, Gross Movement  (4+/5) good plus  -KG    Rt Knee Extension MMT, Gross Movement  (4+/5) good plus  -KG    Rt Knee Flexion MMT, Gross Movement  (4+/5) good plus  -KG       MMT Left Lower Ext    Lt Hip Flexion MMT, Gross Movement  (5/5) normal  -KG    Lt Hip ABduction MMT, Gross Movement  (5/5) normal  -KG    Lt Hip ADduction MMT, Gross Movement  (5/5) normal  -KG    Lt Knee Extension MMT, Gross Movement  (5/5) normal  -KG    Lt Knee Flexion MMT, Gross Movement  (5/5) normal  -KG       Sensation    Sensation WNL?  WNL  -KG    Light Touch  No apparent deficits  -KG       Balance Skills Training    SLS  Bilateral SLS 30\" on level ground  -KG      User Key  (r) = Recorded By, (t) = Taken By, (c) = Cosigned By    Initials Name Provider Type    KG Breanna Borja, PT Physical Therapist                      Therapy Education  Given: HEP, Symptoms/condition management, Posture/body mechanics, Pain management  Program: Reinforced  How Provided: Verbal  Provided to: Patient  Level of Understanding: Verbalized, Demonstrated     PT OP Goals     Row Name 01/23/19 1400          PT Short Term Goals    STG Date to Achieve  01/23/19  -KG     STG 1  Demonstrate independence/compliance with HEP  -KG     STG 1 Progress  Progressing  -KG     STG 2  Tolerate 45 minute treatment session without increased pain  -KG     STG 2 Progress  Progressing  -KG     STG 3  Perform active SLR flex on RLE 2x10 with no quad lag present, no increased pain, good eccentric control  -KG     STG 3 Progress  Met  -KG     STG 4  Demonstrate R knee flex/ext MMT to 4+/5  -KG     STG 4 Progress  " Progressing  -KG        Long Term Goals    LTG Date to Achieve  02/13/19  -KG     LTG 1  Subjectively report 60% improvement or greater  -KG     LTG 1 Progress  Progressing  -KG     LTG 2  Improve LEFS score to 60/80 or greater  -KG     LTG 2 Progress  Ongoing  -KG     LTG 3  Demonstrate independence in aquatic treatment session for carryover into independent management  -KG     LTG 3 Progress  Ongoing  -KG     LTG 4  Demonstrate bilateral knee flex/ext MMT to 5/5  -KG     LTG 4 Progress  Ongoing  -KG     LTG 5  Ascend/descend 5 steps x5 with reciprocal pattern, no increased pain or compensation in LE's bilaterally  -KG     LTG 5 Progress  Met  -KG     LTG 6  Demonstrate bilateral hip flex/abd MMT to 5/5  -KG     LTG 6 Progress  Ongoing  -KG     LTG 7  Demonstrate independence in final HEP and fitness/cybex equipment activities for carryover into independent management  -KG     LTG 7 Progress  Ongoing  -KG        Time Calculation    PT Goal Re-Cert Due Date  02/13/19  -KG       User Key  (r) = Recorded By, (t) = Taken By, (c) = Cosigned By    Initials Name Provider Type    Breanna Moser, PT Physical Therapist          PT Assessment/Plan     Row Name 01/23/19 1400          PT Assessment    Functional Limitations  Limitation in home management;Limitations in community activities;Limitations in functional capacity and performance;Performance in leisure activities;Impaired gait  -KG     Impairments  Balance;Impaired flexibility;Impaired muscle endurance;Muscle strength;Pain;Range of motion;Poor body mechanics  -KG     Assessment Comments  Pt progressing very well with PT overall. Able to tolerate increased standing/functional and therex activities during therapy visit to visit. Strength overall improving nicely in bilateral hips & knees. Continues to lack functional stability at this time. Pt continues to reports compliance with HEP and is eager to improve, as she wants to be more active to live a healthier  "lifestyle with less pain. Pt will continue to benefit from skilled PT services to further improve functional strength and overall activity tolerance.  -KG     Rehab Potential  Good  -KG     Patient/caregiver participated in establishment of treatment plan and goals  Yes  -KG     Patient would benefit from skilled therapy intervention  Yes  -KG        PT Plan    PT Frequency  2x/week  -KG     Predicted Duration of Therapy  3 weeks  -KG     PT Plan Comments  Continue EFX for warm up. Continue to progress CKC/functional strengthening activities as tolerated. Possible reverse BOSU mini squats. Introduce to cybex LP in the next couple of visits. Continue proprioceptive activities; airex SLS  -KG       User Key  (r) = Recorded By, (t) = Taken By, (c) = Cosigned By    Initials Name Provider Type    KG Breanna Borja, PT Physical Therapist          Exercises     Row Name 01/23/19 1400             Precautions    Existing Precautions/Restrictions  no known precautions/restrictions  -KG         Subjective Pain    Able to rate subjective pain?  yes  -KG      Pre-Treatment Pain Level  0  -KG      Post-Treatment Pain Level  0  -KG         Aquatics    Aquatics performed?  No  -KG         Exercise 1    Exercise Name 1  EFX  -KG      Time 1  6 min  -KG      Additional Comments  L 3.0  -KG         Exercise 2    Exercise Name 2  incline calf S  -KG      Reps 2  2  -KG      Time 2  30 sec hold  -KG         Exercise 3    Exercise Name 3  ST HS S  -KG      Cueing 3  Verbal  -KG      Reps 3  2  -KG      Time 3  30 sec hold  -KG      Additional Comments  Bilateral  -KG         Exercise 4    Exercise Name 4  Airex CR/TR  -KG      Cueing 4  Verbal  -KG      Sets 4  2  -KG      Reps 4  10  -KG         Exercise 5    Exercise Name 5  Airex mini squats  -KG      Cueing 5  Verbal  -KG      Sets 5  2  -KG      Reps 5  10  -KG         Exercise 6    Exercise Name 6  Lat step up/over 6\"  -KG      Cueing 6  Verbal  -KG      Sets 6  2  -KG      Reps " "6  10  -KG         Exercise 7    Exercise Name 7  Fwd step up/over 6\"  -KG      Cueing 7  Verbal  -KG      Sets 7  2  -KG      Reps 7  10  -KG         Exercise 8    Exercise Name 8  airex beam lat sidestepping  -KG      Cueing 8  Verbal  -KG      Sets 8  1  -KG      Reps 8  5 laps  -KG         Exercise 9    Exercise Name 9  Airex beam tandem gait  -KG      Cueing 9  Verbal  -KG      Sets 9  1  -KG      Reps 9  5 laps  -KG         Exercise 10    Exercise Name 10  Physioball wall squats  -KG      Cueing 10  Verbal  -KG      Sets 10  2  -KG      Reps 10  10  -KG      Additional Comments  Red pball  -KG         Exercise 11    Exercise Name 11  Sit to stands with no UE assist  -KG      Cueing 11  Verbal  -KG      Sets 11  2  -KG      Reps 11  10  -KG         Exercise 12    Exercise Name 12  CC: Resisted gait fwd/bwd  -KG      Cueing 12  Verbal  -KG      Reps 12  1x 5 laps  -KG      Additional Comments  3 plates  -KG        User Key  (r) = Recorded By, (t) = Taken By, (c) = Cosigned By    Initials Name Provider Type    Breanna Moser, PT Physical Therapist                        Outcome Measure Options: Lower Extremity Functional Scale (LEFS)  Lower Extremity Functional Index  Any of your usual work, housework or school activities: Moderate difficulty  Your usual hobbies, recreational or sporting activities: Moderate difficulty  Getting into or out of the bath: No difficulty  Walking between rooms: A little bit of difficulty  Putting on your shoes or socks: No difficulty  Squatting: Extreme difficulty or unable to perform activity  Lifting an object, like a bag of groceries from the floor: No difficulty  Performing light activities around your home: No difficulty  Performing heavy activities around your home: Moderate difficulty  Getting into or out of a car: No difficulty  Walking 2 blocks: Moderate difficulty  Walking a mile: Quite a bit of difficulty  Going up or down 10 stairs (about 1 flight of stairs): " Moderate difficulty  Standing for 1 hour: A little bit of difficulty  Sitting for 1 hour: A little bit of difficulty  Running on even ground: Extreme difficulty or unable to perform activity  Running on uneven ground: Extreme difficulty or unable to perform activity  Making sharp turns while running fast: Extreme difficulty or unable to perform activity  Hopping: Moderate difficulty  Rolling over in bed: No difficulty  Total: 46      Time Calculation:     Therapy Suggested Charges     Code   Minutes Charges    None             Start Time: 1432  Stop Time: 1520  Time Calculation (min): 48 min  Total Timed Code Minutes- PT: 48 minute(s)     Therapy Charges for Today     Code Description Service Date Service Provider Modifiers Qty    23435836462 HC PT THER PROC EA 15 MIN 1/23/2019 Breanna Borja, PT GP 3          PT G-Codes  Outcome Measure Options: Lower Extremity Functional Scale (LEFS)  Total: 46         Breanna Borja, PT  1/23/2019

## 2019-01-28 ENCOUNTER — HOSPITAL ENCOUNTER (OUTPATIENT)
Dept: PHYSICAL THERAPY | Facility: HOSPITAL | Age: 54
Setting detail: THERAPIES SERIES
Discharge: HOME | End: 2019-01-28

## 2019-01-28 DIAGNOSIS — M25.561 PAIN IN BOTH KNEES, UNSPECIFIED CHRONICITY: ICD-10-CM

## 2019-01-28 DIAGNOSIS — M25.562 PAIN IN BOTH KNEES, UNSPECIFIED CHRONICITY: ICD-10-CM

## 2019-01-28 DIAGNOSIS — R53.1 WEAKNESS: Primary | ICD-10-CM

## 2019-01-28 PROCEDURE — 97110 THERAPEUTIC EXERCISES: CPT

## 2019-01-28 NOTE — THERAPY TREATMENT NOTE
Outpatient Physical Therapy Ortho Treatment Note  [facility]  Sarahy Mann, PTA  19  1:56 PM       Patient Name: Shawnee Suarez  : 1965  MRN: [id number]  Today's Date: 2019      Visit Date: 2019    Subjective Improvement:50%       Attendance:   Approved: 30 visits          MD follow up:            date:         Visit Dx:    ICD-10-CM ICD-9-CM   1. Weakness R53.1 780.79   2. 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   • Nonsmoker   • Encounter for screening for malignant neoplasm of colon        Past Medical History:   Diagnosis Date   • Anxiety    • Depression    • History of transfusion    • Hyperlipidemia    • Ovarian cyst         Past Surgical History:   Procedure Laterality Date   • APPENDECTOMY     • BREAST BIOPSY Right     unsure when   • CHOLECYSTECTOMY     • COLONOSCOPY  2013    Normal exam repeat in 10 years   • COLONOSCOPY N/A 2018    Procedure: COLONOSCOPY WITH ANESTHESIA;  Surgeon: Da Miles MD;  Location: [facility] ENDOSCOPY;  Service: Gastroenterology   • D&C HYSTEROSCOPY ENDOMETRIAL ABLATION N/A 2017    Procedure: DILATATION AND CURETTAGE HYSTEROSCOPY NOVASURE ENDOMETRIAL ABLATION;  Surgeon: Ana Barrios MD;  Location: [facility] OR;  Service:    • OVARIAN CYST REMOVAL         PT Ortho     Row Name 19 1300       Subjective Comments    Subjective Comments  Pt reports that she is doing well today- no pain  -KM       Precautions and Contraindications    Precautions/Limitations  no known precautions/limitations  -KM       Subjective Pain    Able to rate subjective pain?  yes  -KM    Pre-Treatment Pain Level  0  -KM    Post-Treatment Pain Level  0  -KM       Posture/Observations    Posture/Observations Comments  Ambulates independently. NAG  -KM      User Key  (r) = Recorded By, (t) = Taken By, (c) = Cosigned  By    Initials Name Provider Type    KM Sarahy Mann PTA Physical Therapy Assistant                                                     Therapy Education  Given: HEP, Symptoms/condition management, Posture/body mechanics, Pain management  Program: Reinforced  How Provided: Verbal  Provided to: Patient  Level of Understanding: Verbalized, Demonstrated              Time Calculation:   Start Time: 1300  Stop Time: 1348  Time Calculation (min): 48 min  Total Timed Code Minutes- PT: 48 minute(s)  Therapy Suggested Charges     Code   Minutes Charges    None           Therapy Charges for Today     Code Description Service Date Service Provider Modifiers Qty    57560892800 HC PT THER PROC EA 15 MIN 1/28/2019 Sarahy Mann PTA GP 3                    Sarahy Mann PTA  1/29/2019

## 2019-01-28 NOTE — THERAPY TREATMENT NOTE
Outpatient Physical Therapy Ortho Treatment Note  [facility]  Sarahy Mann, PTA  19  1:51 PM       Patient Name: Shawnee Suarez  : 1965  MRN: [id number]  Today's Date: 2019      Visit Date: 2019    Subjective Improvement: 50%      Attendance:    Approved: 30 dary SLOAN follow up: PRN           date: 19        Visit Dx:    ICD-10-CM ICD-9-CM   1. Weakness R53.1 780.79   2. 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   • Nonsmoker   • Encounter for screening for malignant neoplasm of colon        Past Medical History:   Diagnosis Date   • Anxiety    • Depression    • History of transfusion    • Hyperlipidemia    • Ovarian cyst         Past Surgical History:   Procedure Laterality Date   • APPENDECTOMY     • BREAST BIOPSY Right     unsure when   • CHOLECYSTECTOMY     • COLONOSCOPY  2013    Normal exam repeat in 10 years   • COLONOSCOPY N/A 2018    Procedure: COLONOSCOPY WITH ANESTHESIA;  Surgeon: Da Miles MD;  Location: [facility] ENDOSCOPY;  Service: Gastroenterology   • D&C HYSTEROSCOPY ENDOMETRIAL ABLATION N/A 2017    Procedure: DILATATION AND CURETTAGE HYSTEROSCOPY NOVASURE ENDOMETRIAL ABLATION;  Surgeon: Ana Barrios MD;  Location: [facility] OR;  Service:    • OVARIAN CYST REMOVAL         PT Ortho     Row Name 19 1300       Subjective Comments    Subjective Comments  Pt reports that she is doing well today- no pain  -KM       Precautions and Contraindications    Precautions/Limitations  no known precautions/limitations  -KM       Subjective Pain    Able to rate subjective pain?  yes  -KM    Pre-Treatment Pain Level  0  -KM    Post-Treatment Pain Level  0  -KM       Posture/Observations    Posture/Observations Comments  Ambulates independently. NAG  -KM      User Key  (r) = Recorded By, (t) = Taken By, (c)  = Cosigned By    Initials Name Provider Type    Sarahy Worthy PTA Physical Therapy Assistant                      PT Assessment/Plan     Row Name 01/28/19 1300          PT Assessment    Functional Limitations  Limitation in home management;Limitations in community activities;Limitations in functional capacity and performance;Performance in leisure activities;Impaired gait  -KM     Impairments  Balance;Impaired flexibility;Impaired muscle endurance;Muscle strength;Pain;Range of motion;Poor body mechanics  -KM     Assessment Comments  pt had no issues with recip stairs and no compensation. Cues for reverse BOSU mini squats. pt able to tolerate 2 sets of EFX- one warm up and one at end of treatment  -KM     Rehab Potential  Good  -KM     Patient/caregiver participated in establishment of treatment plan and goals  Yes  -KM     Patient would benefit from skilled therapy intervention  Yes  -KM        PT Plan    PT Frequency  2x/week  -KM     Predicted Duration of Therapy Intervention (Therapy Eval)  3 weeks  -KM     PT Plan Comments  LP2 next visit  -KM       User Key  (r) = Recorded By, (t) = Taken By, (c) = Cosigned By    Initials Name Provider Type    Sarahy Worthy PTA Physical Therapy Assistant              Exercises     Row Name 01/28/19 1300             Precautions    Existing Precautions/Restrictions  no known precautions/restrictions  -KM         Subjective Comments    Subjective Comments  Pt reports that she is doing well today- no pain  -KM         Subjective Pain    Able to rate subjective pain?  yes  -KM      Pre-Treatment Pain Level  0  -KM      Post-Treatment Pain Level  0  -KM         Aquatics    Aquatics performed?  No  -KM         Exercise 1    Exercise Name 1  EFX  -KM      Time 1  6 min  -KM      Additional Comments  L3.0; 6 min beginning/5 min end  -KM         Exercise 2    Exercise Name 2  incline calf S  -KM      Reps 2  2  -KM      Time 2  30 sec hold  -KM         Exercise  "3    Exercise Name 3  ST HS S  -KM      Reps 3  2  -KM      Time 3  30 sec hold  -KM         Exercise 4    Exercise Name 4  Airex CR/TR  -KM      Sets 4  2  -KM      Reps 4  10  -KM         Exercise 5    Exercise Name 5  Reverse BOSU mini squats  -KM      Sets 5  2  -KM      Reps 5  10  -KM         Exercise 6    Exercise Name 6  BOSU step up/over 6\"  -KM      Sets 6  2  -KM      Reps 6  10  -KM         Exercise 7    Exercise Name 7  Recip stairs  -KM      Sets 7  1  -KM      Reps 7  10  -KM         Exercise 8    Exercise Name 8  airex beam lat sidestepping  -KM      Reps 8  5 laps  -KM         Exercise 9    Exercise Name 9  Airex beam tandem gait  -KM      Reps 9  5 laps  -KM         Exercise 10    Exercise Name 10  Physioball wall squats  -KM      Sets 10  2  -KM      Reps 10  10  -KM      Additional Comments  red pball  -KM         Exercise 11    Exercise Name 11  Sit to stands with no UE assist  -KM      Sets 11  2  -KM      Reps 11  10  -KM         Exercise 12    Exercise Name 12  CC: Resisted gait fwd/bwd  -KM      Reps 12  5 laps each  -KM      Additional Comments  3 plates  -KM         Exercise 13    Exercise Name 13  Airex SLS  -KM      Time 13  30 sec hold  -KM        User Key  (r) = Recorded By, (t) = Taken By, (c) = Cosigned By    Initials Name Provider Type    Sarahy Worthy, PTA Physical Therapy Assistant                         PT OP Goals     Row Name 01/28/19 1300          PT Short Term Goals    STG Date to Achieve  01/23/19  -KM     STG 1  Demonstrate independence/compliance with HEP  -KM     STG 1 Progress  Met;Ongoing  -KM     STG 2  Tolerate 45 minute treatment session without increased pain  -KM     STG 2 Progress  Partially Met;Progressing  -KM     STG 3  Perform active SLR flex on RLE 2x10 with no quad lag present, no increased pain, good eccentric control  -KM     STG 3 Progress  Met  -KM     STG 4  Demonstrate R knee flex/ext MMT to 4+/5  -KM     STG 4 Progress  Progressing  -KM  "       Long Term Goals    LTG Date to Achieve  02/13/19  -KM     LTG 1  Subjectively report 60% improvement or greater  -KM     LTG 1 Progress  Progressing  -KM     LTG 2  Improve LEFS score to 60/80 or greater  -KM     LTG 2 Progress  Ongoing  -KM     LTG 3  Demonstrate independence in aquatic treatment session for carryover into independent management  -KM     LTG 3 Progress  Ongoing  -KM     LTG 4  Demonstrate bilateral knee flex/ext MMT to 5/5  -KM     LTG 4 Progress  Ongoing  -KM     LTG 5  Ascend/descend 5 steps x5 with reciprocal pattern, no increased pain or compensation in LE's bilaterally  -KM     LTG 5 Progress  Met  -KM     LTG 6  Demonstrate bilateral hip flex/abd MMT to 5/5  -KM     LTG 6 Progress  Ongoing  -KM     LTG 7  Demonstrate independence in final HEP and fitness/cybex equipment activities for carryover into independent management  -KM     LTG 7 Progress  Ongoing  -        Time Calculation    PT Goal Re-Cert Due Date  02/13/19  -KM       User Key  (r) = Recorded By, (t) = Taken By, (c) = Cosigned By    Initials Name Provider Type    Sarahy Worthy PTA Physical Therapy Assistant          Therapy Education  Given: HEP, Symptoms/condition management, Posture/body mechanics, Pain management  Program: Reinforced  How Provided: Verbal  Provided to: Patient  Level of Understanding: Verbalized, Demonstrated              Time Calculation:   Start Time: 1300  Stop Time: 1348  Time Calculation (min): 48 min  Total Timed Code Minutes- PT: 48 minute(s)  Therapy Suggested Charges     Code   Minutes Charges    None           Therapy Charges for Today     Code Description Service Date Service Provider Modifiers Qty    89758328811 HC PT THER PROC EA 15 MIN 1/28/2019 Sarahy Lara PTA GP 3                    Sarahy Lara PTA  1/28/2019

## 2019-01-30 ENCOUNTER — HOSPITAL ENCOUNTER (OUTPATIENT)
Dept: PHYSICAL THERAPY | Facility: HOSPITAL | Age: 54
Setting detail: THERAPIES SERIES
Discharge: HOME | End: 2019-01-30

## 2019-01-30 DIAGNOSIS — R53.1 WEAKNESS: Primary | ICD-10-CM

## 2019-01-30 DIAGNOSIS — M25.562 PAIN IN BOTH KNEES, UNSPECIFIED CHRONICITY: ICD-10-CM

## 2019-01-30 DIAGNOSIS — M25.561 PAIN IN BOTH KNEES, UNSPECIFIED CHRONICITY: ICD-10-CM

## 2019-01-30 PROCEDURE — 97110 THERAPEUTIC EXERCISES: CPT

## 2019-01-30 NOTE — THERAPY TREATMENT NOTE
Outpatient Physical Therapy Ortho Treatment Note  [facility]     Patient Name: Shawnee Suarez  : 1965  MRN: [id number]  Today's Date: 2019      Visit Date: 2019  Subjective Improvement: 50%      Attendance:    Approved: 30 dary SLOAN follow up: PRN           date: 19          Visit Dx:    ICD-10-CM ICD-9-CM   1. Weakness R53.1 780.79   2. 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   • Nonsmoker   • Encounter for screening for malignant neoplasm of colon        Past Medical History:   Diagnosis Date   • Anxiety    • Depression    • History of transfusion    • Hyperlipidemia    • Ovarian cyst         Past Surgical History:   Procedure Laterality Date   • APPENDECTOMY     • BREAST BIOPSY Right     unsure when   • CHOLECYSTECTOMY     • COLONOSCOPY  2013    Normal exam repeat in 10 years   • COLONOSCOPY N/A 2018    Procedure: COLONOSCOPY WITH ANESTHESIA;  Surgeon: Da Miles MD;  Location: [facility] ENDOSCOPY;  Service: Gastroenterology   • D&C HYSTEROSCOPY ENDOMETRIAL ABLATION N/A 2017    Procedure: DILATATION AND CURETTAGE HYSTEROSCOPY NOVASURE ENDOMETRIAL ABLATION;  Surgeon: Ana Barrios MD;  Location: [facility] OR;  Service:    • OVARIAN CYST REMOVAL         PT Ortho     Row Name 19 1300       Subjective Comments    Subjective Comments  Pt states she has some pn with SLB.  -PM       Precautions and Contraindications    Precautions/Limitations  no known precautions/limitations  -PM       Subjective Pain    Able to rate subjective pain?  yes  -PM    Row Name 19 1300       Subjective Comments    Subjective Comments  Pt reports that she is doing well today- no pain  -KM       Precautions and Contraindications    Precautions/Limitations  no known precautions/limitations  -KM       Subjective Pain    Able to rate  subjective pain?  yes  -KM    Pre-Treatment Pain Level  0  -KM    Post-Treatment Pain Level  0  -KM       Posture/Observations    Posture/Observations Comments  Ambulates independently. NAG  -KM      User Key  (r) = Recorded By, (t) = Taken By, (c) = Cosigned By    Initials Name Provider Type    Mary Pierson PTA Physical Therapy Assistant    KM Sarahy Lara PTA Physical Therapy Assistant                      PT Assessment/Plan     Row Name 01/30/19 1300          PT Assessment    Assessment Comments  Pt did well with initiation of Cybex PRE and no c/o's. Proprio-balance improving with SLB on Airex; cues needed for resisted gait.  -PM        PT Plan    PT Frequency  2x/week  -PM     Predicted Duration of Therapy Intervention (Therapy Eval)  3 weeks  -PM     PT Plan Comments  BOSU step up F with contralateral knee raises, HHA handrail  -PM       User Key  (r) = Recorded By, (t) = Taken By, (c) = Cosigned By    Initials Name Provider Type    PM Mary Bray PTA Physical Therapy Assistant              Exercises     Row Name 01/30/19 1300             Precautions    Existing Precautions/Restrictions  no known precautions/restrictions  -PM         Subjective Comments    Subjective Comments  Pt states she has some pn with SLB.  -PM         Subjective Pain    Able to rate subjective pain?  yes  -PM      Pre-Treatment Pain Level  0  -PM      Post-Treatment Pain Level  0  -PM         Aquatics    Aquatics performed?  No  -PM         Exercise 1    Exercise Name 1  EFX  -PM      Time 1  8'  -PM      Additional Comments  L3  -PM         Exercise 2    Exercise Name 2  incline calf S  -PM      Reps 2  2  -PM      Time 2  30 sec hold  -PM         Exercise 3    Exercise Name 3  ST HS S  -PM      Reps 3  2  -PM      Time 3  30 sec hold  -PM         Exercise 4    Exercise Name 4  BOSU lat step up and over  -PM      Sets 4  2  -PM      Reps 4  10  -PM      Additional Comments  handrail support  -PM          "Exercise 5    Exercise Name 5  Reverse BOSU mini squats  -PM      Sets 5  2  -PM      Reps 5  10  -PM      Additional Comments  handrail support  -PM         Exercise 6    Exercise Name 6  BOSU step up/over 6\"  -PM      Sets 6  2  -PM      Reps 6  10  -PM         Exercise 7    Exercise Name 7  Cybex LP2  -PM      Cueing 7  Verbal  -PM      Sets 7  2  -PM      Reps 7  10  -PM      Additional Comments  70#, sled 3  -PM         Exercise 8    Exercise Name 8  Cybex ham curl  -PM      Cueing 8  Verbal  -PM      Sets 8  2  -PM      Reps 8  10  -PM      Additional Comments  35#  -PM         Exercise 9    Exercise Name 9  --  -PM      Reps 9  --  -PM         Exercise 10    Exercise Name 10  Physioball wall squats  -PM      Sets 10  2  -PM      Reps 10  10  -PM      Additional Comments  red SB  -PM         Exercise 11    Exercise Name 11  --  -PM      Sets 11  --  -PM      Reps 11  --  -PM         Exercise 12    Exercise Name 12  CC: Resisted gait fwd/bwd  -PM      Reps 12  5 laps each  -PM      Additional Comments  3 plates  -PM         Exercise 13    Exercise Name 13  Airex SLS  -PM      Time 13  30 sec hold  -PM        User Key  (r) = Recorded By, (t) = Taken By, (c) = Cosigned By    Initials Name Provider Type    PM Mary Bray, PTA Physical Therapy Assistant                         PT OP Goals     Row Name 01/30/19 1300          PT Short Term Goals    STG Date to Achieve  01/23/19  -PM     STG 1  Demonstrate independence/compliance with HEP  -PM     STG 1 Progress  Met;Ongoing  -PM     STG 2  Tolerate 45 minute treatment session without increased pain  -PM     STG 2 Progress  Partially Met;Progressing  -PM     STG 3  Perform active SLR flex on RLE 2x10 with no quad lag present, no increased pain, good eccentric control  -PM     STG 3 Progress  Met  -PM     STG 4  Demonstrate R knee flex/ext MMT to 4+/5  -PM     STG 4 Progress  Progressing  -PM        Long Term Goals    LTG Date to Achieve  02/13/19  -PM     LTG " 1  Subjectively report 60% improvement or greater  -PM     LTG 1 Progress  Progressing  -PM     LTG 2  Improve LEFS score to 60/80 or greater  -PM     LTG 2 Progress  Ongoing  -PM     LTG 3  Demonstrate independence in aquatic treatment session for carryover into independent management  -PM     LTG 3 Progress  Ongoing  -PM     LTG 4  Demonstrate bilateral knee flex/ext MMT to 5/5  -PM     LTG 4 Progress  Ongoing  -PM     LTG 5  Ascend/descend 5 steps x5 with reciprocal pattern, no increased pain or compensation in LE's bilaterally  -PM     LTG 5 Progress  Met  -PM     LTG 6  Demonstrate bilateral hip flex/abd MMT to 5/5  -PM     LTG 6 Progress  Ongoing  -PM     LTG 7  Demonstrate independence in final HEP and fitness/cybex equipment activities for carryover into independent management  -PM     LTG 7 Progress  Ongoing  -PM        Time Calculation    PT Goal Re-Cert Due Date  02/13/19  -PM       User Key  (r) = Recorded By, (t) = Taken By, (c) = Cosigned By    Initials Name Provider Type    PM Mary Bray PTA Physical Therapy Assistant          Therapy Education  Given: HEP, Symptoms/condition management, Posture/body mechanics, Pain management  Program: Reinforced  How Provided: Verbal  Provided to: Patient  Level of Understanding: Verbalized, Demonstrated              Time Calculation:   Start Time: 1301  Stop Time: 1350  Time Calculation (min): 49 min  Total Timed Code Minutes- PT: 49 minute(s)  Therapy Suggested Charges     Code   Minutes Charges    None           Therapy Charges for Today     Code Description Service Date Service Provider Modifiers Qty    04174335398 HC PT THER PROC EA 15 MIN 1/30/2019 Mary Bray PTA GP 3                    Mary Bray PTA  1/30/2019

## 2019-02-01 RX ORDER — ZOLMITRIPTAN 2.5 MG/1
2.5 TABLET, FILM COATED ORAL 2 TIMES DAILY
Qty: 6 TABLET | Refills: 3 | Status: SHIPPED | OUTPATIENT
Start: 2019-02-01 | End: 2019-04-10 | Stop reason: SDUPTHER

## 2019-02-04 ENCOUNTER — HOSPITAL ENCOUNTER (OUTPATIENT)
Dept: PHYSICAL THERAPY | Facility: HOSPITAL | Age: 54
Setting detail: THERAPIES SERIES
Discharge: HOME | End: 2019-02-04

## 2019-02-04 DIAGNOSIS — M25.561 PAIN IN BOTH KNEES, UNSPECIFIED CHRONICITY: ICD-10-CM

## 2019-02-04 DIAGNOSIS — M25.562 PAIN IN BOTH KNEES, UNSPECIFIED CHRONICITY: ICD-10-CM

## 2019-02-04 DIAGNOSIS — R53.1 WEAKNESS: Primary | ICD-10-CM

## 2019-02-04 PROCEDURE — 97110 THERAPEUTIC EXERCISES: CPT

## 2019-02-04 NOTE — THERAPY TREATMENT NOTE
Outpatient Physical Therapy Ortho Treatment Note  [facility]  Sarahy Mann PTA  19  1:52 PM     Patient Name: Shawnee Suarez  : 1965  MRN: [id number]  Today's Date: 2019      Visit Date: 2019    Subjective Improvement: 50%      Attendance: 10/10  Approved: 30 visits           MD follow up: PRN            date: 19        Visit Dx:    ICD-10-CM ICD-9-CM   1. Weakness R53.1 780.79   2. 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   • Nonsmoker   • Encounter for screening for malignant neoplasm of colon        Past Medical History:   Diagnosis Date   • Anxiety    • Depression    • History of transfusion    • Hyperlipidemia    • Ovarian cyst         Past Surgical History:   Procedure Laterality Date   • APPENDECTOMY     • BREAST BIOPSY Right     unsure when   • CHOLECYSTECTOMY     • COLONOSCOPY  2013    Normal exam repeat in 10 years   • COLONOSCOPY N/A 2018    Procedure: COLONOSCOPY WITH ANESTHESIA;  Surgeon: Da Miles MD;  Location: [facility] ENDOSCOPY;  Service: Gastroenterology   • D&C HYSTEROSCOPY ENDOMETRIAL ABLATION N/A 2017    Procedure: DILATATION AND CURETTAGE HYSTEROSCOPY NOVASURE ENDOMETRIAL ABLATION;  Surgeon: Ana Barrios MD;  Location: [facility] OR;  Service:    • OVARIAN CYST REMOVAL         PT Ortho     Row Name 19 1300       Precautions and Contraindications    Precautions/Limitations  no known precautions/limitations  -KM       Subjective Pain    Able to rate subjective pain?  yes  -KM    Pre-Treatment Pain Level  0  -KM    Post-Treatment Pain Level  0  -KM       Posture/Observations    Posture/Observations Comments  NAD. Ambulates independently  -KM      User Key  (r) = Recorded By, (t) = Taken By, (c) = Cosigned By    Initials Name Provider Type    Sarahy Worthy PTA Physical Therapy  Assistant                      PT Assessment/Plan     Row Name 02/04/19 1300          PT Assessment    Functional Limitations  Limitation in home management;Limitations in community activities;Limitations in functional capacity and performance;Performance in leisure activities;Impaired gait  -KM     Impairments  Balance;Impaired flexibility;Impaired muscle endurance;Muscle strength;Pain;Range of motion;Poor body mechanics  -KM     Assessment Comments  Pt had no trouble with SLS bilaterally today. Good form with reverse BOSU squats after cues for weight shifts through the heels  -KM     Rehab Potential  Good  -KM     Patient/caregiver participated in establishment of treatment plan and goals  Yes  -KM     Patient would benefit from skilled therapy intervention  Yes  -KM        PT Plan    PT Frequency  2x/week  -KM     Predicted Duration of Therapy Intervention (Therapy Eval)  3 weeks  -KM     PT Plan Comments  Cybex: LP1  -KM       User Key  (r) = Recorded By, (t) = Taken By, (c) = Cosigned By    Initials Name Provider Type    Sarahy Worthy PTA Physical Therapy Assistant              Exercises     Row Name 02/04/19 1300             Precautions    Existing Precautions/Restrictions  no known precautions/restrictions  -KM         Subjective Comments    Subjective Comments  pt states that she is still having pain with full squating  -KM         Subjective Pain    Able to rate subjective pain?  yes  -KM      Pre-Treatment Pain Level  0  -KM      Post-Treatment Pain Level  0  -KM         Aquatics    Aquatics performed?  No  -KM         Exercise 1    Exercise Name 1  EFX  -KM      Time 1  10 min  -KM      Additional Comments  L3.0  -KM         Exercise 2    Exercise Name 2  incline calf S  -KM      Reps 2  2  -KM      Time 2  30 sec hold  -KM         Exercise 3    Exercise Name 3  ST HS S  -KM      Reps 3  2  -KM      Time 3  30 sec hold  -KM         Exercise 4    Exercise Name 4  BOSU lat step up and over  -KM       Sets 4  2  -KM      Reps 4  10  -KM         Exercise 5    Exercise Name 5  Reverse BOSU mini squats  -KM      Sets 5  2  -KM      Reps 5  10  -KM         Exercise 6    Exercise Name 6  BOSU step up w/ opp hip flexion  -KM      Sets 6  2  -KM      Reps 6  10  -KM      Additional Comments  bilateral  -KM         Exercise 7    Exercise Name 7  Cybex LP2  -KM      Cueing 7  Verbal  -KM      Sets 7  2  -KM      Reps 7  10  -KM      Additional Comments  70# sled 3  -KM         Exercise 8    Exercise Name 8  Cybex ham curl  -KM      Cueing 8  Verbal  -KM      Sets 8  2  -KM      Reps 8  10  -KM      Additional Comments  35#  -KM         Exercise 10    Exercise Name 10  Physioball wall squats  -KM      Sets 10  2  -KM      Reps 10  10  -KM      Additional Comments  red SB  -KM         Exercise 12    Exercise Name 12  CC: Resisted gait fwd/bwd  -KM      Reps 12  5 laps each  -KM      Additional Comments  4 plates  -KM         Exercise 13    Exercise Name 13  Airex SLS  -KM      Time 13  30 sec hold  -KM        User Key  (r) = Recorded By, (t) = Taken By, (c) = Cosigned By    Initials Name Provider Type    Sarahy Worthy, PTA Physical Therapy Assistant                         PT OP Goals     Row Name 02/04/19 1300          PT Short Term Goals    STG Date to Achieve  01/23/19  -KM     STG 1  Demonstrate independence/compliance with HEP  -KM     STG 1 Progress  Met;Ongoing  -KM     STG 2  Tolerate 45 minute treatment session without increased pain  -KM     STG 2 Progress  Partially Met;Progressing  -KM     STG 3  Perform active SLR flex on RLE 2x10 with no quad lag present, no increased pain, good eccentric control  -KM     STG 3 Progress  Met  -KM     STG 4  Demonstrate R knee flex/ext MMT to 4+/5  -KM     STG 4 Progress  Progressing  -KM        Long Term Goals    LTG Date to Achieve  02/13/19  -KM     LTG 1  Subjectively report 60% improvement or greater  -KM     LTG 1 Progress  Progressing  -KM     LTG 2   Improve LEFS score to 60/80 or greater  -KM     LTG 2 Progress  Ongoing  -KM     LTG 3  Demonstrate independence in aquatic treatment session for carryover into independent management  -KM     LTG 3 Progress  Ongoing  -KM     LTG 4  Demonstrate bilateral knee flex/ext MMT to 5/5  -KM     LTG 4 Progress  Ongoing  -KM     LTG 5  Ascend/descend 5 steps x5 with reciprocal pattern, no increased pain or compensation in LE's bilaterally  -KM     LTG 5 Progress  Met  -KM     LTG 6  Demonstrate bilateral hip flex/abd MMT to 5/5  -KM     LTG 6 Progress  Ongoing  -KM     LTG 7  Demonstrate independence in final HEP and fitness/cybex equipment activities for carryover into independent management  -KM     LTG 7 Progress  Ongoing  -KM        Time Calculation    PT Goal Re-Cert Due Date  02/13/19  -KM       User Key  (r) = Recorded By, (t) = Taken By, (c) = Cosigned By    Initials Name Provider Type    Sarahy Worthy PTA Physical Therapy Assistant          Therapy Education  Given: HEP, Symptoms/condition management, Posture/body mechanics, Pain management  Program: Reinforced  How Provided: Verbal  Provided to: Patient  Level of Understanding: Verbalized, Demonstrated              Time Calculation:   Start Time: 1300  Stop Time: 1345  Time Calculation (min): 45 min  Total Timed Code Minutes- PT: 45 minute(s)  Therapy Suggested Charges     Code   Minutes Charges    None           Therapy Charges for Today     Code Description Service Date Service Provider Modifiers Qty    68973525132 HC PT THER PROC EA 15 MIN 2/4/2019 Sarahy Lara PTA GP 3                    Sarahy Lara PTA  2/4/2019

## 2019-02-06 ENCOUNTER — HOSPITAL ENCOUNTER (OUTPATIENT)
Dept: PHYSICAL THERAPY | Facility: HOSPITAL | Age: 54
Setting detail: THERAPIES SERIES
Discharge: HOME | End: 2019-02-06

## 2019-02-06 DIAGNOSIS — R53.1 WEAKNESS: Primary | ICD-10-CM

## 2019-02-06 DIAGNOSIS — M25.561 PAIN IN BOTH KNEES, UNSPECIFIED CHRONICITY: ICD-10-CM

## 2019-02-06 DIAGNOSIS — M25.562 PAIN IN BOTH KNEES, UNSPECIFIED CHRONICITY: ICD-10-CM

## 2019-02-06 PROCEDURE — 97110 THERAPEUTIC EXERCISES: CPT

## 2019-02-06 NOTE — THERAPY TREATMENT NOTE
Outpatient Physical Therapy Ortho Treatment Note  [facility]  Sarahy ALVAREZ Mackenzie, PTA  19  1:49 PM       Patient Name: Shawnee Suarez  : 1965  MRN: [id number]  Today's Date: 2019      Visit Date: 2019    Subjective Improvement: 50%      Attendance:    Approved: 30 visits          MD follow up: PRN            date: 19        Visit Dx:    ICD-10-CM ICD-9-CM   1. Weakness R53.1 780.79   2. 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   • Nonsmoker   • Encounter for screening for malignant neoplasm of colon        Past Medical History:   Diagnosis Date   • Anxiety    • Depression    • History of transfusion    • Hyperlipidemia    • Ovarian cyst         Past Surgical History:   Procedure Laterality Date   • APPENDECTOMY     • BREAST BIOPSY Right     unsure when   • CHOLECYSTECTOMY     • COLONOSCOPY  2013    Normal exam repeat in 10 years   • COLONOSCOPY N/A 2018    Procedure: COLONOSCOPY WITH ANESTHESIA;  Surgeon: Da Miles MD;  Location: [facility] ENDOSCOPY;  Service: Gastroenterology   • D&C HYSTEROSCOPY ENDOMETRIAL ABLATION N/A 2017    Procedure: DILATATION AND CURETTAGE HYSTEROSCOPY NOVASURE ENDOMETRIAL ABLATION;  Surgeon: Ana Barrios MD;  Location: [facility] OR;  Service:    • OVARIAN CYST REMOVAL         PT Ortho     Row Name 19 1300       Precautions and Contraindications    Precautions/Limitations  no known precautions/limitations  -KM       Subjective Pain    Able to rate subjective pain?  yes  -KM    Pre-Treatment Pain Level  0  -KM    Post-Treatment Pain Level  0  -KM       Posture/Observations    Posture/Observations Comments  NAD. Ambulates independently  -KM    Row Name 19 1300       Precautions and Contraindications    Precautions/Limitations  no known precautions/limitations  -KM       Subjective Pain     Able to rate subjective pain?  yes  -KM    Pre-Treatment Pain Level  0  -KM    Post-Treatment Pain Level  0  -KM       Posture/Observations    Posture/Observations Comments  NAD. Ambulates independently  -KM      User Key  (r) = Recorded By, (t) = Taken By, (c) = Cosigned By    Initials Name Provider Type    Sarahy Worthy PTA Physical Therapy Assistant                      PT Assessment/Plan     Row Name 02/06/19 1300          PT Assessment    Functional Limitations  Limitation in home management;Limitations in community activities;Limitations in functional capacity and performance;Performance in leisure activities;Impaired gait  -KM     Impairments  Balance;Impaired flexibility;Impaired muscle endurance;Muscle strength;Pain;Range of motion;Poor body mechanics  -KM     Assessment Comments  pt did well with new cybex equipment today. Less pain today with physioball wall squats with doing them at the beginning of treatment. No pain post RX  -KM     Rehab Potential  Good  -KM     Patient/caregiver participated in establishment of treatment plan and goals  Yes  -KM     Patient would benefit from skilled therapy intervention  Yes  -KM        PT Plan    PT Frequency  2x/week  -KM     Predicted Duration of Therapy Intervention (Therapy Eval)  3 weeks  -KM     PT Plan Comments  Possible wall squats next. Possible SL hip series  -KM       User Key  (r) = Recorded By, (t) = Taken By, (c) = Cosigned By    Initials Name Provider Type    Sarahy Worthy PTA Physical Therapy Assistant              Exercises     Row Name 02/06/19 1300             Precautions    Existing Precautions/Restrictions  no known precautions/restrictions  -KM         Subjective Comments    Subjective Comments  pt states that she  -KM         Subjective Pain    Able to rate subjective pain?  yes  -KM      Pre-Treatment Pain Level  0  -KM      Post-Treatment Pain Level  0  -KM         Aquatics    Aquatics performed?  No  -KM          Exercise 1    Exercise Name 1  EFX  -KM      Time 1  10 min  -KM         Exercise 2    Exercise Name 2  incline calf S  -KM      Reps 2  2  -KM      Time 2  30 sec hold  -KM         Exercise 3    Exercise Name 3  ST HS S  -KM      Reps 3  2  -KM      Time 3  30 sec hold  -KM         Exercise 4    Exercise Name 4  BOSU lat step up and over  -KM      Sets 4  2  -KM      Reps 4  10  -KM         Exercise 5    Exercise Name 5  Reverse BOSU mini squats  -KM      Sets 5  2  -KM      Reps 5  10  -KM         Exercise 6    Exercise Name 6  BOSU step up w/ opp hip flexion  -KM      Sets 6  2  -KM      Reps 6  10  -KM         Exercise 7    Exercise Name 7  Cybex LP2  -KM      Cueing 7  Verbal  -KM      Sets 7  2  -KM      Reps 7  10  -KM      Additional Comments  70# sled 3  -KM         Exercise 8    Exercise Name 8  Cybex ham curl  -KM      Cueing 8  Verbal  -KM      Sets 8  2  -KM      Reps 8  10  -KM      Additional Comments  35#  -KM         Exercise 9    Exercise Name 9  Cybex: LP1  -KM      Sets 9  2  -KM      Reps 9  10  -KM      Additional Comments  50#  -KM         Exercise 10    Exercise Name 10  Physioball wall squats  -KM      Sets 10  2  -KM      Reps 10  10  -KM      Additional Comments  red SB  -KM         Exercise 12    Exercise Name 12  CC: Resisted gait fwd/bwd  -KM      Reps 12  5 laps each  -KM      Additional Comments  4 plates  -KM         Exercise 13    Exercise Name 13  Airex SLS  -KM      Time 13  1 min bilateral  -KM        User Key  (r) = Recorded By, (t) = Taken By, (c) = Cosigned By    Initials Name Provider Type    Sarahy Worthy, PTA Physical Therapy Assistant                         PT OP Goals     Row Name 02/06/19 1300          PT Short Term Goals    STG Date to Achieve  01/23/19  -KM     STG 1  Demonstrate independence/compliance with HEP  -KM     STG 1 Progress  Met;Ongoing  -KM     STG 2  Tolerate 45 minute treatment session without increased pain  -KM     STG 2 Progress   Partially Met;Progressing  -KM     STG 3  Perform active SLR flex on RLE 2x10 with no quad lag present, no increased pain, good eccentric control  -KM     STG 3 Progress  Met  -KM     STG 4  Demonstrate R knee flex/ext MMT to 4+/5  -KM     STG 4 Progress  Progressing  -KM        Long Term Goals    LTG Date to Achieve  02/13/19  -KM     LTG 1  Subjectively report 60% improvement or greater  -KM     LTG 1 Progress  Progressing  -KM     LTG 2  Improve LEFS score to 60/80 or greater  -KM     LTG 2 Progress  Ongoing  -KM     LTG 3  Demonstrate independence in aquatic treatment session for carryover into independent management  -KM     LTG 3 Progress  Ongoing  -KM     LTG 4  Demonstrate bilateral knee flex/ext MMT to 5/5  -KM     LTG 4 Progress  Ongoing  -KM     LTG 5  Ascend/descend 5 steps x5 with reciprocal pattern, no increased pain or compensation in LE's bilaterally  -KM     LTG 5 Progress  Met  -KM     LTG 6  Demonstrate bilateral hip flex/abd MMT to 5/5  -KM     LTG 6 Progress  Ongoing  -KM     LTG 7  Demonstrate independence in final HEP and fitness/cybex equipment activities for carryover into independent management  -KM     LTG 7 Progress  Ongoing  -KM        Time Calculation    PT Goal Re-Cert Due Date  02/13/19  -KM       User Key  (r) = Recorded By, (t) = Taken By, (c) = Cosigned By    Initials Name Provider Type    Sarahy Worthy, PTA Physical Therapy Assistant          Therapy Education  Given: HEP, Symptoms/condition management, Posture/body mechanics, Pain management  Program: Reinforced  How Provided: Verbal  Provided to: Patient  Level of Understanding: Verbalized, Demonstrated              Time Calculation:   Start Time: 1300  Stop Time: 1348  Time Calculation (min): 48 min  Total Timed Code Minutes- PT: 48 minute(s)  Therapy Suggested Charges     Code   Minutes Charges    None           Therapy Charges for Today     Code Description Service Date Service Provider Modifiers Qty     [id number]  PT THER PROC EA 15 MIN 2/6/2019 Sarahy Mann, PTA GP 3                    Sarahy Mann, PTA  2/6/2019

## 2019-02-11 ENCOUNTER — HOSPITAL ENCOUNTER (OUTPATIENT)
Dept: PHYSICAL THERAPY | Facility: HOSPITAL | Age: 54
Setting detail: THERAPIES SERIES
Discharge: HOME | End: 2019-02-11

## 2019-02-11 DIAGNOSIS — M25.562 PAIN IN BOTH KNEES, UNSPECIFIED CHRONICITY: ICD-10-CM

## 2019-02-11 DIAGNOSIS — M25.561 PAIN IN BOTH KNEES, UNSPECIFIED CHRONICITY: ICD-10-CM

## 2019-02-11 DIAGNOSIS — R53.1 WEAKNESS: Primary | ICD-10-CM

## 2019-02-11 PROCEDURE — 97110 THERAPEUTIC EXERCISES: CPT

## 2019-02-11 RX ORDER — ROSUVASTATIN CALCIUM 10 MG/1
10 TABLET, COATED ORAL DAILY
Qty: 90 TABLET | Refills: 2 | Status: SHIPPED | OUTPATIENT
Start: 2019-02-11 | End: 2019-11-04 | Stop reason: SDUPTHER

## 2019-02-11 NOTE — THERAPY TREATMENT NOTE
Outpatient Physical Therapy Ortho Treatment Note  [facility]  Sarahy Mann, PTA  19  1:51 PM       Patient Name: Shawnee Suarez  : 1965  MRN: [id number]  Today's Date: 2019      Visit Date: 2019    Subjective Improvement: 50%      Attendance:    Approved: 30 visits          MD follow up: PRN            date: 19         Visit Dx:    ICD-10-CM ICD-9-CM   1. Weakness R53.1 780.79   2. 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   • Nonsmoker   • Encounter for screening for malignant neoplasm of colon        Past Medical History:   Diagnosis Date   • Anxiety    • Depression    • History of transfusion    • Hyperlipidemia    • Ovarian cyst         Past Surgical History:   Procedure Laterality Date   • APPENDECTOMY     • BREAST BIOPSY Right     unsure when   • CHOLECYSTECTOMY     • COLONOSCOPY  2013    Normal exam repeat in 10 years   • COLONOSCOPY N/A 2018    Procedure: COLONOSCOPY WITH ANESTHESIA;  Surgeon: Da Miles MD;  Location: [facility] ENDOSCOPY;  Service: Gastroenterology   • D&C HYSTEROSCOPY ENDOMETRIAL ABLATION N/A 2017    Procedure: DILATATION AND CURETTAGE HYSTEROSCOPY NOVASURE ENDOMETRIAL ABLATION;  Surgeon: Ana Barrios MD;  Location: [facility] OR;  Service:    • OVARIAN CYST REMOVAL         PT Ortho     Row Name 19 1300       Subjective Comments    Subjective Comments  pt states that she is doing well- no pain today. Working out independently at home reguarly  -KM       Precautions and Contraindications    Precautions/Limitations  no known precautions/limitations  -KM       Subjective Pain    Able to rate subjective pain?  yes  -KM    Pre-Treatment Pain Level  0  -KM    Post-Treatment Pain Level  0  -KM       Posture/Observations    Posture/Observations Comments  NAD. Ambulates independently  -KM       User Key  (r) = Recorded By, (t) = Taken By, (c) = Cosigned By    Initials Name Provider Type    Sarahy Worthy PTA Physical Therapy Assistant                      PT Assessment/Plan     Row Name 02/11/19 1300          PT Assessment    Functional Limitations  Limitation in home management;Limitations in community activities;Limitations in functional capacity and performance;Performance in leisure activities;Impaired gait  -KM     Impairments  Balance;Impaired flexibility;Impaired muscle endurance;Muscle strength;Pain;Range of motion;Poor body mechanics  -KM     Assessment Comments  pt able to set up cybex independenly today. pt able to complete with no pain. Improved endurance throughout. Fatigued with wall squats  -KM     Rehab Potential  Good  -KM     Patient/caregiver participated in establishment of treatment plan and goals  Yes  -KM     Patient would benefit from skilled therapy intervention  Yes  -KM        PT Plan    PT Frequency  2x/week  -KM     Predicted Duration of Therapy Intervention (Therapy Eval)  3 weeks  -KM     PT Plan Comments  Recheck next visit  -KM       User Key  (r) = Recorded By, (t) = Taken By, (c) = Cosigned By    Initials Name Provider Type    Sarahy Worthy PTA Physical Therapy Assistant              Exercises     Row Name 02/11/19 1300             Precautions    Existing Precautions/Restrictions  no known precautions/restrictions  -KM         Subjective Comments    Subjective Comments  pt states that she is doing well- no pain today. Working out independently at home reguarly  -KM         Subjective Pain    Able to rate subjective pain?  yes  -KM      Pre-Treatment Pain Level  0  -KM      Post-Treatment Pain Level  0  -KM         Aquatics    Aquatics performed?  No  -KM         Exercise 1    Exercise Name 1  EFX  -KM      Time 1  10 min  -KM         Exercise 2    Exercise Name 2  incline calf S  -KM      Reps 2  2  -KM      Time 2  30 sec hold  -KM          Exercise 3    Exercise Name 3  ST HS S  -KM      Reps 3  2  -KM      Time 3  30 sec hold  -KM         Exercise 4    Exercise Name 4  BOSU lat step up and over  -KM      Sets 4  2  -KM      Reps 4  10  -KM         Exercise 5    Exercise Name 5  Reverse BOSU mini squats  -KM      Sets 5  2  -KM      Reps 5  10  -KM         Exercise 6    Exercise Name 6  BOSU step up w/ opp hip flexion  -KM      Sets 6  2  -KM      Reps 6  10  -KM         Exercise 7    Exercise Name 7  Cybex LP2  -KM      Cueing 7  Verbal  -KM      Sets 7  2  -KM      Reps 7  10  -KM      Additional Comments  80#  -KM         Exercise 8    Exercise Name 8  Cybex ham curl  -KM      Cueing 8  Verbal  -KM      Sets 8  2  -KM      Reps 8  10  -KM      Additional Comments  40#  -KM         Exercise 9    Exercise Name 9  Cybex: LP1  -KM      Sets 9  2  -KM      Reps 9  10  -KM      Additional Comments  60#  -KM         Exercise 10    Exercise Name 10  Physioball wall squats  -KM      Sets 10  2  -KM      Reps 10  10  -KM      Additional Comments  red  -KM         Exercise 11    Exercise Name 11  wall squats  -KM      Reps 11  3  -KM      Time 11  30 sec hold  -KM         Exercise 12    Exercise Name 12  Cybex: hip abd/add  -KM      Reps 12  2x10  -KM         Exercise 13    Exercise Name 13  SL hip series  -KM      Sets 13  1  -KM      Reps 13  10 each direction  -KM      Time 13  --  -KM        User Key  (r) = Recorded By, (t) = Taken By, (c) = Cosigned By    Initials Name Provider Type    Sarahy Worthy, PTA Physical Therapy Assistant                         PT OP Goals     Row Name 02/11/19 1300          PT Short Term Goals    STG Date to Achieve  01/23/19  -KM     STG 1  Demonstrate independence/compliance with HEP  -KM     STG 1 Progress  Met;Ongoing  -KM     STG 2  Tolerate 45 minute treatment session without increased pain  -KM     STG 2 Progress  Partially Met;Progressing  -KM     STG 3  Perform active SLR flex on RLE 2x10 with no quad lag  present, no increased pain, good eccentric control  -KM     STG 3 Progress  Met  -KM     STG 4  Demonstrate R knee flex/ext MMT to 4+/5  -KM     STG 4 Progress  Progressing  -KM        Long Term Goals    LTG Date to Achieve  02/13/19  -KM     LTG 1  Subjectively report 60% improvement or greater  -KM     LTG 1 Progress  Progressing  -KM     LTG 2  Improve LEFS score to 60/80 or greater  -KM     LTG 2 Progress  Ongoing  -KM     LTG 3  Demonstrate independence in aquatic treatment session for carryover into independent management  -KM     LTG 3 Progress  Ongoing  -KM     LTG 4  Demonstrate bilateral knee flex/ext MMT to 5/5  -KM     LTG 4 Progress  Ongoing  -KM     LTG 5  Ascend/descend 5 steps x5 with reciprocal pattern, no increased pain or compensation in LE's bilaterally  -KM     LTG 5 Progress  Met  -KM     LTG 6  Demonstrate bilateral hip flex/abd MMT to 5/5  -KM     LTG 6 Progress  Ongoing  -KM     LTG 7  Demonstrate independence in final HEP and fitness/cybex equipment activities for carryover into independent management  -KM     LTG 7 Progress  Ongoing  -KM        Time Calculation    PT Goal Re-Cert Due Date  02/13/19  -KM       User Key  (r) = Recorded By, (t) = Taken By, (c) = Cosigned By    Initials Name Provider Type     Sarahy Lara PTA Physical Therapy Assistant          Therapy Education  Given: HEP, Symptoms/condition management, Posture/body mechanics, Pain management  Program: Reinforced  How Provided: Verbal  Provided to: Patient  Level of Understanding: Verbalized, Demonstrated              Time Calculation:   Start Time: 1300  Stop Time: 1347  Time Calculation (min): 47 min  Total Timed Code Minutes- PT: 47 minute(s)  Therapy Suggested Charges     Code   Minutes Charges    None           Therapy Charges for Today     Code Description Service Date Service Provider Modifiers Qty    63140882810 HC PT THER PROC EA 15 MIN 2/11/2019 Sarahy Lara PTA GP 3                     Sarahy Mann, PTA  2/11/2019

## 2019-02-13 ENCOUNTER — APPOINTMENT (OUTPATIENT)
Dept: PHYSICAL THERAPY | Facility: HOSPITAL | Age: 54
End: 2019-02-13

## 2019-02-19 ENCOUNTER — HOSPITAL ENCOUNTER (OUTPATIENT)
Dept: PHYSICAL THERAPY | Facility: HOSPITAL | Age: 54
Setting detail: THERAPIES SERIES
Discharge: HOME | End: 2019-02-19

## 2019-02-19 ENCOUNTER — APPOINTMENT (OUTPATIENT)
Dept: PHYSICAL THERAPY | Facility: HOSPITAL | Age: 54
End: 2019-02-19

## 2019-02-19 DIAGNOSIS — M25.561 PAIN IN BOTH KNEES, UNSPECIFIED CHRONICITY: ICD-10-CM

## 2019-02-19 DIAGNOSIS — R53.1 WEAKNESS: Primary | ICD-10-CM

## 2019-02-19 DIAGNOSIS — M25.562 PAIN IN BOTH KNEES, UNSPECIFIED CHRONICITY: ICD-10-CM

## 2019-02-19 PROCEDURE — 97110 THERAPEUTIC EXERCISES: CPT | Performed by: PHYSICAL THERAPIST

## 2019-02-20 NOTE — THERAPY PROGRESS REPORT/RE-CERT
Outpatient Physical Therapy Ortho Progress Note  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   • Nonsmoker   • Encounter for screening for malignant neoplasm of colon        Past Medical History:   Diagnosis Date   • Anxiety    • Depression    • History of transfusion    • Hyperlipidemia    • Ovarian cyst         Past Surgical History:   Procedure Laterality Date   • APPENDECTOMY     • BREAST BIOPSY Right     unsure when   • CHOLECYSTECTOMY     • COLONOSCOPY  2013    Normal exam repeat in 10 years   • COLONOSCOPY N/A 2018    Procedure: COLONOSCOPY WITH ANESTHESIA;  Surgeon: Da Miles MD;  Location: [facility] ENDOSCOPY;  Service: Gastroenterology   • D&C HYSTEROSCOPY ENDOMETRIAL ABLATION N/A 2017    Procedure: DILATATION AND CURETTAGE HYSTEROSCOPY NOVASURE ENDOMETRIAL ABLATION;  Surgeon: Ana Barrios MD;  Location: [facility] OR;  Service:    • OVARIAN CYST REMOVAL         Visit Dx:     ICD-10-CM ICD-9-CM   1. Weakness R53.1 780.79   2. Pain in both knees, unspecified chronicity M25.561 719.46    M25.562            PT Ortho     Row Name 19 1500       Precautions and Contraindications    Precautions/Limitations  no known precautions/limitations  -KG       Subjective Pain    Able to rate subjective pain?  yes  -KG    Pre-Treatment Pain Level  0  -KG       Posture/Observations    Posture/Observations Comments  No acute distress. Ambulates with non-antalgic gait independently. Some mild hyperextension at knees present at times with gait and static standing.  -KG       Knee Palpation    Knee Palpation?  No Tenderness/Abnormality  -KG       Patellar Accessory Motions    Patellar  Accessory Motions Tested?  Yes  -KG    Superior glide  WNL  -KG    Inferior glide  WNL  -KG    Medial glide  WNL  -KG    Lateral glide  WNL  -KG       Right Lower Ext    Rt Knee Extension/Flexion AROM  WNL  -KG       Left Lower Ext    Lt Knee Extension/Flexion AROM  WNL  -KG       MMT Right Lower Ext    Rt Hip Flexion MMT, Gross Movement  (5/5) normal  -KG    Rt Hip ABduction MMT, Gross Movement  (5/5) normal  -KG    Rt Hip ADduction MMT, Gross Movement  (5/5) normal  -KG    Rt Knee Extension MMT, Gross Movement  (5/5) normal  -KG    Rt Knee Flexion MMT, Gross Movement  (5/5) normal  -KG       MMT Left Lower Ext    Lt Hip Flexion MMT, Gross Movement  (5/5) normal  -KG    Lt Hip ABduction MMT, Gross Movement  (5/5) normal  -KG    Lt Hip ADduction MMT, Gross Movement  (5/5) normal  -KG    Lt Knee Extension MMT, Gross Movement  (5/5) normal  -KG    Lt Knee Flexion MMT, Gross Movement  (5/5) normal  -KG       Sensation    Sensation WNL?  WNL  -KG    Light Touch  No apparent deficits  -KG       Lower Extremity Flexibility    Hamstrings  Bilateral:;Mildly limited  -KG      User Key  (r) = Recorded By, (t) = Taken By, (c) = Cosigned By    Initials Name Provider Type    KG Breanna Borja, PT Physical Therapist                      Therapy Education  Given: HEP, Symptoms/condition management, Posture/body mechanics  Program: Reinforced  How Provided: Verbal  Provided to: Patient  Level of Understanding: Verbalized, Demonstrated     PT OP Goals     Row Name 02/19/19 1500          PT Short Term Goals    STG Date to Achieve  -- All STG's met  -KG     STG 1  Demonstrate independence/compliance with HEP  -KG     STG 1 Progress  Met;Ongoing  -KG     STG 2  Tolerate 45 minute treatment session without increased pain  -KG     STG 2 Progress  Met  -KG     STG 3  Perform active SLR flex on RLE 2x10 with no quad lag present, no increased pain, good eccentric control  -KG     STG 3 Progress  Met  -KG     STG 4  Demonstrate R knee  flex/ext MMT to 4+/5  -KG     STG 4 Progress  Met  -KG        Long Term Goals    LTG Date to Achieve  03/12/19  -KG     LTG 1  Subjectively report 60% improvement or greater  -KG     LTG 1 Progress  Met  -KG     LTG 1 Progress Comments  90%  -KG     LTG 2  Improve LEFS score to 60/80 or greater  -KG     LTG 2 Progress  Met  -KG     LTG 3  Demonstrate independence in aquatic treatment session for carryover into independent management  -KG     LTG 3 Progress  Ongoing  -KG     LTG 4  Demonstrate bilateral knee flex/ext MMT to 5/5  -KG     LTG 4 Progress  Met  -KG     LTG 5  Ascend/descend 5 steps x5 with reciprocal pattern, no increased pain or compensation in LE's bilaterally  -KG     LTG 5 Progress  Met  -KG     LTG 6  Demonstrate bilateral hip flex/abd MMT to 5/5  -KG     LTG 6 Progress  Met  -KG     LTG 7  Demonstrate independence in final HEP and fitness/cybex equipment activities for carryover into independent management  -KG     LTG 7 Progress  Ongoing  -KG        Time Calculation    PT Goal Re-Cert Due Date  03/12/19  -KG       User Key  (r) = Recorded By, (t) = Taken By, (c) = Cosigned By    Initials Name Provider Type    KG Breanna Borja, PT Physical Therapist          PT Assessment/Plan     Row Name 02/19/19 1500          PT Assessment    Functional Limitations  Performance in leisure activities;Limitations in community activities  -KG     Impairments  Balance;Impaired muscle endurance;Muscle strength;Pain  -KG     Assessment Comments  Pt progressing very well with PT at this time, reporting 90% overall improvement. Pt does well with cybex equipment, demonstrating independence. Good improvements noted in overall bilateral knee & hip strength/stability. Good performance with all therex this date. Pt is compliant with HEP and exercises independently at home on regular basis. Will plan to d/c pt next visit to independent management/HEP and free 30 day fitness membership.  -KG     Rehab Potential  Good  -KG      Patient/caregiver participated in establishment of treatment plan and goals  Yes  -KG     Patient would benefit from skilled therapy intervention  Yes  -KG        PT Plan    Predicted Duration of Therapy Intervention (Therapy Eval)  1 more visit  -KG     PT Plan Comments  Plan to d/c next visit to independent management/HEP. Also give pt aquatic therex sheet for independent use when pool re-opens. Reward free 30 day fitness membership.  -KG       User Key  (r) = Recorded By, (t) = Taken By, (c) = Cosigned By    Initials Name Provider Type    Breanna Moser PT Physical Therapist          Modalities     Row Name 02/19/19 1500             Subjective Comments    Subjective Comments  Pt reports 95% overall improvement at this time. Feels like she's ready to be done with PT and start independent management. States her knees will bother her if she twists them in a wrong direction but overall they are doing much better and reports no complaints. Can tell she has gotten stronger.  -KG         Subjective Pain    Post-Treatment Pain Level  0  -KG        User Key  (r) = Recorded By, (t) = Taken By, (c) = Cosigned By    Initials Name Provider Type    Breanna Moser, PT Physical Therapist        Exercises     Row Name 02/19/19 1500             Precautions    Existing Precautions/Restrictions  no known precautions/restrictions  -KG         Subjective Comments    Subjective Comments  Pt reports 95% overall improvement at this time. Feels like she's ready to be done with PT and start independent management. States her knees will bother her if she twists them in a wrong direction but overall they are doing much better and reports no complaints. Can tell she has gotten stronger.  -KG         Subjective Pain    Able to rate subjective pain?  yes  -KG      Pre-Treatment Pain Level  0  -KG      Post-Treatment Pain Level  0  -KG         Aquatics    Aquatics performed?  No  -KG         Exercise 1    Exercise Name 1  EFX   "-KG      Time 1  10 min  -KG      Additional Comments  Pt discretion  -KG         Exercise 2    Exercise Name 2  incline calf S  -KG      Cueing 2  Verbal  -KG      Reps 2  2  -KG      Time 2  30 sec hold  -KG         Exercise 3    Exercise Name 3  ST HS S  -KG      Cueing 3  Verbal  -KG      Reps 3  2  -KG      Time 3  30 sec hold  -KG         Exercise 4    Exercise Name 4  Cybex LP2  -KG      Cueing 4  Verbal  -KG      Sets 4  2  -KG      Reps 4  10  -KG      Additional Comments  90#  -KG         Exercise 5    Exercise Name 5  Cybex LP1 bilateral  -KG      Cueing 5  Verbal  -KG      Sets 5  2  -KG      Reps 5  10  -KG      Additional Comments  65#  -KG         Exercise 6    Exercise Name 6  Cybex hamstring curl  -KG      Cueing 6  Verbal  -KG      Sets 6  2  -KG      Reps 6  10  -KG      Additional Comments  40#  -KG         Exercise 7    Exercise Name 7  Cybex hip abd/add  -KG      Cueing 7  Verbal  -KG      Sets 7  2  -KG      Reps 7  10  -KG      Additional Comments  40#  -KG         Exercise 8    Exercise Name 8  Wall squats  -KG      Cueing 8  Verbal  -KG      Reps 8  3  -KG      Time 8  30\" hold  -KG         Exercise 9    Exercise Name 9  Reverse BOSU mini squats  -KG      Cueing 9  Verbal  -KG      Sets 9  2  -KG      Reps 9  10  -KG        User Key  (r) = Recorded By, (t) = Taken By, (c) = Cosigned By    Initials Name Provider Type    Breanna Moser, PT Physical Therapist                        Outcome Measure Options: Lower Extremity Functional Scale (LEFS)  Lower Extremity Functional Index  Any of your usual work, housework or school activities: A little bit of difficulty  Your usual hobbies, recreational or sporting activities: No difficulty  Getting into or out of the bath: A little bit of difficulty  Walking between rooms: No difficulty  Putting on your shoes or socks: No difficulty  Squatting: Moderate difficulty  Lifting an object, like a bag of groceries from the floor: No " difficulty  Performing light activities around your home: No difficulty  Performing heavy activities around your home: Moderate difficulty  Getting into or out of a car: No difficulty  Walking 2 blocks: No difficulty  Walking a mile: A little bit of difficulty  Going up or down 10 stairs (about 1 flight of stairs): A little bit of difficulty  Standing for 1 hour: No difficulty  Sitting for 1 hour: No difficulty  Running on even ground: A little bit of difficulty  Running on uneven ground: A little bit of difficulty  Making sharp turns while running fast: Moderate difficulty  Hopping: No difficulty  Rolling over in bed: No difficulty  Total: 68      Time Calculation:     Therapy Suggested Charges     Code   Minutes Charges    None             Start Time: 1515  Stop Time: 1559  Time Calculation (min): 44 min  Total Timed Code Minutes- PT: 44 minute(s)     Therapy Charges for Today     Code Description Service Date Service Provider Modifiers Qty    34561379974 HC PT THER PROC EA 15 MIN 2/19/2019 Breanna Borja, PT GP 3          PT G-Codes  Outcome Measure Options: Lower Extremity Functional Scale (LEFS)  Total: 68         Breanna Borja, PT  2/19/2019

## 2019-02-21 ENCOUNTER — HOSPITAL ENCOUNTER (OUTPATIENT)
Dept: PHYSICAL THERAPY | Facility: HOSPITAL | Age: 54
Setting detail: THERAPIES SERIES
Discharge: HOME | End: 2019-02-21

## 2019-02-21 ENCOUNTER — APPOINTMENT (OUTPATIENT)
Dept: PHYSICAL THERAPY | Facility: HOSPITAL | Age: 54
End: 2019-02-21

## 2019-02-21 DIAGNOSIS — M25.562 PAIN IN BOTH KNEES, UNSPECIFIED CHRONICITY: ICD-10-CM

## 2019-02-21 DIAGNOSIS — M25.561 PAIN IN BOTH KNEES, UNSPECIFIED CHRONICITY: ICD-10-CM

## 2019-02-21 DIAGNOSIS — R53.1 WEAKNESS: Primary | ICD-10-CM

## 2019-02-21 PROCEDURE — 97110 THERAPEUTIC EXERCISES: CPT

## 2019-02-21 NOTE — THERAPY DISCHARGE NOTE
Outpatient Physical Therapy Ortho Treatment Note/Discharge Summary  [facility]  Sarahy Mann, PTA  19  2:54 PM       Patient Name: Shawnee Suarez  : 1965  MRN: [id number]  Today's Date: 2019      Visit Date: 2019    Subjective Improvement: 90%      Attendance:   Approved: 30 visits           MD follow up: PRN            date: 3/12/19         Visit Dx:    ICD-10-CM ICD-9-CM   1. Weakness R53.1 780.79   2. 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   • Nonsmoker   • Encounter for screening for malignant neoplasm of colon        Past Medical History:   Diagnosis Date   • Anxiety    • Depression    • History of transfusion    • Hyperlipidemia    • Ovarian cyst         Past Surgical History:   Procedure Laterality Date   • APPENDECTOMY     • BREAST BIOPSY Right     unsure when   • CHOLECYSTECTOMY     • COLONOSCOPY  2013    Normal exam repeat in 10 years   • COLONOSCOPY N/A 2018    Procedure: COLONOSCOPY WITH ANESTHESIA;  Surgeon: Da Miles MD;  Location: [facility] ENDOSCOPY;  Service: Gastroenterology   • D&C HYSTEROSCOPY ENDOMETRIAL ABLATION N/A 2017    Procedure: DILATATION AND CURETTAGE HYSTEROSCOPY NOVASURE ENDOMETRIAL ABLATION;  Surgeon: Ana Barrios MD;  Location: [facility] OR;  Service:    • OVARIAN CYST REMOVAL         PT Ortho     Row Name 19 1300       Subjective Comments    Subjective Comments  pt states that she is ready to be D/C.  -KM       Precautions and Contraindications    Precautions/Limitations  no known precautions/limitations  -KM       Subjective Pain    Able to rate subjective pain?  yes  -KM    Pre-Treatment Pain Level  0  -KM    Post-Treatment Pain Level  0  -KM       Posture/Observations    Posture/Observations Comments  No acute distress. Ambulates with non-antalgic gait independently. Some  mild hyperextension at knees present at times with gait and static standing.  -KM    Row Name 02/19/19 1500       Precautions and Contraindications    Precautions/Limitations  no known precautions/limitations  -KG       Subjective Pain    Able to rate subjective pain?  yes  -KG    Pre-Treatment Pain Level  0  -KG       Posture/Observations    Posture/Observations Comments  No acute distress. Ambulates with non-antalgic gait independently. Some mild hyperextension at knees present at times with gait and static standing.  -KG       Knee Palpation    Knee Palpation?  No Tenderness/Abnormality  -KG       Patellar Accessory Motions    Patellar Accessory Motions Tested?  Yes  -KG    Superior glide  WNL  -KG    Inferior glide  WNL  -KG    Medial glide  WNL  -KG    Lateral glide  WNL  -KG       Right Lower Ext    Rt Knee Extension/Flexion AROM  WNL  -KG       Left Lower Ext    Lt Knee Extension/Flexion AROM  WNL  -KG       MMT Right Lower Ext    Rt Hip Flexion MMT, Gross Movement  (5/5) normal  -KG    Rt Hip ABduction MMT, Gross Movement  (5/5) normal  -KG    Rt Hip ADduction MMT, Gross Movement  (5/5) normal  -KG    Rt Knee Extension MMT, Gross Movement  (5/5) normal  -KG    Rt Knee Flexion MMT, Gross Movement  (5/5) normal  -KG       MMT Left Lower Ext    Lt Hip Flexion MMT, Gross Movement  (5/5) normal  -KG    Lt Hip ABduction MMT, Gross Movement  (5/5) normal  -KG    Lt Hip ADduction MMT, Gross Movement  (5/5) normal  -KG    Lt Knee Extension MMT, Gross Movement  (5/5) normal  -KG    Lt Knee Flexion MMT, Gross Movement  (5/5) normal  -KG       Sensation    Sensation WNL?  WNL  -KG    Light Touch  No apparent deficits  -KG       Lower Extremity Flexibility    Hamstrings  Bilateral:;Mildly limited  -KG      User Key  (r) = Recorded By, (t) = Taken By, (c) = Cosigned By    Initials Name Provider Type    KG Breanna Borja, PT Physical Therapist    Sarahy Worthy, PTA Physical Therapy Assistant                       PT Assessment/Plan     Row Name 02/21/19 1400          PT Assessment    Functional Limitations  Performance in leisure activities;Limitations in community activities  -KM     Impairments  Balance;Impaired muscle endurance;Muscle strength;Pain  -KM     Assessment Comments  I with cybex setup. No cues for form throughout. Verbalzied understanding of continuing HEP  -KM     Rehab Potential  Good  -KM     Patient/caregiver participated in establishment of treatment plan and goals  Yes  -KM     Patient would benefit from skilled therapy intervention  Yes  -KM        PT Plan    PT Plan Comments  D/C to I management and free 30 day fitness  -KM       User Key  (r) = Recorded By, (t) = Taken By, (c) = Cosigned By    Initials Name Provider Type    Sarahy Worthy, PTA Physical Therapy Assistant              Exercises     Row Name 02/21/19 1300             Precautions    Existing Precautions/Restrictions  no known precautions/restrictions  -KM         Subjective Comments    Subjective Comments  pt states that she is ready to be D/C.  -KM         Subjective Pain    Able to rate subjective pain?  yes  -KM      Pre-Treatment Pain Level  0  -KM      Post-Treatment Pain Level  0  -KM         Aquatics    Aquatics performed?  No  -KM         Exercise 1    Exercise Name 1  EFX  -KM      Time 1  10 min  -KM         Exercise 2    Exercise Name 2  incline calf S  -KM      Reps 2  2  -KM      Time 2  30 sec hold  -KM         Exercise 3    Exercise Name 3  ST HS S  -KM      Reps 3  2  -KM      Time 3  30 sec hold  -KM         Exercise 4    Exercise Name 4  Cybex LP2  -KM      Sets 4  2  -KM      Reps 4  10  -KM      Additional Comments  90#  -KM         Exercise 5    Exercise Name 5  Cybex LP1 bilateral  -KM      Sets 5  2  -KM      Reps 5  10  -KM      Additional Comments  65#  -KM         Exercise 6    Exercise Name 6  Cybex hamstring curl  -KM      Sets 6  2  -KM      Reps 6  10  -KM      Additional Comments   40#  -KM         Exercise 7    Exercise Name 7  Cybex hip abd/add  -KM      Sets 7  2  -KM      Reps 7  10  -KM      Additional Comments  40#  -KM         Exercise 8    Exercise Name 8  Cybex: abdominals  -KM      Sets 8  2  -KM      Reps 8  10  -KM      Additional Comments  40#  -KM         Exercise 9    Exercise Name 9  Cybex: torso twist  -KM      Sets 9  2  -KM      Reps 9  10  -KM      Additional Comments  30#  -KM        User Key  (r) = Recorded By, (t) = Taken By, (c) = Cosigned By    Initials Name Provider Type    Sarahy Worthy, PTA Physical Therapy Assistant                         PT OP Goals     Row Name 02/21/19 1400          PT Short Term Goals    STG Date to Achieve  -- All STG's met  -KM     STG 1  Demonstrate independence/compliance with HEP  -KM     STG 1 Progress  Met  -KM     STG 2  Tolerate 45 minute treatment session without increased pain  -KM     STG 2 Progress  Met  -KM     STG 3  Perform active SLR flex on RLE 2x10 with no quad lag present, no increased pain, good eccentric control  -KM     STG 3 Progress  Met  -KM     STG 4  Demonstrate R knee flex/ext MMT to 4+/5  -KM     STG 4 Progress  Met  -KM        Long Term Goals    LTG Date to Achieve  03/12/19  -KM     LTG 1  Subjectively report 60% improvement or greater  -KM     LTG 1 Progress  Met  -KM     LTG 2  Improve LEFS score to 60/80 or greater  -KM     LTG 2 Progress  Met  -KM     LTG 3  Demonstrate independence in aquatic treatment session for carryover into independent management  -KM     LTG 3 Progress  Met  -KM     LTG 4  Demonstrate bilateral knee flex/ext MMT to 5/5  -KM     LTG 4 Progress  Met  -KM     LTG 5  Ascend/descend 5 steps x5 with reciprocal pattern, no increased pain or compensation in LE's bilaterally  -KM     LTG 5 Progress  Met  -KM     LTG 6  Demonstrate bilateral hip flex/abd MMT to 5/5  -KM     LTG 6 Progress  Met  -KM     LTG 7  Demonstrate independence in final HEP and fitness/cybex equipment  activities for carryover into independent management  -KM     LTG 7 Progress  Met  -KM        Time Calculation    PT Goal Re-Cert Due Date  03/12/19  -KM       User Key  (r) = Recorded By, (t) = Taken By, (c) = Cosigned By    Initials Name Provider Type    Sarahy Worthy PTA Physical Therapy Assistant          Therapy Education  Given: HEP, Symptoms/condition management, Posture/body mechanics  Program: Reinforced  How Provided: Verbal  Provided to: Patient  Level of Understanding: Verbalized, Demonstrated              Time Calculation:   Start Time: 1345  Stop Time: 1430  Time Calculation (min): 45 min  Total Timed Code Minutes- PT: 45 minute(s)  Therapy Suggested Charges     Code   Minutes Charges    None           Therapy Charges for Today     Code Description Service Date Service Provider Modifiers Qty    68795617241 HC PT THER PROC EA 15 MIN 2/21/2019 Sarahy Lara PTA GP 3                OP PT Discharge Summary  Date of Discharge: 02/21/19  Reason for Discharge: All goals achieved  Outcomes Achieved: Able to achieve all goals within established timeline  Discharge Destination: Home with home program  Discharge Instructions/Additional Comments: D/C to I management and free 30 day fitness      Sarahy Lara PTA  2/21/2019

## 2019-03-26 ENCOUNTER — OFFICE VISIT (OUTPATIENT)
Dept: FAMILY MEDICINE CLINIC | Facility: CLINIC | Age: 54
End: 2019-03-26

## 2019-03-26 VITALS
TEMPERATURE: 98.6 F | HEART RATE: 69 BPM | WEIGHT: 155.2 LBS | HEIGHT: 63 IN | OXYGEN SATURATION: 96 % | SYSTOLIC BLOOD PRESSURE: 114 MMHG | BODY MASS INDEX: 27.5 KG/M2 | DIASTOLIC BLOOD PRESSURE: 76 MMHG

## 2019-03-26 DIAGNOSIS — E78.2 MIXED HYPERLIPIDEMIA: Primary | ICD-10-CM

## 2019-03-26 PROCEDURE — 99213 OFFICE O/P EST LOW 20 MIN: CPT | Performed by: FAMILY MEDICINE

## 2019-03-26 NOTE — PROGRESS NOTES
Subjective   Shawnee Suarez is a 53 y.o. female.     53-year-old with hyperlipidemia      Hyperlipidemia   This is a chronic problem. The current episode started more than 1 year ago. The problem is controlled. There are no known factors aggravating her hyperlipidemia. Pertinent negatives include no chest pain or myalgias. Current antihyperlipidemic treatment includes diet change and statins. The current treatment provides moderate improvement of lipids. Compliance problems include adherence to exercise.  Risk factors for coronary artery disease include family history, dyslipidemia and post-menopausal.       The following portions of the patient's history were reviewed and updated as appropriate: allergies, current medications, past family history, past medical history, past social history, past surgical history and problem list.    Review of Systems   Cardiovascular: Negative for chest pain and leg swelling.   Genitourinary:        Mother has ovarian cancer-she is BR a 1 and 2--still advised another opinion about surgical intervention   Musculoskeletal: Negative for myalgias.       Objective   Physical Exam   Constitutional: She is oriented to person, place, and time.   Cardiovascular: Normal rate and regular rhythm.   Pulmonary/Chest: Effort normal and breath sounds normal.   Musculoskeletal: She exhibits no edema.   Neurological: She is alert and oriented to person, place, and time.   Psychiatric: She has a normal mood and affect. Her behavior is normal. Judgment and thought content normal.   Nursing note and vitals reviewed.      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    Diagnoses and all orders for this visit:    Mixed hyperlipidemia  -     Comprehensive Metabolic Panel  -     Lipid Panel       Return if symptoms worsen or fail to  improve, for Annual physical.    Plan above-his Mediterranean diet in his exercising 3 times a week

## 2019-03-27 LAB
ALBUMIN SERPL-MCNC: 4.8 G/DL (ref 3.5–5.2)
ALBUMIN/GLOB SERPL: 1.7 G/DL
ALP SERPL-CCNC: 60 U/L (ref 39–117)
ALT SERPL-CCNC: 18 U/L (ref 1–33)
AST SERPL-CCNC: 20 U/L (ref 1–32)
BILIRUB SERPL-MCNC: 0.5 MG/DL (ref 0.2–1.2)
BUN SERPL-MCNC: 14 MG/DL (ref 6–20)
BUN/CREAT SERPL: 20.3 (ref 7–25)
CALCIUM SERPL-MCNC: 10.2 MG/DL (ref 8.6–10.5)
CHLORIDE SERPL-SCNC: 103 MMOL/L (ref 98–107)
CHOLEST SERPL-MCNC: 155 MG/DL (ref 0–200)
CO2 SERPL-SCNC: 24.6 MMOL/L (ref 22–29)
CREAT SERPL-MCNC: 0.69 MG/DL (ref 0.57–1)
GLOBULIN SER CALC-MCNC: 2.8 GM/DL
GLUCOSE SERPL-MCNC: 100 MG/DL (ref 65–99)
HDLC SERPL-MCNC: 64 MG/DL (ref 40–60)
LDLC SERPL CALC-MCNC: 73 MG/DL (ref 0–100)
POTASSIUM SERPL-SCNC: 4.8 MMOL/L (ref 3.5–5.2)
PROT SERPL-MCNC: 7.6 G/DL (ref 6–8.5)
SODIUM SERPL-SCNC: 143 MMOL/L (ref 136–145)
TRIGL SERPL-MCNC: 88 MG/DL (ref 0–150)
VLDLC SERPL CALC-MCNC: 17.6 MG/DL (ref 5–40)

## 2019-04-10 ENCOUNTER — CLINICAL SUPPORT (OUTPATIENT)
Dept: FAMILY MEDICINE CLINIC | Facility: CLINIC | Age: 54
End: 2019-04-10

## 2019-04-10 ENCOUNTER — TELEPHONE (OUTPATIENT)
Dept: FAMILY MEDICINE CLINIC | Facility: CLINIC | Age: 54
End: 2019-04-10

## 2019-04-10 VITALS
HEART RATE: 79 BPM | WEIGHT: 154.4 LBS | DIASTOLIC BLOOD PRESSURE: 62 MMHG | SYSTOLIC BLOOD PRESSURE: 112 MMHG | BODY MASS INDEX: 27.36 KG/M2 | OXYGEN SATURATION: 97 % | HEIGHT: 63 IN

## 2019-04-10 DIAGNOSIS — F41.9 ANXIETY: Primary | ICD-10-CM

## 2019-04-10 DIAGNOSIS — Z79.899 ON LONG TERM DRUG THERAPY: ICD-10-CM

## 2019-04-10 RX ORDER — CLONAZEPAM 1 MG/1
1 TABLET ORAL 2 TIMES DAILY PRN
Qty: 60 TABLET | Refills: 2 | Status: CANCELLED | OUTPATIENT
Start: 2019-04-10

## 2019-04-10 RX ORDER — CLONAZEPAM 1 MG/1
1 TABLET ORAL 2 TIMES DAILY PRN
Qty: 60 TABLET | Refills: 2 | Status: SHIPPED | OUTPATIENT
Start: 2019-04-10 | End: 2019-04-23 | Stop reason: SDUPTHER

## 2019-04-10 RX ORDER — ZOLMITRIPTAN 2.5 MG/1
2.5 TABLET, FILM COATED ORAL 2 TIMES DAILY
Qty: 6 TABLET | Refills: 3 | Status: SHIPPED | OUTPATIENT
Start: 2019-04-10 | End: 2019-06-03 | Stop reason: SDUPTHER

## 2019-04-10 NOTE — TELEPHONE ENCOUNTER
RECENTLY SEEN FOR OV--NEEDS REFILL ON CLONAZEPAM 1MG (1) BID AS NEEDED ANXIETY.  EVELYN 12.12.18. & CONTRACT WAS VALID UNTIL 04.03.19 DO YOU NEED TO SEE IN OFFICE OR CAN PT COME IN FOR A NURSE VISIT.         CONI

## 2019-04-11 RX ORDER — ZOLMITRIPTAN 2.5 MG/1
2.5 TABLET, FILM COATED ORAL 2 TIMES DAILY
Qty: 6 TABLET | Refills: 3 | Status: CANCELLED | OUTPATIENT
Start: 2019-04-11

## 2019-04-11 NOTE — TELEPHONE ENCOUNTER
MED REFILL REQUEST      ZOLMITRIPTAN 2.5MG (1) BID PRN FOR HEADACHE      CAREZONE       ERROR SENT YESTERDAY

## 2019-04-16 LAB — DRUGS UR: NORMAL

## 2019-04-23 RX ORDER — CLONAZEPAM 1 MG/1
1 TABLET ORAL 2 TIMES DAILY PRN
Qty: 60 TABLET | Refills: 2 | Status: SHIPPED | OUTPATIENT
Start: 2019-04-23 | End: 2019-07-22 | Stop reason: SDUPTHER

## 2019-04-24 RX ORDER — CLONAZEPAM 1 MG/1
1 TABLET ORAL 2 TIMES DAILY PRN
Qty: 60 TABLET | Refills: 2 | Status: CANCELLED | OUTPATIENT
Start: 2019-04-24

## 2019-05-15 ENCOUNTER — PROCEDURE VISIT (OUTPATIENT)
Dept: OBSTETRICS AND GYNECOLOGY | Facility: CLINIC | Age: 54
End: 2019-05-15

## 2019-05-15 ENCOUNTER — OFFICE VISIT (OUTPATIENT)
Dept: OBSTETRICS AND GYNECOLOGY | Facility: CLINIC | Age: 54
End: 2019-05-15

## 2019-05-15 VITALS
BODY MASS INDEX: 26.4 KG/M2 | WEIGHT: 149 LBS | SYSTOLIC BLOOD PRESSURE: 96 MMHG | HEIGHT: 63 IN | DIASTOLIC BLOOD PRESSURE: 62 MMHG

## 2019-05-15 DIAGNOSIS — Z80.41 FAMILY HISTORY OF OVARIAN CANCER: Primary | ICD-10-CM

## 2019-05-15 PROCEDURE — 99214 OFFICE O/P EST MOD 30 MIN: CPT | Performed by: OBSTETRICS & GYNECOLOGY

## 2019-05-15 PROCEDURE — 76830 TRANSVAGINAL US NON-OB: CPT | Performed by: OBSTETRICS & GYNECOLOGY

## 2019-05-15 RX ORDER — SODIUM CHLORIDE 0.9 % (FLUSH) 0.9 %
3 SYRINGE (ML) INJECTION EVERY 12 HOURS SCHEDULED
Status: CANCELLED | OUTPATIENT
Start: 2019-05-15

## 2019-05-15 RX ORDER — SODIUM CHLORIDE 0.9 % (FLUSH) 0.9 %
3-10 SYRINGE (ML) INJECTION AS NEEDED
Status: CANCELLED | OUTPATIENT
Start: 2019-05-15

## 2019-05-15 NOTE — H&P
[facility]  Shawnee Suarez  : 1965  MRN: [id number]  CSN: [id number]    History and Physical    Subjective   Shawnee Suarez is a 54 y.o. year old  who presents for consultation about surgery due to family history of ovarian cancer in her mother (age 71) - a maternal aunt also had breast cancer as a young woman (age uncertain, but definitely premenopausal).  The patient's PCP has strongly encouraged her to have a hysterectomy.    Past Medical History:   Diagnosis Date   • Anxiety    • Depression    • History of transfusion    • Hyperlipidemia    • Ovarian cyst      Past Surgical History:   Procedure Laterality Date   • APPENDECTOMY     • BREAST BIOPSY Right     unsure when   • CHOLECYSTECTOMY     • COLONOSCOPY  2013    Normal exam repeat in 10 years   • COLONOSCOPY N/A 2018    Procedure: COLONOSCOPY WITH ANESTHESIA;  Surgeon: Da Miles MD;  Location: [facility] ENDOSCOPY;  Service: Gastroenterology   • D&C HYSTEROSCOPY ENDOMETRIAL ABLATION N/A 2017    Procedure: DILATATION AND CURETTAGE HYSTEROSCOPY NOVASURE ENDOMETRIAL ABLATION;  Surgeon: Ana Barrios MD;  Location: [facility] OR;  Service:    • OVARIAN CYST REMOVAL       Social History    Tobacco Use      Smoking status: Never Smoker      Smokeless tobacco: Never Used      Current Outpatient Medications:   •  cholecalciferol (VITAMIN D3) 1000 units tablet, Take 1,000 Units by mouth Daily., Disp: , Rfl:   •  clonazePAM (KlonoPIN) 1 MG tablet, Take 1 tablet by mouth 2 (Two) Times a Day As Needed for Seizures., Disp: 60 tablet, Rfl: 2  •  fluticasone (FLONASE) 50 MCG/ACT nasal spray, 2 sprays into each nostril Daily., Disp: , Rfl:   •  Lactobacillus (REPHRESH PRO-B PO), Take 1 tablet by mouth Daily., Disp: , Rfl:   •  loratadine (ALLERGY) 10 MG tablet, Take 10 mg by mouth Daily., Disp: , Rfl:   •  Multiple Vitamins-Minerals (WOMENS 50+ MULTI VITAMIN/MIN PO), Take  by mouth., Disp: , Rfl:   •  rosuvastatin (CRESTOR) 10 MG tablet, Take 1  "tablet by mouth Daily., Disp: 90 tablet, Rfl: 2  •  Vortioxetine HBr (TRINTELLIX) 20 MG tablet, Take 20 mg by mouth Daily., Disp: 90 tablet, Rfl: 1  •  ZOLMitriptan (ZOMIG) 2.5 MG tablet, Take 1 tablet by mouth 2 (Two) Times a Day. As needed for headaches, Disp: 6 tablet, Rfl: 3    No Known Allergies    Family History   Problem Relation Age of Onset   • Ovarian cancer Mother 72   • Brain cancer Father    • No Known Problems Brother    • No Known Problems Daughter    • No Known Problems Maternal Grandmother    • No Known Problems Maternal Grandfather    • No Known Problems Paternal Grandmother    • Brain cancer Paternal Grandfather    • Heart disease Brother    • No Known Problems Daughter    • Breast cancer Other 28   • No Known Problems Maternal Aunt    • No Known Problems Paternal Aunt    • Uterine cancer Neg Hx    • Colon cancer Neg Hx    • Melanoma Neg Hx    • Prostate cancer Neg Hx    • BRCA 1/2 Neg Hx    • Endometrial cancer Neg Hx    • Colon polyps Neg Hx      Review of Systems   Constitutional: Negative for activity change and unexpected weight change.   Respiratory: Negative for shortness of breath.    Cardiovascular: Negative for chest pain.   Gastrointestinal: Positive for constipation (chronic, manages with diet). Negative for abdominal pain and diarrhea.   Genitourinary: Positive for enuresis (rare with sneezing or laughing). Negative for difficulty urinating, pelvic pain, vaginal bleeding, vaginal discharge and vaginal pain.   Musculoskeletal: Negative for arthralgias and back pain.   Psychiatric/Behavioral: Negative for dysphoric mood. The patient is not nervous/anxious.         Well-managed with meds         Objective   BP 96/62   Ht 160 cm (63\")   Wt 67.6 kg (149 lb)   BMI 26.39 kg/m²      Physical Exam   Physical Exam   Constitutional: She is oriented to person, place, and time. She appears well-developed and well-nourished. No distress.   HENT:   Head: Normocephalic and atraumatic.   Eyes: EOM " are normal.   Neck: Normal range of motion.   Pulmonary/Chest: Effort normal.   Abdominal: Soft. She exhibits no distension. There is no tenderness.   Genitourinary:   Genitourinary Comments: U/s today: Uterus 5.17 X 3.97 X 3.22, with lining too thin to identify.  R ovary normal, L not visualized.   Musculoskeletal: Normal range of motion.   Neurological: She is alert and oriented to person, place, and time.   Skin: Skin is warm and dry.   Psychiatric: She has a normal mood and affect. Her behavior is normal. Judgment normal.   Nursing note and vitals reviewed.      Labs  Lab Results   Component Value Date     09/25/2018    HGB 16.1 (H) 09/25/2018    HCT 49.2 (H) 09/25/2018    WBC 5.49 09/25/2018     03/26/2019    K 4.8 03/26/2019     03/26/2019    CO2 24.6 03/26/2019    BUN 14 03/26/2019    CREATININE 0.69 03/26/2019    GLUCOSE 87 12/25/2016    ALBUMIN 4.80 03/26/2019    CALCIUM 10.2 03/26/2019    AST 20 03/26/2019    ALT 18 03/26/2019    BILITOT 0.5 03/26/2019        Assessment & Plan    Shawnee was seen today for fhx ovarian cancer.    Diagnoses and all orders for this visit:    Family history of ovarian cancer: The patient was strongly advised to have a hysterectomy with BSO by her primary care provider, but the patient is hesitant.  Patient has a family member who required a hysterectomy and then had complications of both posterior straightening prolapse as well as colostomy.  The patient is anxious with possibility of ovarian cancer, since her own mother had ovarian cancer, but is also very aware that post history to be prolapse can be a real result of surgery.  The patient did have bracket testing which was negative.  We have also discussed annual ultrasound surveillance as a possible conservative plan, but the patient was advised that no data has proven this to increase survival, only earlier detection.  Risks of laparoscopic surgery were reviewed to include, but are not limited to, the  possibility of bowel perforation requiring possible bowel resection and/or colostomy, possible bladder injury or possible and possible vascular injury which could require the need for a blood transfusion.  Possible need for conversion to a laparotomy was also discussed.  The patient's questions were answered to her satisfaction.  Post-op restrictions and typical post-op course were also reviewed.  The patient has elected for laparoscopic BSO, but would like to keep her uterus and cervix for support.  She even had questions about a supracervical hysterectomy but was discouraged from this since any future need for trachelectomy would carry an even higher risk of laparoscopic or surgical injury.  Surgery is being scheduled for June 17.  All questions were answered to the satisfaction of both the patient and her .  Comments:  Patient's BRCA testing was negative.    BMI 26.0-26.9,adult        Ana Barrios MD  5/15/2019  11:29 AM

## 2019-05-20 ENCOUNTER — RESULTS ENCOUNTER (OUTPATIENT)
Dept: OBSTETRICS AND GYNECOLOGY | Facility: CLINIC | Age: 54
End: 2019-05-20

## 2019-05-20 DIAGNOSIS — Z80.41 FAMILY HISTORY OF OVARIAN CANCER: ICD-10-CM

## 2019-05-28 ENCOUNTER — APPOINTMENT (OUTPATIENT)
Dept: PREADMISSION TESTING | Facility: HOSPITAL | Age: 54
End: 2019-05-28

## 2019-05-30 ENCOUNTER — APPOINTMENT (OUTPATIENT)
Dept: PREADMISSION TESTING | Facility: HOSPITAL | Age: 54
End: 2019-05-30

## 2019-05-30 VITALS
BODY MASS INDEX: 25.78 KG/M2 | DIASTOLIC BLOOD PRESSURE: 58 MMHG | OXYGEN SATURATION: 95 % | WEIGHT: 151.01 LBS | HEART RATE: 73 BPM | RESPIRATION RATE: 18 BRPM | HEIGHT: 64 IN | SYSTOLIC BLOOD PRESSURE: 100 MMHG

## 2019-05-30 DIAGNOSIS — Z80.41 FAMILY HISTORY OF OVARIAN CANCER: ICD-10-CM

## 2019-05-30 LAB
ANION GAP SERPL CALCULATED.3IONS-SCNC: 9 MMOL/L (ref 4–13)
BASOPHILS # BLD AUTO: 0.03 10*3/MM3 (ref 0–0.2)
BASOPHILS NFR BLD AUTO: 0.7 % (ref 0–2)
BUN BLD-MCNC: 12 MG/DL (ref 5–21)
BUN/CREAT SERPL: 21.1 (ref 7–25)
CALCIUM SPEC-SCNC: 10.1 MG/DL (ref 8.4–10.4)
CHLORIDE SERPL-SCNC: 104 MMOL/L (ref 98–110)
CO2 SERPL-SCNC: 28 MMOL/L (ref 24–31)
CREAT BLD-MCNC: 0.57 MG/DL (ref 0.5–1.4)
DEPRECATED RDW RBC AUTO: 40.6 FL (ref 40–54)
EOSINOPHIL # BLD AUTO: 0.06 10*3/MM3 (ref 0–0.7)
EOSINOPHIL NFR BLD AUTO: 1.4 % (ref 0–4)
ERYTHROCYTE [DISTWIDTH] IN BLOOD BY AUTOMATED COUNT: 12.9 % (ref 12–15)
GFR SERPL CREATININE-BSD FRML MDRD: 110 ML/MIN/1.73
GLUCOSE BLD-MCNC: 89 MG/DL (ref 70–100)
HCT VFR BLD AUTO: 40.7 % (ref 37–47)
HGB BLD-MCNC: 14.1 G/DL (ref 12–16)
IMM GRANULOCYTES # BLD AUTO: 0 10*3/MM3 (ref 0–0.05)
IMM GRANULOCYTES NFR BLD AUTO: 0 % (ref 0–5)
LYMPHOCYTES # BLD AUTO: 1.54 10*3/MM3 (ref 0.72–4.86)
LYMPHOCYTES NFR BLD AUTO: 35.9 % (ref 15–45)
MCH RBC QN AUTO: 30.2 PG (ref 28–32)
MCHC RBC AUTO-ENTMCNC: 34.6 G/DL (ref 33–36)
MCV RBC AUTO: 87.2 FL (ref 82–98)
MONOCYTES # BLD AUTO: 0.41 10*3/MM3 (ref 0.19–1.3)
MONOCYTES NFR BLD AUTO: 9.6 % (ref 4–12)
NEUTROPHILS # BLD AUTO: 2.25 10*3/MM3 (ref 1.87–8.4)
NEUTROPHILS NFR BLD AUTO: 52.4 % (ref 39–78)
NRBC BLD AUTO-RTO: 0 /100 WBC (ref 0–0.2)
PLATELET # BLD AUTO: 161 10*3/MM3 (ref 130–400)
PMV BLD AUTO: 12.3 FL (ref 6–12)
POTASSIUM BLD-SCNC: 4.2 MMOL/L (ref 3.5–5.3)
RBC # BLD AUTO: 4.67 10*6/MM3 (ref 4.2–5.4)
SODIUM BLD-SCNC: 141 MMOL/L (ref 135–145)
WBC NRBC COR # BLD: 4.29 10*3/MM3 (ref 4.8–10.8)

## 2019-05-30 PROCEDURE — 93010 ELECTROCARDIOGRAM REPORT: CPT | Performed by: INTERNAL MEDICINE

## 2019-05-30 PROCEDURE — 93005 ELECTROCARDIOGRAM TRACING: CPT

## 2019-05-30 PROCEDURE — 85025 COMPLETE CBC W/AUTO DIFF WBC: CPT | Performed by: OBSTETRICS & GYNECOLOGY

## 2019-05-30 PROCEDURE — 36415 COLL VENOUS BLD VENIPUNCTURE: CPT

## 2019-05-30 PROCEDURE — 80048 BASIC METABOLIC PNL TOTAL CA: CPT | Performed by: OBSTETRICS & GYNECOLOGY

## 2019-06-03 RX ORDER — ZOLMITRIPTAN 2.5 MG/1
TABLET, FILM COATED ORAL
Qty: 6 TABLET | Refills: 6 | Status: SHIPPED | OUTPATIENT
Start: 2019-06-03 | End: 2020-02-17 | Stop reason: SDUPTHER

## 2019-06-04 DIAGNOSIS — R94.31 ABNORMAL EKG: Primary | ICD-10-CM

## 2019-06-04 NOTE — PROGRESS NOTES
Please let patient know that her pre-op ekg was abnormal and that I have ordered a treadmill stress test.  Thx

## 2019-06-11 ENCOUNTER — HOSPITAL ENCOUNTER (OUTPATIENT)
Dept: CARDIOLOGY | Facility: HOSPITAL | Age: 54
Discharge: HOME | End: 2019-06-11
Admitting: OBSTETRICS & GYNECOLOGY

## 2019-06-11 VITALS
HEART RATE: 70 BPM | BODY MASS INDEX: 25.78 KG/M2 | WEIGHT: 151.01 LBS | SYSTOLIC BLOOD PRESSURE: 107 MMHG | DIASTOLIC BLOOD PRESSURE: 72 MMHG | HEIGHT: 64 IN

## 2019-06-11 DIAGNOSIS — R94.31 ABNORMAL EKG: ICD-10-CM

## 2019-06-11 LAB
BH CV STRESS BP STAGE 1: NORMAL
BH CV STRESS BP STAGE 2: NORMAL
BH CV STRESS BP STAGE 3: NORMAL
BH CV STRESS DURATION MIN STAGE 1: 3
BH CV STRESS DURATION MIN STAGE 2: 3
BH CV STRESS DURATION MIN STAGE 3: 1
BH CV STRESS DURATION SEC STAGE 1: 0
BH CV STRESS DURATION SEC STAGE 2: 0
BH CV STRESS DURATION SEC STAGE 3: 0
BH CV STRESS GRADE STAGE 1: 10
BH CV STRESS GRADE STAGE 2: 12
BH CV STRESS GRADE STAGE 3: 14
BH CV STRESS HR STAGE 1: 109
BH CV STRESS HR STAGE 2: 129
BH CV STRESS HR STAGE 3: 146
BH CV STRESS METS STAGE 1: 5
BH CV STRESS METS STAGE 2: 7.5
BH CV STRESS METS STAGE 3: 10
BH CV STRESS PROTOCOL 1: NORMAL
BH CV STRESS RECOVERY BP: NORMAL MMHG
BH CV STRESS RECOVERY HR: 78 BPM
BH CV STRESS SPEED STAGE 1: 1.7
BH CV STRESS SPEED STAGE 2: 2.5
BH CV STRESS SPEED STAGE 3: 3.4
BH CV STRESS STAGE 1: 1
BH CV STRESS STAGE 2: 2
BH CV STRESS STAGE 3: 3
MAXIMAL PREDICTED HEART RATE: 164 BPM
PERCENT MAX PREDICTED HR: 89.02 %
STRESS BASELINE BP: NORMAL MMHG
STRESS BASELINE HR: 71 BPM
STRESS PERCENT HR: 105 %
STRESS POST ESTIMATED WORKLOAD: 10 METS
STRESS POST EXERCISE DUR MIN: 7 MIN
STRESS POST PEAK BP: NORMAL MMHG
STRESS POST PEAK HR: 146 BPM
STRESS TARGET HR: 139 BPM

## 2019-06-11 PROCEDURE — 93017 CV STRESS TEST TRACING ONLY: CPT

## 2019-06-11 PROCEDURE — 93018 CV STRESS TEST I&R ONLY: CPT | Performed by: INTERNAL MEDICINE

## 2019-06-13 ENCOUNTER — TELEPHONE (OUTPATIENT)
Dept: OBSTETRICS AND GYNECOLOGY | Facility: CLINIC | Age: 54
End: 2019-06-13

## 2019-06-13 NOTE — TELEPHONE ENCOUNTER
Pt called requesting treadmill stress testing. Per dr bacon pt fit for surgery on 6-17-19. Pt understood. BS

## 2019-06-17 ENCOUNTER — HOSPITAL ENCOUNTER (OUTPATIENT)
Facility: HOSPITAL | Age: 54
Setting detail: HOSPITAL OUTPATIENT SURGERY
Discharge: HOME | End: 2019-06-17
Attending: OBSTETRICS & GYNECOLOGY | Admitting: OBSTETRICS & GYNECOLOGY

## 2019-06-17 ENCOUNTER — ANESTHESIA EVENT (OUTPATIENT)
Dept: PERIOP | Facility: HOSPITAL | Age: 54
End: 2019-06-17

## 2019-06-17 ENCOUNTER — ANESTHESIA (OUTPATIENT)
Dept: PERIOP | Facility: HOSPITAL | Age: 54
End: 2019-06-17

## 2019-06-17 VITALS
SYSTOLIC BLOOD PRESSURE: 93 MMHG | RESPIRATION RATE: 16 BRPM | OXYGEN SATURATION: 90 % | DIASTOLIC BLOOD PRESSURE: 62 MMHG | TEMPERATURE: 97.5 F | HEART RATE: 70 BPM

## 2019-06-17 DIAGNOSIS — Z80.41 FAMILY HISTORY OF OVARIAN CANCER: ICD-10-CM

## 2019-06-17 LAB
ABO GROUP BLD: NORMAL
BLD GP AB SCN SERPL QL: NEGATIVE
HCG SERPL QL: NEGATIVE
RH BLD: POSITIVE
T&S EXPIRATION DATE: NORMAL

## 2019-06-17 PROCEDURE — 25010000002 FENTANYL CITRATE (PF) 100 MCG/2ML SOLUTION: Performed by: NURSE ANESTHETIST, CERTIFIED REGISTERED

## 2019-06-17 PROCEDURE — 25010000002 FENTANYL CITRATE (PF) 100 MCG/2ML SOLUTION: Performed by: ANESTHESIOLOGY

## 2019-06-17 PROCEDURE — 25010000002 ONDANSETRON PER 1 MG: Performed by: NURSE ANESTHETIST, CERTIFIED REGISTERED

## 2019-06-17 PROCEDURE — 86900 BLOOD TYPING SEROLOGIC ABO: CPT | Performed by: OBSTETRICS & GYNECOLOGY

## 2019-06-17 PROCEDURE — 25010000002 DEXAMETHASONE PER 1 MG: Performed by: NURSE ANESTHETIST, CERTIFIED REGISTERED

## 2019-06-17 PROCEDURE — S0260 H&P FOR SURGERY: HCPCS | Performed by: OBSTETRICS & GYNECOLOGY

## 2019-06-17 PROCEDURE — 84703 CHORIONIC GONADOTROPIN ASSAY: CPT | Performed by: OBSTETRICS & GYNECOLOGY

## 2019-06-17 PROCEDURE — 88305 TISSUE EXAM BY PATHOLOGIST: CPT | Performed by: OBSTETRICS & GYNECOLOGY

## 2019-06-17 PROCEDURE — 86901 BLOOD TYPING SEROLOGIC RH(D): CPT | Performed by: OBSTETRICS & GYNECOLOGY

## 2019-06-17 PROCEDURE — 25010000002 NEOSTIGMINE PER 0.5 MG: Performed by: NURSE ANESTHETIST, CERTIFIED REGISTERED

## 2019-06-17 PROCEDURE — 25010000002 PROPOFOL 10 MG/ML EMULSION: Performed by: NURSE ANESTHETIST, CERTIFIED REGISTERED

## 2019-06-17 PROCEDURE — 58661 LAPAROSCOPY REMOVE ADNEXA: CPT | Performed by: OBSTETRICS & GYNECOLOGY

## 2019-06-17 PROCEDURE — 25010000002 MIDAZOLAM PER 1 MG: Performed by: ANESTHESIOLOGY

## 2019-06-17 PROCEDURE — 86850 RBC ANTIBODY SCREEN: CPT | Performed by: OBSTETRICS & GYNECOLOGY

## 2019-06-17 PROCEDURE — 25010000002 DEXAMETHASONE PER 1 MG: Performed by: ANESTHESIOLOGY

## 2019-06-17 RX ORDER — METOCLOPRAMIDE HYDROCHLORIDE 5 MG/ML
5 INJECTION INTRAMUSCULAR; INTRAVENOUS
Status: DISCONTINUED | OUTPATIENT
Start: 2019-06-17 | End: 2019-06-17 | Stop reason: HOSPADM

## 2019-06-17 RX ORDER — LABETALOL HYDROCHLORIDE 5 MG/ML
5 INJECTION, SOLUTION INTRAVENOUS
Status: DISCONTINUED | OUTPATIENT
Start: 2019-06-17 | End: 2019-06-17 | Stop reason: HOSPADM

## 2019-06-17 RX ORDER — OXYCODONE AND ACETAMINOPHEN 7.5; 325 MG/1; MG/1
2 TABLET ORAL EVERY 4 HOURS PRN
Status: DISCONTINUED | OUTPATIENT
Start: 2019-06-17 | End: 2019-06-17 | Stop reason: HOSPADM

## 2019-06-17 RX ORDER — MIDAZOLAM HYDROCHLORIDE 1 MG/ML
1 INJECTION INTRAMUSCULAR; INTRAVENOUS
Status: DISCONTINUED | OUTPATIENT
Start: 2019-06-17 | End: 2019-06-17 | Stop reason: HOSPADM

## 2019-06-17 RX ORDER — FENTANYL CITRATE 50 UG/ML
25 INJECTION, SOLUTION INTRAMUSCULAR; INTRAVENOUS AS NEEDED
Status: COMPLETED | OUTPATIENT
Start: 2019-06-17 | End: 2019-06-17

## 2019-06-17 RX ORDER — LIDOCAINE HYDROCHLORIDE 20 MG/ML
INJECTION, SOLUTION INFILTRATION; PERINEURAL AS NEEDED
Status: DISCONTINUED | OUTPATIENT
Start: 2019-06-17 | End: 2019-06-17 | Stop reason: SURG

## 2019-06-17 RX ORDER — SODIUM CHLORIDE 0.9 % (FLUSH) 0.9 %
1-10 SYRINGE (ML) INJECTION AS NEEDED
Status: DISCONTINUED | OUTPATIENT
Start: 2019-06-17 | End: 2019-06-17 | Stop reason: HOSPADM

## 2019-06-17 RX ORDER — OXYCODONE HYDROCHLORIDE AND ACETAMINOPHEN 5; 325 MG/1; MG/1
1-2 TABLET ORAL EVERY 4 HOURS PRN
Qty: 20 TABLET | Refills: 0 | Status: SHIPPED | OUTPATIENT
Start: 2019-06-17 | End: 2019-07-23

## 2019-06-17 RX ORDER — PROPOFOL 10 MG/ML
VIAL (ML) INTRAVENOUS AS NEEDED
Status: DISCONTINUED | OUTPATIENT
Start: 2019-06-17 | End: 2019-06-17 | Stop reason: SURG

## 2019-06-17 RX ORDER — IBUPROFEN 600 MG/1
600 TABLET ORAL ONCE AS NEEDED
Status: DISCONTINUED | OUTPATIENT
Start: 2019-06-17 | End: 2019-06-17 | Stop reason: HOSPADM

## 2019-06-17 RX ORDER — BUPIVACAINE HCL/0.9 % NACL/PF 0.1 %
2 PLASTIC BAG, INJECTION (ML) EPIDURAL ONCE
Status: COMPLETED | OUTPATIENT
Start: 2019-06-17 | End: 2019-06-17

## 2019-06-17 RX ORDER — ACETAMINOPHEN 500 MG
1000 TABLET ORAL ONCE
Status: COMPLETED | OUTPATIENT
Start: 2019-06-17 | End: 2019-06-17

## 2019-06-17 RX ORDER — ROCURONIUM BROMIDE 10 MG/ML
INJECTION, SOLUTION INTRAVENOUS AS NEEDED
Status: DISCONTINUED | OUTPATIENT
Start: 2019-06-17 | End: 2019-06-17 | Stop reason: SURG

## 2019-06-17 RX ORDER — IPRATROPIUM BROMIDE AND ALBUTEROL SULFATE 2.5; .5 MG/3ML; MG/3ML
3 SOLUTION RESPIRATORY (INHALATION) ONCE AS NEEDED
Status: DISCONTINUED | OUTPATIENT
Start: 2019-06-17 | End: 2019-06-17 | Stop reason: HOSPADM

## 2019-06-17 RX ORDER — OXYCODONE AND ACETAMINOPHEN 10; 325 MG/1; MG/1
1 TABLET ORAL ONCE AS NEEDED
Status: COMPLETED | OUTPATIENT
Start: 2019-06-17 | End: 2019-06-17

## 2019-06-17 RX ORDER — OXYCODONE HYDROCHLORIDE AND ACETAMINOPHEN 5; 325 MG/1; MG/1
1 TABLET ORAL EVERY 4 HOURS PRN
Status: DISCONTINUED | OUTPATIENT
Start: 2019-06-17 | End: 2019-06-17 | Stop reason: HOSPADM

## 2019-06-17 RX ORDER — SODIUM CHLORIDE 0.9 % (FLUSH) 0.9 %
3 SYRINGE (ML) INJECTION EVERY 12 HOURS SCHEDULED
Status: DISCONTINUED | OUTPATIENT
Start: 2019-06-17 | End: 2019-06-17 | Stop reason: HOSPADM

## 2019-06-17 RX ORDER — MAGNESIUM HYDROXIDE 1200 MG/15ML
LIQUID ORAL AS NEEDED
Status: DISCONTINUED | OUTPATIENT
Start: 2019-06-17 | End: 2019-06-17 | Stop reason: HOSPADM

## 2019-06-17 RX ORDER — NALOXONE HCL 0.4 MG/ML
0.4 VIAL (ML) INJECTION AS NEEDED
Status: DISCONTINUED | OUTPATIENT
Start: 2019-06-17 | End: 2019-06-17 | Stop reason: HOSPADM

## 2019-06-17 RX ORDER — GLYCOPYRROLATE 0.2 MG/ML
INJECTION INTRAMUSCULAR; INTRAVENOUS AS NEEDED
Status: DISCONTINUED | OUTPATIENT
Start: 2019-06-17 | End: 2019-06-17 | Stop reason: SURG

## 2019-06-17 RX ORDER — FENTANYL CITRATE 50 UG/ML
INJECTION, SOLUTION INTRAMUSCULAR; INTRAVENOUS AS NEEDED
Status: DISCONTINUED | OUTPATIENT
Start: 2019-06-17 | End: 2019-06-17 | Stop reason: SURG

## 2019-06-17 RX ORDER — DEXAMETHASONE SODIUM PHOSPHATE 4 MG/ML
4 INJECTION, SOLUTION INTRA-ARTICULAR; INTRALESIONAL; INTRAMUSCULAR; INTRAVENOUS; SOFT TISSUE ONCE AS NEEDED
Status: COMPLETED | OUTPATIENT
Start: 2019-06-17 | End: 2019-06-17

## 2019-06-17 RX ORDER — LIDOCAINE HYDROCHLORIDE 40 MG/ML
SOLUTION TOPICAL AS NEEDED
Status: DISCONTINUED | OUTPATIENT
Start: 2019-06-17 | End: 2019-06-17 | Stop reason: SURG

## 2019-06-17 RX ORDER — SODIUM CHLORIDE 0.9 % (FLUSH) 0.9 %
3-10 SYRINGE (ML) INJECTION AS NEEDED
Status: DISCONTINUED | OUTPATIENT
Start: 2019-06-17 | End: 2019-06-17 | Stop reason: HOSPADM

## 2019-06-17 RX ORDER — DEXAMETHASONE SODIUM PHOSPHATE 4 MG/ML
INJECTION, SOLUTION INTRA-ARTICULAR; INTRALESIONAL; INTRAMUSCULAR; INTRAVENOUS; SOFT TISSUE AS NEEDED
Status: DISCONTINUED | OUTPATIENT
Start: 2019-06-17 | End: 2019-06-17 | Stop reason: SURG

## 2019-06-17 RX ORDER — ONDANSETRON 2 MG/ML
INJECTION INTRAMUSCULAR; INTRAVENOUS AS NEEDED
Status: DISCONTINUED | OUTPATIENT
Start: 2019-06-17 | End: 2019-06-17 | Stop reason: SURG

## 2019-06-17 RX ORDER — SODIUM CHLORIDE, SODIUM LACTATE, POTASSIUM CHLORIDE, CALCIUM CHLORIDE 600; 310; 30; 20 MG/100ML; MG/100ML; MG/100ML; MG/100ML
100 INJECTION, SOLUTION INTRAVENOUS CONTINUOUS
Status: DISCONTINUED | OUTPATIENT
Start: 2019-06-17 | End: 2019-06-17 | Stop reason: HOSPADM

## 2019-06-17 RX ORDER — ULTRASOUND COUPLING MEDIUM
GEL (GRAM) TOPICAL AS NEEDED
Status: DISCONTINUED | OUTPATIENT
Start: 2019-06-17 | End: 2019-06-17 | Stop reason: HOSPADM

## 2019-06-17 RX ORDER — MIDAZOLAM HYDROCHLORIDE 1 MG/ML
2 INJECTION INTRAMUSCULAR; INTRAVENOUS
Status: DISCONTINUED | OUTPATIENT
Start: 2019-06-17 | End: 2019-06-17 | Stop reason: HOSPADM

## 2019-06-17 RX ORDER — ONDANSETRON 2 MG/ML
4 INJECTION INTRAMUSCULAR; INTRAVENOUS ONCE AS NEEDED
Status: DISCONTINUED | OUTPATIENT
Start: 2019-06-17 | End: 2019-06-17 | Stop reason: HOSPADM

## 2019-06-17 RX ORDER — FAMOTIDINE 10 MG/ML
20 INJECTION, SOLUTION INTRAVENOUS
Status: COMPLETED | OUTPATIENT
Start: 2019-06-17 | End: 2019-06-17

## 2019-06-17 RX ORDER — SODIUM CHLORIDE, SODIUM LACTATE, POTASSIUM CHLORIDE, CALCIUM CHLORIDE 600; 310; 30; 20 MG/100ML; MG/100ML; MG/100ML; MG/100ML
20 INJECTION, SOLUTION INTRAVENOUS CONTINUOUS
Status: DISCONTINUED | OUTPATIENT
Start: 2019-06-17 | End: 2019-06-17 | Stop reason: HOSPADM

## 2019-06-17 RX ORDER — PHENYLEPHRINE HCL IN 0.9% NACL 0.8MG/10ML
SYRINGE (ML) INTRAVENOUS AS NEEDED
Status: DISCONTINUED | OUTPATIENT
Start: 2019-06-17 | End: 2019-06-17 | Stop reason: SURG

## 2019-06-17 RX ADMIN — FENTANYL CITRATE 25 MCG: 50 INJECTION, SOLUTION INTRAMUSCULAR; INTRAVENOUS at 11:53

## 2019-06-17 RX ADMIN — LIDOCAINE HYDROCHLORIDE 60 MG: 20 INJECTION, SOLUTION INFILTRATION; PERINEURAL at 10:45

## 2019-06-17 RX ADMIN — GLYCOPYRROLATE 0.4 MG: 0.2 INJECTION, SOLUTION INTRAMUSCULAR; INTRAVENOUS at 11:19

## 2019-06-17 RX ADMIN — LIDOCAINE HYDROCHLORIDE 1 EACH: 40 SOLUTION TOPICAL at 10:45

## 2019-06-17 RX ADMIN — FENTANYL CITRATE 25 MCG: 50 INJECTION, SOLUTION INTRAMUSCULAR; INTRAVENOUS at 11:55

## 2019-06-17 RX ADMIN — ONDANSETRON HYDROCHLORIDE 4 MG: 2 SOLUTION INTRAMUSCULAR; INTRAVENOUS at 11:18

## 2019-06-17 RX ADMIN — FENTANYL CITRATE 50 MCG: 50 INJECTION, SOLUTION INTRAMUSCULAR; INTRAVENOUS at 11:02

## 2019-06-17 RX ADMIN — ROCURONIUM BROMIDE 30 MG: 10 INJECTION INTRAVENOUS at 10:45

## 2019-06-17 RX ADMIN — Medication 3 MG: at 11:19

## 2019-06-17 RX ADMIN — FAMOTIDINE 20 MG: 10 INJECTION, SOLUTION INTRAVENOUS at 10:26

## 2019-06-17 RX ADMIN — MIDAZOLAM 2 MG: 1 INJECTION INTRAMUSCULAR; INTRAVENOUS at 10:26

## 2019-06-17 RX ADMIN — FENTANYL CITRATE 25 MCG: 50 INJECTION, SOLUTION INTRAMUSCULAR; INTRAVENOUS at 11:52

## 2019-06-17 RX ADMIN — ACETAMINOPHEN 1000 MG: 500 TABLET, FILM COATED ORAL at 10:26

## 2019-06-17 RX ADMIN — PROPOFOL 200 MG: 10 INJECTION, EMULSION INTRAVENOUS at 10:45

## 2019-06-17 RX ADMIN — DEXAMETHASONE SODIUM PHOSPHATE 4 MG: 4 INJECTION, SOLUTION INTRAMUSCULAR; INTRAVENOUS at 10:45

## 2019-06-17 RX ADMIN — FENTANYL CITRATE 50 MCG: 50 INJECTION, SOLUTION INTRAMUSCULAR; INTRAVENOUS at 10:45

## 2019-06-17 RX ADMIN — SODIUM CHLORIDE, POTASSIUM CHLORIDE, SODIUM LACTATE AND CALCIUM CHLORIDE: 600; 310; 30; 20 INJECTION, SOLUTION INTRAVENOUS at 11:15

## 2019-06-17 RX ADMIN — Medication 80 MCG: at 10:56

## 2019-06-17 RX ADMIN — SODIUM CHLORIDE, POTASSIUM CHLORIDE, SODIUM LACTATE AND CALCIUM CHLORIDE 20 ML/HR: 600; 310; 30; 20 INJECTION, SOLUTION INTRAVENOUS at 09:28

## 2019-06-17 RX ADMIN — DEXAMETHASONE SODIUM PHOSPHATE 4 MG: 4 INJECTION, SOLUTION INTRAMUSCULAR; INTRAVENOUS at 10:26

## 2019-06-17 RX ADMIN — Medication 2 G: at 10:45

## 2019-06-17 RX ADMIN — FENTANYL CITRATE 25 MCG: 50 INJECTION, SOLUTION INTRAMUSCULAR; INTRAVENOUS at 11:50

## 2019-06-17 RX ADMIN — OXYCODONE HYDROCHLORIDE AND ACETAMINOPHEN 1 TABLET: 10; 325 TABLET ORAL at 11:52

## 2019-06-17 NOTE — ANESTHESIA PROCEDURE NOTES
Airway  Urgency: elective    Airway not difficult    General Information and Staff    Patient location during procedure: OR    Indications and Patient Condition  Indications for airway management: airway protection  Mask difficulty assessment: 1 - vent by mask    Final Airway Details  Final airway type: endotracheal airway      Successful airway: ETT    Successful intubation technique: direct laryngoscopy  Facilitating devices/methods: intubating stylet  Blade: Marlyn  Blade size: 3  ETT size (mm): 7.5  Cormack-Lehane Classification: grade I - full view of glottis  Placement verified by: chest auscultation, capnometry and palpation of cuff   Measured from: lips  ETT to lips (cm): 20  Number of attempts at approach: 1    Additional Comments  Intubation by reynaldo ortega

## 2019-06-17 NOTE — ANESTHESIA POSTPROCEDURE EVALUATION
Patient: Shawnee Suarez    Procedure Summary     Date:  06/17/19 Room / Location:  [facility] OR  /  [facility] OR    Anesthesia Start:  1040 Anesthesia Stop:  1141    Procedures:       DIAGNOSTIC LAPAROSCOPY (N/A Abdomen)      SALPINGO OOPHORECTOMY LAPAROSCOPIC (Bilateral Abdomen) Diagnosis:       Family history of ovarian cancer      (Family history of ovarian cancer [Z80.41])    Surgeon:  Ana Barrios MD Provider:  Cary Rizo CRNA    Anesthesia Type:  general ASA Status:  2          Anesthesia Type: general  Last vitals  BP   102/65 (06/17/19 1230)   Temp   97.5 °F (36.4 °C) (06/17/19 1225)   Pulse   62 (06/17/19 1230)   Resp   16 (06/17/19 1230)     SpO2   94 % (06/17/19 1225)     Post Anesthesia Care and Evaluation    Patient location during evaluation: PACU  Patient participation: complete - patient participated  Level of consciousness: awake and alert  Pain management: adequate  Airway patency: patent  Anesthetic complications: No anesthetic complications  PONV Status: none  Cardiovascular status: acceptable and hemodynamically stable  Respiratory status: acceptable  Hydration status: acceptable    Comments: Blood pressure 102/65, pulse 62, temperature 97.5 °F (36.4 °C), temperature source Temporal, resp. rate 16, SpO2 94 %, not currently breastfeeding.    Patient discharged from PACU based upon Sy score. Please see RN notes for further details

## 2019-06-17 NOTE — OP NOTE
BH Jacquelin Suarez  : 1965  MRN: [id number]  [facility]: [id number]  Date: 2019    Operative Note    DIAGNOSTIC LAPAROSCOPY, SALPINGO OOPHORECTOMY LAPAROSCOPIC      Pre-op Diagnosis:  Family history of ovarian cancer [Z80.41]   Post-op Diagnosis:  Post-Op Diagnosis Codes:     * Family history of ovarian cancer [Z80.41]   Procedure: Procedure(s):  DIAGNOSTIC LAPAROSCOPY  SALPINGO OOPHORECTOMY LAPAROSCOPIC   Surgeon: Surgeon(s):  Ana Barrios MD       Anesthesia: General   Estimated Blood Loss: none           ABx: 2 gm ancef   Specimens:  Bilateral tubes and ovaries   Findings: Normal pelvis, no adhesions to anterior abdominal wall   Complications: none     Description of Procedure:        After informed consent was obtained, the patient was taken to the operating room, where general anesthesia was administered. She was placed in the lithotomy position in [facility], and her abdomen, perineum and vagina were prepped and draped in normal sterile fashion. A speculum was placed in the vagina and a [employer] uterine manipulator placed through the cervical os and clamped to the anterior lip of the cervix.  Her bladder drained with a latex free catheter.   The patient's abdomen was insufflated using a Varess needle at the umbilicus.  Following appropriate insufflation, a 5 mm Optiview trocar was used at the same location.  The abdomen was surveyed and found to be free of any adhesive disease or scar tissue to the anterior abdominal wall.  An additional 12 mm trocar was placed in the suprapubic region, in the midline, above the pubic symphysis, and another 5 mm port placed in the LLQ. The patient was then placed in Trendelenburg position and the bowel swept out of the pelvis with a blunt probe and/or [employer] Geck grasper.  The left fallopian tube was grasp with the Luis Alfred[employer] Geck grasper and placed under general tension as Gyrus harmonic scalpel device was used to dissect the tube and ovary away from the  ipsilateral side wall and across the diameter of the tube near the cornua of the uterus.   All surgical pedicles were checked for hemostasis.  The identical procedure was then performed on the right fallopian tube and ovary.  The patient's uterus, both ovaries, and both fallopian tubes all appeared completely normal.  There was no free fluid or blood in the patient's pelvis.  Adnexa were withdrawn through the 12 mm trocar using an endoloop pouch.  The laparoscope was removed and the gas allowed to escape the abdomen through the umbilical port.  Trochars were removed.   The fascia at the 12 mm incision was reapproximated with Neoclose self-anchoring devices that were placed just before the camera was withdrawn; and subcutaneous tissue at all incisions were reapproximated with 3-0 Vicryl in a continuous fashion.  The instruments were then removed from the vagina and the Hulka site noted to be hemostatic.  Skin Affix was placed over the incisions as a bandage.     The patient was then awakened and taken to the recovery room in stable condition.  She tolerated the procedure well and without complications.  All sponge needle and instrument counts were correct times 2 per the OR staff.      Ana Barrios MD   6/17/2019  11:26 AM

## 2019-06-17 NOTE — DISCHARGE INSTRUCTIONS

## 2019-06-17 NOTE — H&P
[facility]  Shawnee Suarez  : 1965  MRN: [id number]  CSN: [id number]    History and Physical    Subjective   Shawnee Suarez is a 54 y.o. year old  who presents for consultation about surgery due to family history of ovarian cancer in her mother (age 71) - a maternal aunt also had breast cancer as a young woman (age uncertain, but definitely premenopausal).  The patient's PCP has strongly encouraged her to have a hysterectomy.    Past Medical History:   Diagnosis Date   • Anxiety    • Depression    • History of transfusion    • Hyperlipidemia    • Ovarian cyst      Past Surgical History:   Procedure Laterality Date   • APPENDECTOMY     • BREAST BIOPSY Right     unsure when   • CHOLECYSTECTOMY     • COLONOSCOPY  2013    Normal exam repeat in 10 years   • COLONOSCOPY N/A 2018    Procedure: COLONOSCOPY WITH ANESTHESIA;  Surgeon: Da Miles MD;  Location: [facility] ENDOSCOPY;  Service: Gastroenterology   • D&C HYSTEROSCOPY ENDOMETRIAL ABLATION N/A 2017    Procedure: DILATATION AND CURETTAGE HYSTEROSCOPY NOVASURE ENDOMETRIAL ABLATION;  Surgeon: Ana Barrios MD;  Location: [facility] OR;  Service:    • OVARIAN CYST REMOVAL       Social History    Tobacco Use      Smoking status: Never Smoker      Smokeless tobacco: Never Used      Current Outpatient Medications:   •  cholecalciferol (VITAMIN D3) 1000 units tablet, Take 1,000 Units by mouth Daily., Disp: , Rfl:   •  clonazePAM (KlonoPIN) 1 MG tablet, Take 1 tablet by mouth 2 (Two) Times a Day As Needed for Seizures., Disp: 60 tablet, Rfl: 2  •  fluticasone (FLONASE) 50 MCG/ACT nasal spray, 2 sprays into each nostril Daily., Disp: , Rfl:   •  Lactobacillus (REPHRESH PRO-B PO), Take 1 tablet by mouth Daily., Disp: , Rfl:   •  loratadine (ALLERGY) 10 MG tablet, Take 10 mg by mouth Daily., Disp: , Rfl:   •  Multiple Vitamins-Minerals (WOMENS 50+ MULTI VITAMIN/MIN PO), Take  by mouth., Disp: , Rfl:   •  rosuvastatin (CRESTOR) 10 MG tablet, Take 1  "tablet by mouth Daily., Disp: 90 tablet, Rfl: 2  •  Vortioxetine HBr (TRINTELLIX) 20 MG tablet, Take 20 mg by mouth Daily., Disp: 90 tablet, Rfl: 1  •  ZOLMitriptan (ZOMIG) 2.5 MG tablet, Take 1 tablet by mouth 2 (Two) Times a Day. As needed for headaches, Disp: 6 tablet, Rfl: 3    No Known Allergies    Family History   Problem Relation Age of Onset   • Ovarian cancer Mother 72   • Brain cancer Father    • No Known Problems Brother    • No Known Problems Daughter    • No Known Problems Maternal Grandmother    • No Known Problems Maternal Grandfather    • No Known Problems Paternal Grandmother    • Brain cancer Paternal Grandfather    • Heart disease Brother    • No Known Problems Daughter    • Breast cancer Other 28   • No Known Problems Maternal Aunt    • No Known Problems Paternal Aunt    • Uterine cancer Neg Hx    • Colon cancer Neg Hx    • Melanoma Neg Hx    • Prostate cancer Neg Hx    • BRCA 1/2 Neg Hx    • Endometrial cancer Neg Hx    • Colon polyps Neg Hx      Review of Systems   Constitutional: Negative for activity change and unexpected weight change.   Respiratory: Negative for shortness of breath.    Cardiovascular: Negative for chest pain.   Gastrointestinal: Positive for constipation (chronic, manages with diet). Negative for abdominal pain and diarrhea.   Genitourinary: Positive for enuresis (rare with sneezing or laughing). Negative for difficulty urinating, pelvic pain, vaginal bleeding, vaginal discharge and vaginal pain.   Musculoskeletal: Negative for arthralgias and back pain.   Psychiatric/Behavioral: Negative for dysphoric mood. The patient is not nervous/anxious.         Well-managed with meds         Objective   BP 96/62   Ht 160 cm (63\")   Wt 67.6 kg (149 lb)   BMI 26.39 kg/m²      Physical Exam   Physical Exam   Constitutional: She is oriented to person, place, and time. She appears well-developed and well-nourished. No distress.   HENT:   Head: Normocephalic and atraumatic.   Eyes: EOM " are normal.   Neck: Normal range of motion.   Pulmonary/Chest: Effort normal.   Abdominal: Soft. She exhibits no distension. There is no tenderness.   Genitourinary:   Genitourinary Comments: U/s today: Uterus 5.17 X 3.97 X 3.22, with lining too thin to identify.  R ovary normal, L not visualized.   Musculoskeletal: Normal range of motion.   Neurological: She is alert and oriented to person, place, and time.   Skin: Skin is warm and dry.   Psychiatric: She has a normal mood and affect. Her behavior is normal. Judgment normal.   Nursing note and vitals reviewed.      Labs  Lab Results   Component Value Date     09/25/2018    HGB 16.1 (H) 09/25/2018    HCT 49.2 (H) 09/25/2018    WBC 5.49 09/25/2018     03/26/2019    K 4.8 03/26/2019     03/26/2019    CO2 24.6 03/26/2019    BUN 14 03/26/2019    CREATININE 0.69 03/26/2019    GLUCOSE 87 12/25/2016    ALBUMIN 4.80 03/26/2019    CALCIUM 10.2 03/26/2019    AST 20 03/26/2019    ALT 18 03/26/2019    BILITOT 0.5 03/26/2019        Assessment & Plan    Shawnee was seen today for fhx ovarian cancer.    Diagnoses and all orders for this visit:    Family history of ovarian cancer: The patient was strongly advised to have a hysterectomy with BSO by her primary care provider, but the patient is hesitant.  Patient has a family member who required a hysterectomy and then had complications of both posterior straightening prolapse as well as colostomy.  The patient is anxious with possibility of ovarian cancer, since her own mother had ovarian cancer, but is also very aware that post history to be prolapse can be a real result of surgery.  The patient did have bracket testing which was negative.  We have also discussed annual ultrasound surveillance as a possible conservative plan, but the patient was advised that no data has proven this to increase survival, only earlier detection.  Risks of laparoscopic surgery were reviewed to include, but are not limited to, the  possibility of bowel perforation requiring possible bowel resection and/or colostomy, possible bladder injury or possible and possible vascular injury which could require the need for a blood transfusion.  Possible need for conversion to a laparotomy was also discussed.  The patient's questions were answered to her satisfaction.  Post-op restrictions and typical post-op course were also reviewed.  The patient has elected for laparoscopic BSO, but would like to keep her uterus and cervix for support.  She even had questions about a supracervical hysterectomy but was discouraged from this since any future need for trachelectomy would carry an even higher risk of laparoscopic or surgical injury.  Surgery is being scheduled for June 17.  All questions were answered to the satisfaction of both the patient and her .  Comments:  Patient's BRCA testing was negative.    BMI 26.0-26.9,adult        Ana Barrios MD  5/15/2019  11:29 AM      Patient seen in [facility] and has no questions or concerns about procedure.  There have been no changes to her medical history or status.

## 2019-06-19 LAB
CYTO UR: NORMAL
LAB AP CASE REPORT: NORMAL
LAB AP CLINICAL INFORMATION: NORMAL
PATH REPORT.FINAL DX SPEC: NORMAL
PATH REPORT.GROSS SPEC: NORMAL

## 2019-06-21 ENCOUNTER — TELEPHONE (OUTPATIENT)
Dept: PASTORAL CARE | Facility: HOSPITAL | Age: 54
End: 2019-06-21

## 2019-06-21 NOTE — TELEPHONE ENCOUNTER
Post discharge follow up call to [facility] member.  Spoke with patient who states she is doing well and not requiring any pain medication.  Verified f/u appt with Dr. Barrios.  Denies any needs at this time.  Will call if any arise.

## 2019-07-01 RX ORDER — VORTIOXETINE 20 MG/1
TABLET, FILM COATED ORAL
Qty: 90 TABLET | Refills: 0 | Status: SHIPPED | OUTPATIENT
Start: 2019-07-01 | End: 2019-09-30 | Stop reason: SDUPTHER

## 2019-07-03 ENCOUNTER — OFFICE VISIT (OUTPATIENT)
Dept: OBSTETRICS AND GYNECOLOGY | Facility: CLINIC | Age: 54
End: 2019-07-03

## 2019-07-03 VITALS
WEIGHT: 146 LBS | SYSTOLIC BLOOD PRESSURE: 106 MMHG | DIASTOLIC BLOOD PRESSURE: 70 MMHG | BODY MASS INDEX: 25.87 KG/M2 | HEIGHT: 63 IN

## 2019-07-03 DIAGNOSIS — Z09 S/P GYNECOLOGICAL SURGERY, FOLLOW-UP EXAM: Primary | ICD-10-CM

## 2019-07-03 PROCEDURE — 99024 POSTOP FOLLOW-UP VISIT: CPT | Performed by: OBSTETRICS & GYNECOLOGY

## 2019-07-03 NOTE — PROGRESS NOTES
"Subjective   Chief Complaint   Patient presents with   • Post-op     pt here today for 2 week post op Dx Lap for bilateral salpingoophorectomy. pt says she is feeling ok since surgery. pt says one of her incisions is open slightly. pt voices no other concerns.      Shawnee Suarez is a 54 y.o. year old  presenting to be seen for her post-operative visit.  She had a bilateral salpingo-oophorectomy for family h/o ovarian cancer 2 weeks ago.  Currently she reports mild pain for only a few days after surgery, but is not having any pain at this time.  She is not having any vaginal bleeding.    No Additional Complaints Reported    The following portions of the patient's history were reviewed and updated as appropriate:current medications and allergies    Review of Systems      Objective   /70   Ht 160 cm (63\")   Wt 66.2 kg (146 lb)   BMI 25.86 kg/m²     General:  well developed; well nourished  no acute distress   Abdomen: soft, non-tender; no masses.  Incisions healing well, 3-4 mm of suprapubic incision with separation of just skin edges   Pelvis: Not performed.          Assessment   1. Pt is  2 weeks s/p bilateral salpingo-oophorectomy for family h/o ovarian cancer.  Patient reports her BRCA testing was negative.  2. Doing well     Plan   1. Ready to resume all of her normal activities  2. Encouraged patient to just listen to her body as far as resuming exercise    No orders of the defined types were placed in this encounter.         This note was electronically signed.    Ana Barrios MD  July 3, 2019  "

## 2019-07-22 ENCOUNTER — PRIOR AUTHORIZATION (OUTPATIENT)
Dept: FAMILY MEDICINE CLINIC | Facility: CLINIC | Age: 54
End: 2019-07-22

## 2019-07-22 ENCOUNTER — TELEPHONE (OUTPATIENT)
Dept: FAMILY MEDICINE CLINIC | Facility: CLINIC | Age: 54
End: 2019-07-22

## 2019-07-23 ENCOUNTER — OFFICE VISIT (OUTPATIENT)
Dept: FAMILY MEDICINE CLINIC | Facility: CLINIC | Age: 54
End: 2019-07-23

## 2019-07-23 VITALS
HEART RATE: 73 BPM | DIASTOLIC BLOOD PRESSURE: 74 MMHG | BODY MASS INDEX: 26.72 KG/M2 | HEIGHT: 63 IN | SYSTOLIC BLOOD PRESSURE: 106 MMHG | TEMPERATURE: 97.6 F | OXYGEN SATURATION: 98 % | WEIGHT: 150.8 LBS

## 2019-07-23 DIAGNOSIS — F41.9 ANXIETY: Primary | ICD-10-CM

## 2019-07-23 PROCEDURE — 99214 OFFICE O/P EST MOD 30 MIN: CPT | Performed by: FAMILY MEDICINE

## 2019-07-23 RX ORDER — CLONAZEPAM 1 MG/1
TABLET ORAL
Qty: 60 TABLET | Refills: 5 | Status: SHIPPED | OUTPATIENT
Start: 2019-07-23 | End: 2019-09-26 | Stop reason: SDUPTHER

## 2019-07-23 NOTE — PROGRESS NOTES
Subjective   Shawnee Suarez is a 54 y.o. female.     54-year-old female with chronic anxiety      Anxiety   Presents for follow-up visit. Symptoms include nervous/anxious behavior. Patient reports no suicidal ideas. Symptoms occur most days. The severity of symptoms is mild. The quality of sleep is fair. Nighttime awakenings: occasional.           The following portions of the patient's history were reviewed and updated as appropriate: allergies, current medications, past family history, past medical history, past social history, past surgical history and problem list.    Review of Systems   Neurological: Negative for headaches.   Psychiatric/Behavioral: Negative for sleep disturbance and suicidal ideas. The patient is nervous/anxious.        Objective   Physical Exam   Constitutional: She is oriented to person, place, and time.   HENT:   Nose: Nose normal.   Mouth/Throat: Oropharynx is clear and moist.   Cardiovascular: Normal rate and regular rhythm.   Pulmonary/Chest: Effort normal and breath sounds normal.   Musculoskeletal: She exhibits no edema.   Neurological: She is alert and oriented to person, place, and time.   Psychiatric: She has a normal mood and affect. Her behavior is normal. Judgment and thought content normal.   Nursing note and vitals reviewed.      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    Diagnoses and all orders for this visit:    Anxiety       Return in about 3 months (around 10/23/2019).    Plan above-continue same Klonopin 1 mg  CONTROLLED SUBSTANCE TRACKING 4/3/2018 9/25/2018 4/10/2019 7/23/2019   Last Lduin 4/3/2018 9/25/2018 4/10/2019 7/23/2019   Report Number [id number]   Last UDS 4/3/2018 4/3/2018 - 4/10/2019   Last Controlled Substance Agreement  4/3/2018 4/3/2018 4/10/2019 4/10/2019

## 2019-07-31 ENCOUNTER — TELEPHONE (OUTPATIENT)
Dept: FAMILY MEDICINE CLINIC | Facility: CLINIC | Age: 54
End: 2019-07-31

## 2019-08-01 ENCOUNTER — OFFICE VISIT (OUTPATIENT)
Dept: FAMILY MEDICINE CLINIC | Facility: CLINIC | Age: 54
End: 2019-08-01

## 2019-08-01 VITALS
SYSTOLIC BLOOD PRESSURE: 108 MMHG | HEART RATE: 78 BPM | DIASTOLIC BLOOD PRESSURE: 76 MMHG | HEIGHT: 63 IN | WEIGHT: 150.6 LBS | OXYGEN SATURATION: 96 % | TEMPERATURE: 97.6 F | BODY MASS INDEX: 26.68 KG/M2

## 2019-08-01 DIAGNOSIS — J40 BRONCHITIS: Primary | ICD-10-CM

## 2019-08-01 PROCEDURE — 99213 OFFICE O/P EST LOW 20 MIN: CPT | Performed by: NURSE PRACTITIONER

## 2019-08-01 RX ORDER — METHYLPREDNISOLONE 4 MG/1
TABLET ORAL
Qty: 21 TABLET | Refills: 0 | Status: SHIPPED | OUTPATIENT
Start: 2019-08-01 | End: 2019-09-26

## 2019-08-01 RX ORDER — DEXTROMETHORPHAN HYDROBROMIDE AND PROMETHAZINE HYDROCHLORIDE 15; 6.25 MG/5ML; MG/5ML
5 SYRUP ORAL NIGHTLY PRN
Qty: 180 ML | Refills: 0 | Status: SHIPPED | OUTPATIENT
Start: 2019-08-01 | End: 2020-09-28

## 2019-08-01 RX ORDER — AZITHROMYCIN 250 MG/1
TABLET, FILM COATED ORAL
Qty: 6 TABLET | Refills: 0 | Status: SHIPPED | OUTPATIENT
Start: 2019-08-01 | End: 2019-09-26

## 2019-08-01 NOTE — PROGRESS NOTES
CC: cough    History:  Shawnee Suarez is a 54 y.o. female who presents today for evaluation of the above problems.    Cough   This is a new problem. The current episode started 1 to 4 weeks ago. The problem has been gradually worsening. The problem occurs every few minutes (keeping up up at night). The cough is productive of sputum. Associated symptoms include postnasal drip, shortness of breath and wheezing. Pertinent negatives include no fever. Nothing aggravates the symptoms. Treatments tried: zyrtec, flonase, and mucinex. The treatment provided no relief.     ROS:  Review of Systems   Constitutional: Negative for fever.   HENT: Positive for postnasal drip. Negative for sinus pressure.    Respiratory: Positive for cough, shortness of breath and wheezing. Negative for chest tightness.        Allergies   Allergen Reactions   • Percocet [Oxycodone-Acetaminophen] Itching     Past Medical History:   Diagnosis Date   • Anxiety    • Depression    • History of transfusion    • Hyperlipidemia    • Migraines    • Ovarian cyst     x 2      Past Surgical History:   Procedure Laterality Date   • APPENDECTOMY     • BREAST BIOPSY Right     unsure when   • CHOLECYSTECTOMY     • COLONOSCOPY  11/22/2013    Normal exam repeat in 10 years   • COLONOSCOPY N/A 11/13/2018    Procedure: COLONOSCOPY WITH ANESTHESIA;  Surgeon: Da Miles MD;  Location: [facility] ENDOSCOPY;  Service: Gastroenterology   • D&C HYSTEROSCOPY ENDOMETRIAL ABLATION N/A 2/6/2017    Procedure: DILATATION AND CURETTAGE HYSTEROSCOPY NOVASURE ENDOMETRIAL ABLATION;  Surgeon: Ana Barrios MD;  Location: [facility] OR;  Service:    • DIAGNOSTIC LAPAROSCOPY N/A 6/17/2019    Procedure: DIAGNOSTIC LAPAROSCOPY;  Surgeon: Ana Barrios MD;  Location: [facility] OR;  Service: Obstetrics/Gynecology   • OVARIAN CYST REMOVAL      x 2   • SALPINGO OOPHORECTOMY Bilateral 6/17/2019    Procedure: SALPINGO OOPHORECTOMY LAPAROSCOPIC;  Surgeon: Ana Barrios MD;  Location: [facility] OR;   Service: Obstetrics/Gynecology     Family History   Problem Relation Age of Onset   • Ovarian cancer Mother 72   • Brain cancer Father    • No Known Problems Brother    • No Known Problems Daughter    • No Known Problems Maternal Grandmother    • No Known Problems Maternal Grandfather    • No Known Problems Paternal Grandmother    • Brain cancer Paternal Grandfather    • Heart disease Brother    • No Known Problems Daughter    • Breast cancer Other 28   • No Known Problems Maternal Aunt    • No Known Problems Paternal Aunt    • Uterine cancer Neg Hx    • Colon cancer Neg Hx    • Melanoma Neg Hx    • Prostate cancer Neg Hx    • BRCA 1/2 Neg Hx    • Endometrial cancer Neg Hx    • Colon polyps Neg Hx       reports that she has never smoked. She has never used smokeless tobacco. She reports that she does not drink alcohol or use drugs.      Current Outpatient Medications:   •  cholecalciferol (VITAMIN D3) 1000 units tablet, Take 2,000 Units by mouth Daily., Disp: , Rfl:   •  clonazePAM (KlonoPIN) 1 MG tablet, TAKE 1 TABLET BY MOUTH TWICE A DAY AS NEEDED FOR SEZIURES, Disp: 60 tablet, Rfl: 5  •  fluticasone (FLONASE) 50 MCG/ACT nasal spray, 2 sprays into each nostril Daily., Disp: , Rfl:   •  Lactobacillus (REPHRESH PRO-B PO), Take 1 tablet by mouth Daily., Disp: , Rfl:   •  loratadine (ALLERGY) 10 MG tablet, Take 10 mg by mouth Daily., Disp: , Rfl:   •  Multiple Vitamins-Minerals (WOMENS 50+ MULTI VITAMIN/MIN PO), Take  by mouth., Disp: , Rfl:   •  rosuvastatin (CRESTOR) 10 MG tablet, Take 1 tablet by mouth Daily., Disp: 90 tablet, Rfl: 2  •  TRINTELLIX 20 MG tablet, TAKE ONE TABLET BY MOUTH ONCE A DAY, Disp: 90 tablet, Rfl: 0  •  ZOLMitriptan (ZOMIG) 2.5 MG tablet, TAKE 1 TABLET BY MOUTH TWICE DAILY AS NEEDED FOR HEADACHE, Disp: 6 tablet, Rfl: 6  •  azithromycin (ZITHROMAX) 250 MG tablet, Take 2 tablets the first day, then 1 tablet daily for 4 days., Disp: 6 tablet, Rfl: 0  •  methylPREDNISolone (MEDROL, VELVET,) 4 MG  "tablet, Take as directed on package instructions., Disp: 21 tablet, Rfl: 0  •  promethazine-dextromethorphan (PROMETHAZINE-DM) 6.25-15 MG/5ML syrup, Take 5 mL by mouth At Night As Needed for Cough., Disp: 180 mL, Rfl: 0    OBJECTIVE:  /76 (BP Location: Left arm, Patient Position: Sitting, Cuff Size: Adult)   Pulse 78   Temp 97.6 °F (36.4 °C) (Temporal)   Ht 160 cm (63\")   Wt 68.3 kg (150 lb 9.6 oz)   SpO2 96%   Breastfeeding? No   BMI 26.68 kg/m²    Physical Exam   Constitutional: She is oriented to person, place, and time. Vital signs are normal. She appears well-developed and well-nourished.   Cardiovascular: Normal rate and regular rhythm.   Pulmonary/Chest: Effort normal and breath sounds normal. No respiratory distress.   Coarseness of left upper and middle lobes noted.   Abdominal: Soft. Bowel sounds are normal.   Neurological: She is alert and oriented to person, place, and time.   Psychiatric: She has a normal mood and affect. Her behavior is normal.   Vitals reviewed.      Assessment/Plan    Shawnee was seen today for cough and uri.    Diagnoses and all orders for this visit:    Bronchitis  -     azithromycin (ZITHROMAX) 250 MG tablet; Take 2 tablets the first day, then 1 tablet daily for 4 days.  -     methylPREDNISolone (MEDROL, VELVET,) 4 MG tablet; Take as directed on package instructions.  -     promethazine-dextromethorphan (PROMETHAZINE-DM) 6.25-15 MG/5ML syrup; Take 5 mL by mouth At Night As Needed for Cough.        An After Visit Summary was printed and given to the patient at discharge.  Return if symptoms worsen or fail to improve, for Next scheduled follow up.       MICHELLE Arnold 08/01/2019    Electronically signed.  "

## 2019-08-05 ENCOUNTER — TELEPHONE (OUTPATIENT)
Dept: FAMILY MEDICINE CLINIC | Facility: CLINIC | Age: 54
End: 2019-08-05

## 2019-08-05 DIAGNOSIS — L98.9 SKIN LESIONS: Primary | ICD-10-CM

## 2019-09-26 ENCOUNTER — OFFICE VISIT (OUTPATIENT)
Dept: FAMILY MEDICINE CLINIC | Facility: CLINIC | Age: 54
End: 2019-09-26

## 2019-09-26 VITALS
DIASTOLIC BLOOD PRESSURE: 78 MMHG | BODY MASS INDEX: 28.56 KG/M2 | TEMPERATURE: 97.3 F | WEIGHT: 161.2 LBS | SYSTOLIC BLOOD PRESSURE: 118 MMHG | OXYGEN SATURATION: 97 % | HEIGHT: 63 IN | HEART RATE: 79 BPM

## 2019-09-26 DIAGNOSIS — Z23 NEED FOR INFLUENZA VACCINATION: ICD-10-CM

## 2019-09-26 DIAGNOSIS — Z00.00 ROUTINE GENERAL MEDICAL EXAMINATION AT A HEALTH CARE FACILITY: Primary | ICD-10-CM

## 2019-09-26 DIAGNOSIS — R10.9 ABDOMINAL PAIN, UNSPECIFIED ABDOMINAL LOCATION: ICD-10-CM

## 2019-09-26 LAB
BILIRUB BLD-MCNC: NEGATIVE MG/DL
CLARITY, POC: CLEAR
COLOR UR: YELLOW
GLUCOSE UR STRIP-MCNC: NEGATIVE MG/DL
KETONES UR QL: NEGATIVE
LEUKOCYTE EST, POC: ABNORMAL
NITRITE UR-MCNC: NEGATIVE MG/ML
PH UR: 5 [PH] (ref 5–8)
PROT UR STRIP-MCNC: NEGATIVE MG/DL
RBC # UR STRIP: NEGATIVE /UL
SP GR UR: 1.02 (ref 1–1.03)
UROBILINOGEN UR QL: NORMAL

## 2019-09-26 PROCEDURE — 99396 PREV VISIT EST AGE 40-64: CPT | Performed by: FAMILY MEDICINE

## 2019-09-26 PROCEDURE — 90674 CCIIV4 VAC NO PRSV 0.5 ML IM: CPT | Performed by: FAMILY MEDICINE

## 2019-09-26 PROCEDURE — 90471 IMMUNIZATION ADMIN: CPT | Performed by: FAMILY MEDICINE

## 2019-09-26 PROCEDURE — 81003 URINALYSIS AUTO W/O SCOPE: CPT | Performed by: FAMILY MEDICINE

## 2019-09-26 RX ORDER — CLONAZEPAM 1 MG/1
1 TABLET ORAL 2 TIMES DAILY PRN
Qty: 60 TABLET | Refills: 5 | Status: SHIPPED | OUTPATIENT
Start: 2019-09-26 | End: 2019-12-23 | Stop reason: SDUPTHER

## 2019-09-26 NOTE — PROGRESS NOTES
Stephanie Suarez is a 54 y.o. female.     54-year-old female for wellness exam         The following portions of the patient's history were reviewed and updated as appropriate: allergies, current medications, past family history, past medical history, past social history, past surgical history and problem list.    Review of Systems   Constitutional:        Recent weight gain after ASA/BSO   Respiratory: Negative for shortness of breath.    Cardiovascular: Negative for chest pain and leg swelling.   Gastrointestinal:        Colonoscopy less than a year ago   Genitourinary: Negative for difficulty urinating.   All other systems reviewed and are negative.      Objective   Physical Exam   Constitutional: She is oriented to person, place, and time. She appears well-developed and well-nourished.   HENT:   Right Ear: External ear normal.   Left Ear: External ear normal.   Mouth/Throat: Oropharynx is clear and moist.   Eyes: EOM are normal. Pupils are equal, round, and reactive to light.   Neck: No thyromegaly present.   No neck masses good carotid pulses   Cardiovascular: Normal rate and regular rhythm.   Pulmonary/Chest: Effort normal and breath sounds normal.   Abdominal: Soft. Bowel sounds are normal.   Genitourinary:   Genitourinary Comments: GYN exams elsewhere   Musculoskeletal: She exhibits no edema.   Lymphadenopathy:     She has no cervical adenopathy.   Neurological: She is alert and oriented to person, place, and time.   Skin: Skin is warm and dry. Capillary refill takes less than 2 seconds.   Psychiatric: She has a normal mood and affect. Her behavior is normal. Judgment and thought content normal.   Nursing note and vitals reviewed.      Assessment/Plan   Problems Addressed this Visit     None      Visit Diagnoses     Routine general medical examination at a health care facility    -  Primary    Relevant Orders    TSH    T4, free    CBC & Differential    Comprehensive Metabolic Panel    Lipid Panel     Need for influenza vaccination        Relevant Orders    Flucelvax Quad=>4Years (PFS) (Completed)    Abdominal pain, unspecified abdominal location        Relevant Orders    POC Urinalysis Dipstick, Multipro (Completed)            Plan-above  CONTROLLED SUBSTANCE TRACKING 4/3/2018 9/25/2018 4/10/2019 7/23/2019   Last [place] 4/3/2018 9/25/2018 4/10/2019 7/23/2019   Report Number [id number]   Last UDS 4/3/2018 4/3/2018 - 4/10/2019   Last Controlled Substance Agreement 4/3/2018 4/3/2018 4/10/2019 4/10/2019

## 2019-09-27 LAB
ALBUMIN SERPL-MCNC: 4.6 G/DL (ref 3.5–5.2)
ALBUMIN/GLOB SERPL: 1.7 G/DL
ALP SERPL-CCNC: 60 U/L (ref 39–117)
ALT SERPL-CCNC: 36 U/L (ref 1–33)
AST SERPL-CCNC: 25 U/L (ref 1–32)
BASOPHILS # BLD AUTO: 0.05 10*3/MM3 (ref 0–0.2)
BASOPHILS NFR BLD AUTO: 1.2 % (ref 0–1.5)
BILIRUB SERPL-MCNC: 0.4 MG/DL (ref 0.2–1.2)
BUN SERPL-MCNC: 12 MG/DL (ref 6–20)
BUN/CREAT SERPL: 17.6 (ref 7–25)
CALCIUM SERPL-MCNC: 9.9 MG/DL (ref 8.6–10.5)
CHLORIDE SERPL-SCNC: 105 MMOL/L (ref 98–107)
CHOLEST SERPL-MCNC: 188 MG/DL (ref 0–200)
CO2 SERPL-SCNC: 27.5 MMOL/L (ref 22–29)
CREAT SERPL-MCNC: 0.68 MG/DL (ref 0.57–1)
EOSINOPHIL # BLD AUTO: 0.12 10*3/MM3 (ref 0–0.4)
EOSINOPHIL NFR BLD AUTO: 2.9 % (ref 0.3–6.2)
ERYTHROCYTE [DISTWIDTH] IN BLOOD BY AUTOMATED COUNT: 12.4 % (ref 12.3–15.4)
GLOBULIN SER CALC-MCNC: 2.7 GM/DL
GLUCOSE SERPL-MCNC: 98 MG/DL (ref 65–99)
HCT VFR BLD AUTO: 44.2 % (ref 34–46.6)
HDLC SERPL-MCNC: 71 MG/DL (ref 40–60)
HGB BLD-MCNC: 14.4 G/DL (ref 12–15.9)
IMM GRANULOCYTES # BLD AUTO: 0.01 10*3/MM3 (ref 0–0.05)
IMM GRANULOCYTES NFR BLD AUTO: 0.2 % (ref 0–0.5)
LDLC SERPL CALC-MCNC: 89 MG/DL (ref 0–100)
LYMPHOCYTES # BLD AUTO: 1.31 10*3/MM3 (ref 0.7–3.1)
LYMPHOCYTES NFR BLD AUTO: 31.8 % (ref 19.6–45.3)
MCH RBC QN AUTO: 30.3 PG (ref 26.6–33)
MCHC RBC AUTO-ENTMCNC: 32.6 G/DL (ref 31.5–35.7)
MCV RBC AUTO: 92.9 FL (ref 79–97)
MONOCYTES # BLD AUTO: 0.48 10*3/MM3 (ref 0.1–0.9)
MONOCYTES NFR BLD AUTO: 11.7 % (ref 5–12)
NEUTROPHILS # BLD AUTO: 2.15 10*3/MM3 (ref 1.7–7)
NEUTROPHILS NFR BLD AUTO: 52.2 % (ref 42.7–76)
NRBC BLD AUTO-RTO: 0 /100 WBC (ref 0–0.2)
PLATELET # BLD AUTO: 181 10*3/MM3 (ref 140–450)
POTASSIUM SERPL-SCNC: 4.9 MMOL/L (ref 3.5–5.2)
PROT SERPL-MCNC: 7.3 G/DL (ref 6–8.5)
RBC # BLD AUTO: 4.76 10*6/MM3 (ref 3.77–5.28)
SODIUM SERPL-SCNC: 144 MMOL/L (ref 136–145)
T4 FREE SERPL-MCNC: 1.06 NG/DL (ref 0.93–1.7)
TRIGL SERPL-MCNC: 140 MG/DL (ref 0–150)
TSH SERPL DL<=0.005 MIU/L-ACNC: 1.05 UIU/ML (ref 0.27–4.2)
VLDLC SERPL CALC-MCNC: 28 MG/DL
WBC # BLD AUTO: 4.12 10*3/MM3 (ref 3.4–10.8)

## 2019-09-30 RX ORDER — VORTIOXETINE 20 MG/1
TABLET, FILM COATED ORAL
Qty: 90 TABLET | Refills: 3 | Status: SHIPPED | OUTPATIENT
Start: 2019-09-30 | End: 2020-06-19 | Stop reason: SDUPTHER

## 2019-10-28 ENCOUNTER — TELEPHONE (OUTPATIENT)
Dept: OBSTETRICS AND GYNECOLOGY | Facility: CLINIC | Age: 54
End: 2019-10-28

## 2019-10-28 DIAGNOSIS — Z12.31 ENCOUNTER FOR SCREENING MAMMOGRAM FOR MALIGNANT NEOPLASM OF BREAST: Primary | ICD-10-CM

## 2019-10-28 NOTE — TELEPHONE ENCOUNTER
Pt calls requesting mammogram be ordered.  She has appt tomorrow in office with provider at 12/30

## 2019-10-29 ENCOUNTER — HOSPITAL ENCOUNTER (OUTPATIENT)
Dept: MAMMOGRAPHY | Facility: HOSPITAL | Age: 54
Discharge: HOME | End: 2019-10-29
Admitting: NURSE PRACTITIONER

## 2019-10-29 ENCOUNTER — OFFICE VISIT (OUTPATIENT)
Dept: OBSTETRICS AND GYNECOLOGY | Facility: CLINIC | Age: 54
End: 2019-10-29

## 2019-10-29 VITALS
SYSTOLIC BLOOD PRESSURE: 122 MMHG | DIASTOLIC BLOOD PRESSURE: 84 MMHG | BODY MASS INDEX: 29.06 KG/M2 | WEIGHT: 164 LBS | HEIGHT: 63 IN

## 2019-10-29 DIAGNOSIS — Z12.31 ENCOUNTER FOR SCREENING MAMMOGRAM FOR BREAST CANCER: ICD-10-CM

## 2019-10-29 DIAGNOSIS — Z80.41 FAMILY HISTORY OF OVARIAN CANCER: ICD-10-CM

## 2019-10-29 DIAGNOSIS — Z01.419 WELL WOMAN EXAM WITH ROUTINE GYNECOLOGICAL EXAM: Primary | ICD-10-CM

## 2019-10-29 DIAGNOSIS — Z80.3 FAMILY HISTORY OF BREAST CANCER: ICD-10-CM

## 2019-10-29 PROCEDURE — G0123 SCREEN CERV/VAG THIN LAYER: HCPCS | Performed by: NURSE PRACTITIONER

## 2019-10-29 PROCEDURE — 87624 HPV HI-RISK TYP POOLED RSLT: CPT | Performed by: NURSE PRACTITIONER

## 2019-10-29 PROCEDURE — 99396 PREV VISIT EST AGE 40-64: CPT | Performed by: NURSE PRACTITIONER

## 2019-10-29 PROCEDURE — 77067 SCR MAMMO BI INCL CAD: CPT

## 2019-10-29 PROCEDURE — 77063 BREAST TOMOSYNTHESIS BI: CPT

## 2019-10-29 NOTE — PATIENT INSTRUCTIONS

## 2019-10-29 NOTE — PROGRESS NOTES
"Subjective     Shawnee Suarez is a 54 y.o. female    Patient is here for yearly checkup.  She has no complaints.  Her mother has had a relapse with ovarian cancer and is undergoing chemotherapy again.      Gynecologic Exam   The patient's pertinent negatives include no pelvic pain, vaginal bleeding or vaginal discharge. The patient is experiencing no pain. Pertinent negatives include no abdominal pain, anorexia, back pain, chills, constipation, diarrhea, discolored urine, dysuria, fever, flank pain, frequency, headaches, hematuria, joint pain, joint swelling, nausea, painful intercourse, rash, sore throat, urgency or vomiting. She is sexually active. She is postmenopausal.         /84   Ht 160 cm (63\")   Wt 74.4 kg (164 lb)   BMI 29.05 kg/m²     Outpatient Encounter Medications as of 10/29/2019   Medication Sig Dispense Refill   • cholecalciferol (VITAMIN D3) 1000 units tablet Take 2,000 Units by mouth Daily.     • clonazePAM (KlonoPIN) 1 MG tablet Take 1 tablet by mouth 2 (Two) Times a Day As Needed for Seizures. 60 tablet 5   • fluticasone (FLONASE) 50 MCG/ACT nasal spray 2 sprays into each nostril Daily.     • Lactobacillus (REPHRESH PRO-B PO) Take 1 tablet by mouth Daily.     • loratadine (ALLERGY) 10 MG tablet Take 10 mg by mouth Daily.     • Multiple Vitamins-Minerals (WOMENS 50+ MULTI VITAMIN/MIN PO) Take  by mouth.     • rosuvastatin (CRESTOR) 10 MG tablet Take 1 tablet by mouth Daily. 90 tablet 2   • TRINTELLIX 20 MG tablet TAKE ONE TABLET BY MOUTH ONCE A DAY 90 tablet 3   • ZOLMitriptan (ZOMIG) 2.5 MG tablet TAKE 1 TABLET BY MOUTH TWICE DAILY AS NEEDED FOR HEADACHE 6 tablet 6   • promethazine-dextromethorphan (PROMETHAZINE-DM) 6.25-15 MG/5ML syrup Take 5 mL by mouth At Night As Needed for Cough. 180 mL 0     No facility-administered encounter medications on file as of 10/29/2019.        Surgical History  Past Surgical History:   Procedure Laterality Date   • APPENDECTOMY     • BREAST BIOPSY Right  "    unsure when   • CHOLECYSTECTOMY     • COLONOSCOPY  11/22/2013    Normal exam repeat in 10 years   • COLONOSCOPY N/A 11/13/2018    Procedure: COLONOSCOPY WITH ANESTHESIA;  Surgeon: Da Miles MD;  Location: [facility] ENDOSCOPY;  Service: Gastroenterology   • D&C HYSTEROSCOPY ENDOMETRIAL ABLATION N/A 2/6/2017    Procedure: DILATATION AND CURETTAGE HYSTEROSCOPY NOVASURE ENDOMETRIAL ABLATION;  Surgeon: Ana Barrios MD;  Location: [facility] OR;  Service:    • DIAGNOSTIC LAPAROSCOPY N/A 6/17/2019    Procedure: DIAGNOSTIC LAPAROSCOPY;  Surgeon: Ana Barrios MD;  Location: [facility] OR;  Service: Obstetrics/Gynecology   • OVARIAN CYST REMOVAL      x 2   • SALPINGO OOPHORECTOMY Bilateral 6/17/2019    Procedure: SALPINGO OOPHORECTOMY LAPAROSCOPIC;  Surgeon: Ana Barrios MD;  Location: [facility] OR;  Service: Obstetrics/Gynecology       Family History  Family History   Problem Relation Age of Onset   • Ovarian cancer Mother 72   • Brain cancer Father    • No Known Problems Brother    • No Known Problems Daughter    • No Known Problems Maternal Grandmother    • No Known Problems Maternal Grandfather    • No Known Problems Paternal Grandmother    • Brain cancer Paternal Grandfather    • Heart disease Brother    • No Known Problems Daughter    • Breast cancer Other 28   • No Known Problems Maternal Aunt    • No Known Problems Paternal Aunt    • Uterine cancer Neg Hx    • Colon cancer Neg Hx    • Melanoma Neg Hx    • Prostate cancer Neg Hx    • BRCA 1/2 Neg Hx    • Endometrial cancer Neg Hx    • Colon polyps Neg Hx        The following portions of the patient's history were reviewed and updated as appropriate: allergies, current medications, past family history, past medical history, past social history, past surgical history and problem list.    Review of Systems   Constitutional: Negative for activity change, appetite change, chills, diaphoresis, fatigue, fever, unexpected weight gain and unexpected weight loss.   HENT:  Negative for congestion, dental problem, drooling, ear discharge, ear pain, facial swelling, hearing loss, mouth sores, nosebleeds, postnasal drip, rhinorrhea, sinus pressure, sneezing, sore throat, swollen glands, tinnitus, trouble swallowing and voice change.    Eyes: Negative for blurred vision, double vision, photophobia, pain, discharge, redness, itching and visual disturbance.   Respiratory: Negative for apnea, cough, choking, chest tightness, shortness of breath, wheezing and stridor.    Cardiovascular: Negative for chest pain, palpitations and leg swelling.   Gastrointestinal: Negative for abdominal distention, abdominal pain, anal bleeding, anorexia, blood in stool, constipation, diarrhea, nausea, rectal pain, vomiting, GERD and indigestion.   Endocrine: Negative for cold intolerance, heat intolerance, polydipsia, polyphagia and polyuria.   Genitourinary: Negative for amenorrhea, breast discharge, breast lump, breast pain, decreased libido, decreased urine volume, difficulty urinating, dyspareunia, dysuria, flank pain, frequency, genital sores, hematuria, menstrual problem, pelvic pain, pelvic pressure, urgency, urinary incontinence, vaginal bleeding, vaginal discharge and vaginal pain.   Musculoskeletal: Negative for arthralgias, back pain, gait problem, joint pain, joint swelling, myalgias, neck pain, neck stiffness and bursitis.   Skin: Negative for color change, dry skin and rash.   Allergic/Immunologic: Negative for environmental allergies, food allergies and immunocompromised state.   Neurological: Negative for dizziness, tremors, seizures, syncope, facial asymmetry, speech difficulty, weakness, light-headedness, numbness, headache, memory problem and confusion.   Hematological: Negative for adenopathy. Does not bruise/bleed easily.   Psychiatric/Behavioral: Negative for agitation, behavioral problems, decreased concentration, dysphoric mood, hallucinations, self-injury, sleep disturbance, suicidal  ideas, negative for hyperactivity, depressed mood and stress. The patient is not nervous/anxious.        Objective   Physical Exam   Constitutional: She is oriented to person, place, and time. She appears well-developed and well-nourished. No distress.   HENT:   Head: Normocephalic.   Right Ear: External ear normal.   Left Ear: External ear normal.   Nose: Nose normal.   Mouth/Throat: Oropharynx is clear and moist.   Eyes: Conjunctivae are normal. Right eye exhibits no discharge. Left eye exhibits no discharge. No scleral icterus.   Neck: Normal range of motion. Neck supple. Carotid bruit is not present. No tracheal deviation present. No thyromegaly present.   Cardiovascular: Normal rate, regular rhythm, normal heart sounds and intact distal pulses.   No murmur heard.  Pulmonary/Chest: Effort normal and breath sounds normal. No respiratory distress. She has no wheezes. Right breast exhibits no inverted nipple, no mass, no nipple discharge, no skin change and no tenderness. Left breast exhibits no inverted nipple, no mass, no nipple discharge, no skin change and no tenderness. Breasts are symmetrical. There is no breast swelling.   Abdominal: Soft. She exhibits no distension and no mass. There is no tenderness. There is no guarding. No hernia. Hernia confirmed negative in the right inguinal area and confirmed negative in the left inguinal area.   Genitourinary: Rectum normal, vagina normal and uterus normal. Rectal exam shows no mass. No breast tenderness, discharge or bleeding. Pelvic exam was performed with patient supine. There is no rash, tenderness, lesion or injury on the right labia. There is no rash, tenderness, lesion or injury on the left labia. Uterus is not enlarged, not fixed and not tender. Cervix exhibits no motion tenderness, no discharge and no friability. Right adnexum displays no mass, no tenderness and no fullness. Left adnexum displays no mass, no tenderness and no fullness. No erythema,  tenderness or bleeding in the vagina. No foreign body in the vagina. No signs of injury around the vagina. No vaginal discharge found.   Genitourinary Comments:   BSU normal  Urethral meatus  Normal  Perineum  Normal  Ovaries are surgically absent.   Musculoskeletal: Normal range of motion. She exhibits no edema or tenderness.   Lymphadenopathy:        Head (right side): No submental, no submandibular, no tonsillar, no preauricular, no posterior auricular and no occipital adenopathy present.        Head (left side): No submental, no submandibular, no tonsillar, no preauricular, no posterior auricular and no occipital adenopathy present.     She has no cervical adenopathy.        Right cervical: No superficial cervical, no deep cervical and no posterior cervical adenopathy present.       Left cervical: No superficial cervical, no deep cervical and no posterior cervical adenopathy present.     She has no axillary adenopathy.        Right: No inguinal adenopathy present.        Left: No inguinal adenopathy present.   Neurological: She is alert and oriented to person, place, and time. Coordination normal.   Skin: Skin is warm and dry. No bruising and no rash noted. She is not diaphoretic. No erythema.   Psychiatric: She has a normal mood and affect. Her behavior is normal. Judgment and thought content normal.   Nursing note and vitals reviewed.      Assessment/Plan   Shawnee was seen today for gynecologic exam.    Diagnoses and all orders for this visit:    Well woman exam with routine gynecological exam  Normal GYN exam. Will have lab work at PCP. Encouraged SBE, pt is aware how to do self breast exam and the importance of same. Discussed weight management and importance of maintaining a healthy weight. Discussed Vitamin D intake and the importance of adequate vitamin D for both Bone Health and a healthy immune system.  Discussed Daily exercise and the importance of same, in regards to a healthy heart as well as helping  to maintain her weight and improving her mental health.  BMI 29.1.  Colonoscopy is up to date.  Mammogram will be scheduled at [facility].  Pap smear is done per ASCCP guidelines.  -     Liquid-based Pap Smear, Screening    Encounter for screening mammogram for breast cancer  Comments:  Mammogram is scheduled at [facility] today.    Family history of ovarian cancer  Comments:  Patient has had genetic screening and was negative for ovarian cancer.  She has also had a BSO.    Family history of breast cancer  Comments:  Patient has had genetic screening and was negative for breast cancer.  She was positive for colon cancer so she has colonoscopies more frequently.         Patient's Body mass index is 29.05 kg/m². BMI is above normal parameters. Recommendations include: educational material, exercise counseling and nutrition counseling.      Janett Davila, APRN  10/29/2019

## 2019-10-31 LAB
GEN CATEG CVX/VAG CYTO-IMP: NORMAL
HPV I/H RISK 4 DNA CVX QL PROBE+SIG AMP: NOT DETECTED
LAB AP CASE REPORT: NORMAL
LAB AP GYN ADDITIONAL INFORMATION: NORMAL
LAB AP GYN OTHER FINDINGS: NORMAL
PATH INTERP SPEC-IMP: NORMAL
STAT OF ADQ CVX/VAG CYTO-IMP: NORMAL

## 2019-11-04 RX ORDER — ROSUVASTATIN CALCIUM 10 MG/1
10 TABLET, COATED ORAL DAILY
Qty: 90 TABLET | Refills: 3 | Status: SHIPPED | OUTPATIENT
Start: 2019-11-04 | End: 2020-06-19 | Stop reason: SDUPTHER

## 2019-12-23 ENCOUNTER — OFFICE VISIT (OUTPATIENT)
Dept: FAMILY MEDICINE CLINIC | Facility: CLINIC | Age: 54
End: 2019-12-23

## 2019-12-23 VITALS
DIASTOLIC BLOOD PRESSURE: 81 MMHG | HEART RATE: 79 BPM | OXYGEN SATURATION: 98 % | WEIGHT: 163 LBS | SYSTOLIC BLOOD PRESSURE: 117 MMHG | TEMPERATURE: 98.4 F | BODY MASS INDEX: 28.87 KG/M2

## 2019-12-23 DIAGNOSIS — F41.9 ANXIETY: Primary | ICD-10-CM

## 2019-12-23 PROCEDURE — 99213 OFFICE O/P EST LOW 20 MIN: CPT | Performed by: FAMILY MEDICINE

## 2019-12-23 RX ORDER — CLONAZEPAM 1 MG/1
1 TABLET ORAL 2 TIMES DAILY PRN
Qty: 60 TABLET | Refills: 2 | Status: SHIPPED | OUTPATIENT
Start: 2019-12-23 | End: 2020-03-03 | Stop reason: SDUPTHER

## 2019-12-23 NOTE — PROGRESS NOTES
Subjective   Shawnee Suarez is a 54 y.o. female.     54-year-old female with chronic anxiety    Anxiety   Presents for follow-up visit. Symptoms include depressed mood, excessive worry and nervous/anxious behavior. Patient reports no suicidal ideas. Symptoms occur most days. The severity of symptoms is mild. The quality of sleep is fair. Nighttime awakenings: occasional.           The following portions of the patient's history were reviewed and updated as appropriate: allergies, current medications, past family history, past medical history, past social history, past surgical history and problem list.    Review of Systems   Musculoskeletal: Positive for arthralgias.   Psychiatric/Behavioral: Negative for suicidal ideas. The patient is nervous/anxious.        Objective   Physical Exam   Constitutional: She is oriented to person, place, and time.   Overweight   Cardiovascular: Normal rate and regular rhythm.   Pulmonary/Chest: Effort normal and breath sounds normal.   Musculoskeletal: She exhibits no edema.   Neurological: She is alert and oriented to person, place, and time.   Psychiatric: She has a normal mood and affect. Her behavior is normal. Judgment and thought content normal.   Nursing note and vitals reviewed.      Assessment/Plan   Shawnee was seen today for med refill.    Diagnoses and all orders for this visit:    Anxiety  -     clonazePAM (KlonoPIN) 1 MG tablet; Take 1 tablet by mouth 2 (Two) Times a Day As Needed for Seizures.         Plan above plus suggested getting on diet and calories and exercising  CONTROLLED SUBSTANCE TRACKING 4/3/2018 9/25/2018 4/10/2019 7/23/2019 12/23/2019   Last Ludin 4/3/2018 9/25/2018 4/10/2019 7/23/2019 12/23/2019   Report Number [id number] -   Last UDS 4/3/2018 4/3/2018 - 4/10/2019 4/10/2019   Last Controlled Substance Agreement 4/3/2018 4/3/2018 4/10/2019 4/10/2019 4/10/2019

## 2020-02-17 RX ORDER — ZOLMITRIPTAN 2.5 MG/1
TABLET, FILM COATED ORAL
Qty: 6 TABLET | Refills: 0 | Status: SHIPPED | OUTPATIENT
Start: 2020-02-17 | End: 2020-03-16

## 2020-03-02 DIAGNOSIS — F41.9 ANXIETY: ICD-10-CM

## 2020-03-02 RX ORDER — CLONAZEPAM 1 MG/1
TABLET ORAL
Qty: 60 TABLET | Refills: 0 | OUTPATIENT
Start: 2020-03-02

## 2020-03-03 ENCOUNTER — OFFICE VISIT (OUTPATIENT)
Dept: FAMILY MEDICINE CLINIC | Facility: CLINIC | Age: 55
End: 2020-03-03

## 2020-03-03 VITALS
SYSTOLIC BLOOD PRESSURE: 110 MMHG | DIASTOLIC BLOOD PRESSURE: 78 MMHG | WEIGHT: 167.8 LBS | HEART RATE: 102 BPM | TEMPERATURE: 98.7 F | HEIGHT: 63 IN | OXYGEN SATURATION: 97 % | BODY MASS INDEX: 29.73 KG/M2

## 2020-03-03 DIAGNOSIS — F41.9 ANXIETY: ICD-10-CM

## 2020-03-03 DIAGNOSIS — E78.2 MIXED HYPERLIPIDEMIA: Primary | ICD-10-CM

## 2020-03-03 DIAGNOSIS — Z51.81 THERAPEUTIC DRUG MONITORING: ICD-10-CM

## 2020-03-03 PROCEDURE — 99213 OFFICE O/P EST LOW 20 MIN: CPT | Performed by: FAMILY MEDICINE

## 2020-03-03 RX ORDER — CLONAZEPAM 1 MG/1
1 TABLET ORAL 2 TIMES DAILY PRN
Qty: 60 TABLET | Refills: 2 | Status: SHIPPED | OUTPATIENT
Start: 2020-03-03 | End: 2020-04-14 | Stop reason: SDUPTHER

## 2020-03-03 NOTE — PROGRESS NOTES
Subjective   Shawnee Suarez is a 54 y.o. female.     54-year-old female with hyperlipidemia and chronic anxiety    Hyperlipidemia   This is a chronic problem. The current episode started more than 1 year ago. The problem is controlled. There are no known factors aggravating her hyperlipidemia. Pertinent negatives include no chest pain. Current antihyperlipidemic treatment includes diet change and statins. Compliance problems include adherence to diet and adherence to exercise.  Risk factors for coronary artery disease include dyslipidemia, a sedentary lifestyle and post-menopausal.       The following portions of the patient's history were reviewed and updated as appropriate: allergies, current medications, past family history, past medical history, past social history, past surgical history and problem list.    Review of Systems   Cardiovascular: Negative for chest pain and leg swelling.   Psychiatric/Behavioral: The patient is nervous/anxious.        Objective   Physical Exam   Constitutional: She is oriented to person, place, and time.   Overweight   Musculoskeletal: She exhibits tenderness.   Bilateral knee tenderness   Neurological: She is alert and oriented to person, place, and time.   Psychiatric: She has a normal mood and affect. Her behavior is normal. Judgment and thought content normal.   Nursing note and vitals reviewed.      Assessment/Plan   Shawnee was seen today for med refill.    Diagnoses and all orders for this visit:    Mixed hyperlipidemia  -     Comprehensive Metabolic Panel  -     Lipid Panel    Anxiety  -     clonazePAM (KlonoPIN) 1 MG tablet; Take 1 tablet by mouth 2 (Two) Times a Day As Needed for Seizures.    Therapeutic drug monitoring  -     ToxASSURE Select 13 (MW) - Urine, Clean Catch         Plan above  CONTROLLED SUBSTANCE TRACKING 4/3/2018 9/25/2018 4/10/2019 7/23/2019 12/23/2019 3/3/2020   Last Ludin 4/3/2018 9/25/2018 4/10/2019 7/23/2019 12/23/2019 3/3/2020   Report Number  [id number] - -   Last UDS 4/3/2018 4/3/2018 - 4/10/2019 4/10/2019 3/3/2020   Last Controlled Substance Agreement 4/3/2018 4/3/2018 4/10/2019 4/10/2019 4/10/2019 3/3/2020

## 2020-03-04 DIAGNOSIS — R73.9 ELEVATED BLOOD SUGAR: Primary | ICD-10-CM

## 2020-03-04 LAB
ALBUMIN SERPL-MCNC: 4.6 G/DL (ref 3.5–5.2)
ALBUMIN/GLOB SERPL: 1.6 G/DL
ALP SERPL-CCNC: 64 U/L (ref 39–117)
ALT SERPL-CCNC: 53 U/L (ref 1–33)
AST SERPL-CCNC: 34 U/L (ref 1–32)
BILIRUB SERPL-MCNC: 0.4 MG/DL (ref 0.2–1.2)
BUN SERPL-MCNC: 12 MG/DL (ref 6–20)
BUN/CREAT SERPL: 18.2 (ref 7–25)
CALCIUM SERPL-MCNC: 9.7 MG/DL (ref 8.6–10.5)
CHLORIDE SERPL-SCNC: 103 MMOL/L (ref 98–107)
CHOLEST SERPL-MCNC: 197 MG/DL (ref 0–200)
CO2 SERPL-SCNC: 20.8 MMOL/L (ref 22–29)
CREAT SERPL-MCNC: 0.66 MG/DL (ref 0.57–1)
GLOBULIN SER CALC-MCNC: 2.8 GM/DL
GLUCOSE SERPL-MCNC: 133 MG/DL (ref 65–99)
HDLC SERPL-MCNC: 64 MG/DL (ref 40–60)
LDLC SERPL CALC-MCNC: 82 MG/DL (ref 0–100)
POTASSIUM SERPL-SCNC: 4.3 MMOL/L (ref 3.5–5.2)
PROT SERPL-MCNC: 7.4 G/DL (ref 6–8.5)
SODIUM SERPL-SCNC: 142 MMOL/L (ref 136–145)
TRIGL SERPL-MCNC: 254 MG/DL (ref 0–150)
VLDLC SERPL CALC-MCNC: 50.8 MG/DL

## 2020-03-07 LAB — DRUGS UR: NORMAL

## 2020-03-16 RX ORDER — ZOLMITRIPTAN 2.5 MG/1
TABLET, FILM COATED ORAL
Qty: 6 TABLET | Refills: 2 | Status: SHIPPED | OUTPATIENT
Start: 2020-03-16 | End: 2020-06-19 | Stop reason: SDUPTHER

## 2020-03-30 ENCOUNTER — TELEPHONE (OUTPATIENT)
Dept: FAMILY MEDICINE CLINIC | Facility: CLINIC | Age: 55
End: 2020-03-30

## 2020-03-30 PROCEDURE — U0003 INFECTIOUS AGENT DETECTION BY NUCLEIC ACID (DNA OR RNA); SEVERE ACUTE RESPIRATORY SYNDROME CORONAVIRUS 2 (SARS-COV-2) (CORONAVIRUS DISEASE [COVID-19]), AMPLIFIED PROBE TECHNIQUE, MAKING USE OF HIGH THROUGHPUT TECHNOLOGIES AS DESCRIBED BY CMS-2020-01-R: HCPCS | Performed by: NURSE PRACTITIONER

## 2020-03-30 PROCEDURE — 87635 SARS-COV-2 COVID-19 AMP PRB: CPT | Performed by: NURSE PRACTITIONER

## 2020-03-30 NOTE — TELEPHONE ENCOUNTER
Patient had family in from [place] last week, left on 3.25.20.  Son in law wasn't feeling well.  Tested positive for COVID-19 3.27.20, was tested on 3.26.20.  Patient has low grade fever -99, body aches, no shortness of breath

## 2020-04-06 ENCOUNTER — TELEPHONE (OUTPATIENT)
Dept: FAMILY MEDICINE CLINIC | Facility: CLINIC | Age: 55
End: 2020-04-06

## 2020-04-07 NOTE — ANESTHESIA PREPROCEDURE EVALUATION
Anesthesia Evaluation     Patient summary reviewed and Nursing notes reviewed   no history of anesthetic complications:  NPO Solid Status: > 8 hours  NPO Liquid Status: > 8 hours           Airway   Mallampati: I  TM distance: >3 FB  Neck ROM: full  No difficulty expected  Dental      Pulmonary    (-) asthma, not a smoker  Cardiovascular   Exercise tolerance: good (4-7 METS)    ECG reviewed    (+) hyperlipidemia,   (-) hypertension, CAD    ROS comment: Treadmill stress test- wnl    Neuro/Psych  (+) headaches, psychiatric history Anxiety and Depression,     (-) seizures, TIA, CVA  GI/Hepatic/Renal/Endo    (-) liver disease, no renal disease, diabetes    Musculoskeletal     Abdominal    Substance History      OB/GYN          Other                        Anesthesia Plan    ASA 2     general     intravenous induction   Anesthetic plan, all risks, benefits, and alternatives have been provided, discussed and informed consent has been obtained with: patient.       "PATIENT:  Trudi Paiz  :  2008  BARB:  2020    Medical Nutrition Therapy    Nutrition Reassessment - Phone Visit    Patient opted to conduct today's return visit via telephone vs an in person visit to the clinic.    I spoke with: Mom (Dang) and    The reason for the telephone visit was: nutrition reassessment and education     Phone call contact time    Call Started at: 9:00 am     Call Ended at: 9:15 am     Trudi is a 11 year old year old male with obesity, NAFLD, and hypertriglyceridemia.  Trudi was referred by Dr. Lukas López for nutrition education and counseling.  Trudi was last seen in our clinic on 12/10/2019 by ANGI and MD.    Anthropometrics  Age:  11 year old male   Weight:    Wt Readings from Last 4 Encounters:   20 54.4 kg (120 lb) (77 %)*   20 54.5 kg (120 lb 2 oz) (78 %)*   20 52.7 kg (116 lb 3.2 oz) (74 %)*   12/10/19 53.1 kg (117 lb 1 oz) (76 %)*     * Growth percentiles are based on CDC (Boys, 2-20 Years) data.     Height:    Ht Readings from Last 2 Encounters:   20 1.51 m (4' 11.45\") (20 %)*   20 1.492 m (4' 10.74\") (16 %)*     * Growth percentiles are based on CDC (Boys, 2-20 Years) data.     Body Mass Index:  There is no height or weight on file to calculate BMI.  Body Mass Index Percentile:  No height and weight on file for this encounter.     Mom took home weight today, which was 125 lbs suggesting weight is up ~5 lbs over the past 2 months.     Nutrition History  Mom feels like Trudi was doing well keeping a routine, until now since he is home from school. She reports he has been less active and eating more now that he is at home and not going to school.    He skips breakfast in the morning and does his online school. Eats his first meal of the day (lunch) around 11-12pm, which is often salad (lettuce/tomato) on a bed of rice (1/2 plate). He will drink water or carrot/peach/apple juice (homemade) with lunch.     His next meal is dinner. " Mom reports he has the same thing for dinner as he has for lunch - salad (lettuce/tomato) with rice, sometimes soup. Mom states she goes to work at this time, so is unsure how much he is eating or how much he is snacking after dinner.     He previously would eat fruit, yogurt, or whatever they have available after dinner. Sometimes now he will have plain bread.     He is drinking most water and homemade juice.     Overall Mom feels he is snacking more throughout the day and has not been doing much activity. She reports he is walking some, but is concerned with having him go outside with the virus.     Nutritional Intakes  Breakfast:   Skips  Lunch:   Salad (lettuce/tomato), 1/2 plate rice   Dinner:   Salad (lettuce/tomato), 1/2 plate rice or soup   HS Snack:  fruit, yogurt, bread    Beverages:  Water or homemade juice      Activity Level  Mom reports Trudi is not very active. She reports he is walking some, but is concerned with having him go outside with the virus. She said they do have a treadmill at home, but haven't been using.     Medications/Vitamins/Minerals    Current Outpatient Medications:      Cholecalciferol (VITAMIN D3) 2000 units CAPS, Take 2,000 Int'l Units by mouth daily (Patient not taking: Reported on 2/4/2020), Disp: 90 capsule, Rfl: 1     fluticasone (FLONASE) 50 MCG/ACT nasal spray, Spray 1 spray into both nostrils daily (Patient not taking: Reported on 2/3/2020), Disp: 9.9 mL, Rfl: 3     ibuprofen (CHILD IBUPROFEN) 100 MG/5ML suspension, Take 30 mLs (600 mg) by mouth every 6 hours as needed for fever or moderate pain (Patient not taking: Reported on 2/3/2020), Disp: 118 mL, Rfl: 1     ondansetron (ZOFRAN ODT) 4 MG ODT tab, Take 1 tablet (4 mg) by mouth every 6 hours as needed for nausea (Patient not taking: Reported on 10/22/2019), Disp: 10 tablet, Rfl: 0    Nutrition Diagnosis  Overweight related to excessive energy intake as evidenced by BMI/age >90th %ile.    Interventions &  Education  Reviewed previous goals and progress. Discussed barriers to change and brainstormed ways to help.     Goals  1) Aim for a walk/treadmill/activity 5 days/week for 30 minutes.   2) Aim to have a small breakfast in the morning to try and control snacking/hunger throughout the day. Examples: yogurt or egg with fruit/toast.   3) Try to add a fruit or protein with lunch/dinner. Aim to try for 1/4 plate starch (rice) at lunch and dinner, 1/2 plate fruit/veggies, 1/4 plate protein.     Monitoring/Evaluation  Will continue to monitor progress towards goals and provide education in Pediatric Weight Management.    Spent 15 minutes in phone consult with patient & mother and .      Patito Hua, ANGI, LD  Pediatric Dietitian

## 2020-04-14 ENCOUNTER — OFFICE VISIT (OUTPATIENT)
Dept: FAMILY MEDICINE CLINIC | Facility: CLINIC | Age: 55
End: 2020-04-14

## 2020-04-14 DIAGNOSIS — F41.9 ANXIETY: ICD-10-CM

## 2020-04-14 PROCEDURE — 99441 PR PHYS/QHP TELEPHONE EVALUATION 5-10 MIN: CPT | Performed by: FAMILY MEDICINE

## 2020-04-14 RX ORDER — ACETAMINOPHEN 500 MG
500 TABLET ORAL 2 TIMES DAILY
COMMUNITY
End: 2020-09-28

## 2020-04-14 RX ORDER — CLONAZEPAM 1 MG/1
1 TABLET ORAL 2 TIMES DAILY PRN
Qty: 60 TABLET | Refills: 2 | Status: SHIPPED | OUTPATIENT
Start: 2020-04-14 | End: 2020-06-19 | Stop reason: SDUPTHER

## 2020-04-14 NOTE — PROGRESS NOTES
Subjective   Shawnee Suarez is a 54 y.o. female.     54-year-old female with chronic anxiety and recent positive for COVID-19 virus-presently 2-1/2 weeks out of the illness with her quarantine ended.  Feeling better no fever strength coming back-he is going to contact the [employer] to see if she can be a convalescent plasma donor      The following portions of the patient's history were reviewed and updated as appropriate: allergies, current medications, past family history, past medical history, past social history, past surgical history and problem list.    Review of Systems   Constitutional: Positive for fatigue. Negative for fever.   Respiratory: Negative for cough and shortness of breath.    Cardiovascular: Negative for chest pain.   Neurological: Negative for headaches.   Psychiatric/Behavioral: Negative for agitation, sleep disturbance and suicidal ideas. The patient is not nervous/anxious.        Objective   Physical Exam   Constitutional: She is oriented to person, place, and time.   Cognitively intact without slurred speech-notes she is feeling better from her recent viral illness and is going to look into being a plasma donor   Neurological: She is alert and oriented to person, place, and time.   Psychiatric: She has a normal mood and affect. Her behavior is normal. Judgment and thought content normal.       Assessment/Plan   Shawnee was seen today for med refill.    Diagnoses and all orders for this visit:    Anxiety  -     clonazePAM (KlonoPIN) 1 MG tablet; Take 1 tablet by mouth 2 (Two) Times a Day As Needed for Seizures.         Plan above- stable much improved with medication-handled the COVID-19 illness very stablely  This visit has been rescheduled as a phone visit to comply with patient safety concerns in accordance with CDC recommendations. Total time of discussion was 10 minutes.

## 2020-04-14 NOTE — PROGRESS NOTES
You have chosen to receive care through a telephone visit. Do you consent to use a telephone visit for your medical care today? Yes

## 2020-05-13 ENCOUNTER — OFFICE VISIT (OUTPATIENT)
Dept: FAMILY MEDICINE CLINIC | Facility: CLINIC | Age: 55
End: 2020-05-13

## 2020-05-13 VITALS
OXYGEN SATURATION: 98 % | HEART RATE: 80 BPM | TEMPERATURE: 97.9 F | DIASTOLIC BLOOD PRESSURE: 76 MMHG | SYSTOLIC BLOOD PRESSURE: 110 MMHG | WEIGHT: 172.6 LBS | HEIGHT: 63 IN | BODY MASS INDEX: 30.58 KG/M2

## 2020-05-13 DIAGNOSIS — R07.9 CHEST PAIN, UNSPECIFIED TYPE: Primary | ICD-10-CM

## 2020-05-13 DIAGNOSIS — M94.0 COSTOCHONDRITIS: ICD-10-CM

## 2020-05-13 PROCEDURE — 96372 THER/PROPH/DIAG INJ SC/IM: CPT | Performed by: NURSE PRACTITIONER

## 2020-05-13 PROCEDURE — 99213 OFFICE O/P EST LOW 20 MIN: CPT | Performed by: NURSE PRACTITIONER

## 2020-05-13 PROCEDURE — 93000 ELECTROCARDIOGRAM COMPLETE: CPT | Performed by: NURSE PRACTITIONER

## 2020-05-13 RX ORDER — PREDNISONE 10 MG/1
TABLET ORAL
Qty: 21 TABLET | Refills: 0 | Status: SHIPPED | OUTPATIENT
Start: 2020-05-13 | End: 2020-09-28

## 2020-05-13 RX ORDER — METHYLPREDNISOLONE ACETATE 40 MG/ML
40 INJECTION, SUSPENSION INTRA-ARTICULAR; INTRALESIONAL; INTRAMUSCULAR; SOFT TISSUE ONCE
Status: COMPLETED | OUTPATIENT
Start: 2020-05-13 | End: 2020-05-13

## 2020-05-13 RX ADMIN — METHYLPREDNISOLONE ACETATE 40 MG: 40 INJECTION, SUSPENSION INTRA-ARTICULAR; INTRALESIONAL; INTRAMUSCULAR; SOFT TISSUE at 13:30

## 2020-05-13 NOTE — PATIENT INSTRUCTIONS
Costochondritis  Costochondritis is swelling and irritation (inflammation) of the tissue (cartilage) that connects your ribs to your breastbone (sternum). This causes pain in the front of your chest. Usually, the pain:  · Starts gradually.  · Is in more than one rib.  This condition usually goes away on its own over time.  Follow these instructions at home:  · Do not do anything that makes your pain worse.  · If directed, put ice on the painful area:  ? Put ice in a plastic bag.  ? Place a towel between your skin and the bag.  ? Leave the ice on for 20 minutes, 2-3 times a day.  · If directed, put heat on the affected area as often as told by your doctor. Use the heat source that your doctor tells you to use, such as a moist heat pack or a heating pad.  ? Place a towel between your skin and the heat source.  ? Leave the heat on for 20-30 minutes.  ? Take off the heat if your skin turns bright red. This is very important if you cannot feel pain, heat, or cold. You may have a greater risk of getting burned.  · Take over-the-counter and prescription medicines only as told by your doctor.  · Return to your normal activities as told by your doctor. Ask your doctor what activities are safe for you.  · Keep all follow-up visits as told by your doctor. This is important.  Contact a doctor if:  · You have chills or a fever.  · Your pain does not go away or it gets worse.  · You have a cough that does not go away.  Get help right away if:  · You are short of breath.  This information is not intended to replace advice given to you by your health care provider. Make sure you discuss any questions you have with your health care provider.  Document Released: 06/05/2009 Document Revised: 07/07/2017 Document Reviewed: 04/12/2017  [employer] Interactive Patient Education © 2020 [employer]

## 2020-05-13 NOTE — PROGRESS NOTES
Procedure     ECG 12 Lead  Date/Time: 5/13/2020 1:30 PM  Performed by: Bree Lockett MA  Authorized by: Yajaira Jimenez APRN   Comparison: compared with previous ECG from 5/31/2019  Comparison to previous ECG: Normal sinus rhythm with nonspecific ST and T wave abnormality present on both occasions.   Rhythm: sinus rhythm  Rate: normal  BPM: 77  Conduction: conduction normal  QRS axis: normal  Other findings: non-specific ST-T wave changes    Clinical impression: abnormal EKG

## 2020-05-13 NOTE — PROGRESS NOTES
CC:    History:  Shawnee Suarez is a 55 y.o. female who presents today for evaluation of the above problems.    Chest tightness started over the weekend off and on.  Worse today over the last couple of hours.  Pressure in mid chest.  Tingling in left upper chest. States that the area in her left upper chest feels like when she was nursing and would experience milk let down.  Doesn't radiate down her arms.  Has had some neck stiffness. No jaw pain. Has been doing some exercise and moving furniture over the last couple of weeks.  Also started doing some floor exercises after receiving an exercise mat for Mother's Day.  This did include some low planks.        HPI  ROS:  Review of Systems   Constitutional: Negative for fever.   Respiratory: Negative for shortness of breath.    Cardiovascular:        Chest tightness   Gastrointestinal: Negative for constipation and diarrhea.   Neurological: Negative for dizziness and light-headedness.   Psychiatric/Behavioral: The patient is nervous/anxious.        Allergies   Allergen Reactions   • Percocet [Oxycodone-Acetaminophen] Itching     Past Medical History:   Diagnosis Date   • Anxiety    • Depression    • History of transfusion    • Hyperlipidemia    • Migraines    • Ovarian cyst     x 2      Past Surgical History:   Procedure Laterality Date   • APPENDECTOMY     • BREAST BIOPSY Right     unsure when   • CHOLECYSTECTOMY     • COLONOSCOPY  11/22/2013    Normal exam repeat in 10 years   • COLONOSCOPY N/A 11/13/2018    Procedure: COLONOSCOPY WITH ANESTHESIA;  Surgeon: Da Miles MD;  Location: [facility] ENDOSCOPY;  Service: Gastroenterology   • D&C HYSTEROSCOPY ENDOMETRIAL ABLATION N/A 2/6/2017    Procedure: DILATATION AND CURETTAGE HYSTEROSCOPY NOVASURE ENDOMETRIAL ABLATION;  Surgeon: Ana Barrios MD;  Location: [facility] OR;  Service:    • DIAGNOSTIC LAPAROSCOPY N/A 6/17/2019    Procedure: DIAGNOSTIC LAPAROSCOPY;  Surgeon: Ana Barrios MD;  Location: [facility] OR;  Service:  Obstetrics/Gynecology   • OVARIAN CYST REMOVAL      x 2   • SALPINGO OOPHORECTOMY Bilateral 6/17/2019    Procedure: SALPINGO OOPHORECTOMY LAPAROSCOPIC;  Surgeon: Ana Barrios MD;  Location: [facility];  Service: Obstetrics/Gynecology     Family History   Problem Relation Age of Onset   • Ovarian cancer Mother 72   • Brain cancer Father    • No Known Problems Brother    • No Known Problems Daughter    • No Known Problems Maternal Grandmother    • No Known Problems Maternal Grandfather    • No Known Problems Paternal Grandmother    • Brain cancer Paternal Grandfather    • Heart disease Brother    • No Known Problems Daughter    • Breast cancer Other 28   • No Known Problems Maternal Aunt    • No Known Problems Paternal Aunt    • Uterine cancer Neg Hx    • Colon cancer Neg Hx    • Melanoma Neg Hx    • Prostate cancer Neg Hx    • BRCA 1/2 Neg Hx    • Endometrial cancer Neg Hx    • Colon polyps Neg Hx       reports that she has never smoked. She has never used smokeless tobacco. She reports that she does not drink alcohol or use drugs.      Current Outpatient Medications:   •  acetaminophen (TYLENOL) 500 MG tablet, Take 1,000 mg by mouth 2 (Two) Times a Day., Disp: , Rfl:   •  cholecalciferol (VITAMIN D3) 1000 units tablet, Take 2,000 Units by mouth Daily., Disp: , Rfl:   •  clonazePAM (KlonoPIN) 1 MG tablet, Take 1 tablet by mouth 2 (Two) Times a Day As Needed for Seizures., Disp: 60 tablet, Rfl: 2  •  fluticasone (FLONASE) 50 MCG/ACT nasal spray, 2 sprays into each nostril Daily., Disp: , Rfl:   •  Lactobacillus (REPHRESH PRO-B PO), Take 1 tablet by mouth Daily., Disp: , Rfl:   •  loratadine (ALLERGY) 10 MG tablet, Take 10 mg by mouth Daily., Disp: , Rfl:   •  Multiple Vitamins-Minerals (WOMENS 50+ MULTI VITAMIN/MIN PO), Take  by mouth., Disp: , Rfl:   •  rosuvastatin (CRESTOR) 10 MG tablet, Take 1 tablet by mouth Daily., Disp: 90 tablet, Rfl: 3  •  TRINTELLIX 20 MG tablet, TAKE ONE TABLET BY MOUTH ONCE A DAY, Disp:  "90 tablet, Rfl: 3  •  ZOLMitriptan (ZOMIG) 2.5 MG tablet, TAKE 1 TABLET BY MOUTH TWICE DAILY AS NEEDED FOR HEADACHE, Disp: 6 tablet, Rfl: 2  •  predniSONE (DELTASONE) 10 MG (21) tablet pack, Use as directed on package. Start on 05/14/2020., Disp: 21 tablet, Rfl: 0  •  promethazine-dextromethorphan (PROMETHAZINE-DM) 6.25-15 MG/5ML syrup, Take 5 mL by mouth At Night As Needed for Cough., Disp: 180 mL, Rfl: 0  No current facility-administered medications for this visit.     OBJECTIVE:  /76 (BP Location: Left arm, Patient Position: Sitting, Cuff Size: Adult)   Pulse 80   Temp 97.9 °F (36.6 °C) (Temporal)   Ht 160 cm (63\")   Wt 78.3 kg (172 lb 9.6 oz)   SpO2 98%   Breastfeeding No   BMI 30.57 kg/m²    Physical Exam   Constitutional: She is oriented to person, place, and time. Vital signs are normal. She appears well-developed and well-nourished.   Cardiovascular: Normal rate and regular rhythm.   Pulmonary/Chest: Effort normal and breath sounds normal. She exhibits tenderness.   Sternal tenderness   Neurological: She is alert and oriented to person, place, and time.   Psychiatric: She has a normal mood and affect. Her behavior is normal.   Vitals reviewed.      Assessment/Plan    Shawnee was seen today for chest pain.    Diagnoses and all orders for this visit:    Chest pain, unspecified type  -     ECG 12 Lead  -     Comprehensive Metabolic Panel  -     Troponin I    Costochondritis  -     methylPREDNISolone acetate (DEPO-medrol) injection 40 mg  -     predniSONE (DELTASONE) 10 MG (21) tablet pack; Use as directed on package. Start on 05/14/2020.    No change from previous on ECG. Will evaluate labs for additional objectivity, however, her symptoms do appear to be musculoskeletal in nature.    Patient was masked upon entering facility.  I wore N95 mask, with face shield, gown, and gloves.  MA wore N95 mask with face shield, gown and gloves.          An After Visit Summary was printed and given to the patient " at discharge.  Return if symptoms worsen or fail to improve, for Next scheduled follow up.       MICEHLLE Arnold 05/13/2020    Electronically signed.

## 2020-05-14 DIAGNOSIS — R79.89 ELEVATED LFTS: Primary | ICD-10-CM

## 2020-05-14 LAB
ALBUMIN SERPL-MCNC: 4.8 G/DL (ref 3.5–5.2)
ALBUMIN/GLOB SERPL: 1.6 G/DL
ALP SERPL-CCNC: 61 U/L (ref 39–117)
ALT SERPL-CCNC: 119 U/L (ref 1–33)
AST SERPL-CCNC: 75 U/L (ref 1–32)
BILIRUB SERPL-MCNC: 0.5 MG/DL (ref 0.2–1.2)
BUN SERPL-MCNC: 14 MG/DL (ref 6–20)
BUN/CREAT SERPL: 19.7 (ref 7–25)
CALCIUM SERPL-MCNC: 10.1 MG/DL (ref 8.6–10.5)
CHLORIDE SERPL-SCNC: 104 MMOL/L (ref 98–107)
CO2 SERPL-SCNC: 24.4 MMOL/L (ref 22–29)
CREAT SERPL-MCNC: 0.71 MG/DL (ref 0.57–1)
GLOBULIN SER CALC-MCNC: 3 GM/DL
GLUCOSE SERPL-MCNC: 95 MG/DL (ref 65–99)
POTASSIUM SERPL-SCNC: 4.9 MMOL/L (ref 3.5–5.2)
PROT SERPL-MCNC: 7.8 G/DL (ref 6–8.5)
SODIUM SERPL-SCNC: 143 MMOL/L (ref 136–145)
TROPONIN I SERPL-MCNC: <0.01 NG/ML (ref 0–0.04)

## 2020-05-14 NOTE — PROGRESS NOTES
Liver enzymes are up, but labs are other wise ok.  Has she been taking excessive tylenol or had she been drinking alcohol the night before the test?  Need to repeat CMP in one week.  May need liver ultrasound.

## 2020-05-21 ENCOUNTER — RESULTS ENCOUNTER (OUTPATIENT)
Dept: FAMILY MEDICINE CLINIC | Facility: CLINIC | Age: 55
End: 2020-05-21

## 2020-05-21 DIAGNOSIS — R79.89 ELEVATED LFTS: ICD-10-CM

## 2020-05-22 DIAGNOSIS — R79.89 ELEVATED LFTS: Primary | ICD-10-CM

## 2020-05-22 LAB
ALBUMIN SERPL-MCNC: 4.8 G/DL (ref 3.5–5.2)
ALBUMIN/GLOB SERPL: 1.8 G/DL
ALP SERPL-CCNC: 59 U/L (ref 39–117)
ALT SERPL-CCNC: 70 U/L (ref 1–33)
AST SERPL-CCNC: 26 U/L (ref 1–32)
BILIRUB SERPL-MCNC: 0.6 MG/DL (ref 0.2–1.2)
BUN SERPL-MCNC: 19 MG/DL (ref 6–20)
BUN/CREAT SERPL: 22.9 (ref 7–25)
CALCIUM SERPL-MCNC: 10.1 MG/DL (ref 8.6–10.5)
CHLORIDE SERPL-SCNC: 99 MMOL/L (ref 98–107)
CO2 SERPL-SCNC: 27.3 MMOL/L (ref 22–29)
CREAT SERPL-MCNC: 0.83 MG/DL (ref 0.57–1)
GLOBULIN SER CALC-MCNC: 2.7 GM/DL
GLUCOSE SERPL-MCNC: 175 MG/DL (ref 65–99)
POTASSIUM SERPL-SCNC: 4.4 MMOL/L (ref 3.5–5.2)
PROT SERPL-MCNC: 7.5 G/DL (ref 6–8.5)
SODIUM SERPL-SCNC: 142 MMOL/L (ref 136–145)

## 2020-05-22 NOTE — PROGRESS NOTES
Liver enzymes have came back down considerably.  Let's check another CMP in one month to make we are back to normal.

## 2020-06-01 ENCOUNTER — RESULTS ENCOUNTER (OUTPATIENT)
Dept: FAMILY MEDICINE CLINIC | Facility: CLINIC | Age: 55
End: 2020-06-01

## 2020-06-01 DIAGNOSIS — R73.9 ELEVATED BLOOD SUGAR: ICD-10-CM

## 2020-06-19 DIAGNOSIS — F41.9 ANXIETY: ICD-10-CM

## 2020-06-19 RX ORDER — ROSUVASTATIN CALCIUM 10 MG/1
10 TABLET, COATED ORAL DAILY
Qty: 90 TABLET | Refills: 0 | Status: SHIPPED | OUTPATIENT
Start: 2020-06-19 | End: 2020-07-02 | Stop reason: SDUPTHER

## 2020-06-19 RX ORDER — CLONAZEPAM 1 MG/1
1 TABLET ORAL 2 TIMES DAILY PRN
Qty: 60 TABLET | Refills: 0 | Status: SHIPPED | OUTPATIENT
Start: 2020-06-19 | End: 2020-07-01 | Stop reason: SDUPTHER

## 2020-06-19 RX ORDER — ZOLMITRIPTAN 2.5 MG/1
TABLET, FILM COATED ORAL
Qty: 6 TABLET | Refills: 2 | Status: SHIPPED | OUTPATIENT
Start: 2020-06-19 | End: 2020-07-02 | Stop reason: SDUPTHER

## 2020-06-22 ENCOUNTER — RESULTS ENCOUNTER (OUTPATIENT)
Dept: FAMILY MEDICINE CLINIC | Facility: CLINIC | Age: 55
End: 2020-06-22

## 2020-06-22 DIAGNOSIS — R79.89 ELEVATED LFTS: ICD-10-CM

## 2020-06-23 ENCOUNTER — TELEMEDICINE (OUTPATIENT)
Dept: FAMILY MEDICINE CLINIC | Facility: CLINIC | Age: 55
End: 2020-06-23

## 2020-06-23 ENCOUNTER — DOCUMENTATION (OUTPATIENT)
Dept: OBSTETRICS AND GYNECOLOGY | Facility: CLINIC | Age: 55
End: 2020-06-23

## 2020-06-23 DIAGNOSIS — R09.82 POSTNASAL DRIP: Primary | ICD-10-CM

## 2020-06-23 PROCEDURE — 99213 OFFICE O/P EST LOW 20 MIN: CPT | Performed by: NURSE PRACTITIONER

## 2020-06-23 NOTE — PROGRESS NOTES
CC: postnasal drainage    History:  Shawnee Suarez is a 55 y.o. female who presents today for evaluation of the above problems.    Postnasal drainage for about a week.  Using flonase and generic OTC allergy medication.  Cetirizine. These have not helped.     HPI  ROS:  Review of Systems   Constitutional: Negative for fever.   HENT: Positive for postnasal drip. Negative for sore throat.        Allergies   Allergen Reactions   • Percocet [Oxycodone-Acetaminophen] Itching     Past Medical History:   Diagnosis Date   • Anxiety    • Depression    • History of transfusion    • Hyperlipidemia    • Migraines    • Ovarian cyst     x 2      Past Surgical History:   Procedure Laterality Date   • APPENDECTOMY     • BREAST BIOPSY Right     unsure when   • CHOLECYSTECTOMY     • COLONOSCOPY  11/22/2013    Normal exam repeat in 10 years   • COLONOSCOPY N/A 11/13/2018    Procedure: COLONOSCOPY WITH ANESTHESIA;  Surgeon: Da Miles MD;  Location: [facility] ENDOSCOPY;  Service: Gastroenterology   • D&C HYSTEROSCOPY ENDOMETRIAL ABLATION N/A 2/6/2017    Procedure: DILATATION AND CURETTAGE HYSTEROSCOPY NOVASURE ENDOMETRIAL ABLATION;  Surgeon: Ana Barrios MD;  Location: [facility] OR;  Service:    • DIAGNOSTIC LAPAROSCOPY N/A 6/17/2019    Procedure: DIAGNOSTIC LAPAROSCOPY;  Surgeon: Ana Barrios MD;  Location: [facility] OR;  Service: Obstetrics/Gynecology   • OVARIAN CYST REMOVAL      x 2   • SALPINGO OOPHORECTOMY Bilateral 6/17/2019    Procedure: SALPINGO OOPHORECTOMY LAPAROSCOPIC;  Surgeon: Ana Barrios MD;  Location: [facility] OR;  Service: Obstetrics/Gynecology     Family History   Problem Relation Age of Onset   • Ovarian cancer Mother 72   • Brain cancer Father    • No Known Problems Brother    • No Known Problems Daughter    • No Known Problems Maternal Grandmother    • No Known Problems Maternal Grandfather    • No Known Problems Paternal Grandmother    • Brain cancer Paternal Grandfather    • Heart disease Brother    • No Known  Problems Daughter    • Breast cancer Other 28   • No Known Problems Maternal Aunt    • No Known Problems Paternal Aunt    • Uterine cancer Neg Hx    • Colon cancer Neg Hx    • Melanoma Neg Hx    • Prostate cancer Neg Hx    • BRCA 1/2 Neg Hx    • Endometrial cancer Neg Hx    • Colon polyps Neg Hx       reports that she has never smoked. She has never used smokeless tobacco. She reports that she does not drink alcohol or use drugs.      Current Outpatient Medications:   •  acetaminophen (TYLENOL) 500 MG tablet, Take 1,000 mg by mouth 2 (Two) Times a Day., Disp: , Rfl:   •  cholecalciferol (VITAMIN D3) 1000 units tablet, Take 2,000 Units by mouth Daily., Disp: , Rfl:   •  clonazePAM (KlonoPIN) 1 MG tablet, Take 1 tablet by mouth 2 (Two) Times a Day As Needed for Seizures., Disp: 60 tablet, Rfl: 0  •  fluticasone (FLONASE) 50 MCG/ACT nasal spray, 2 sprays into each nostril Daily., Disp: , Rfl:   •  Lactobacillus (REPHRESH PRO-B PO), Take 1 tablet by mouth Daily., Disp: , Rfl:   •  loratadine (ALLERGY) 10 MG tablet, Take 10 mg by mouth Daily., Disp: , Rfl:   •  Multiple Vitamins-Minerals (WOMENS 50+ MULTI VITAMIN/MIN PO), Take  by mouth., Disp: , Rfl:   •  predniSONE (DELTASONE) 10 MG (21) tablet pack, Use as directed on package. Start on 05/14/2020., Disp: 21 tablet, Rfl: 0  •  promethazine-dextromethorphan (PROMETHAZINE-DM) 6.25-15 MG/5ML syrup, Take 5 mL by mouth At Night As Needed for Cough., Disp: 180 mL, Rfl: 0  •  rosuvastatin (Crestor) 10 MG tablet, Take 1 tablet by mouth Daily., Disp: 90 tablet, Rfl: 0  •  Vortioxetine HBr (Trintellix) 20 MG tablet, Take 20 mg by mouth Daily., Disp: 90 tablet, Rfl: 0  •  ZOLMitriptan (ZOMIG) 2.5 MG tablet, TAKE 1 TABLET BY MOUTH TWICE DAILY AS NEEDED FOR HEADACHE, Disp: 6 tablet, Rfl: 2    OBJECTIVE:  There were no vitals taken for this visit.   Physical Exam   Constitutional: She is oriented to person, place, and time. She appears well-developed and well-nourished.    Pulmonary/Chest: Effort normal.   Neurological: She is alert and oriented to person, place, and time.   Psychiatric: She has a normal mood and affect. Her behavior is normal.       Assessment/Plan    Diagnoses and all orders for this visit:    Postnasal drip    Change from zyrtec to Claritin, Allegra, or Xyzal.  Increase flonase to twice a day for the next two weeks.     This visit was completed via secure Zoom connection in combination with telephone due to lack of audio connection.       An After Visit Summary was printed and given to the patient at discharge.  Return if symptoms worsen or fail to improve.       MICHELLE Arnold 06/23/2020    Electronically signed.

## 2020-06-23 NOTE — PROGRESS NOTES
Pt called stating she thinks possible yeast infection. Has had for some time. Encouraged her to try Monostat OTC.

## 2020-07-01 ENCOUNTER — OFFICE VISIT (OUTPATIENT)
Dept: FAMILY MEDICINE CLINIC | Facility: CLINIC | Age: 55
End: 2020-07-01

## 2020-07-01 VITALS
TEMPERATURE: 97.9 F | SYSTOLIC BLOOD PRESSURE: 124 MMHG | BODY MASS INDEX: 30.3 KG/M2 | HEIGHT: 63 IN | HEART RATE: 95 BPM | DIASTOLIC BLOOD PRESSURE: 80 MMHG | OXYGEN SATURATION: 97 % | RESPIRATION RATE: 16 BRPM | WEIGHT: 171 LBS

## 2020-07-01 DIAGNOSIS — F41.9 ANXIETY: ICD-10-CM

## 2020-07-01 PROCEDURE — 99213 OFFICE O/P EST LOW 20 MIN: CPT | Performed by: FAMILY MEDICINE

## 2020-07-01 RX ORDER — CLONAZEPAM 1 MG/1
1 TABLET ORAL 2 TIMES DAILY PRN
Qty: 60 TABLET | Refills: 2 | Status: SHIPPED | OUTPATIENT
Start: 2020-07-01 | End: 2020-08-07 | Stop reason: SDUPTHER

## 2020-07-01 RX ORDER — CLONAZEPAM 1 MG/1
1 TABLET ORAL 2 TIMES DAILY PRN
Qty: 60 TABLET | Refills: 2 | Status: SHIPPED | OUTPATIENT
Start: 2020-07-01 | End: 2020-07-01 | Stop reason: SDUPTHER

## 2020-07-01 NOTE — PROGRESS NOTES
Subjective   Shawnee Suarez is a 55 y.o. female.     55-year-old female with chronic anxiety    Anxiety   Presents for follow-up visit. Symptoms include excessive worry and nervous/anxious behavior. Patient reports no suicidal ideas. Symptoms occur most days. The severity of symptoms is moderate. The quality of sleep is fair. Nighttime awakenings: occasional.           The following portions of the patient's history were reviewed and updated as appropriate: allergies, current medications, past family history, past medical history, past social history, past surgical history and problem list.    Review of Systems   Psychiatric/Behavioral: Positive for sleep disturbance. Negative for suicidal ideas. The patient is nervous/anxious.        Objective   Physical Exam   Constitutional: She is oriented to person, place, and time.   Overweight   Musculoskeletal: She exhibits tenderness.   Carpometacarpal joint tenderness   Neurological: She is alert and oriented to person, place, and time.   Skin: Skin is warm and dry.   Psychiatric: She has a normal mood and affect. Her behavior is normal. Judgment and thought content normal.   Nursing note and vitals reviewed.    CONTROLLED SUBSTANCE TRACKING 4/3/2018 9/25/2018 4/10/2019 7/23/2019 12/23/2019 3/3/2020 4/14/2020   Last [place] 4/3/2018 9/25/2018 4/10/2019 7/23/2019 12/23/2019 3/3/2020 4/14/2020   Report Number [id number]   Last UDS 4/3/2018 4/3/2018 - 4/10/2019 4/10/2019 3/3/2020 3/3/2020   Last Controlled Substance Agreement 4/3/2018 4/3/2018 4/10/2019 4/10/2019 4/10/2019 3/3/2020 3/3/2020       Assessment/Plan   Shawnee was seen today for follow-up.    Diagnoses and all orders for this visit:    Anxiety  -     clonazePAM (KlonoPIN) 1 MG tablet; Take 1 tablet by mouth 2 (Two) Times a Day As Needed for Seizures.       Plan COVID V protection etc. patient has history of exposure

## 2020-07-02 LAB
ALBUMIN SERPL-MCNC: NORMAL G/DL
ALP SERPL-CCNC: NORMAL U/L
ALT SERPL-CCNC: NORMAL U/L
AST SERPL-CCNC: NORMAL U/L
BILIRUB SERPL-MCNC: NORMAL MG/DL
BUN SERPL-MCNC: NORMAL MG/DL
CALCIUM SERPL-MCNC: NORMAL MG/DL
CHLORIDE SERPL-SCNC: NORMAL MMOL/L
CO2 SERPL-SCNC: NORMAL MMOL/L
CREAT SERPL-MCNC: NORMAL MG/DL
GLUCOSE SERPL-MCNC: NORMAL MG/DL
POTASSIUM SERPL-SCNC: NORMAL MMOL/L
PROT SERPL-MCNC: NORMAL G/DL
SODIUM SERPL-SCNC: NORMAL MMOL/L
SPECIMEN STATUS: NORMAL

## 2020-07-02 RX ORDER — ZOLMITRIPTAN 2.5 MG/1
TABLET, FILM COATED ORAL
Qty: 18 TABLET | Refills: 2 | Status: SHIPPED | OUTPATIENT
Start: 2020-07-02 | End: 2020-09-17 | Stop reason: SDUPTHER

## 2020-07-02 RX ORDER — ROSUVASTATIN CALCIUM 10 MG/1
10 TABLET, COATED ORAL DAILY
Qty: 90 TABLET | Refills: 3 | Status: SHIPPED | OUTPATIENT
Start: 2020-07-02 | End: 2020-09-17 | Stop reason: SDUPTHER

## 2020-08-07 DIAGNOSIS — F41.9 ANXIETY: ICD-10-CM

## 2020-08-07 RX ORDER — CLONAZEPAM 1 MG/1
1 TABLET ORAL 2 TIMES DAILY PRN
Qty: 60 TABLET | Refills: 0 | OUTPATIENT
Start: 2020-08-07 | End: 2020-08-17 | Stop reason: SDUPTHER

## 2020-08-07 NOTE — TELEPHONE ENCOUNTER
MED REFILL REQUEST    TRINTELLIX 20MG (1) QD  CLONAZEPAM 1MG (1) BID      [facility]--NEED TO CALL FOR REFILLS [phone]    EVELYN 07.02.20

## 2020-08-11 ENCOUNTER — PRIOR AUTHORIZATION (OUTPATIENT)
Dept: FAMILY MEDICINE CLINIC | Facility: CLINIC | Age: 55
End: 2020-08-11

## 2020-08-13 NOTE — TELEPHONE ENCOUNTER
Annie spoke with Stephie at [facility] this morning.  Stephie asked that I return her call     Ph [phone]    Left v/m for Stephie to return call

## 2020-08-13 NOTE — TELEPHONE ENCOUNTER
Stephie returned call.  Policy number submitted through cover my meds is not their member.  Advised that the member information and policy number were already populated on cover my meds request.  She is canceling prior auth request.

## 2020-08-17 DIAGNOSIS — F41.9 ANXIETY: ICD-10-CM

## 2020-08-17 RX ORDER — CLONAZEPAM 1 MG/1
1 TABLET ORAL 2 TIMES DAILY PRN
Qty: 60 TABLET | Refills: 0 | Status: SHIPPED | OUTPATIENT
Start: 2020-08-17 | End: 2020-09-17 | Stop reason: SDUPTHER

## 2020-08-17 NOTE — TELEPHONE ENCOUNTER
Patient has not received medication from mail order.  Would like sent to [facility].    trintellix  clonazepam

## 2020-09-17 DIAGNOSIS — F41.9 ANXIETY: ICD-10-CM

## 2020-09-17 RX ORDER — ROSUVASTATIN CALCIUM 10 MG/1
10 TABLET, COATED ORAL DAILY
Qty: 90 TABLET | Refills: 0 | Status: SHIPPED | OUTPATIENT
Start: 2020-09-17 | End: 2021-01-13 | Stop reason: SDUPTHER

## 2020-09-17 RX ORDER — ZOLMITRIPTAN 2.5 MG/1
TABLET, FILM COATED ORAL
Qty: 18 TABLET | Refills: 2 | Status: SHIPPED | OUTPATIENT
Start: 2020-09-17 | End: 2021-04-14 | Stop reason: SDUPTHER

## 2020-09-17 RX ORDER — CLONAZEPAM 1 MG/1
1 TABLET ORAL 2 TIMES DAILY PRN
Qty: 60 TABLET | Refills: 0 | Status: SHIPPED | OUTPATIENT
Start: 2020-09-17 | End: 2020-10-26

## 2020-09-23 ENCOUNTER — TRANSCRIBE ORDERS (OUTPATIENT)
Dept: ADMINISTRATIVE | Facility: HOSPITAL | Age: 55
End: 2020-09-23

## 2020-09-23 DIAGNOSIS — Z12.31 SCREENING MAMMOGRAM, ENCOUNTER FOR: Primary | ICD-10-CM

## 2020-09-28 ENCOUNTER — OFFICE VISIT (OUTPATIENT)
Dept: FAMILY MEDICINE CLINIC | Facility: CLINIC | Age: 55
End: 2020-09-28

## 2020-09-28 VITALS
SYSTOLIC BLOOD PRESSURE: 122 MMHG | OXYGEN SATURATION: 97 % | TEMPERATURE: 97.1 F | HEART RATE: 88 BPM | DIASTOLIC BLOOD PRESSURE: 82 MMHG | HEIGHT: 64 IN | RESPIRATION RATE: 16 BRPM | BODY MASS INDEX: 30.35 KG/M2 | WEIGHT: 177.8 LBS

## 2020-09-28 DIAGNOSIS — Z23 NEED FOR INFLUENZA VACCINATION: Primary | ICD-10-CM

## 2020-09-28 DIAGNOSIS — M54.9 BACK PAIN, UNSPECIFIED BACK LOCATION, UNSPECIFIED BACK PAIN LATERALITY, UNSPECIFIED CHRONICITY: ICD-10-CM

## 2020-09-28 DIAGNOSIS — Z00.00 ROUTINE GENERAL MEDICAL EXAMINATION AT A HEALTH CARE FACILITY: ICD-10-CM

## 2020-09-28 LAB
BILIRUB BLD-MCNC: NEGATIVE MG/DL
CLARITY, POC: CLEAR
COLOR UR: YELLOW
GLUCOSE UR STRIP-MCNC: NEGATIVE MG/DL
KETONES UR QL: NEGATIVE
LEUKOCYTE EST, POC: NEGATIVE
NITRITE UR-MCNC: NEGATIVE MG/ML
PH UR: 6 [PH] (ref 5–8)
PROT UR STRIP-MCNC: NEGATIVE MG/DL
RBC # UR STRIP: NEGATIVE /UL
SP GR UR: 1.02 (ref 1–1.03)
UROBILINOGEN UR QL: NORMAL

## 2020-09-28 PROCEDURE — 99396 PREV VISIT EST AGE 40-64: CPT | Performed by: FAMILY MEDICINE

## 2020-09-28 PROCEDURE — 81003 URINALYSIS AUTO W/O SCOPE: CPT | Performed by: FAMILY MEDICINE

## 2020-09-28 PROCEDURE — 90471 IMMUNIZATION ADMIN: CPT | Performed by: FAMILY MEDICINE

## 2020-09-28 PROCEDURE — 90686 IIV4 VACC NO PRSV 0.5 ML IM: CPT | Performed by: FAMILY MEDICINE

## 2020-09-28 NOTE — PROGRESS NOTES
Stephanie Suarez is a 55 y.o. female.     55-year-old female with obesity    Obesity  This is a chronic problem. The current episode started more than 1 year ago. The problem has been waxing and waning. Associated symptoms include fatigue. Pertinent negatives include no chest pain. The symptoms are aggravated by eating. She has tried nothing for the symptoms. The treatment provided no relief.      Vitals:    09/28/20 0806   BP: 122/82   Pulse: 88   Resp: 16   Temp: 97.1 °F (36.2 °C)   SpO2: 97%       The following portions of the patient's history were reviewed and updated as appropriate: allergies, current medications, past family history, past medical history, past social history, past surgical history and problem list.    Review of Systems   Constitutional: Positive for fatigue.   Respiratory: Negative for shortness of breath.    Cardiovascular: Negative for chest pain and leg swelling.   Gastrointestinal:        Up-to-date on colons   Psychiatric/Behavioral: The patient is nervous/anxious.    All other systems reviewed and are negative.      Objective   Physical Exam  Vitals signs and nursing note reviewed.   Constitutional:       Appearance: Normal appearance. She is obese.   HENT:      Right Ear: Tympanic membrane and ear canal normal.      Left Ear: Ear canal normal.   Eyes:      Extraocular Movements: Extraocular movements intact.      Pupils: Pupils are equal, round, and reactive to light.   Neck:      Vascular: No carotid bruit.   Cardiovascular:      Rate and Rhythm: Normal rate and regular rhythm.      Pulses: Normal pulses.      Heart sounds: Normal heart sounds.   Pulmonary:      Effort: Pulmonary effort is normal.      Breath sounds: Normal breath sounds.      Comments: Breast exam by another provider  Abdominal:      General: Abdomen is flat.      Palpations: Abdomen is soft.   Musculoskeletal:         General: No swelling or tenderness.   Lymphadenopathy:      Cervical: No cervical  adenopathy.   Skin:     General: Skin is warm and dry.      Capillary Refill: Capillary refill takes less than 2 seconds.   Neurological:      General: No focal deficit present.      Mental Status: She is alert and oriented to person, place, and time.   Psychiatric:         Mood and Affect: Mood normal.         Behavior: Behavior normal.         Thought Content: Thought content normal.         Judgment: Judgment normal.         Patient's Body mass index is 31 kg/m². BMI is above normal parameters. Recommendations include: educational material.      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   • Nonsmoker   • Encounter for screening for malignant neoplasm of colon   • Family history of ovarian cancer     Problems Addressed this Visit     None      Visit Diagnoses     Need for influenza vaccination    -  Primary    Relevant Orders    Fluarix/Fluzone/Afluria Quad>6 Months (Completed)    Routine general medical examination at a health care facility        Relevant Orders    TSH    T4, free    CBC & Differential    Comprehensive Metabolic Panel    Lipid Panel    Back pain, unspecified back location, unspecified back pain laterality, unspecified chronicity        Relevant Orders    POC Urinalysis Dipstick, Multipro (Completed)      Diagnoses       Codes Comments    Need for influenza vaccination    -  Primary ICD-10-CM: Z23  ICD-9-CM: V04.81     Routine general medical examination at a health care facility     ICD-10-CM: Z00.00  ICD-9-CM: V70.0     Back pain, unspecified back location, unspecified back pain laterality, unspecified chronicity     ICD-10-CM: M54.9  ICD-9-CM: 724.5         Return in 3 months (on 12/28/2020).       Plan above- [facility] management information given    Electronically signed by Marlon Tran MD 09/28/2020    Answers for HPI/ROS submitted by the patient on 9/23/2020   What is the primary reason for your visit?: Physical

## 2020-09-29 LAB
ALBUMIN SERPL-MCNC: 5 G/DL (ref 3.5–5.2)
ALBUMIN/GLOB SERPL: 2.5 G/DL
ALP SERPL-CCNC: 51 U/L (ref 39–117)
ALT SERPL-CCNC: 16 U/L (ref 1–33)
AST SERPL-CCNC: 20 U/L (ref 1–32)
BASOPHILS # BLD AUTO: 0.09 10*3/MM3 (ref 0–0.2)
BASOPHILS NFR BLD AUTO: 0.9 % (ref 0–1.5)
BILIRUB SERPL-MCNC: 0.7 MG/DL (ref 0–1.2)
BUN SERPL-MCNC: 16 MG/DL (ref 6–20)
BUN/CREAT SERPL: 21.6 (ref 7–25)
CALCIUM SERPL-MCNC: 9.4 MG/DL (ref 8.6–10.5)
CHLORIDE SERPL-SCNC: 95 MMOL/L (ref 98–107)
CHOLEST SERPL-MCNC: 146 MG/DL (ref 0–200)
CO2 SERPL-SCNC: 29.7 MMOL/L (ref 22–29)
CREAT SERPL-MCNC: 0.74 MG/DL (ref 0.57–1)
EOSINOPHIL # BLD AUTO: 0.14 10*3/MM3 (ref 0–0.4)
EOSINOPHIL NFR BLD AUTO: 1.3 % (ref 0.3–6.2)
ERYTHROCYTE [DISTWIDTH] IN BLOOD BY AUTOMATED COUNT: 14 % (ref 12.3–15.4)
GLOBULIN SER CALC-MCNC: 2 GM/DL
GLUCOSE SERPL-MCNC: 148 MG/DL (ref 65–99)
HCT VFR BLD AUTO: 44.4 % (ref 34–46.6)
HDLC SERPL-MCNC: 48 MG/DL (ref 40–60)
HGB BLD-MCNC: 14.4 G/DL (ref 12–15.9)
IMM GRANULOCYTES # BLD AUTO: 0.02 10*3/MM3 (ref 0–0.05)
IMM GRANULOCYTES NFR BLD AUTO: 0.2 % (ref 0–0.5)
LDLC SERPL CALC-MCNC: 60 MG/DL (ref 0–100)
LYMPHOCYTES # BLD AUTO: 3.79 10*3/MM3 (ref 0.7–3.1)
LYMPHOCYTES NFR BLD AUTO: 36.4 % (ref 19.6–45.3)
MCH RBC QN AUTO: 30 PG (ref 26.6–33)
MCHC RBC AUTO-ENTMCNC: 32.4 G/DL (ref 31.5–35.7)
MCV RBC AUTO: 92.5 FL (ref 79–97)
MONOCYTES # BLD AUTO: 0.8 10*3/MM3 (ref 0.1–0.9)
MONOCYTES NFR BLD AUTO: 7.7 % (ref 5–12)
NEUTROPHILS # BLD AUTO: 5.56 10*3/MM3 (ref 1.7–7)
NEUTROPHILS NFR BLD AUTO: 53.5 % (ref 42.7–76)
NRBC BLD AUTO-RTO: 0 /100 WBC (ref 0–0.2)
PLATELET # BLD AUTO: 297 10*3/MM3 (ref 140–450)
POTASSIUM SERPL-SCNC: 4.1 MMOL/L (ref 3.5–5.2)
PROT SERPL-MCNC: 7 G/DL (ref 6–8.5)
RBC # BLD AUTO: 4.8 10*6/MM3 (ref 3.77–5.28)
SODIUM SERPL-SCNC: 139 MMOL/L (ref 136–145)
T4 FREE SERPL-MCNC: 1.28 NG/DL (ref 0.93–1.7)
TRIGL SERPL-MCNC: 191 MG/DL (ref 0–150)
TSH SERPL DL<=0.005 MIU/L-ACNC: 3.41 UIU/ML (ref 0.27–4.2)
VLDLC SERPL CALC-MCNC: 38.2 MG/DL
WBC # BLD AUTO: 10.4 10*3/MM3 (ref 3.4–10.8)

## 2020-09-30 LAB
Lab: NORMAL
Lab: NORMAL

## 2020-10-01 LAB
HBA1C MFR BLD: 6.07 % (ref 4.8–5.6)
WRITTEN AUTHORIZATION: NORMAL

## 2020-10-14 ENCOUNTER — TELEPHONE (OUTPATIENT)
Dept: FAMILY MEDICINE CLINIC | Facility: CLINIC | Age: 55
End: 2020-10-14

## 2020-10-14 RX ORDER — VORTIOXETINE 20 MG/1
TABLET, FILM COATED ORAL
Qty: 90 TABLET | Refills: 0 | Status: SHIPPED | OUTPATIENT
Start: 2020-10-14 | End: 2021-01-13 | Stop reason: SDUPTHER

## 2020-10-14 NOTE — TELEPHONE ENCOUNTER
PENDING REFILL FOR TRINTELLIX 20MG (1) QD.  PT STATES SHE IS LEAVING TO GO OUT OF TOWN TOMORROW-BE BACK NEXT WEEK SOMETIME.  REQUESTING EARLY REFILL BUT REFILL IS NOT DUE FOR ANOTHER MONTH. NEW [employer] SHE WAS DEALING WITH -PT STATES SHE GOT A 1 MONTH SUPPLY FROM HER MOTHER SINCE THEY TAKE THE SAME MEDICATION-STATES SHE OWES HER MOTHER.  PLEASE ADVISE.  INFORMED INSURANCE MAY NOT PAY.

## 2020-10-26 DIAGNOSIS — F41.9 ANXIETY: ICD-10-CM

## 2020-10-26 RX ORDER — CLONAZEPAM 1 MG/1
TABLET ORAL
Qty: 60 TABLET | Refills: 0 | Status: SHIPPED | OUTPATIENT
Start: 2020-10-26 | End: 2020-12-10 | Stop reason: SDUPTHER

## 2020-11-03 ENCOUNTER — OFFICE VISIT (OUTPATIENT)
Dept: OBSTETRICS AND GYNECOLOGY | Facility: CLINIC | Age: 55
End: 2020-11-03

## 2020-11-03 ENCOUNTER — HOSPITAL ENCOUNTER (OUTPATIENT)
Dept: BONE DENSITY | Facility: HOSPITAL | Age: 55
Discharge: HOME | End: 2020-11-03

## 2020-11-03 ENCOUNTER — HOSPITAL ENCOUNTER (OUTPATIENT)
Dept: MAMMOGRAPHY | Facility: HOSPITAL | Age: 55
Discharge: HOME | End: 2020-11-03

## 2020-11-03 VITALS
BODY MASS INDEX: 30.05 KG/M2 | SYSTOLIC BLOOD PRESSURE: 118 MMHG | WEIGHT: 176 LBS | HEIGHT: 64 IN | DIASTOLIC BLOOD PRESSURE: 74 MMHG

## 2020-11-03 DIAGNOSIS — Z01.419 WELL WOMAN EXAM WITH ROUTINE GYNECOLOGICAL EXAM: Primary | ICD-10-CM

## 2020-11-03 DIAGNOSIS — Z13.820 ENCOUNTER FOR SCREENING FOR OSTEOPOROSIS: ICD-10-CM

## 2020-11-03 DIAGNOSIS — R63.5 WEIGHT GAIN: ICD-10-CM

## 2020-11-03 DIAGNOSIS — R92.8 ABNORMAL MAMMOGRAM: Primary | ICD-10-CM

## 2020-11-03 DIAGNOSIS — Z12.31 ENCOUNTER FOR SCREENING MAMMOGRAM FOR BREAST CANCER: ICD-10-CM

## 2020-11-03 DIAGNOSIS — Z15.89 GENETIC SUSCEPTIBILITY TO OTHER DISEASE: ICD-10-CM

## 2020-11-03 PROCEDURE — 99396 PREV VISIT EST AGE 40-64: CPT | Performed by: NURSE PRACTITIONER

## 2020-11-03 PROCEDURE — 77080 DXA BONE DENSITY AXIAL: CPT

## 2020-11-03 PROCEDURE — 77063 BREAST TOMOSYNTHESIS BI: CPT

## 2020-11-03 PROCEDURE — 87624 HPV HI-RISK TYP POOLED RSLT: CPT | Performed by: NURSE PRACTITIONER

## 2020-11-03 PROCEDURE — G0123 SCREEN CERV/VAG THIN LAYER: HCPCS | Performed by: NURSE PRACTITIONER

## 2020-11-03 PROCEDURE — 77067 SCR MAMMO BI INCL CAD: CPT

## 2020-11-03 RX ORDER — PHENTERMINE HYDROCHLORIDE 37.5 MG/1
37.5 CAPSULE ORAL EVERY MORNING
Qty: 30 CAPSULE | Refills: 0 | Status: SHIPPED | OUTPATIENT
Start: 2020-11-03 | End: 2020-11-30 | Stop reason: SDUPTHER

## 2020-11-03 NOTE — PATIENT INSTRUCTIONS
"BMI for Adults  What is BMI?  Body mass index (BMI) is a number that is calculated from a person's weight and height. BMI can help estimate how much of a person's weight is composed of fat. BMI does not measure body fat directly. Rather, it is an alternative to procedures that directly measure body fat, which can be difficult and expensive.  BMI can help identify people who may be at higher risk for certain medical problems.  What are BMI measurements used for?  BMI is used as a screening tool to identify possible weight problems. It helps determine whether a person is obese, overweight, a healthy weight, or underweight.  BMI is useful for:  · Identifying a weight problem that may be related to a medical condition or may increase the risk for medical problems.  · Promoting changes, such as changes in diet and exercise, to help reach a healthy weight. BMI screening can be repeated to see if these changes are working.  How is BMI calculated?  BMI involves measuring your weight in relation to your height. Both height and weight are measured, and the BMI is calculated from those numbers. This can be done either in English ([place] or metric measurements. Note that charts and online BMI calculators are available to help you find your BMI quickly and easily without having to do these calculations yourself.  To calculate your BMI in English ([place] measurements:    1. Measure your weight in pounds (lb).  2. Multiply the number of pounds by 703.  ? For example, for a person who weighs 180 lb, multiply that number by 703, which equals 126,540.  3. Measure your height in inches. Then multiply that number by itself to get a measurement called \"inches squared.\"  ? For example, for a person who is 70 inches tall, the \"inches squared\" measurement is 70 inches x 70 inches, which equals 4,900 inches squared.  4. Divide the total from step 2 (number of lb x 703) by the total from step 3 (inches squared): 126,540 ÷ 4,900 = 25.8. This is " "your BMI.  To calculate your BMI in metric measurements:  1. Measure your weight in kilograms (kg).  2. Measure your height in meters (m). Then multiply that number by itself to get a measurement called \"meters squared.\"  ? For example, for a person who is 1.75 m tall, the \"meters squared\" measurement is 1.75 m x 1.75 m, which is equal to 3.1 meters squared.  3. Divide the number of kilograms (your weight) by the meters squared number. In this example: 70 ÷ 3.1 = 22.6. This is your BMI.  What do the results mean?  BMI charts are used to identify whether you are underweight, normal weight, overweight, or obese. The following guidelines will be used:  · Underweight: BMI less than 18.5.  · Normal weight: BMI between 18.5 and 24.9.  · Overweight: BMI between 25 and 29.9.  · Obese: BMI of 30 or above.  Keep these notes in mind:  · Weight includes both fat and muscle, so someone with a muscular build, such as an athlete, may have a BMI that is higher than 24.9. In cases like these, BMI is not an accurate measure of body fat.  · To determine if excess body fat is the cause of a BMI of 25 or higher, further assessments may need to be done by a health care provider.  · BMI is usually interpreted in the same way for men and women.  Where to find more information  For more information about BMI, including tools to quickly calculate your BMI, go to these websites:  · Centers for Disease Control and Prevention: www.cdc.gov  · American Heart Association: www.heart.org  · National Heart, Lung, and Blood [place]: www.nhlbi.nih.gov  Summary  · Body mass index (BMI) is a number that is calculated from a person's weight and height.  · BMI may help estimate how much of a person's weight is composed of fat. BMI can help identify those who may be at higher risk for certain medical problems.  · BMI can be measured using English measurements or metric measurements.  · BMI charts are used to identify whether you are underweight, normal " weight, overweight, or obese.  This information is not intended to replace advice given to you by your health care provider. Make sure you discuss any questions you have with your health care provider.  Document Released: 08/29/2005 Document Revised: 09/09/2020 Document Reviewed: 07/17/2020  Elsevier Patient Education © 2020 [employer]

## 2020-11-03 NOTE — PROGRESS NOTES
"Subjective     Shawnee Suarez is a 55 y.o. female    Patient is here for yearly checkup.  She has complaints of weight gain.    Gynecologic Exam  The patient's pertinent negatives include no pelvic pain, vaginal bleeding or vaginal discharge. The patient is experiencing no pain. Pertinent negatives include no abdominal pain, anorexia, back pain, chills, constipation, diarrhea, discolored urine, dysuria, fever, flank pain, frequency, headaches, hematuria, joint pain, joint swelling, nausea, painful intercourse, rash, sore throat, urgency or vomiting. She is sexually active. She is postmenopausal.         /74   Ht 161.3 cm (63.5\")   Wt 79.8 kg (176 lb)   BMI 30.68 kg/m²     Outpatient Encounter Medications as of 11/3/2020   Medication Sig Dispense Refill   • cholecalciferol (VITAMIN D3) 1000 units tablet Take 2,000 Units by mouth Daily.     • clonazePAM (KlonoPIN) 1 MG tablet TAKE ONE TABLET BY MOUTH TWICE A DAY AS NEEDED FOR SEIZURES 60 tablet 0   • fluticasone (FLONASE) 50 MCG/ACT nasal spray 2 sprays into each nostril Daily.     • Lactobacillus (REPHRESH PRO-B PO) Take 1 tablet by mouth Daily.     • loratadine (ALLERGY) 10 MG tablet Take 10 mg by mouth Daily.     • Multiple Vitamins-Minerals (WOMENS 50+ MULTI VITAMIN/MIN PO) Take  by mouth.     • rosuvastatin (Crestor) 10 MG tablet Take 1 tablet by mouth Daily. 90 tablet 0   • Trintellix 20 MG tablet TAKE ONE TABLET BY MOUTH EVERY DAY 90 tablet 0   • ZOLMitriptan (ZOMIG) 2.5 MG tablet TAKE 1 TABLET BY MOUTH TWICE DAILY AS NEEDED FOR HEADACHE 18 tablet 2   • phentermine 37.5 MG capsule Take 1 capsule by mouth Every Morning. 30 capsule 0     No facility-administered encounter medications on file as of 11/3/2020.        Surgical History  Past Surgical History:   Procedure Laterality Date   • APPENDECTOMY     • BREAST BIOPSY Right 11/1989   • CHOLECYSTECTOMY     • COLONOSCOPY  11/22/2013    Normal exam repeat in 10 years   • COLONOSCOPY N/A 11/13/2018    " Procedure: COLONOSCOPY WITH ANESTHESIA;  Surgeon: Da Miles MD;  Location: [facility] ENDOSCOPY;  Service: Gastroenterology   • D&C HYSTEROSCOPY ENDOMETRIAL ABLATION N/A 2/6/2017    Procedure: DILATATION AND CURETTAGE HYSTEROSCOPY NOVASURE ENDOMETRIAL ABLATION;  Surgeon: Ana Barrios MD;  Location: [facility] OR;  Service:    • DIAGNOSTIC LAPAROSCOPY N/A 6/17/2019    Procedure: DIAGNOSTIC LAPAROSCOPY;  Surgeon: Ana Barrios MD;  Location: [facility] OR;  Service: Obstetrics/Gynecology   • OVARIAN CYST REMOVAL      x 2   • SALPINGO OOPHORECTOMY Bilateral 6/17/2019    Procedure: SALPINGO OOPHORECTOMY LAPAROSCOPIC;  Surgeon: Ana Barrios MD;  Location: [facility] OR;  Service: Obstetrics/Gynecology       Family History  Family History   Problem Relation Age of Onset   • Ovarian cancer Mother 72   • Brain cancer Father    • No Known Problems Brother    • No Known Problems Daughter    • No Known Problems Maternal Grandmother    • No Known Problems Maternal Grandfather    • No Known Problems Paternal Grandmother    • Brain cancer Paternal Grandfather    • Heart disease Brother    • No Known Problems Daughter    • Breast cancer Other 28   • No Known Problems Maternal Aunt    • No Known Problems Paternal Aunt    • Uterine cancer Neg Hx    • Colon cancer Neg Hx    • Melanoma Neg Hx    • Prostate cancer Neg Hx    • BRCA 1/2 Neg Hx    • Endometrial cancer Neg Hx    • Colon polyps Neg Hx        The following portions of the patient's history were reviewed and updated as appropriate: allergies, current medications, past family history, past medical history, past social history, past surgical history and problem list.    Review of Systems   Constitutional: Positive for unexpected weight gain. Negative for activity change, appetite change, chills, diaphoresis, fatigue, fever and unexpected weight loss.   HENT: Negative for congestion, dental problem, drooling, ear discharge, ear pain, facial swelling, hearing loss, mouth sores,  nosebleeds, postnasal drip, rhinorrhea, sinus pressure, sneezing, sore throat, swollen glands, tinnitus, trouble swallowing and voice change.    Eyes: Negative for blurred vision, double vision, photophobia, pain, discharge, redness, itching and visual disturbance.   Respiratory: Negative for apnea, cough, choking, chest tightness, shortness of breath, wheezing and stridor.    Cardiovascular: Negative for chest pain, palpitations and leg swelling.   Gastrointestinal: Negative for abdominal distention, abdominal pain, anal bleeding, anorexia, blood in stool, constipation, diarrhea, nausea, rectal pain, vomiting, GERD and indigestion.   Endocrine: Negative for cold intolerance, heat intolerance, polydipsia, polyphagia and polyuria.   Genitourinary: Negative for amenorrhea, breast discharge, breast lump, breast pain, decreased libido, decreased urine volume, difficulty urinating, dyspareunia, dysuria, flank pain, frequency, genital sores, hematuria, menstrual problem, pelvic pain, pelvic pressure, urgency, urinary incontinence, vaginal bleeding, vaginal discharge and vaginal pain.   Musculoskeletal: Negative for arthralgias, back pain, gait problem, joint pain, joint swelling, myalgias, neck pain, neck stiffness and bursitis.   Skin: Negative for color change, dry skin and rash.   Allergic/Immunologic: Negative for environmental allergies, food allergies and immunocompromised state.   Neurological: Negative for dizziness, tremors, seizures, syncope, facial asymmetry, speech difficulty, weakness, light-headedness, numbness, headache, memory problem and confusion.   Hematological: Negative for adenopathy. Does not bruise/bleed easily.   Psychiatric/Behavioral: Negative for agitation, behavioral problems, decreased concentration, dysphoric mood, hallucinations, self-injury, sleep disturbance, suicidal ideas, negative for hyperactivity, depressed mood and stress. The patient is not nervous/anxious.        Objective    Physical Exam  Vitals signs and nursing note reviewed. Exam conducted with a chaperone present.   Constitutional:       General: She is not in acute distress.     Appearance: She is well-developed. She is not diaphoretic.   HENT:      Head: Normocephalic.      Right Ear: External ear normal.      Left Ear: External ear normal.      Nose: Nose normal.   Eyes:      General: No scleral icterus.        Right eye: No discharge.         Left eye: No discharge.      Conjunctiva/sclera: Conjunctivae normal.   Neck:      Musculoskeletal: Normal range of motion and neck supple.      Thyroid: No thyromegaly.      Vascular: No carotid bruit.      Trachea: No tracheal deviation.   Cardiovascular:      Rate and Rhythm: Normal rate and regular rhythm.      Heart sounds: Normal heart sounds. No murmur.   Pulmonary:      Effort: Pulmonary effort is normal. No respiratory distress.      Breath sounds: Normal breath sounds. No wheezing.   Chest:      Breasts: Breasts are symmetrical.         Right: No inverted nipple, mass, nipple discharge, skin change or tenderness.         Left: No inverted nipple, mass, nipple discharge, skin change or tenderness.   Abdominal:      General: There is no distension.      Palpations: Abdomen is soft. There is no mass.      Tenderness: There is no abdominal tenderness. There is no guarding.      Hernia: No hernia is present. There is no hernia in the left inguinal area or right inguinal area.   Genitourinary:     General: Normal vulva.      Exam position: Lithotomy position.      Labia:         Right: No rash, tenderness, lesion or injury.         Left: No rash, tenderness, lesion or injury.       Vagina: Normal. No signs of injury and foreign body. No vaginal discharge, erythema, tenderness or bleeding.      Cervix: Normal.      Uterus: Normal. Not enlarged, not fixed and not tender.       Adnexa:         Right: No mass, tenderness or fullness.          Left: No mass, tenderness or fullness.         Rectum: Normal. No mass.      Comments: Ovaries are surgically absent  BSU normal  Urethral meatus  Normal  Perineum  Normal  Musculoskeletal: Normal range of motion.         General: No tenderness.   Lymphadenopathy:      Head:      Right side of head: No submental, submandibular, tonsillar, preauricular, posterior auricular or occipital adenopathy.      Left side of head: No submental, submandibular, tonsillar, preauricular, posterior auricular or occipital adenopathy.      Cervical: No cervical adenopathy.      Right cervical: No superficial, deep or posterior cervical adenopathy.     Left cervical: No superficial, deep or posterior cervical adenopathy.      Lower Body: No right inguinal adenopathy. No left inguinal adenopathy.   Skin:     General: Skin is warm and dry.      Findings: No bruising, erythema or rash.   Neurological:      Mental Status: She is alert and oriented to person, place, and time.      Coordination: Coordination normal.   Psychiatric:         Mood and Affect: Mood normal.         Behavior: Behavior normal.         Thought Content: Thought content normal.         Judgment: Judgment normal.         Assessment/Plan   Diagnoses and all orders for this visit:    1. Well woman exam with routine gynecological exam (Primary)  Normal GYN exam. Will have lab work at PCP. Encouraged SBE, pt is aware how to do self breast exam and the importance of same. Discussed weight management and importance of maintaining a healthy weight. Discussed Vitamin D intake and the importance of adequate vitamin D for both Bone Health and a healthy immune system.  Discussed Daily exercise and the importance of same, in regards to a healthy heart as well as helping to maintain her weight and improving her mental health.  BMI 30.7.  Colonoscopy will be scheduled next month.  Mammogram was already done and bone density will be scheduled at [facility].  Pap smear is done per ASCCP guidelines.  -     Liquid-based  Pap Smear, Screening    2. Encounter for screening mammogram for breast cancer  Comments:  Patient had her mammogram today at [facility].    3. Encounter for screening for osteoporosis  Comments:  Patient will have a bone density at [facility].  Orders:  -     DEXA Bone Density Axial; Future    4. Weight gain  Comments:  Patient has gained weight and would like to try medication for same.  She will try phentermine.  Ludin is reviewed.  RTO in 1 month.  Orders:  -     phentermine 37.5 MG capsule; Take 1 capsule by mouth Every Morning.  Dispense: 30 capsule; Refill: 0    5. 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  She has an appointment to be scheduled for colonoscopy.         Patient's Body mass index is 30.68 kg/m². BMI is above normal parameters. Recommendations include: educational material, exercise counseling and nutrition counseling.      Janett Davila, APRN  11/3/2020

## 2020-11-05 ENCOUNTER — HOSPITAL ENCOUNTER (OUTPATIENT)
Dept: MAMMOGRAPHY | Facility: HOSPITAL | Age: 55
Discharge: HOME | End: 2020-11-05

## 2020-11-05 ENCOUNTER — HOSPITAL ENCOUNTER (OUTPATIENT)
Dept: ULTRASOUND IMAGING | Facility: HOSPITAL | Age: 55
Discharge: HOME | End: 2020-11-05

## 2020-11-05 DIAGNOSIS — R92.8 ABNORMAL MAMMOGRAM: ICD-10-CM

## 2020-11-05 DIAGNOSIS — R92.8 ABNORMAL MAMMOGRAM: Primary | ICD-10-CM

## 2020-11-05 PROCEDURE — 76642 ULTRASOUND BREAST LIMITED: CPT

## 2020-11-05 PROCEDURE — 77065 DX MAMMO INCL CAD UNI: CPT

## 2020-11-05 PROCEDURE — G0279 TOMOSYNTHESIS, MAMMO: HCPCS

## 2020-11-20 ENCOUNTER — TRANSCRIBE ORDERS (OUTPATIENT)
Dept: ADMINISTRATIVE | Facility: HOSPITAL | Age: 55
End: 2020-11-20

## 2020-11-20 DIAGNOSIS — N63.11 LUMP IN UPPER OUTER QUADRANT OF RIGHT BREAST: Primary | ICD-10-CM

## 2020-11-30 ENCOUNTER — OFFICE VISIT (OUTPATIENT)
Dept: OBSTETRICS AND GYNECOLOGY | Facility: CLINIC | Age: 55
End: 2020-11-30

## 2020-11-30 ENCOUNTER — HOSPITAL ENCOUNTER (OUTPATIENT)
Dept: MAMMOGRAPHY | Facility: HOSPITAL | Age: 55
End: 2020-11-30

## 2020-11-30 ENCOUNTER — HOSPITAL ENCOUNTER (OUTPATIENT)
Dept: ULTRASOUND IMAGING | Facility: HOSPITAL | Age: 55
Discharge: HOME | End: 2020-11-30
Admitting: SPECIALIST

## 2020-11-30 VITALS
DIASTOLIC BLOOD PRESSURE: 80 MMHG | BODY MASS INDEX: 28.51 KG/M2 | SYSTOLIC BLOOD PRESSURE: 110 MMHG | HEIGHT: 64 IN | WEIGHT: 167 LBS

## 2020-11-30 DIAGNOSIS — R92.8 ABNORMAL MAMMOGRAM: ICD-10-CM

## 2020-11-30 DIAGNOSIS — R63.5 WEIGHT GAIN: Primary | ICD-10-CM

## 2020-11-30 PROCEDURE — 99213 OFFICE O/P EST LOW 20 MIN: CPT | Performed by: NURSE PRACTITIONER

## 2020-11-30 PROCEDURE — 76642 ULTRASOUND BREAST LIMITED: CPT

## 2020-11-30 RX ORDER — PHENTERMINE HYDROCHLORIDE 37.5 MG/1
37.5 CAPSULE ORAL EVERY MORNING
Qty: 30 CAPSULE | Refills: 0 | Status: SHIPPED | OUTPATIENT
Start: 2020-11-30 | End: 2020-12-10

## 2020-11-30 RX ORDER — CALCIUM CARB/VITAMIN D3/VIT K1 650MG-12.5
TABLET,CHEWABLE ORAL 2 TIMES DAILY
COMMUNITY

## 2020-11-30 NOTE — PROGRESS NOTES
"Subjective     Shawnee Suarez is a 55 y.o. female    Here for follow up on weight loss. She has been on Phentermine for 1 month. She has lost 9 pounds. She has lost a total of 9 pounds. She has not had any side effects from the medication. She has been doing well with diet and exercise and has been drinking at least 64 ozs. of water daily. We discussed increasing Protein in her diet.    Patient was supposed to have a breast biopsy this morning for an abnormal mammogram.  But when they repeated the breast ultrasound the area had shrunk in size so they are just going to repeat the ultrasound and mammogram in 6 months.        /80   Ht 161.3 cm (63.5\")   Wt 75.8 kg (167 lb)   BMI 29.12 kg/m²     Outpatient Encounter Medications as of 11/30/2020   Medication Sig Dispense Refill   • Calcium-Vitamin D-Vitamin K (Viactiv Calcium Plus D) 650-12.5-40 MG-MCG-MCG chewable tablet Chew 2 (two) times a day.     • cholecalciferol (VITAMIN D3) 1000 units tablet Take 2,000 Units by mouth Daily.     • clonazePAM (KlonoPIN) 1 MG tablet TAKE ONE TABLET BY MOUTH TWICE A DAY AS NEEDED FOR SEIZURES 60 tablet 0   • fluticasone (FLONASE) 50 MCG/ACT nasal spray 2 sprays into each nostril Daily.     • Lactobacillus (REPHRESH PRO-B PO) Take 1 tablet by mouth Daily.     • loratadine (ALLERGY) 10 MG tablet Take 10 mg by mouth Daily.     • Multiple Vitamins-Minerals (WOMENS 50+ MULTI VITAMIN/MIN PO) Take  by mouth.     • phentermine 37.5 MG capsule Take 1 capsule by mouth Every Morning. 30 capsule 0   • rosuvastatin (Crestor) 10 MG tablet Take 1 tablet by mouth Daily. 90 tablet 0   • Trintellix 20 MG tablet TAKE ONE TABLET BY MOUTH EVERY DAY 90 tablet 0   • ZOLMitriptan (ZOMIG) 2.5 MG tablet TAKE 1 TABLET BY MOUTH TWICE DAILY AS NEEDED FOR HEADACHE 18 tablet 2   • [DISCONTINUED] phentermine 37.5 MG capsule Take 1 capsule by mouth Every Morning. 30 capsule 0     No facility-administered encounter medications on file as of 11/30/2020.  "       Surgical History  Past Surgical History:   Procedure Laterality Date   • APPENDECTOMY     • BREAST BIOPSY Right 11/1989   • CHOLECYSTECTOMY     • COLONOSCOPY  11/22/2013    Normal exam repeat in 10 years   • COLONOSCOPY N/A 11/13/2018    Normal but prep was only fair repeat in 2 years   • D&C HYSTEROSCOPY ENDOMETRIAL ABLATION N/A 2/6/2017    Procedure: DILATATION AND CURETTAGE HYSTEROSCOPY NOVASURE ENDOMETRIAL ABLATION;  Surgeon: Ana Barrios MD;  Location: [facility] OR;  Service:    • DIAGNOSTIC LAPAROSCOPY N/A 6/17/2019    Procedure: DIAGNOSTIC LAPAROSCOPY;  Surgeon: Ana Barrios MD;  Location: [facility] OR;  Service: Obstetrics/Gynecology   • OVARIAN CYST REMOVAL      x 2   • SALPINGO OOPHORECTOMY Bilateral 6/17/2019    Procedure: SALPINGO OOPHORECTOMY LAPAROSCOPIC;  Surgeon: Ana Barrios MD;  Location: [facility] OR;  Service: Obstetrics/Gynecology       Family History  Family History   Problem Relation Age of Onset   • Ovarian cancer Mother 72   • Brain cancer Father    • No Known Problems Brother    • No Known Problems Daughter    • No Known Problems Maternal Grandmother    • No Known Problems Maternal Grandfather    • No Known Problems Paternal Grandmother    • Brain cancer Paternal Grandfather    • Heart disease Brother    • No Known Problems Daughter    • Breast cancer Other 28   • No Known Problems Maternal Aunt    • No Known Problems Paternal Aunt    • Uterine cancer Neg Hx    • Colon cancer Neg Hx    • Melanoma Neg Hx    • Prostate cancer Neg Hx    • BRCA 1/2 Neg Hx    • Endometrial cancer Neg Hx    • Colon polyps Neg Hx        The following portions of the patient's history were reviewed and updated as appropriate: allergies, current medications, past family history, past medical history, past social history, past surgical history and problem list.    Review of Systems   Constitutional: Negative for activity change, appetite change, chills, diaphoresis, fatigue, fever, unexpected weight gain and  unexpected weight loss.   HENT: Negative for congestion, dental problem, drooling, ear discharge, ear pain, facial swelling, hearing loss, mouth sores, nosebleeds, postnasal drip, rhinorrhea, sinus pressure, sneezing, sore throat, swollen glands, tinnitus, trouble swallowing and voice change.    Eyes: Negative for blurred vision, double vision, photophobia, pain, discharge, redness, itching and visual disturbance.   Respiratory: Negative for apnea, cough, choking, chest tightness, shortness of breath, wheezing and stridor.    Cardiovascular: Negative for chest pain, palpitations and leg swelling.   Gastrointestinal: Negative for abdominal distention, abdominal pain, anal bleeding, blood in stool, constipation, diarrhea, nausea, rectal pain, vomiting, GERD and indigestion.   Endocrine: Negative for cold intolerance, heat intolerance, polydipsia, polyphagia and polyuria.   Genitourinary: Negative for amenorrhea, breast discharge, breast lump, breast pain, decreased libido, decreased urine volume, difficulty urinating, dyspareunia, dysuria, flank pain, frequency, genital sores, hematuria, menstrual problem, pelvic pain, pelvic pressure, urgency, urinary incontinence, vaginal bleeding, vaginal discharge and vaginal pain.   Musculoskeletal: Negative for arthralgias, back pain, gait problem, joint swelling, myalgias, neck pain, neck stiffness and bursitis.   Skin: Negative for color change, dry skin and rash.   Allergic/Immunologic: Negative for environmental allergies, food allergies and immunocompromised state.   Neurological: Negative for dizziness, tremors, seizures, syncope, facial asymmetry, speech difficulty, weakness, light-headedness, numbness, headache, memory problem and confusion.   Hematological: Negative for adenopathy. Does not bruise/bleed easily.   Psychiatric/Behavioral: Negative for agitation, behavioral problems, decreased concentration, dysphoric mood, hallucinations, self-injury, sleep disturbance,  suicidal ideas, negative for hyperactivity, depressed mood and stress. The patient is not nervous/anxious.        Objective   Physical Exam  Vitals signs and nursing note reviewed.   Constitutional:       Appearance: She is well-developed.   HENT:      Head: Normocephalic and atraumatic.   Eyes:      General:         Right eye: No discharge.         Left eye: No discharge.      Conjunctiva/sclera: Conjunctivae normal.   Neck:      Musculoskeletal: Normal range of motion and neck supple.      Thyroid: No thyromegaly.   Cardiovascular:      Rate and Rhythm: Normal rate and regular rhythm.      Heart sounds: Normal heart sounds.   Pulmonary:      Effort: Pulmonary effort is normal.      Breath sounds: Normal breath sounds.   Skin:     General: Skin is warm and dry.   Neurological:      Mental Status: She is alert and oriented to person, place, and time.   Psychiatric:         Behavior: Behavior normal.         Thought Content: Thought content normal.         Judgment: Judgment normal.         Assessment/Plan   Diagnoses and all orders for this visit:    1. Weight gain (Primary)  Comments:  This is her first month on phentermine. She has lost 9 pounds.  Ludni is reviewed.  RTO in 1 month.  Orders:  -     phentermine 37.5 MG capsule; Take 1 capsule by mouth Every Morning.  Dispense: 30 capsule; Refill: 0    2. Abnormal mammogram  Comments:  Patient has had a repeat breast ultrasound this morning and they determined she did not need a breast biopsy.  They are just going to repeat the ultrasound and mammogram in 6 months.         Patient's Body mass index is 29.12 kg/m². BMI is above normal parameters. Recommendations include: educational material, exercise counseling, nutrition counseling and pharmacological intervention.      MICHELLE Nuñez  11/30/2020  Answers for HPI/ROS submitted by the patient on 11/30/2020   What is the primary reason for your visit?: Other  Please describe your symptoms.: No symptoms.  Have  you had these symptoms before?: Yes  How long have you been having these symptoms?: Greater than 2 weeks  Please list any medications you are currently taking for this condition.: None

## 2020-12-01 ENCOUNTER — TELEMEDICINE (OUTPATIENT)
Dept: GASTROENTEROLOGY | Facility: CLINIC | Age: 55
End: 2020-12-01

## 2020-12-01 VITALS — WEIGHT: 166 LBS | HEIGHT: 63 IN | BODY MASS INDEX: 29.41 KG/M2

## 2020-12-01 DIAGNOSIS — Z15.89 GENETIC SUSCEPTIBILITY TO OTHER DISEASE: ICD-10-CM

## 2020-12-01 DIAGNOSIS — Z78.9 NONSMOKER: ICD-10-CM

## 2020-12-01 DIAGNOSIS — Z12.11 ENCOUNTER FOR SCREENING FOR MALIGNANT NEOPLASM OF COLON: Primary | ICD-10-CM

## 2020-12-01 DIAGNOSIS — Z80.41 FAMILY HISTORY OF OVARIAN CANCER: ICD-10-CM

## 2020-12-01 PROCEDURE — S0285 CNSLT BEFORE SCREEN COLONOSC: HCPCS | Performed by: CLINICAL NURSE SPECIALIST

## 2020-12-01 NOTE — PROGRESS NOTES
Shawnee Suarez  1965      12/1/2020  Chief Complaint   Patient presents with   • Colonoscopy     Subjective   HPI  Shawnee Suarez is a 55 y.o. female who presents as a referral for preventative maintenance. She has no complaints of nausea or vomiting. No change in bowels. No wt loss. No BRBPR. No melena. There is NO family hx for colon cancer. No abdominal pain.  Past Medical History:   Diagnosis Date   • Anxiety    • Depression    • History of transfusion    • Hyperlipidemia    • Migraines    • Ovarian cyst     x 2      Past Surgical History:   Procedure Laterality Date   • APPENDECTOMY     • BREAST BIOPSY Right 11/1989   • CHOLECYSTECTOMY     • COLONOSCOPY  11/22/2013    Normal exam repeat in 10 years   • COLONOSCOPY N/A 11/13/2018    Normal but prep was only fair repeat in 2 years   • D&C HYSTEROSCOPY ENDOMETRIAL ABLATION N/A 2/6/2017    Procedure: DILATATION AND CURETTAGE HYSTEROSCOPY NOVASURE ENDOMETRIAL ABLATION;  Surgeon: Ana Barrios MD;  Location: [facility] OR;  Service:    • DIAGNOSTIC LAPAROSCOPY N/A 6/17/2019    Procedure: DIAGNOSTIC LAPAROSCOPY;  Surgeon: Ana Barrios MD;  Location: [facility] OR;  Service: Obstetrics/Gynecology   • OVARIAN CYST REMOVAL      x 2   • SALPINGO OOPHORECTOMY Bilateral 6/17/2019    Procedure: SALPINGO OOPHORECTOMY LAPAROSCOPIC;  Surgeon: Ana Barrios MD;  Location: [facility] OR;  Service: Obstetrics/Gynecology     Outpatient Medications Marked as Taking for the 12/1/20 encounter (Telemedicine) with Bree Fisher APRN   Medication Sig Dispense Refill   • Calcium-Vitamin D-Vitamin K (Viactiv Calcium Plus D) 650-12.5-40 MG-MCG-MCG chewable tablet Chew 2 (two) times a day.     • cholecalciferol (VITAMIN D3) 1000 units tablet Take 2,000 Units by mouth Daily.     • clonazePAM (KlonoPIN) 1 MG tablet TAKE ONE TABLET BY MOUTH TWICE A DAY AS NEEDED FOR SEIZURES 60 tablet 0   • fluticasone (FLONASE) 50 MCG/ACT nasal spray 2 sprays into each nostril Daily.     • Lactobacillus  (REPHRESH PRO-B PO) Take 1 tablet by mouth Daily.     • loratadine (ALLERGY) 10 MG tablet Take 10 mg by mouth Daily.     • Multiple Vitamins-Minerals (WOMENS 50+ MULTI VITAMIN/MIN PO) Take  by mouth.     • phentermine 37.5 MG capsule Take 1 capsule by mouth Every Morning. 30 capsule 0   • rosuvastatin (Crestor) 10 MG tablet Take 1 tablet by mouth Daily. 90 tablet 0   • Trintellix 20 MG tablet TAKE ONE TABLET BY MOUTH EVERY DAY 90 tablet 0   • ZOLMitriptan (ZOMIG) 2.5 MG tablet TAKE 1 TABLET BY MOUTH TWICE DAILY AS NEEDED FOR HEADACHE 18 tablet 2     Allergies   Allergen Reactions   • Percocet [Oxycodone-Acetaminophen] Itching     Social History     Socioeconomic History   • Marital status:      Spouse name: Not on file   • Number of children: Not on file   • Years of education: Not on file   • Highest education level: Not on file   Tobacco Use   • Smoking status: Never Smoker   • Smokeless tobacco: Never Used   Substance and Sexual Activity   • Alcohol use: No   • Drug use: No   • Sexual activity: Defer     Birth control/protection: Post-menopausal     Family History   Problem Relation Age of Onset   • Ovarian cancer Mother 72   • Brain cancer Father    • No Known Problems Brother    • No Known Problems Daughter    • No Known Problems Maternal Grandmother    • No Known Problems Maternal Grandfather    • No Known Problems Paternal Grandmother    • Brain cancer Paternal Grandfather    • Heart disease Brother    • No Known Problems Daughter    • Breast cancer Other 28   • No Known Problems Maternal Aunt    • No Known Problems Paternal Aunt    • Uterine cancer Neg Hx    • Colon cancer Neg Hx    • Melanoma Neg Hx    • Prostate cancer Neg Hx    • BRCA 1/2 Neg Hx    • Endometrial cancer Neg Hx    • Colon polyps Neg Hx      Health Maintenance   Topic Date Due   • COLONOSCOPY  11/13/2020   • ZOSTER VACCINE (1 of 2) 09/28/2021 (Originally 4/15/2013)   • LIPID PANEL  09/28/2021   • ANNUAL PHYSICAL  09/29/2021   •  "MAMMOGRAM  11/05/2021   • PAP SMEAR  11/03/2023   • TDAP/TD VACCINES (2 - Td) 06/01/2025   • HEPATITIS C SCREENING  Completed   • INFLUENZA VACCINE  Completed   • Pneumococcal Vaccine 0-64  Aged Out       REVIEW OF SYSTEMS  General: well appearing, no fever chills or sweats, no unexplained wt loss  HEENT: no acute visual or hearing disturbances  Cardiovascular: No chest pain or palpitations  Pulmonary: No shortness of breath, coughing, wheezing or hemoptysis  : No burning, urgency, hematuria, or dysuria  Musculoskeletal: No joint pain or stiffness  Peripheral: no edema  Skin: No lesions or rashes  Neuro: No dizziness, headaches, stroke, syncope  Endocrine: No hot or cold intolerances  Hematological: No blood dyscrasias    Objective   Vitals:    12/01/20 1442   Weight: 75.3 kg (166 lb)   Height: 160 cm (63\")     Body mass index is 29.41 kg/m².  Patient's Body mass index is 29.41 kg/m². BMI is above normal parameters. Recommendations include: nutrition counseling.      PHYSICAL EXAM  General: age appropriate well nourished well appearing, no acute distress  Head: normocephalic and atraumatic  Global assessment-supple  Neck-No JVD notedNeuro-Non focal, converses appropriately, awake, alert, oriented    Assessment/Plan     Diagnoses and all orders for this visit:    1. Encounter for screening for malignant neoplasm of colon (Primary)  Comments:  poor prep 2 years ago  Orders:  -     Case Request; Standing  -     Implement Anesthesia Orders Day of Procedure; Standing  -     Obtain Informed Consent; Standing  -     Verify bowel prep was successful; Standing  -     Case Request  -     polyethylene glycol (GoLYTELY) 236 g solution; Take as directed by office instructions.  Dispense: 4000 mL; Refill: 0    2. Nonsmoker    3. Family history of ovarian cancer    4. Genetic susceptibility to other disease    discussed previous poor prep and she is going to do the Golytely and we are going to support with Miralax for " constipation a few days prior.     COLONOSCOPY WITH ANESTHESIA (N/A)  Body mass index is 29.41 kg/m².    Patient instructions on prep prior to procedure provided to the patient.    All risks, benefits, alternatives, and indications of colonoscopy procedure have been discussed with the patient. Risks to include perforation of the colon requiring possible surgery or colostomy, risk of bleeding from biopsies or removal of colon tissue, possibility of missing a colon polyp or cancer, or adverse drug reaction.  Benefits to include the diagnosis and management of disease of the colon and rectum. Alternatives to include barium enema, radiographic evaluation, lab testing or no intervention. Pt verbalizes understanding and agrees.     Bree Fisher, APRN  2020  14:50 CST      IF YOU SMOKE OR USE TOBACCO PLEASE READ THE FOLLOWIN minutes reading provided    Why is smoking bad for me?  Smoking increases the risk of heart disease, lung disease, vascular disease, stroke, and cancer.     If you smoke, STOP!    If you would like more information on quitting smoking, please visit the [employer] website: [email or web address]/[email or web address]/healthier-[email or web address]   This link will provide additional resources including the QUIT line and the Beat the Pack support groups.     For more information:    Quit Now [place]  -QUIT-NOW  https://[place].[employer].org/en-US/    Obesity, Adult  Obesity is the condition of having too much total body fat. Being overweight or obese means that your weight is greater than what is considered healthy for your body size. Obesity is determined by a measurement called BMI. BMI is an estimate of body fat and is calculated from height and weight. For adults, a BMI of 30 or higher is considered obese.  Obesity can eventually lead to other health concerns and major illnesses, including:  · Stroke.  · Coronary artery disease (CAD).  · Type 2 diabetes.  · Some types of cancer,  including cancers of the colon, breast, uterus, and gallbladder.  · Osteoarthritis.  · High blood pressure (hypertension).  · High cholesterol.  · Sleep apnea.  · Gallbladder stones.  · Infertility problems.  What are the causes?  The main cause of obesity is taking in (consuming) more calories than your body uses for energy. Other factors that contribute to this condition may include:  · Being born with genes that make you more likely to become obese.  · Having a medical condition that causes obesity. These conditions include:  ¨ Hypothyroidism.  ¨ Polycystic ovarian syndrome (PCOS).  ¨ Binge-eating disorder.  ¨ Cushing syndrome.  · Taking certain medicines, such as steroids, antidepressants, and seizure medicines.  · Not being physically active (sedentary lifestyle).  · Living where there are limited places to exercise safely or buy healthy foods.  · Not getting enough sleep.  What increases the risk?  The following factors may increase your risk of this condition:  · Having a family history of obesity.  · Being a woman of -American descent.  · Being a man of  descent.  What are the signs or symptoms?  Having excessive body fat is the main symptom of this condition.  How is this diagnosed?  This condition may be diagnosed based on:  · Your symptoms.  · Your medical history.  · A physical exam. Your health care provider may measure:  ¨ Your BMI. If you are an adult with a BMI between 25 and less than 30, you are considered overweight. If you are an adult with a BMI of 30 or higher, you are considered obese.  ¨ The distances around your hips and your waist (circumferences). These may be compared to each other to help diagnose your condition.  ¨ Your skinfold thickness. Your health care provider may gently pinch a fold of your skin and measure it.  How is this treated?  Treatment for this condition often includes changing your lifestyle. Treatment may include some or all of the following:  · Dietary  changes. Work with your health care provider and a dietitian to set a weight-loss goal that is healthy and reasonable for you. Dietary changes may include eating:  ¨ Smaller portions. A portion size is the amount of a particular food that is healthy for you to eat at one time. This varies from person to person.  ¨ Low-calorie or low-fat options.  ¨ More whole grains, fruits, and vegetables.  · Regular physical activity. This may include aerobic activity (cardio) and strength training.  · Medicine to help you lose weight. Your health care provider may prescribe medicine if you are unable to lose 1 pound a week after 6 weeks of eating more healthily and doing more physical activity.  · Surgery. Surgical options may include gastric banding and gastric bypass. Surgery may be done if:  ¨ Other treatments have not helped to improve your condition.  ¨ You have a BMI of 40 or higher.  ¨ You have life-threatening health problems related to obesity.  Follow these instructions at home:     Eating and drinking     · Follow recommendations from your health care provider about what you eat and drink. Your health care provider may advise you to:  ¨ Limit fast foods, sweets, and processed snack foods.  ¨ Choose low-fat options, such as low-fat milk instead of whole milk.  ¨ Eat 5 or more servings of fruits or vegetables every day.  ¨ Eat at home more often. This gives you more control over what you eat.  ¨ Choose healthy foods when you eat out.  ¨ Learn what a healthy portion size is.  ¨ Keep low-fat snacks on hand.  ¨ Avoid sugary drinks, such as soda, fruit juice, iced tea sweetened with sugar, and flavored milk.  ¨ Eat a healthy breakfast.  · Drink enough water to keep your urine clear or pale yellow.  · Do not go without eating for long periods of time (do not fast) or follow a fad diet. Fasting and fad diets can be unhealthy and even dangerous.  Physical Activity   · Exercise regularly, as told by your health care provider.  Ask your health care provider what types of exercise are safe for you and how often you should exercise.  · Warm up and stretch before being active.  · Cool down and stretch after being active.  · Rest between periods of activity.  Lifestyle   · Limit the time that you spend in front of your TV, computer, or video game system.  · Find ways to reward yourself that do not involve food.  · Limit alcohol intake to no more than 1 drink a day for nonpregnant women and 2 drinks a day for men. One drink equals 12 oz of beer, 5 oz of wine, or 1½ oz of hard liquor.  General instructions   · Keep a weight loss journal to keep track of the food you eat and how much you exercise you get.  · Take over-the-counter and prescription medicines only as told by your health care provider.  · Take vitamins and supplements only as told by your health care provider.  · Consider joining a support group. Your health care provider may be able to recommend a support group.  · Keep all follow-up visits as told by your health care provider. This is important.  Contact a health care provider if:  · You are unable to meet your weight loss goal after 6 weeks of dietary and lifestyle changes.  This information is not intended to replace advice given to you by your health care provider. Make sure you discuss any questions you have with your health care provider.  Document Released: 01/25/2006 Document Revised: 05/22/2017 Document Reviewed: 10/05/2016  Elsevier Interactive Patient Education © 2017 Elsevier Inc.

## 2020-12-08 DIAGNOSIS — F41.9 ANXIETY: ICD-10-CM

## 2020-12-08 RX ORDER — CLONAZEPAM 1 MG/1
TABLET ORAL
Qty: 60 TABLET | Refills: 0 | OUTPATIENT
Start: 2020-12-08

## 2020-12-10 ENCOUNTER — OFFICE VISIT (OUTPATIENT)
Dept: FAMILY MEDICINE CLINIC | Facility: CLINIC | Age: 55
End: 2020-12-10

## 2020-12-10 VITALS
BODY MASS INDEX: 29.23 KG/M2 | TEMPERATURE: 96.9 F | OXYGEN SATURATION: 97 % | HEIGHT: 63 IN | RESPIRATION RATE: 16 BRPM | WEIGHT: 165 LBS | SYSTOLIC BLOOD PRESSURE: 116 MMHG | DIASTOLIC BLOOD PRESSURE: 70 MMHG | HEART RATE: 102 BPM

## 2020-12-10 DIAGNOSIS — F41.9 ANXIETY: ICD-10-CM

## 2020-12-10 PROCEDURE — 99214 OFFICE O/P EST MOD 30 MIN: CPT | Performed by: FAMILY MEDICINE

## 2020-12-10 RX ORDER — CLONAZEPAM 1 MG/1
1 TABLET ORAL 2 TIMES DAILY PRN
Qty: 60 TABLET | Refills: 2 | Status: SHIPPED | OUTPATIENT
Start: 2020-12-10 | End: 2021-03-11 | Stop reason: SDUPTHER

## 2020-12-10 NOTE — PROGRESS NOTES
Stephanie Suarez is a 55 y.o. female.     55-year-old female with history of anxiety for management    Anxiety  Presents for follow-up visit. Symptoms include depressed mood, excessive worry and nervous/anxious behavior. Patient reports no chest pain or suicidal ideas. Symptoms occur occasionally. The severity of symptoms is mild. The quality of sleep is fair. Nighttime awakenings: occasional.           The following portions of the patient's history were reviewed and updated as appropriate: allergies, current medications, past family history, past medical history, past social history, past surgical history and problem list.    Review of Systems   Cardiovascular: Negative for chest pain and leg swelling.   Genitourinary:        Recent mammography abnormality-biopsy deferred   Psychiatric/Behavioral: Negative for sleep disturbance and suicidal ideas. The patient is nervous/anxious.        Objective   Physical Exam  Vitals signs and nursing note reviewed.   Constitutional:       Appearance: Normal appearance.   Eyes:      Extraocular Movements: Extraocular movements intact.      Pupils: Pupils are equal, round, and reactive to light.   Neck:      Vascular: No carotid bruit.   Cardiovascular:      Rate and Rhythm: Normal rate and regular rhythm.      Pulses: Normal pulses.      Heart sounds: Normal heart sounds.   Pulmonary:      Effort: Pulmonary effort is normal.      Breath sounds: Normal breath sounds.   Abdominal:      General: Abdomen is flat.      Palpations: Abdomen is soft.   Musculoskeletal:      Right lower leg: No edema.      Left lower leg: No edema.   Lymphadenopathy:      Cervical: No cervical adenopathy.   Skin:     General: Skin is warm and dry.   Neurological:      General: No focal deficit present.      Mental Status: She is alert and oriented to person, place, and time.   Psychiatric:         Mood and Affect: Mood normal.         Behavior: Behavior normal.         Thought Content: Thought  content normal.         Judgment: Judgment normal.         Assessment/Plan   Diagnoses and all orders for this visit:    1. Anxiety  -     clonazePAM (KlonoPIN) 1 MG tablet; Take 1 tablet by mouth 2 (Two) Times a Day As Needed for Seizures. 1 twice daily as needed for anxiety  Dispense: 60 tablet; Refill: 2       CONTROLLED SUBSTANCE TRACKING 4/10/2019 7/23/2019 12/23/2019 3/3/2020 4/14/2020 7/1/2020 9/28/2020   Last Ludin 4/10/2019 7/23/2019 12/23/2019 3/3/2020 4/14/2020 7/1/2020 7/1/2020   Report Number [id number]   Last UDS - 4/10/2019 4/10/2019 3/3/2020 3/3/2020 3/3/2020 3/4/2020   Last Controlled Substance Agreement 4/10/2019 4/10/2019 4/10/2019 3/3/2020 3/3/2020 3/4/2020 3/4/2020       Plan above-encouraged exercise--Covid antibodies positive-try to give plasma [facility] but they could not find adequate vein

## 2020-12-21 ENCOUNTER — TELEPHONE (OUTPATIENT)
Dept: FAMILY MEDICINE CLINIC | Facility: CLINIC | Age: 55
End: 2020-12-21

## 2020-12-21 NOTE — TELEPHONE ENCOUNTER
PATIENT CALLING IN REQUESTING TO SPEAK TO CLINICAL STAFF. SHE IS HAVING SOME ISSUES WITH THE SKIN BREAKING NEAR FINGER NAIL BEDS AND WOULD LIKE TO DISCUSS OTC OPTIONS FOR PREVENTING THAT.     PLEASE CONTACT AND ADVISE.

## 2020-12-29 ENCOUNTER — TELEPHONE (OUTPATIENT)
Dept: OBSTETRICS AND GYNECOLOGY | Facility: CLINIC | Age: 55
End: 2020-12-29

## 2020-12-30 ENCOUNTER — OFFICE VISIT (OUTPATIENT)
Dept: OBSTETRICS AND GYNECOLOGY | Facility: CLINIC | Age: 55
End: 2020-12-30

## 2020-12-30 VITALS
WEIGHT: 155 LBS | HEIGHT: 63 IN | SYSTOLIC BLOOD PRESSURE: 116 MMHG | BODY MASS INDEX: 27.46 KG/M2 | DIASTOLIC BLOOD PRESSURE: 76 MMHG

## 2020-12-30 DIAGNOSIS — R63.5 WEIGHT GAIN: Primary | ICD-10-CM

## 2020-12-30 PROCEDURE — 99213 OFFICE O/P EST LOW 20 MIN: CPT | Performed by: NURSE PRACTITIONER

## 2020-12-30 RX ORDER — PHENTERMINE HYDROCHLORIDE 37.5 MG/1
37.5 CAPSULE ORAL EVERY MORNING
Qty: 30 CAPSULE | Refills: 0 | Status: SHIPPED | OUTPATIENT
Start: 2020-12-30 | End: 2021-01-27 | Stop reason: SDUPTHER

## 2020-12-30 RX ORDER — PHENTERMINE HYDROCHLORIDE 37.5 MG/1
37.5 CAPSULE ORAL EVERY MORNING
COMMUNITY
End: 2020-12-30 | Stop reason: SDUPTHER

## 2020-12-30 RX ORDER — PHENTERMINE HYDROCHLORIDE 30 MG/1
30 CAPSULE ORAL EVERY MORNING
COMMUNITY
End: 2020-12-30

## 2020-12-30 NOTE — PROGRESS NOTES
"Subjective     Shawnee Suarez is a 55 y.o. female    Here for follow up on weight loss. She has been on Phentermine for 2 months. She has lost 10 pounds. She has lost a total of 19 pounds. She has not had any side effects from the medication. She has been doing well with diet and exercise and has been drinking at least 64 ozs. of water daily. We discussed increasing Protein in her diet.          /76   Ht 160 cm (62.99\")   Wt 70.3 kg (155 lb)   LMP  (LMP Unknown)   BMI 27.46 kg/m²     Outpatient Encounter Medications as of 12/30/2020   Medication Sig Dispense Refill   • Calcium-Vitamin D-Vitamin K (Viactiv Calcium Plus D) 650-12.5-40 MG-MCG-MCG chewable tablet Chew 2 (two) times a day.     • Cholecalciferol (Vitamin D3) 125 MCG (5000 UT) chewable tablet Chew 5,000 Units Daily.     • clonazePAM (KlonoPIN) 1 MG tablet Take 1 tablet by mouth 2 (Two) Times a Day As Needed for Seizures. 1 twice daily as needed for anxiety 60 tablet 2   • fluticasone (FLONASE) 50 MCG/ACT nasal spray 2 sprays into each nostril Daily.     • Lactobacillus (REPHRESH PRO-B PO) Take 1 tablet by mouth Daily.     • loratadine (ALLERGY) 10 MG tablet Take 10 mg by mouth Daily.     • Multiple Vitamins-Minerals (WOMENS 50+ MULTI VITAMIN/MIN PO) Take  by mouth.     • phentermine 37.5 MG capsule Take 1 capsule by mouth Every Morning. 30 capsule 0   • polyethylene glycol (GoLYTELY) 236 g solution Take as directed by office instructions. 4000 mL 0   • rosuvastatin (Crestor) 10 MG tablet Take 1 tablet by mouth Daily. 90 tablet 0   • Trintellix 20 MG tablet TAKE ONE TABLET BY MOUTH EVERY DAY 90 tablet 0   • ZOLMitriptan (ZOMIG) 2.5 MG tablet TAKE 1 TABLET BY MOUTH TWICE DAILY AS NEEDED FOR HEADACHE 18 tablet 2   • [DISCONTINUED] phentermine 30 MG capsule Take 30 mg by mouth Every Morning.     • [DISCONTINUED] phentermine 37.5 MG capsule Take 37.5 mg by mouth Every Morning.       No facility-administered encounter medications on file as of " 12/30/2020.        Surgical History  Past Surgical History:   Procedure Laterality Date   • APPENDECTOMY     • BREAST BIOPSY Right 11/1989   • CHOLECYSTECTOMY     • COLONOSCOPY  11/22/2013    Normal exam repeat in 10 years   • COLONOSCOPY N/A 11/13/2018    Normal but prep was only fair repeat in 2 years   • D&C HYSTEROSCOPY ENDOMETRIAL ABLATION N/A 2/6/2017    Procedure: DILATATION AND CURETTAGE HYSTEROSCOPY NOVASURE ENDOMETRIAL ABLATION;  Surgeon: Ana Barrios MD;  Location: [facility] OR;  Service:    • DIAGNOSTIC LAPAROSCOPY N/A 6/17/2019    Procedure: DIAGNOSTIC LAPAROSCOPY;  Surgeon: Ana Barrios MD;  Location: [facility] OR;  Service: Obstetrics/Gynecology   • OVARIAN CYST REMOVAL      x 2   • SALPINGO OOPHORECTOMY Bilateral 6/17/2019    Procedure: SALPINGO OOPHORECTOMY LAPAROSCOPIC;  Surgeon: Ana Barrios MD;  Location: [facility] OR;  Service: Obstetrics/Gynecology       Family History  Family History   Problem Relation Age of Onset   • Ovarian cancer Mother 72   • Brain cancer Father    • No Known Problems Brother    • No Known Problems Daughter    • No Known Problems Maternal Grandmother    • No Known Problems Maternal Grandfather    • No Known Problems Paternal Grandmother    • Brain cancer Paternal Grandfather    • Heart disease Brother    • No Known Problems Daughter    • Breast cancer Other 28   • No Known Problems Maternal Aunt    • No Known Problems Paternal Aunt    • Uterine cancer Neg Hx    • Colon cancer Neg Hx    • Melanoma Neg Hx    • Prostate cancer Neg Hx    • BRCA 1/2 Neg Hx    • Endometrial cancer Neg Hx    • Colon polyps Neg Hx        The following portions of the patient's history were reviewed and updated as appropriate: allergies, current medications, past family history, past medical history, past social history, past surgical history and problem list.    Review of Systems   Constitutional: Negative for activity change, appetite change, chills, diaphoresis, fatigue, fever, unexpected weight  gain and unexpected weight loss.   HENT: Negative for congestion, dental problem, drooling, ear discharge, ear pain, facial swelling, hearing loss, mouth sores, nosebleeds, postnasal drip, rhinorrhea, sinus pressure, sneezing, sore throat, swollen glands, tinnitus, trouble swallowing and voice change.    Eyes: Negative for blurred vision, double vision, photophobia, pain, discharge, redness, itching and visual disturbance.   Respiratory: Negative for apnea, cough, choking, chest tightness, shortness of breath, wheezing and stridor.    Cardiovascular: Negative for chest pain, palpitations and leg swelling.   Gastrointestinal: Negative for abdominal distention, abdominal pain, anal bleeding, blood in stool, constipation, diarrhea, nausea, rectal pain, vomiting, GERD and indigestion.   Endocrine: Negative for cold intolerance, heat intolerance, polydipsia, polyphagia and polyuria.   Genitourinary: Negative for amenorrhea, breast discharge, breast lump, breast pain, decreased libido, decreased urine volume, difficulty urinating, dyspareunia, dysuria, flank pain, frequency, genital sores, hematuria, menstrual problem, pelvic pain, pelvic pressure, urgency, urinary incontinence, vaginal bleeding, vaginal discharge and vaginal pain.   Musculoskeletal: Negative for arthralgias, back pain, gait problem, joint swelling, myalgias, neck pain, neck stiffness and bursitis.   Skin: Negative for color change, dry skin and rash.   Allergic/Immunologic: Negative for environmental allergies, food allergies and immunocompromised state.   Neurological: Negative for dizziness, tremors, seizures, syncope, facial asymmetry, speech difficulty, weakness, light-headedness, numbness, headache, memory problem and confusion.   Hematological: Negative for adenopathy. Does not bruise/bleed easily.   Psychiatric/Behavioral: Negative for agitation, behavioral problems, decreased concentration, dysphoric mood, hallucinations, self-injury, sleep  disturbance, suicidal ideas, negative for hyperactivity, depressed mood and stress. The patient is not nervous/anxious.        Objective   Physical Exam  Vitals signs and nursing note reviewed.   Constitutional:       Appearance: She is well-developed.   HENT:      Head: Normocephalic and atraumatic.   Eyes:      General:         Right eye: No discharge.         Left eye: No discharge.      Conjunctiva/sclera: Conjunctivae normal.   Neck:      Musculoskeletal: Normal range of motion and neck supple.      Thyroid: No thyromegaly.   Cardiovascular:      Rate and Rhythm: Normal rate and regular rhythm.      Heart sounds: Normal heart sounds.   Pulmonary:      Effort: Pulmonary effort is normal.      Breath sounds: Normal breath sounds.   Skin:     General: Skin is warm and dry.   Neurological:      Mental Status: She is alert and oriented to person, place, and time.   Psychiatric:         Behavior: Behavior normal.         Thought Content: Thought content normal.         Judgment: Judgment normal.         Assessment/Plan   Diagnoses and all orders for this visit:    1. Weight gain (Primary)  Comments:  This is her 2nd month on phentermine. She has lost 10 pounds. She has lost 19 pounds total. Wishes to continue. Ludin is reviewed.  Orders:  -     phentermine 37.5 MG capsule; Take 1 capsule by mouth Every Morning.  Dispense: 30 capsule; Refill: 0         Patient's Body mass index is 27.46 kg/m². BMI is above normal parameters. Recommendations include: educational material, exercise counseling, nutrition counseling and pharmacological intervention.      Janett Davila, APRN  12/30/2020

## 2020-12-30 NOTE — PATIENT INSTRUCTIONS
"BMI for Adults  What is BMI?  Body mass index (BMI) is a number that is calculated from a person's weight and height. BMI can help estimate how much of a person's weight is composed of fat. BMI does not measure body fat directly. Rather, it is an alternative to procedures that directly measure body fat, which can be difficult and expensive.  BMI can help identify people who may be at higher risk for certain medical problems.  What are BMI measurements used for?  BMI is used as a screening tool to identify possible weight problems. It helps determine whether a person is obese, overweight, a healthy weight, or underweight.  BMI is useful for:  · Identifying a weight problem that may be related to a medical condition or may increase the risk for medical problems.  · Promoting changes, such as changes in diet and exercise, to help reach a healthy weight. BMI screening can be repeated to see if these changes are working.  How is BMI calculated?  BMI involves measuring your weight in relation to your height. Both height and weight are measured, and the BMI is calculated from those numbers. This can be done either in English (U.S[place] or metric measurements. Note that charts and online BMI calculators are available to help you find your BMI quickly and easily without having to do these calculations yourself.  To calculate your BMI in English (U.S.) measurements:    1. Measure your weight in pounds (lb).  2. Multiply the number of pounds by 703.  ? For example, for a person who weighs 180 lb, multiply that number by 703, which equals 126,540.  3. Measure your height in inches. Then multiply that number by itself to get a measurement called \"inches squared.\"  ? For example, for a person who is 70 inches tall, the \"inches squared\" measurement is 70 inches x 70 inches, which equals 4,900 inches squared.  4. Divide the total from step 2 (number of lb x 703) by the total from step 3 (inches squared): 126,540 ÷ 4,900 = 25.8. This is " "your BMI.  To calculate your BMI in metric measurements:  1. Measure your weight in kilograms (kg).  2. Measure your height in meters (m). Then multiply that number by itself to get a measurement called \"meters squared.\"  ? For example, for a person who is 1.75 m tall, the \"meters squared\" measurement is 1.75 m x 1.75 m, which is equal to 3.1 meters squared.  3. Divide the number of kilograms (your weight) by the meters squared number. In this example: 70 ÷ 3.1 = 22.6. This is your BMI.  What do the results mean?  BMI charts are used to identify whether you are underweight, normal weight, overweight, or obese. The following guidelines will be used:  · Underweight: BMI less than 18.5.  · Normal weight: BMI between 18.5 and 24.9.  · Overweight: BMI between 25 and 29.9.  · Obese: BMI of 30 or above.  Keep these notes in mind:  · Weight includes both fat and muscle, so someone with a muscular build, such as an athlete, may have a BMI that is higher than 24.9. In cases like these, BMI is not an accurate measure of body fat.  · To determine if excess body fat is the cause of a BMI of 25 or higher, further assessments may need to be done by a health care provider.  · BMI is usually interpreted in the same way for men and women.  Where to find more information  For more information about BMI, including tools to quickly calculate your BMI, go to these websites:  · Centers for Disease Control and Prevention: www.cdc.gov  · American Heart Association: www.heart.org  · National Heart, Lung, and Blood [place]: www.nhlbi.nih.gov  Summary  · Body mass index (BMI) is a number that is calculated from a person's weight and height.  · BMI may help estimate how much of a person's weight is composed of fat. BMI can help identify those who may be at higher risk for certain medical problems.  · BMI can be measured using English measurements or metric measurements.  · BMI charts are used to identify whether you are underweight, normal " weight, overweight, or obese.  This information is not intended to replace advice given to you by your health care provider. Make sure you discuss any questions you have with your health care provider.  Document Revised: 09/09/2020 Document Reviewed: 07/17/2020  Elsevier Patient Education © 2020 [employer].

## 2021-01-13 RX ORDER — ROSUVASTATIN CALCIUM 10 MG/1
10 TABLET, COATED ORAL DAILY
Qty: 90 TABLET | Refills: 2 | Status: SHIPPED | OUTPATIENT
Start: 2021-01-13 | End: 2021-10-19 | Stop reason: SDUPTHER

## 2021-01-13 RX ORDER — VORTIOXETINE 20 MG/1
1 TABLET, FILM COATED ORAL DAILY
Qty: 90 TABLET | Refills: 2 | Status: SHIPPED | OUTPATIENT
Start: 2021-01-13 | End: 2021-06-11 | Stop reason: ALTCHOICE

## 2021-01-13 NOTE — TELEPHONE ENCOUNTER
Caller: Erick Shawnee MCCRAY    Relationship: Self    Best call back number: [phone]    Medication needed:   Requested Prescriptions     Pending Prescriptions Disp Refills   • Vortioxetine HBr (Trintellix) 20 MG tablet 90 tablet 0     Sig: Take 20 mg by mouth Daily.   • rosuvastatin (Crestor) 10 MG tablet 90 tablet 0     Sig: Take 1 tablet by mouth Daily.       When do you need the refill by: 1/13/2021    Does the patient have less than a 3 day supply:  [x] Yes  [] No    What is the patient's preferred pharmacy: [facility] [place], [place] - [address] [place] [address] [phone] [facility] [phone]

## 2021-01-15 ENCOUNTER — TRANSCRIBE ORDERS (OUTPATIENT)
Dept: LAB | Facility: HOSPITAL | Age: 56
End: 2021-01-15

## 2021-01-15 DIAGNOSIS — Z01.818 PRE-OP TESTING: Primary | ICD-10-CM

## 2021-01-19 ENCOUNTER — APPOINTMENT (OUTPATIENT)
Dept: LAB | Facility: HOSPITAL | Age: 56
End: 2021-01-19

## 2021-01-19 ENCOUNTER — LAB (OUTPATIENT)
Dept: LAB | Facility: HOSPITAL | Age: 56
End: 2021-01-19

## 2021-01-19 LAB — SARS-COV-2 ORF1AB RESP QL NAA+PROBE: NOT DETECTED

## 2021-01-19 PROCEDURE — C9803 HOPD COVID-19 SPEC COLLECT: HCPCS | Performed by: INTERNAL MEDICINE

## 2021-01-19 PROCEDURE — U0004 COV-19 TEST NON-CDC HGH THRU: HCPCS | Performed by: INTERNAL MEDICINE

## 2021-01-22 ENCOUNTER — ANESTHESIA (OUTPATIENT)
Dept: GASTROENTEROLOGY | Facility: HOSPITAL | Age: 56
End: 2021-01-22

## 2021-01-22 ENCOUNTER — HOSPITAL ENCOUNTER (OUTPATIENT)
Facility: HOSPITAL | Age: 56
Setting detail: HOSPITAL OUTPATIENT SURGERY
Discharge: HOME | End: 2021-01-22
Attending: INTERNAL MEDICINE | Admitting: INTERNAL MEDICINE

## 2021-01-22 ENCOUNTER — ANESTHESIA EVENT (OUTPATIENT)
Dept: GASTROENTEROLOGY | Facility: HOSPITAL | Age: 56
End: 2021-01-22

## 2021-01-22 VITALS
RESPIRATION RATE: 16 BRPM | TEMPERATURE: 97.3 F | OXYGEN SATURATION: 97 % | DIASTOLIC BLOOD PRESSURE: 76 MMHG | BODY MASS INDEX: 26.75 KG/M2 | HEIGHT: 63 IN | WEIGHT: 151 LBS | SYSTOLIC BLOOD PRESSURE: 114 MMHG | HEART RATE: 83 BPM

## 2021-01-22 DIAGNOSIS — Z12.11 ENCOUNTER FOR SCREENING FOR MALIGNANT NEOPLASM OF COLON: ICD-10-CM

## 2021-01-22 PROCEDURE — 25010000002 PROPOFOL 10 MG/ML EMULSION: Performed by: NURSE ANESTHETIST, CERTIFIED REGISTERED

## 2021-01-22 PROCEDURE — 45378 DIAGNOSTIC COLONOSCOPY: CPT | Performed by: INTERNAL MEDICINE

## 2021-01-22 RX ORDER — LIDOCAINE HYDROCHLORIDE 20 MG/ML
INJECTION, SOLUTION EPIDURAL; INFILTRATION; INTRACAUDAL; PERINEURAL AS NEEDED
Status: DISCONTINUED | OUTPATIENT
Start: 2021-01-22 | End: 2021-01-22 | Stop reason: SURG

## 2021-01-22 RX ORDER — SODIUM CHLORIDE 0.9 % (FLUSH) 0.9 %
10 SYRINGE (ML) INJECTION AS NEEDED
Status: CANCELLED | OUTPATIENT
Start: 2021-01-22

## 2021-01-22 RX ORDER — SODIUM CHLORIDE 0.9 % (FLUSH) 0.9 %
10 SYRINGE (ML) INJECTION EVERY 12 HOURS SCHEDULED
Status: CANCELLED | OUTPATIENT
Start: 2021-01-22

## 2021-01-22 RX ORDER — SODIUM CHLORIDE 0.9 % (FLUSH) 0.9 %
10 SYRINGE (ML) INJECTION AS NEEDED
Status: DISCONTINUED | OUTPATIENT
Start: 2021-01-22 | End: 2021-01-22 | Stop reason: HOSPADM

## 2021-01-22 RX ORDER — LIDOCAINE HYDROCHLORIDE 10 MG/ML
0.5 INJECTION, SOLUTION EPIDURAL; INFILTRATION; INTRACAUDAL; PERINEURAL ONCE AS NEEDED
Status: DISCONTINUED | OUTPATIENT
Start: 2021-01-22 | End: 2021-01-22 | Stop reason: HOSPADM

## 2021-01-22 RX ORDER — PROPOFOL 10 MG/ML
VIAL (ML) INTRAVENOUS AS NEEDED
Status: DISCONTINUED | OUTPATIENT
Start: 2021-01-22 | End: 2021-01-22 | Stop reason: SURG

## 2021-01-22 RX ORDER — SODIUM CHLORIDE 9 MG/ML
100 INJECTION, SOLUTION INTRAVENOUS CONTINUOUS
Status: CANCELLED | OUTPATIENT
Start: 2021-01-22

## 2021-01-22 RX ORDER — SODIUM CHLORIDE 9 MG/ML
500 INJECTION, SOLUTION INTRAVENOUS CONTINUOUS PRN
Status: DISCONTINUED | OUTPATIENT
Start: 2021-01-22 | End: 2021-01-22 | Stop reason: HOSPADM

## 2021-01-22 RX ADMIN — PROPOFOL 70 MG: 10 INJECTION, EMULSION INTRAVENOUS at 11:19

## 2021-01-22 RX ADMIN — PROPOFOL 50 MG: 10 INJECTION, EMULSION INTRAVENOUS at 11:40

## 2021-01-22 RX ADMIN — SODIUM CHLORIDE 500 ML: 9 INJECTION, SOLUTION INTRAVENOUS at 10:11

## 2021-01-22 RX ADMIN — PROPOFOL 80 MG: 10 INJECTION, EMULSION INTRAVENOUS at 11:17

## 2021-01-22 RX ADMIN — LIDOCAINE HYDROCHLORIDE 50 MG: 20 INJECTION, SOLUTION EPIDURAL; INFILTRATION; INTRACAUDAL; PERINEURAL at 11:17

## 2021-01-22 RX ADMIN — PROPOFOL 100 MG: 10 INJECTION, EMULSION INTRAVENOUS at 11:27

## 2021-01-22 RX ADMIN — PROPOFOL 100 MG: 10 INJECTION, EMULSION INTRAVENOUS at 11:35

## 2021-01-22 NOTE — ANESTHESIA POSTPROCEDURE EVALUATION
Patient: Shawnee Suarez    Procedure Summary     Date: 01/22/21 Room / Location: [facility] ENDOSCOPY 5 / [facility]    Anesthesia Start: 1114 Anesthesia Stop: 1147    Procedure: COLONOSCOPY WITH ANESTHESIA (N/A ) Diagnosis:       Encounter for screening for malignant neoplasm of colon      (Encounter for screening for malignant neoplasm of colon [Z12.11])    Surgeon: Da Miles MD Provider: Juancho Bradley CRNA    Anesthesia Type: MAC ASA Status: 2          Anesthesia Type: MAC    Vitals  Vitals Value Taken Time   BP     Temp     Pulse 95 01/22/21 1147   Resp     SpO2 96 % 01/22/21 1147   Vitals shown include unvalidated device data.        Post Anesthesia Care and Evaluation    Patient location during evaluation: PHASE II  Patient participation: complete - patient participated  Level of consciousness: awake  Pain score: 0  Pain management: adequate  Anesthetic complications: No anesthetic complications  PONV Status: none  Cardiovascular status: acceptable  Respiratory status: acceptable  Hydration status: acceptable

## 2021-01-22 NOTE — ANESTHESIA PREPROCEDURE EVALUATION
Anesthesia Evaluation     Patient summary reviewed and Nursing notes reviewed   no history of anesthetic complications:  NPO Solid Status: > 8 hours  NPO Liquid Status: > 2 hours           Airway   Mallampati: I  TM distance: >3 FB  Neck ROM: full  No difficulty expected  Dental - normal exam     Pulmonary - negative pulmonary ROS   (-) asthma, not a smoker  Cardiovascular   Exercise tolerance: good (4-7 METS)    ECG reviewed    (+) hyperlipidemia,   (-) hypertension, CAD    ROS comment: Treadmill stress test- wnl    Neuro/Psych  (+) headaches, psychiatric history Anxiety and Depression,     (-) seizures, TIA, CVA  GI/Hepatic/Renal/Endo - negative ROS   (-) liver disease, no renal disease, diabetes    Musculoskeletal     (+) arthralgias,   Abdominal    Substance History      OB/GYN negative ob/gyn ROS         Other   arthritis,                        Anesthesia Plan    ASA 2     MAC     intravenous induction     Anesthetic plan, all risks, benefits, and alternatives have been provided, discussed and informed consent has been obtained with: patient.

## 2021-01-22 NOTE — H&P
[facility] Gastroenterology  Pre Procedure History & Physical    Chief Complaint:   Screening    Subjective     HPI:   Here for colonoscopy.  Screening    Past Medical History:   Past Medical History:   Diagnosis Date   • Anxiety    • Depression    • History of transfusion    • Hyperlipidemia    • Migraines    • Ovarian cyst     x 2        Past Surgical History:  Past Surgical History:   Procedure Laterality Date   • APPENDECTOMY     • BREAST BIOPSY Right 11/1989   • CHOLECYSTECTOMY     • COLONOSCOPY  11/22/2013    Normal exam repeat in 10 years   • COLONOSCOPY N/A 11/13/2018    Normal but prep was only fair repeat in 2 years   • D&C HYSTEROSCOPY ENDOMETRIAL ABLATION N/A 2/6/2017    Procedure: DILATATION AND CURETTAGE HYSTEROSCOPY NOVASURE ENDOMETRIAL ABLATION;  Surgeon: Ana Barrios MD;  Location: [facility] OR;  Service:    • DIAGNOSTIC LAPAROSCOPY N/A 6/17/2019    Procedure: DIAGNOSTIC LAPAROSCOPY;  Surgeon: Ana Barrios MD;  Location: [facility] OR;  Service: Obstetrics/Gynecology   • OVARIAN CYST REMOVAL      x 2   • SALPINGO OOPHORECTOMY Bilateral 6/17/2019    Procedure: SALPINGO OOPHORECTOMY LAPAROSCOPIC;  Surgeon: Ana Barrios MD;  Location: [facility] OR;  Service: Obstetrics/Gynecology       Family History:  Family History   Problem Relation Age of Onset   • Ovarian cancer Mother 72   • Brain cancer Father    • No Known Problems Brother    • No Known Problems Daughter    • No Known Problems Maternal Grandmother    • No Known Problems Maternal Grandfather    • No Known Problems Paternal Grandmother    • Brain cancer Paternal Grandfather    • Heart disease Brother    • No Known Problems Daughter    • Breast cancer Other 28   • No Known Problems Maternal Aunt    • No Known Problems Paternal Aunt    • Uterine cancer Neg Hx    • Colon cancer Neg Hx    • Melanoma Neg Hx    • Prostate cancer Neg Hx    • BRCA 1/2 Neg Hx    • Endometrial cancer Neg Hx    • Colon polyps Neg Hx        Social History:   reports that  "she has never smoked. She has never used smokeless tobacco. She reports that she does not drink alcohol or use drugs.    Medications:   Prior to Admission medications    Medication Sig Start Date End Date Taking? Authorizing Provider   Calcium-Vitamin D-Vitamin K (Viactiv Calcium Plus D) 650-12.5-40 MG-MCG-MCG chewable tablet Chew 2 (two) times a day.   Yes Ravinder Aguilar MD   Cholecalciferol (Vitamin D3) 125 MCG (5000 UT) chewable tablet Chew 5,000 Units Daily.   Yes Ravinder Aguilar MD   clonazePAM (KlonoPIN) 1 MG tablet Take 1 tablet by mouth 2 (Two) Times a Day As Needed for Seizures. 1 twice daily as needed for anxiety 12/10/20  Yes Marlon Tran MD   fluticasone (FLONASE) 50 MCG/ACT nasal spray 2 sprays into each nostril Daily.   Yes Ravinder Aguilar MD   Lactobacillus (REPHRESH PRO-B PO) Take 1 tablet by mouth Daily.   Yes Ravinder Aguilar MD   loratadine (ALLERGY) 10 MG tablet Take 10 mg by mouth Daily.   Yes Ravinder Aguilar MD   Multiple Vitamins-Minerals (WOMENS 50+ MULTI VITAMIN/MIN PO) Take  by mouth.   Yes Ravinder Aguilar MD   phentermine 37.5 MG capsule Take 1 capsule by mouth Every Morning. 12/30/20  Yes Janett Davila APRN   polyethylene glycol (GoLYTELY) 236 g solution Take as directed by office instructions. 12/1/20  Yes Bree Fisher APRN   Vortioxetine HBr (Trintellix) 20 MG tablet Take 20 mg by mouth Daily. 1/13/21  Yes Marlon Tran MD   ZOLMitriptan (ZOMIG) 2.5 MG tablet TAKE 1 TABLET BY MOUTH TWICE DAILY AS NEEDED FOR HEADACHE 9/17/20  Yes Yajaira Jimenez APRN   rosuvastatin (Crestor) 10 MG tablet Take 1 tablet by mouth Daily. 1/13/21   Marlon Tran MD       Allergies:  Percocet [oxycodone-acetaminophen]    Objective     Blood pressure 116/80, pulse 93, temperature 97.3 °F (36.3 °C), temperature source Temporal, resp. rate 18, height 160 cm (63\"), weight 68.5 kg (151 lb), last menstrual period 01/22/2017, SpO2 97 %, " not currently breastfeeding.    Physical Exam   Constitutional: Pt is oriented to person, place, and in no distress.   HENT: Mouth/Throat: Oropharynx is clear.   Cardiovascular: Normal rate, regular rhythm.    Pulmonary/Chest: Effort normal. No respiratory distress. No  wheezes.   Abdominal: Soft. Non-distended.  Skin: Skin is warm and dry.   Psychiatric: Mood, memory, affect and judgment appear normal.     Assessment/Plan     Diagnosis:  Screening colonoscopy    Anticipated Surgical Procedure:    Proceed with colonoscopy as scheduled    The following major R/B/A were discussed with the patient, however the list is not all inclusive . Risk:  Bleeding (immediate and delayed), perforation (rupture or tear), reaction to medication, missed lesion/cancer, pain during the procedure, infection, need for surgery, need for ostomy, need for mechanical ventilation (breathing machine), death.  Benefits: removal of polyp/tissue, burn/clip/or inject to stop bleeding, removal of foreign body, dilate any stricture.  Alternatives: Xray or CT, surgery, do nothing with associated risk   The patient was given time to ask question and received explanation, and agrees to proceed as per History and Physical.   No guarantee given or expressed.    EMR Dragon/transcription disclaimer: Much of this encounter note is an electronic transcription/translation of spoken language to printed text.  The electronic translation of spoken language may permit erroneous, or at times, nonsensical words or phrases to be inadvertently transcribed.  Although I have reviewed the note for such errors, some may still exist.    Da Miles MD  11:29 CST  1/22/2021

## 2021-01-27 ENCOUNTER — OFFICE VISIT (OUTPATIENT)
Dept: OBSTETRICS AND GYNECOLOGY | Facility: CLINIC | Age: 56
End: 2021-01-27

## 2021-01-27 VITALS
HEIGHT: 63 IN | BODY MASS INDEX: 26.75 KG/M2 | SYSTOLIC BLOOD PRESSURE: 108 MMHG | WEIGHT: 151 LBS | DIASTOLIC BLOOD PRESSURE: 82 MMHG

## 2021-01-27 DIAGNOSIS — G47.00 INSOMNIA, UNSPECIFIED TYPE: ICD-10-CM

## 2021-01-27 DIAGNOSIS — K59.00 CONSTIPATION, UNSPECIFIED CONSTIPATION TYPE: ICD-10-CM

## 2021-01-27 DIAGNOSIS — R63.5 WEIGHT GAIN: Primary | ICD-10-CM

## 2021-01-27 PROCEDURE — 99213 OFFICE O/P EST LOW 20 MIN: CPT | Performed by: NURSE PRACTITIONER

## 2021-01-27 RX ORDER — PHENTERMINE HYDROCHLORIDE 37.5 MG/1
37.5 CAPSULE ORAL EVERY MORNING
Qty: 30 CAPSULE | Refills: 0 | Status: SHIPPED | OUTPATIENT
Start: 2021-01-27 | End: 2021-02-24 | Stop reason: SDUPTHER

## 2021-01-27 NOTE — PROGRESS NOTES
"Subjective     Shawnee Suarez is a 55 y.o. female    Here for follow up on weight loss. She has been on Phentermine for 3 months. She has lost 4 pounds. She has lost a total of 24 pounds. She has not had any side effects from the medication. She has been doing well with diet and exercise and has been drinking at least 64 ozs. of water daily. We discussed increasing Protein in her diet.          /82   Ht 160 cm (62.99\")   Wt 68.5 kg (151 lb)   LMP  (LMP Unknown)   BMI 26.76 kg/m²     Outpatient Encounter Medications as of 1/27/2021   Medication Sig Dispense Refill   • Calcium-Vitamin D-Vitamin K (Viactiv Calcium Plus D) 650-12.5-40 MG-MCG-MCG chewable tablet Chew 2 (two) times a day.     • Cholecalciferol (Vitamin D3) 125 MCG (5000 UT) chewable tablet Chew 5,000 Units Daily.     • clonazePAM (KlonoPIN) 1 MG tablet Take 1 tablet by mouth 2 (Two) Times a Day As Needed for Seizures. 1 twice daily as needed for anxiety 60 tablet 2   • fluticasone (FLONASE) 50 MCG/ACT nasal spray 2 sprays into each nostril Daily.     • Lactobacillus (REPHRESH PRO-B PO) Take 1 tablet by mouth Daily.     • loratadine (ALLERGY) 10 MG tablet Take 10 mg by mouth Daily.     • Multiple Vitamins-Minerals (WOMENS 50+ MULTI VITAMIN/MIN PO) Take  by mouth.     • polyethylene glycol (GoLYTELY) 236 g solution Take as directed by office instructions. 4000 mL 0   • rosuvastatin (Crestor) 10 MG tablet Take 1 tablet by mouth Daily. 90 tablet 2   • Vortioxetine HBr (Trintellix) 20 MG tablet Take 20 mg by mouth Daily. 90 tablet 2   • ZOLMitriptan (ZOMIG) 2.5 MG tablet TAKE 1 TABLET BY MOUTH TWICE DAILY AS NEEDED FOR HEADACHE 18 tablet 2   • [DISCONTINUED] phentermine 37.5 MG capsule Take 1 capsule by mouth Every Morning. 30 capsule 0   • phentermine 37.5 MG capsule Take 1 capsule by mouth Every Morning. 30 capsule 0     No facility-administered encounter medications on file as of 1/27/2021.        Surgical History  Past Surgical History: "   Procedure Laterality Date   • APPENDECTOMY     • BREAST BIOPSY Right 11/1989   • CHOLECYSTECTOMY     • COLONOSCOPY  11/22/2013    Normal exam repeat in 10 years   • COLONOSCOPY N/A 11/13/2018    Normal but prep was only fair repeat in 2 years   • COLONOSCOPY N/A 1/22/2021    Procedure: COLONOSCOPY WITH ANESTHESIA;  Surgeon: Da Miles MD;  Location: [facility] ENDOSCOPY;  Service: Gastroenterology;  Laterality: N/A;  pre: screen  post: normal  Chelsea, Marlon Valente MD     • D&C HYSTEROSCOPY ENDOMETRIAL ABLATION N/A 2/6/2017    Procedure: DILATATION AND CURETTAGE HYSTEROSCOPY NOVASURE ENDOMETRIAL ABLATION;  Surgeon: Ana Barrios MD;  Location: [facility] OR;  Service:    • DIAGNOSTIC LAPAROSCOPY N/A 6/17/2019    Procedure: DIAGNOSTIC LAPAROSCOPY;  Surgeon: Ana Barrios MD;  Location: [facility] OR;  Service: Obstetrics/Gynecology   • OVARIAN CYST REMOVAL      x 2   • SALPINGO OOPHORECTOMY Bilateral 6/17/2019    Procedure: SALPINGO OOPHORECTOMY LAPAROSCOPIC;  Surgeon: Ana Barrios MD;  Location: [facility] OR;  Service: Obstetrics/Gynecology       Family History  Family History   Problem Relation Age of Onset   • Ovarian cancer Mother 72   • Brain cancer Father    • No Known Problems Brother    • No Known Problems Daughter    • No Known Problems Maternal Grandmother    • No Known Problems Maternal Grandfather    • No Known Problems Paternal Grandmother    • Brain cancer Paternal Grandfather    • Heart disease Brother    • No Known Problems Daughter    • Breast cancer Other 28   • No Known Problems Maternal Aunt    • No Known Problems Paternal Aunt    • Uterine cancer Neg Hx    • Colon cancer Neg Hx    • Melanoma Neg Hx    • Prostate cancer Neg Hx    • BRCA 1/2 Neg Hx    • Endometrial cancer Neg Hx    • Colon polyps Neg Hx        The following portions of the patient's history were reviewed and updated as appropriate: allergies, current medications, past family history, past medical history, past social history, past  surgical history and problem list.    Review of Systems   Constitutional: Negative for activity change, appetite change, chills, diaphoresis, fatigue, fever, unexpected weight gain and unexpected weight loss.   HENT: Negative for congestion, dental problem, drooling, ear discharge, ear pain, facial swelling, hearing loss, mouth sores, nosebleeds, postnasal drip, rhinorrhea, sinus pressure, sneezing, sore throat, swollen glands, tinnitus, trouble swallowing and voice change.    Eyes: Negative for blurred vision, double vision, photophobia, pain, discharge, redness, itching and visual disturbance.   Respiratory: Negative for apnea, cough, choking, chest tightness, shortness of breath, wheezing and stridor.    Cardiovascular: Negative for chest pain, palpitations and leg swelling.   Gastrointestinal: Negative for abdominal distention, abdominal pain, anal bleeding, blood in stool, constipation, diarrhea, nausea, rectal pain, vomiting, GERD and indigestion.   Endocrine: Negative for cold intolerance, heat intolerance, polydipsia, polyphagia and polyuria.   Genitourinary: Negative for amenorrhea, breast discharge, breast lump, breast pain, decreased libido, decreased urine volume, difficulty urinating, dyspareunia, dysuria, flank pain, frequency, genital sores, hematuria, menstrual problem, pelvic pain, pelvic pressure, urgency, urinary incontinence, vaginal bleeding, vaginal discharge and vaginal pain.   Musculoskeletal: Negative for arthralgias, back pain, gait problem, joint swelling, myalgias, neck pain, neck stiffness and bursitis.   Skin: Negative for color change, dry skin and rash.   Allergic/Immunologic: Negative for environmental allergies, food allergies and immunocompromised state.   Neurological: Negative for dizziness, tremors, seizures, syncope, facial asymmetry, speech difficulty, weakness, light-headedness, numbness, headache, memory problem and confusion.   Hematological: Negative for adenopathy. Does  not bruise/bleed easily.   Psychiatric/Behavioral: Negative for agitation, behavioral problems, decreased concentration, dysphoric mood, hallucinations, self-injury, sleep disturbance, suicidal ideas, negative for hyperactivity, depressed mood and stress. The patient is not nervous/anxious.        Objective   Physical Exam  Vitals signs and nursing note reviewed.   Constitutional:       Appearance: She is well-developed.   HENT:      Head: Normocephalic and atraumatic.   Eyes:      General:         Right eye: No discharge.         Left eye: No discharge.      Conjunctiva/sclera: Conjunctivae normal.   Neck:      Musculoskeletal: Normal range of motion and neck supple.      Thyroid: No thyromegaly.   Cardiovascular:      Rate and Rhythm: Normal rate and regular rhythm.      Heart sounds: Normal heart sounds.   Pulmonary:      Effort: Pulmonary effort is normal.      Breath sounds: Normal breath sounds.   Skin:     General: Skin is warm and dry.   Neurological:      Mental Status: She is alert and oriented to person, place, and time.   Psychiatric:         Behavior: Behavior normal.         Thought Content: Thought content normal.         Judgment: Judgment normal.         Assessment/Plan   Diagnoses and all orders for this visit:    1. Weight gain (Primary)  Comments:  This is her 3rd month on phentermine. She has lost 4 pounds. She has lost 24 pounds total. Wishes to continue. Ludin is reviewed.  Orders:  -     phentermine 37.5 MG capsule; Take 1 capsule by mouth Every Morning.  Dispense: 30 capsule; Refill: 0    2. Constipation, unspecified constipation type  Comments:  Will try Senokot S daily.    3. Insomnia, unspecified type  Comments:  Will try Melantonin.         Patient's Body mass index is 26.76 kg/m². BMI is above normal parameters. Recommendations include: educational material, exercise counseling, nutrition counseling and pharmacological intervention.      Janett Davila, APRN  1/27/2021

## 2021-01-27 NOTE — PATIENT INSTRUCTIONS
"BMI for Adults  What is BMI?  Body mass index (BMI) is a number that is calculated from a person's weight and height. BMI can help estimate how much of a person's weight is composed of fat. BMI does not measure body fat directly. Rather, it is an alternative to procedures that directly measure body fat, which can be difficult and expensive.  BMI can help identify people who may be at higher risk for certain medical problems.  What are BMI measurements used for?  BMI is used as a screening tool to identify possible weight problems. It helps determine whether a person is obese, overweight, a healthy weight, or underweight.  BMI is useful for:  · Identifying a weight problem that may be related to a medical condition or may increase the risk for medical problems.  · Promoting changes, such as changes in diet and exercise, to help reach a healthy weight. BMI screening can be repeated to see if these changes are working.  How is BMI calculated?  BMI involves measuring your weight in relation to your height. Both height and weight are measured, and the BMI is calculated from those numbers. This can be done either in English (U.S.) or metric measurements. Note that charts and online BMI calculators are available to help you find your BMI quickly and easily without having to do these calculations yourself.  To calculate your BMI in English (U.S.) measurements:    1. Measure your weight in pounds (lb).  2. Multiply the number of pounds by 703.  ? For example, for a person who weighs 180 lb, multiply that number by 703, which equals 126,540.  3. Measure your height in inches. Then multiply that number by itself to get a measurement called \"inches squared.\"  ? For example, for a person who is 70 inches tall, the \"inches squared\" measurement is 70 inches x 70 inches, which equals 4,900 inches squared.  4. Divide the total from step 2 (number of lb x 703) by the total from step 3 (inches squared): 126,540 ÷ 4,900 = 25.8. This is " "your BMI.  To calculate your BMI in metric measurements:  1. Measure your weight in kilograms (kg).  2. Measure your height in meters (m). Then multiply that number by itself to get a measurement called \"meters squared.\"  ? For example, for a person who is 1.75 m tall, the \"meters squared\" measurement is 1.75 m x 1.75 m, which is equal to 3.1 meters squared.  3. Divide the number of kilograms (your weight) by the meters squared number. In this example: 70 ÷ 3.1 = 22.6. This is your BMI.  What do the results mean?  BMI charts are used to identify whether you are underweight, normal weight, overweight, or obese. The following guidelines will be used:  · Underweight: BMI less than 18.5.  · Normal weight: BMI between 18.5 and 24.9.  · Overweight: BMI between 25 and 29.9.  · Obese: BMI of 30 or above.  Keep these notes in mind:  · Weight includes both fat and muscle, so someone with a muscular build, such as an athlete, may have a BMI that is higher than 24.9. In cases like these, BMI is not an accurate measure of body fat.  · To determine if excess body fat is the cause of a BMI of 25 or higher, further assessments may need to be done by a health care provider.  · BMI is usually interpreted in the same way for men and women.  Where to find more information  For more information about BMI, including tools to quickly calculate your BMI, go to these websites:  · Centers for Disease Control and Prevention: www.cdc.gov  · American Heart Association: www.heart.org  · National Heart, Lung, and Blood [place]: www.nhlbi.nih.gov  Summary  · Body mass index (BMI) is a number that is calculated from a person's weight and height.  · BMI may help estimate how much of a person's weight is composed of fat. BMI can help identify those who may be at higher risk for certain medical problems.  · BMI can be measured using English measurements or metric measurements.  · BMI charts are used to identify whether you are underweight, normal " weight, overweight, or obese.  This information is not intended to replace advice given to you by your health care provider. Make sure you discuss any questions you have with your health care provider.  Document Revised: 09/09/2020 Document Reviewed: 07/17/2020  Elsevier Patient Education © 2020 [employer] Inc.

## 2021-02-02 ENCOUNTER — TELEPHONE (OUTPATIENT)
Dept: GASTROENTEROLOGY | Facility: CLINIC | Age: 56
End: 2021-02-02

## 2021-02-24 ENCOUNTER — OFFICE VISIT (OUTPATIENT)
Dept: OBSTETRICS AND GYNECOLOGY | Facility: CLINIC | Age: 56
End: 2021-02-24

## 2021-02-24 VITALS
WEIGHT: 148 LBS | BODY MASS INDEX: 26.22 KG/M2 | DIASTOLIC BLOOD PRESSURE: 80 MMHG | SYSTOLIC BLOOD PRESSURE: 110 MMHG | HEIGHT: 63 IN

## 2021-02-24 DIAGNOSIS — R63.5 WEIGHT GAIN: ICD-10-CM

## 2021-02-24 PROCEDURE — 99213 OFFICE O/P EST LOW 20 MIN: CPT | Performed by: NURSE PRACTITIONER

## 2021-02-24 RX ORDER — PHENTERMINE HYDROCHLORIDE 37.5 MG/1
37.5 CAPSULE ORAL EVERY MORNING
Qty: 30 CAPSULE | Refills: 0 | Status: SHIPPED | OUTPATIENT
Start: 2021-02-24 | End: 2021-03-26 | Stop reason: SDUPTHER

## 2021-02-24 NOTE — PROGRESS NOTES
"Subjective     Shawnee Suarez is a 55 y.o. female    Here for follow up on weight loss. She has been on Phentermine for 4 months. She has lost 3 pounds. She has lost a total of 27 pounds. She has not had any side effects from the medication. She has been doing well with diet and exercise and has been drinking at least 64 ozs. of water daily. We discussed increasing Protein in her diet.          /80   Ht 160 cm (62.99\")   Wt 67.1 kg (148 lb)   LMP  (LMP Unknown)   BMI 26.22 kg/m²     Outpatient Encounter Medications as of 2/24/2021   Medication Sig Dispense Refill   • Calcium-Vitamin D-Vitamin K (Viactiv Calcium Plus D) 650-12.5-40 MG-MCG-MCG chewable tablet Chew 2 (two) times a day.     • Cholecalciferol (Vitamin D3) 125 MCG (5000 UT) chewable tablet Chew 5,000 Units Daily.     • clonazePAM (KlonoPIN) 1 MG tablet Take 1 tablet by mouth 2 (Two) Times a Day As Needed for Seizures. 1 twice daily as needed for anxiety 60 tablet 2   • fluticasone (FLONASE) 50 MCG/ACT nasal spray 2 sprays into each nostril Daily.     • Lactobacillus (REPHRESH PRO-B PO) Take 1 tablet by mouth Daily.     • loratadine (ALLERGY) 10 MG tablet Take 10 mg by mouth Daily.     • Multiple Vitamins-Minerals (WOMENS 50+ MULTI VITAMIN/MIN PO) Take  by mouth.     • phentermine 37.5 MG capsule Take 1 capsule by mouth Every Morning. 30 capsule 0   • polyethylene glycol (GoLYTELY) 236 g solution Take as directed by office instructions. 4000 mL 0   • rosuvastatin (Crestor) 10 MG tablet Take 1 tablet by mouth Daily. 90 tablet 2   • Vortioxetine HBr (Trintellix) 20 MG tablet Take 20 mg by mouth Daily. 90 tablet 2   • ZOLMitriptan (ZOMIG) 2.5 MG tablet TAKE 1 TABLET BY MOUTH TWICE DAILY AS NEEDED FOR HEADACHE 18 tablet 2   • [DISCONTINUED] phentermine 37.5 MG capsule Take 1 capsule by mouth Every Morning. 30 capsule 0     No facility-administered encounter medications on file as of 2/24/2021.        Surgical History  Past Surgical History: "   Procedure Laterality Date   • APPENDECTOMY     • BREAST BIOPSY Right 11/1989   • CHOLECYSTECTOMY     • COLONOSCOPY  11/22/2013    Normal exam repeat in 10 years   • COLONOSCOPY N/A 11/13/2018    Normal but prep was only fair repeat in 2 years   • COLONOSCOPY N/A 1/22/2021    Procedure: COLONOSCOPY WITH ANESTHESIA;  Surgeon: Da Miles MD;  Location: [facility] ENDOSCOPY;  Service: Gastroenterology;  Laterality: N/A;  pre: screen  post: normal  Chelsea, Marlon Valente MD     • D&C HYSTEROSCOPY ENDOMETRIAL ABLATION N/A 2/6/2017    Procedure: DILATATION AND CURETTAGE HYSTEROSCOPY NOVASURE ENDOMETRIAL ABLATION;  Surgeon: Ana Barrios MD;  Location: [facility] OR;  Service:    • DIAGNOSTIC LAPAROSCOPY N/A 6/17/2019    Procedure: DIAGNOSTIC LAPAROSCOPY;  Surgeon: Ana Barrios MD;  Location: [facility] OR;  Service: Obstetrics/Gynecology   • OVARIAN CYST REMOVAL      x 2   • SALPINGO OOPHORECTOMY Bilateral 6/17/2019    Procedure: SALPINGO OOPHORECTOMY LAPAROSCOPIC;  Surgeon: Ana Barrios MD;  Location: [facility] OR;  Service: Obstetrics/Gynecology       Family History  Family History   Problem Relation Age of Onset   • Ovarian cancer Mother 72   • Brain cancer Father    • No Known Problems Brother    • No Known Problems Daughter    • No Known Problems Maternal Grandmother    • No Known Problems Maternal Grandfather    • No Known Problems Paternal Grandmother    • Brain cancer Paternal Grandfather    • Heart disease Brother    • No Known Problems Daughter    • Breast cancer Other 28   • No Known Problems Maternal Aunt    • No Known Problems Paternal Aunt    • Uterine cancer Neg Hx    • Colon cancer Neg Hx    • Melanoma Neg Hx    • Prostate cancer Neg Hx    • BRCA 1/2 Neg Hx    • Endometrial cancer Neg Hx    • Colon polyps Neg Hx        The following portions of the patient's history were reviewed and updated as appropriate: allergies, current medications, past family history, past medical history, past social history, past  surgical history and problem list.    Review of Systems   Constitutional: Negative for activity change, appetite change, chills, diaphoresis, fatigue, fever, unexpected weight gain and unexpected weight loss.   HENT: Negative for congestion, dental problem, drooling, ear discharge, ear pain, facial swelling, hearing loss, mouth sores, nosebleeds, postnasal drip, rhinorrhea, sinus pressure, sneezing, sore throat, swollen glands, tinnitus, trouble swallowing and voice change.    Eyes: Negative for blurred vision, double vision, photophobia, pain, discharge, redness, itching and visual disturbance.   Respiratory: Negative for apnea, cough, choking, chest tightness, shortness of breath, wheezing and stridor.    Cardiovascular: Negative for chest pain, palpitations and leg swelling.   Gastrointestinal: Negative for abdominal distention, abdominal pain, anal bleeding, blood in stool, constipation, diarrhea, nausea, rectal pain, vomiting, GERD and indigestion.   Endocrine: Negative for cold intolerance, heat intolerance, polydipsia, polyphagia and polyuria.   Genitourinary: Negative for amenorrhea, breast discharge, breast lump, breast pain, decreased libido, decreased urine volume, difficulty urinating, dyspareunia, dysuria, flank pain, frequency, genital sores, hematuria, menstrual problem, pelvic pain, pelvic pressure, urgency, urinary incontinence, vaginal bleeding, vaginal discharge and vaginal pain.   Musculoskeletal: Negative for arthralgias, back pain, gait problem, joint swelling, myalgias, neck pain, neck stiffness and bursitis.   Skin: Negative for color change, dry skin and rash.   Allergic/Immunologic: Negative for environmental allergies, food allergies and immunocompromised state.   Neurological: Negative for dizziness, tremors, seizures, syncope, facial asymmetry, speech difficulty, weakness, light-headedness, numbness, headache, memory problem and confusion.   Hematological: Negative for adenopathy. Does  not bruise/bleed easily.   Psychiatric/Behavioral: Negative for agitation, behavioral problems, decreased concentration, dysphoric mood, hallucinations, self-injury, sleep disturbance, suicidal ideas, negative for hyperactivity, depressed mood and stress. The patient is not nervous/anxious.        Objective   Physical Exam  Vitals signs and nursing note reviewed.   Constitutional:       Appearance: She is well-developed.   HENT:      Head: Normocephalic and atraumatic.   Eyes:      General:         Right eye: No discharge.         Left eye: No discharge.      Conjunctiva/sclera: Conjunctivae normal.   Neck:      Musculoskeletal: Normal range of motion and neck supple.      Thyroid: No thyromegaly.   Cardiovascular:      Rate and Rhythm: Normal rate and regular rhythm.      Heart sounds: Normal heart sounds.   Pulmonary:      Effort: Pulmonary effort is normal.      Breath sounds: Normal breath sounds.   Skin:     General: Skin is warm and dry.   Neurological:      Mental Status: She is alert and oriented to person, place, and time.   Psychiatric:         Behavior: Behavior normal.         Thought Content: Thought content normal.         Judgment: Judgment normal.         Assessment/Plan   Diagnoses and all orders for this visit:    1. Weight gain  Comments:  This is her 4th month on phentermine. She has lost 3 pounds. She has lost 27 pounds total. Wishes to continue. Ludin is reviewed.  Orders:  -     phentermine 37.5 MG capsule; Take 1 capsule by mouth Every Morning.  Dispense: 30 capsule; Refill: 0         Patient's Body mass index is 26.22 kg/m². BMI is above normal parameters. Recommendations include: educational material, exercise counseling, nutrition counseling and pharmacological intervention.      Janett Davila, APRN  2/24/2021

## 2021-02-24 NOTE — PATIENT INSTRUCTIONS
"BMI for Adults  What is BMI?  Body mass index (BMI) is a number that is calculated from a person's weight and height. BMI can help estimate how much of a person's weight is composed of fat. BMI does not measure body fat directly. Rather, it is an alternative to procedures that directly measure body fat, which can be difficult and expensive.  BMI can help identify people who may be at higher risk for certain medical problems.  What are BMI measurements used for?  BMI is used as a screening tool to identify possible weight problems. It helps determine whether a person is obese, overweight, a healthy weight, or underweight.  BMI is useful for:  · Identifying a weight problem that may be related to a medical condition or may increase the risk for medical problems.  · Promoting changes, such as changes in diet and exercise, to help reach a healthy weight. BMI screening can be repeated to see if these changes are working.  How is BMI calculated?  BMI involves measuring your weight in relation to your height. Both height and weight are measured, and the BMI is calculated from those numbers. This can be done either in English (U.S.) or metric measurements. Note that charts and online BMI calculators are available to help you find your BMI quickly and easily without having to do these calculations yourself.  To calculate your BMI in English (U.S.) measurements:    1. Measure your weight in pounds (lb).  2. Multiply the number of pounds by 703.  ? For example, for a person who weighs 180 lb, multiply that number by 703, which equals 126,540.  3. Measure your height in inches. Then multiply that number by itself to get a measurement called \"inches squared.\"  ? For example, for a person who is 70 inches tall, the \"inches squared\" measurement is 70 inches x 70 inches, which equals 4,900 inches squared.  4. Divide the total from step 2 (number of lb x 703) by the total from step 3 (inches squared): 126,540 ÷ 4,900 = 25.8. This is " "your BMI.  To calculate your BMI in metric measurements:  1. Measure your weight in kilograms (kg).  2. Measure your height in meters (m). Then multiply that number by itself to get a measurement called \"meters squared.\"  ? For example, for a person who is 1.75 m tall, the \"meters squared\" measurement is 1.75 m x 1.75 m, which is equal to 3.1 meters squared.  3. Divide the number of kilograms (your weight) by the meters squared number. In this example: 70 ÷ 3.1 = 22.6. This is your BMI.  What do the results mean?  BMI charts are used to identify whether you are underweight, normal weight, overweight, or obese. The following guidelines will be used:  · Underweight: BMI less than 18.5.  · Normal weight: BMI between 18.5 and 24.9.  · Overweight: BMI between 25 and 29.9.  · Obese: BMI of 30 or above.  Keep these notes in mind:  · Weight includes both fat and muscle, so someone with a muscular build, such as an athlete, may have a BMI that is higher than 24.9. In cases like these, BMI is not an accurate measure of body fat.  · To determine if excess body fat is the cause of a BMI of 25 or higher, further assessments may need to be done by a health care provider.  · BMI is usually interpreted in the same way for men and women.  Where to find more information  For more information about BMI, including tools to quickly calculate your BMI, go to these websites:  · Centers for Disease Control and Prevention: www.cdc.gov  · American Heart Association: www.heart.org  · National Heart, Lung, and Blood [place]: www.nhlbi.nih.gov  Summary  · Body mass index (BMI) is a number that is calculated from a person's weight and height.  · BMI may help estimate how much of a person's weight is composed of fat. BMI can help identify those who may be at higher risk for certain medical problems.  · BMI can be measured using English measurements or metric measurements.  · BMI charts are used to identify whether you are underweight, normal " weight, overweight, or obese.  This information is not intended to replace advice given to you by your health care provider. Make sure you discuss any questions you have with your health care provider.  Document Revised: 09/09/2020 Document Reviewed: 07/17/2020  Elsevier Patient Education © 2020 Elsevier Inc.

## 2021-03-11 ENCOUNTER — OFFICE VISIT (OUTPATIENT)
Dept: FAMILY MEDICINE CLINIC | Facility: CLINIC | Age: 56
End: 2021-03-11

## 2021-03-11 VITALS
HEIGHT: 62 IN | TEMPERATURE: 97.5 F | WEIGHT: 148 LBS | SYSTOLIC BLOOD PRESSURE: 122 MMHG | BODY MASS INDEX: 27.23 KG/M2 | DIASTOLIC BLOOD PRESSURE: 72 MMHG | RESPIRATION RATE: 16 BRPM | HEART RATE: 111 BPM | OXYGEN SATURATION: 99 %

## 2021-03-11 DIAGNOSIS — E78.2 MIXED HYPERLIPIDEMIA: ICD-10-CM

## 2021-03-11 DIAGNOSIS — F41.9 ANXIETY: ICD-10-CM

## 2021-03-11 DIAGNOSIS — R73.9 ELEVATED BLOOD SUGAR: Primary | ICD-10-CM

## 2021-03-11 DIAGNOSIS — Z51.81 THERAPEUTIC DRUG MONITORING: ICD-10-CM

## 2021-03-11 PROCEDURE — 99214 OFFICE O/P EST MOD 30 MIN: CPT | Performed by: FAMILY MEDICINE

## 2021-03-11 RX ORDER — CLONAZEPAM 1 MG/1
1 TABLET ORAL 2 TIMES DAILY PRN
Qty: 60 TABLET | Refills: 2 | Status: SHIPPED | OUTPATIENT
Start: 2021-03-11 | End: 2021-06-11 | Stop reason: SDUPTHER

## 2021-03-11 NOTE — PROGRESS NOTES
Subjective   Shawnee Suarez is a 55 y.o. female.     55-year-old female with hyperlipidemia and chronic anxiety    Hyperlipidemia  This is a chronic problem. The current episode started more than 1 year ago. The problem is controlled. Recent lipid tests were reviewed and are normal. There are no known factors aggravating her hyperlipidemia. Pertinent negatives include no chest pain or myalgias. Current antihyperlipidemic treatment includes diet change and statins. Risk factors for coronary artery disease include dyslipidemia, post-menopausal and family history.       The following portions of the patient's history were reviewed and updated as appropriate: allergies, current medications, past family history, past medical history, past social history, past surgical history and problem list.    Review of Systems   Cardiovascular: Negative for chest pain and leg swelling.   Musculoskeletal: Negative for myalgias.        DJD-hands   Psychiatric/Behavioral: The patient is nervous/anxious.         Patient on phentermine-GYN provider supplied       Objective   Physical Exam  Vitals and nursing note reviewed.   Constitutional:       Appearance: Normal appearance.   Cardiovascular:      Rate and Rhythm: Regular rhythm. Tachycardia present.      Pulses: Normal pulses.      Heart sounds: Normal heart sounds.   Pulmonary:      Effort: Pulmonary effort is normal.      Breath sounds: Normal breath sounds.   Abdominal:      General: Abdomen is flat.      Palpations: Abdomen is soft.   Musculoskeletal:      Right lower leg: No edema.      Left lower leg: No edema.   Neurological:      General: No focal deficit present.      Mental Status: She is alert and oriented to person, place, and time.   Psychiatric:         Mood and Affect: Mood normal.         Behavior: Behavior normal.         Thought Content: Thought content normal.         Judgment: Judgment normal.         Assessment/Plan   Diagnoses and all orders for this visit:    1.  Elevated blood sugar (Primary)  -     Comprehensive Metabolic Panel  -     Hemoglobin A1c    2. Mixed hyperlipidemia  -     Comprehensive Metabolic Panel  -     Lipid Panel    3. Therapeutic drug monitoring  -     ToxASSURE Select 13 (MW) - Urine, Clean Catch    4. Anxiety  -     clonazePAM (KlonoPIN) 1 MG tablet; Take 1 tablet by mouth 2 (Two) Times a Day As Needed for Seizures. 1 twice daily as needed for anxiety  Dispense: 60 tablet; Refill: 2           CONTROLLED SUBSTANCE TRACKING 4/10/2019 7/23/2019 12/23/2019 3/3/2020 4/14/2020 7/1/2020 9/28/2020   Last Ludin 4/10/2019 7/23/2019 12/23/2019 3/3/2020 4/14/2020 7/1/2020 7/1/2020   Report Number [id number]   Last UDS - 4/10/2019 4/10/2019 3/3/2020 3/3/2020 3/3/2020 3/4/2020   Last Controlled Substance Agreement 4/10/2019 4/10/2019 4/10/2019 3/3/2020 3/3/2020 3/4/2020 3/4/2020      Plan above-getting COVID-19 shot

## 2021-03-12 LAB
ALBUMIN SERPL-MCNC: 5 G/DL (ref 3.5–5.2)
ALBUMIN/GLOB SERPL: 2 G/DL
ALP SERPL-CCNC: 64 U/L (ref 39–117)
ALT SERPL-CCNC: 20 U/L (ref 1–33)
AST SERPL-CCNC: 21 U/L (ref 1–32)
BILIRUB SERPL-MCNC: 0.8 MG/DL (ref 0–1.2)
BUN SERPL-MCNC: 11 MG/DL (ref 6–20)
BUN/CREAT SERPL: 16.7 (ref 7–25)
CALCIUM SERPL-MCNC: 10.1 MG/DL (ref 8.6–10.5)
CHLORIDE SERPL-SCNC: 103 MMOL/L (ref 98–107)
CHOLEST SERPL-MCNC: 175 MG/DL (ref 0–200)
CO2 SERPL-SCNC: 30.5 MMOL/L (ref 22–29)
CREAT SERPL-MCNC: 0.66 MG/DL (ref 0.57–1)
GLOBULIN SER CALC-MCNC: 2.5 GM/DL
GLUCOSE SERPL-MCNC: 91 MG/DL (ref 65–99)
HBA1C MFR BLD: 5.5 % (ref 4.8–5.6)
HDLC SERPL-MCNC: 74 MG/DL (ref 40–60)
LDLC SERPL CALC-MCNC: 77 MG/DL (ref 0–100)
POTASSIUM SERPL-SCNC: 5.1 MMOL/L (ref 3.5–5.2)
PROT SERPL-MCNC: 7.5 G/DL (ref 6–8.5)
SODIUM SERPL-SCNC: 143 MMOL/L (ref 136–145)
TRIGL SERPL-MCNC: 141 MG/DL (ref 0–150)
VLDLC SERPL CALC-MCNC: 24 MG/DL (ref 5–40)

## 2021-03-16 LAB — DRUGS UR: NORMAL

## 2021-03-26 ENCOUNTER — OFFICE VISIT (OUTPATIENT)
Dept: OBSTETRICS AND GYNECOLOGY | Facility: CLINIC | Age: 56
End: 2021-03-26

## 2021-03-26 VITALS
DIASTOLIC BLOOD PRESSURE: 84 MMHG | SYSTOLIC BLOOD PRESSURE: 124 MMHG | BODY MASS INDEX: 26.68 KG/M2 | HEIGHT: 62 IN | WEIGHT: 145 LBS

## 2021-03-26 DIAGNOSIS — R63.5 WEIGHT GAIN: ICD-10-CM

## 2021-03-26 PROCEDURE — 99213 OFFICE O/P EST LOW 20 MIN: CPT | Performed by: NURSE PRACTITIONER

## 2021-03-26 RX ORDER — PHENTERMINE HYDROCHLORIDE 37.5 MG/1
37.5 CAPSULE ORAL EVERY MORNING
Qty: 30 CAPSULE | Refills: 0 | Status: SHIPPED | OUTPATIENT
Start: 2021-03-26 | End: 2021-06-11

## 2021-03-26 NOTE — PATIENT INSTRUCTIONS
"BMI for Adults  What is BMI?  Body mass index (BMI) is a number that is calculated from a person's weight and height. BMI can help estimate how much of a person's weight is composed of fat. BMI does not measure body fat directly. Rather, it is an alternative to procedures that directly measure body fat, which can be difficult and expensive.  BMI can help identify people who may be at higher risk for certain medical problems.  What are BMI measurements used for?  BMI is used as a screening tool to identify possible weight problems. It helps determine whether a person is obese, overweight, a healthy weight, or underweight.  BMI is useful for:  · Identifying a weight problem that may be related to a medical condition or may increase the risk for medical problems.  · Promoting changes, such as changes in diet and exercise, to help reach a healthy weight. BMI screening can be repeated to see if these changes are working.  How is BMI calculated?  BMI involves measuring your weight in relation to your height. Both height and weight are measured, and the BMI is calculated from those numbers. This can be done either in English (U.S.) or metric measurements. Note that charts and online BMI calculators are available to help you find your BMI quickly and easily without having to do these calculations yourself.  To calculate your BMI in English (U.S.) measurements:    1. Measure your weight in pounds (lb).  2. Multiply the number of pounds by 703.  ? For example, for a person who weighs 180 lb, multiply that number by 703, which equals 126,540.  3. Measure your height in inches. Then multiply that number by itself to get a measurement called \"inches squared.\"  ? For example, for a person who is 70 inches tall, the \"inches squared\" measurement is 70 inches x 70 inches, which equals 4,900 inches squared.  4. Divide the total from step 2 (number of lb x 703) by the total from step 3 (inches squared): 126,540 ÷ 4,900 = 25.8. This is " "your BMI.  To calculate your BMI in metric measurements:  1. Measure your weight in kilograms (kg).  2. Measure your height in meters (m). Then multiply that number by itself to get a measurement called \"meters squared.\"  ? For example, for a person who is 1.75 m tall, the \"meters squared\" measurement is 1.75 m x 1.75 m, which is equal to 3.1 meters squared.  3. Divide the number of kilograms (your weight) by the meters squared number. In this example: 70 ÷ 3.1 = 22.6. This is your BMI.  What do the results mean?  BMI charts are used to identify whether you are underweight, normal weight, overweight, or obese. The following guidelines will be used:  · Underweight: BMI less than 18.5.  · Normal weight: BMI between 18.5 and 24.9.  · Overweight: BMI between 25 and 29.9.  · Obese: BMI of 30 or above.  Keep these notes in mind:  · Weight includes both fat and muscle, so someone with a muscular build, such as an athlete, may have a BMI that is higher than 24.9. In cases like these, BMI is not an accurate measure of body fat.  · To determine if excess body fat is the cause of a BMI of 25 or higher, further assessments may need to be done by a health care provider.  · BMI is usually interpreted in the same way for men and women.  Where to find more information  For more information about BMI, including tools to quickly calculate your BMI, go to these websites:  · Centers for Disease Control and Prevention: www.cdc.gov  · American Heart Association: www.heart.org  · National Heart, Lung, and Blood [place]: www.nhlbi.nih.gov  Summary  · Body mass index (BMI) is a number that is calculated from a person's weight and height.  · BMI may help estimate how much of a person's weight is composed of fat. BMI can help identify those who may be at higher risk for certain medical problems.  · BMI can be measured using English measurements or metric measurements.  · BMI charts are used to identify whether you are underweight, normal " weight, overweight, or obese.  This information is not intended to replace advice given to you by your health care provider. Make sure you discuss any questions you have with your health care provider.  Document Revised: 09/09/2020 Document Reviewed: 07/17/2020  Elsevier Patient Education © 2021 [employer]vier Inc.

## 2021-03-26 NOTE — PROGRESS NOTES
"Subjective     Shawnee Suarez is a 55 y.o. female    Here for follow up on weight loss. She has been on Phentermine for 5 months. She has lost 3 pounds. She has lost a total of 30 pounds. She has not had any side effects from the medication. She has been doing well with diet and exercise and has been drinking at least 64 ozs. of water daily. We discussed increasing Protein in her diet.          /84   Ht 157.5 cm (62.01\")   Wt 65.8 kg (145 lb)   LMP  (LMP Unknown)   BMI 26.51 kg/m²     Outpatient Encounter Medications as of 3/26/2021   Medication Sig Dispense Refill   • Calcium-Vitamin D-Vitamin K (Viactiv Calcium Plus D) 650-12.5-40 MG-MCG-MCG chewable tablet Chew 2 (two) times a day.     • Cholecalciferol (Vitamin D3) 125 MCG (5000 UT) chewable tablet Chew 5,000 Units Daily.     • clonazePAM (KlonoPIN) 1 MG tablet Take 1 tablet by mouth 2 (Two) Times a Day As Needed for Seizures. 1 twice daily as needed for anxiety 60 tablet 2   • fluticasone (FLONASE) 50 MCG/ACT nasal spray 2 sprays into each nostril Daily.     • Lactobacillus (REPHRESH PRO-B PO) Take 1 tablet by mouth Daily.     • loratadine (ALLERGY) 10 MG tablet Take 10 mg by mouth Daily.     • Multiple Vitamins-Minerals (WOMENS 50+ MULTI VITAMIN/MIN PO) Take  by mouth.     • phentermine 37.5 MG capsule Take 1 capsule by mouth Every Morning. 30 capsule 0   • rosuvastatin (Crestor) 10 MG tablet Take 1 tablet by mouth Daily. 90 tablet 2   • Vortioxetine HBr (Trintellix) 20 MG tablet Take 20 mg by mouth Daily. 90 tablet 2   • ZOLMitriptan (ZOMIG) 2.5 MG tablet TAKE 1 TABLET BY MOUTH TWICE DAILY AS NEEDED FOR HEADACHE 18 tablet 2   • [DISCONTINUED] phentermine 37.5 MG capsule Take 1 capsule by mouth Every Morning. 30 capsule 0     No facility-administered encounter medications on file as of 3/26/2021.       Surgical History  Past Surgical History:   Procedure Laterality Date   • APPENDECTOMY     • BREAST BIOPSY Right 11/1989   • CHOLECYSTECTOMY     • " COLONOSCOPY  11/22/2013    Normal exam repeat in 10 years   • COLONOSCOPY N/A 11/13/2018    Normal but prep was only fair repeat in 2 years   • COLONOSCOPY N/A 1/22/2021    Procedure: COLONOSCOPY WITH ANESTHESIA;  Surgeon: Da Miles MD;  Location: [facility] ENDOSCOPY;  Service: Gastroenterology;  Laterality: N/A;  pre: screen  post: normal  Chelsea, Marlon Valente MD     • D & C HYSTEROSCOPY ENDOMETRIAL ABLATION N/A 2/6/2017    Procedure: DILATATION AND CURETTAGE HYSTEROSCOPY NOVASURE ENDOMETRIAL ABLATION;  Surgeon: Ana Barrios MD;  Location: [facility] OR;  Service:    • DIAGNOSTIC LAPAROSCOPY N/A 6/17/2019    Procedure: DIAGNOSTIC LAPAROSCOPY;  Surgeon: Ana Barrios MD;  Location: [facility] OR;  Service: Obstetrics/Gynecology   • OVARIAN CYST REMOVAL      x 2   • SALPINGO OOPHORECTOMY Bilateral 6/17/2019    Procedure: SALPINGO OOPHORECTOMY LAPAROSCOPIC;  Surgeon: Ana Barrios MD;  Location: [facility] OR;  Service: Obstetrics/Gynecology       Family History  Family History   Problem Relation Age of Onset   • Ovarian cancer Mother 72   • Brain cancer Father    • No Known Problems Brother    • No Known Problems Daughter    • No Known Problems Maternal Grandmother    • No Known Problems Maternal Grandfather    • No Known Problems Paternal Grandmother    • Brain cancer Paternal Grandfather    • Heart disease Brother    • No Known Problems Daughter    • Breast cancer Other 28   • No Known Problems Maternal Aunt    • No Known Problems Paternal Aunt    • Uterine cancer Neg Hx    • Colon cancer Neg Hx    • Melanoma Neg Hx    • Prostate cancer Neg Hx    • BRCA 1/2 Neg Hx    • Endometrial cancer Neg Hx    • Colon polyps Neg Hx        The following portions of the patient's history were reviewed and updated as appropriate: allergies, current medications, past family history, past medical history, past social history, past surgical history and problem list.    Review of Systems   Constitutional: Negative for activity  change, appetite change, chills, diaphoresis, fatigue, fever, unexpected weight gain and unexpected weight loss.   HENT: Negative for congestion, dental problem, drooling, ear discharge, ear pain, facial swelling, hearing loss, mouth sores, nosebleeds, postnasal drip, rhinorrhea, sinus pressure, sneezing, sore throat, swollen glands, tinnitus, trouble swallowing and voice change.    Eyes: Negative for blurred vision, double vision, photophobia, pain, discharge, redness, itching and visual disturbance.   Respiratory: Negative for apnea, cough, choking, chest tightness, shortness of breath, wheezing and stridor.    Cardiovascular: Negative for chest pain, palpitations and leg swelling.   Gastrointestinal: Negative for abdominal distention, abdominal pain, anal bleeding, blood in stool, constipation, diarrhea, nausea, rectal pain, vomiting, GERD and indigestion.   Endocrine: Negative for cold intolerance, heat intolerance, polydipsia, polyphagia and polyuria.   Genitourinary: Negative for amenorrhea, breast discharge, breast lump, breast pain, decreased libido, decreased urine volume, difficulty urinating, dyspareunia, dysuria, flank pain, frequency, genital sores, hematuria, menstrual problem, pelvic pain, pelvic pressure, urgency, urinary incontinence, vaginal bleeding, vaginal discharge and vaginal pain.   Musculoskeletal: Negative for arthralgias, back pain, gait problem, joint swelling, myalgias, neck pain, neck stiffness and bursitis.   Skin: Negative for color change, dry skin and rash.   Allergic/Immunologic: Negative for environmental allergies, food allergies and immunocompromised state.   Neurological: Negative for dizziness, tremors, seizures, syncope, facial asymmetry, speech difficulty, weakness, light-headedness, numbness, headache, memory problem and confusion.   Hematological: Negative for adenopathy. Does not bruise/bleed easily.   Psychiatric/Behavioral: Negative for agitation, behavioral problems,  decreased concentration, dysphoric mood, hallucinations, self-injury, sleep disturbance, suicidal ideas, negative for hyperactivity, depressed mood and stress. The patient is not nervous/anxious.        Objective   Physical Exam  Vitals and nursing note reviewed.   Constitutional:       Appearance: She is well-developed.   HENT:      Head: Normocephalic and atraumatic.   Eyes:      General:         Right eye: No discharge.         Left eye: No discharge.      Conjunctiva/sclera: Conjunctivae normal.   Neck:      Thyroid: No thyromegaly.   Cardiovascular:      Rate and Rhythm: Normal rate and regular rhythm.      Heart sounds: Normal heart sounds.   Pulmonary:      Effort: Pulmonary effort is normal.      Breath sounds: Normal breath sounds.   Musculoskeletal:      Cervical back: Normal range of motion and neck supple.   Skin:     General: Skin is warm and dry.   Neurological:      Mental Status: She is alert and oriented to person, place, and time.   Psychiatric:         Mood and Affect: Mood normal.         Behavior: Behavior normal.         Thought Content: Thought content normal.         Judgment: Judgment normal.         Assessment/Plan   Diagnoses and all orders for this visit:    1. Weight gain  Comments:  This is her 5th month on phentermine. She has lost 3 pounds. She has lost 30 pounds total. Wishes to continue. Ludin is reviewed.  Orders:  -     phentermine 37.5 MG capsule; Take 1 capsule by mouth Every Morning.  Dispense: 30 capsule; Refill: 0         Patient's Body mass index is 26.51 kg/m². BMI is above normal parameters. Recommendations include: educational material, exercise counseling, nutrition counseling and pharmacological intervention.      Janett Davila, APRN  3/26/2021

## 2021-04-14 ENCOUNTER — TELEPHONE (OUTPATIENT)
Dept: FAMILY MEDICINE CLINIC | Facility: CLINIC | Age: 56
End: 2021-04-14

## 2021-04-14 RX ORDER — ZOLMITRIPTAN 2.5 MG/1
TABLET, FILM COATED ORAL
Qty: 18 TABLET | Refills: 2 | Status: SHIPPED | OUTPATIENT
Start: 2021-04-14 | End: 2021-05-07 | Stop reason: SDUPTHER

## 2021-04-14 NOTE — TELEPHONE ENCOUNTER
Caller: Shawnee Suarez    Relationship: Self    Best call back number: [phone]    What medication are you requesting: ZOLMitriptan (ZOMIG) 2.5 MG tablet    What are your current symptoms: MIGRAINE    How long have you been experiencing symptoms: 2 DAYS    Have you had these symptoms before:    [x] Yes  [] No    Have you been treated for these symptoms before:   [x] Yes  [] No     If a prescription is needed, what is your preferred pharmacy and phone number: [id number] [address], [facility] [address] - [phone] ph - [phone] FX     Additional notes:    PATIENT IS OUT OF TOWN FOR 3 WEEKS AND LEFT MIGRAINE MEDICATION AT HOME.

## 2021-04-27 ENCOUNTER — OFFICE VISIT (OUTPATIENT)
Dept: OBSTETRICS AND GYNECOLOGY | Facility: CLINIC | Age: 56
End: 2021-04-27

## 2021-04-27 VITALS
SYSTOLIC BLOOD PRESSURE: 118 MMHG | WEIGHT: 145 LBS | HEIGHT: 62 IN | BODY MASS INDEX: 26.68 KG/M2 | DIASTOLIC BLOOD PRESSURE: 80 MMHG

## 2021-04-27 DIAGNOSIS — D22.9 ATYPICAL MOLE: ICD-10-CM

## 2021-04-27 DIAGNOSIS — R63.5 WEIGHT GAIN: Primary | ICD-10-CM

## 2021-04-27 PROCEDURE — 99213 OFFICE O/P EST LOW 20 MIN: CPT | Performed by: NURSE PRACTITIONER

## 2021-04-27 RX ORDER — PHENTERMINE HYDROCHLORIDE 37.5 MG/1
37.5 CAPSULE ORAL EVERY MORNING
Qty: 30 CAPSULE | Refills: 0 | Status: CANCELLED | OUTPATIENT
Start: 2021-04-27

## 2021-04-27 NOTE — PROGRESS NOTES
"Subjective     Shawnee Suarez is a 56 y.o. female    Here for follow up on weight loss. She has been on Phentermine for 6 month. She has lost 0 pounds. She has lost a total of 30 pounds. She has not had any side effects from the medication. She has been doing well with diet and exercise and has been drinking at least 64 ozs. of water daily. We discussed increasing Protein in her diet.  She wishes to stop phentermine for a while and then resume in approximately 3 months.          /80   Ht 157.5 cm (62.01\")   Wt 65.8 kg (145 lb)   LMP  (LMP Unknown)   Breastfeeding No   BMI 26.51 kg/m²     Outpatient Encounter Medications as of 4/27/2021   Medication Sig Dispense Refill   • phentermine 37.5 MG capsule Take 1 capsule by mouth Every Morning. 30 capsule 0   • Calcium-Vitamin D-Vitamin K (Viactiv Calcium Plus D) 650-12.5-40 MG-MCG-MCG chewable tablet Chew 2 (two) times a day.     • Cholecalciferol (Vitamin D3) 125 MCG (5000 UT) chewable tablet Chew 5,000 Units Daily.     • clonazePAM (KlonoPIN) 1 MG tablet Take 1 tablet by mouth 2 (Two) Times a Day As Needed for Seizures. 1 twice daily as needed for anxiety 60 tablet 2   • fluticasone (FLONASE) 50 MCG/ACT nasal spray 2 sprays into each nostril Daily.     • Lactobacillus (REPHRESH PRO-B PO) Take 1 tablet by mouth Daily.     • loratadine (ALLERGY) 10 MG tablet Take 10 mg by mouth Daily.     • Multiple Vitamins-Minerals (WOMENS 50+ MULTI VITAMIN/MIN PO) Take  by mouth.     • rosuvastatin (Crestor) 10 MG tablet Take 1 tablet by mouth Daily. 90 tablet 2   • Vortioxetine HBr (Trintellix) 20 MG tablet Take 20 mg by mouth Daily. 90 tablet 2   • ZOLMitriptan (ZOMIG) 2.5 MG tablet TAKE 1 TABLET BY MOUTH TWICE DAILY AS NEEDED FOR HEADACHE 18 tablet 2     No facility-administered encounter medications on file as of 4/27/2021.       Surgical History  Past Surgical History:   Procedure Laterality Date   • APPENDECTOMY     • BREAST BIOPSY Right 11/1989   • CHOLECYSTECTOMY     "   • COLONOSCOPY  11/22/2013    Normal exam repeat in 10 years   • COLONOSCOPY N/A 11/13/2018    Normal but prep was only fair repeat in 2 years   • COLONOSCOPY N/A 1/22/2021    Procedure: COLONOSCOPY WITH ANESTHESIA;  Surgeon: Da Miles MD;  Location: [facility] ENDOSCOPY;  Service: Gastroenterology;  Laterality: N/A;  pre: screen  post: normal  Chelsea, Marlon Valente MD     • D & C HYSTEROSCOPY ENDOMETRIAL ABLATION N/A 2/6/2017    Procedure: DILATATION AND CURETTAGE HYSTEROSCOPY NOVASURE ENDOMETRIAL ABLATION;  Surgeon: Ana Barrios MD;  Location: [facility] OR;  Service:    • DIAGNOSTIC LAPAROSCOPY N/A 6/17/2019    Procedure: DIAGNOSTIC LAPAROSCOPY;  Surgeon: Ana Barrios MD;  Location: [facility] OR;  Service: Obstetrics/Gynecology   • OVARIAN CYST REMOVAL      x 2   • SALPINGO OOPHORECTOMY Bilateral 6/17/2019    Procedure: SALPINGO OOPHORECTOMY LAPAROSCOPIC;  Surgeon: Ana Barrios MD;  Location: [facility] OR;  Service: Obstetrics/Gynecology       Family History  Family History   Problem Relation Age of Onset   • Ovarian cancer Mother 72   • Brain cancer Father    • No Known Problems Brother    • No Known Problems Daughter    • No Known Problems Maternal Grandmother    • No Known Problems Maternal Grandfather    • No Known Problems Paternal Grandmother    • Brain cancer Paternal Grandfather    • Heart disease Brother    • No Known Problems Daughter    • Breast cancer Other 28   • No Known Problems Maternal Aunt    • No Known Problems Paternal Aunt    • Uterine cancer Neg Hx    • Colon cancer Neg Hx    • Melanoma Neg Hx    • Prostate cancer Neg Hx    • BRCA 1/2 Neg Hx    • Endometrial cancer Neg Hx    • Colon polyps Neg Hx        The following portions of the patient's history were reviewed and updated as appropriate: allergies, current medications, past family history, past medical history, past social history, past surgical history and problem list.    Review of Systems   Constitutional: Negative for activity  change, appetite change, chills, diaphoresis, fatigue, fever, unexpected weight gain and unexpected weight loss.   HENT: Negative for congestion, dental problem, drooling, ear discharge, ear pain, facial swelling, hearing loss, mouth sores, nosebleeds, postnasal drip, rhinorrhea, sinus pressure, sneezing, sore throat, swollen glands, tinnitus, trouble swallowing and voice change.    Eyes: Negative for blurred vision, double vision, photophobia, pain, discharge, redness, itching and visual disturbance.   Respiratory: Negative for apnea, cough, choking, chest tightness, shortness of breath, wheezing and stridor.    Cardiovascular: Negative for chest pain, palpitations and leg swelling.   Gastrointestinal: Negative for abdominal distention, abdominal pain, anal bleeding, blood in stool, constipation, diarrhea, nausea, rectal pain, vomiting, GERD and indigestion.   Endocrine: Negative for cold intolerance, heat intolerance, polydipsia, polyphagia and polyuria.   Genitourinary: Negative for amenorrhea, breast discharge, breast lump, breast pain, decreased libido, decreased urine volume, difficulty urinating, dyspareunia, dysuria, flank pain, frequency, genital sores, hematuria, menstrual problem, pelvic pain, pelvic pressure, urgency, urinary incontinence, vaginal bleeding, vaginal discharge and vaginal pain.   Musculoskeletal: Negative for arthralgias, back pain, gait problem, joint swelling, myalgias, neck pain, neck stiffness and bursitis.   Skin: Positive for skin lesions. Negative for color change, dry skin, pallor, rash and bruise.   Allergic/Immunologic: Negative for environmental allergies, food allergies and immunocompromised state.   Neurological: Negative for dizziness, tremors, seizures, syncope, facial asymmetry, speech difficulty, weakness, light-headedness, numbness, headache, memory problem and confusion.   Hematological: Negative for adenopathy. Does not bruise/bleed easily.   Psychiatric/Behavioral:  Negative for agitation, behavioral problems, decreased concentration, dysphoric mood, hallucinations, self-injury, sleep disturbance, suicidal ideas, negative for hyperactivity, depressed mood and stress. The patient is not nervous/anxious.        Objective   Physical Exam  Vitals and nursing note reviewed.   Constitutional:       Appearance: She is well-developed.   HENT:      Head: Normocephalic and atraumatic.   Eyes:      General:         Right eye: No discharge.         Left eye: No discharge.      Conjunctiva/sclera: Conjunctivae normal.   Neck:      Thyroid: No thyromegaly.   Cardiovascular:      Rate and Rhythm: Normal rate and regular rhythm.      Heart sounds: Normal heart sounds.   Pulmonary:      Effort: Pulmonary effort is normal.      Breath sounds: Normal breath sounds.   Musculoskeletal:      Cervical back: Normal range of motion and neck supple.   Skin:     General: Skin is warm and dry.          Neurological:      Mental Status: She is alert and oriented to person, place, and time.   Psychiatric:         Mood and Affect: Mood normal.         Behavior: Behavior normal.         Thought Content: Thought content normal.         Judgment: Judgment normal.         Assessment/Plan   Diagnoses and all orders for this visit:    1. Weight gain (Primary)  Comments:  This is her 6th month on phentermine. She has lost 0 pounds. She has lost 30 pounds total.  Patient will stop for 3 months.  Ludin is reviewed.    2. Atypical mole  Comments:  Patient has several atypical moles.  She will see Dr. Mary Anne Moeller for follow-up.  Orders:  -     Ambulatory Referral to Dermatology             Patient's Body mass index is 26.51 kg/m². BMI is above normal parameters. Recommendations include: educational material, exercise counseling and nutrition counseling.      Janett Davila, APRN  4/27/2021

## 2021-04-27 NOTE — PATIENT INSTRUCTIONS
"BMI for Adults  What is BMI?  Body mass index (BMI) is a number that is calculated from a person's weight and height. BMI can help estimate how much of a person's weight is composed of fat. BMI does not measure body fat directly. Rather, it is an alternative to procedures that directly measure body fat, which can be difficult and expensive.  BMI can help identify people who may be at higher risk for certain medical problems.  What are BMI measurements used for?  BMI is used as a screening tool to identify possible weight problems. It helps determine whether a person is obese, overweight, a healthy weight, or underweight.  BMI is useful for:  · Identifying a weight problem that may be related to a medical condition or may increase the risk for medical problems.  · Promoting changes, such as changes in diet and exercise, to help reach a healthy weight. BMI screening can be repeated to see if these changes are working.  How is BMI calculated?  BMI involves measuring your weight in relation to your height. Both height and weight are measured, and the BMI is calculated from those numbers. This can be done either in English (U.S[place] or metric measurements. Note that charts and online BMI calculators are available to help you find your BMI quickly and easily without having to do these calculations yourself.  To calculate your BMI in English (U.S.) measurements:    1. Measure your weight in pounds (lb).  2. Multiply the number of pounds by 703.  ? For example, for a person who weighs 180 lb, multiply that number by 703, which equals 126,540.  3. Measure your height in inches. Then multiply that number by itself to get a measurement called \"inches squared.\"  ? For example, for a person who is 70 inches tall, the \"inches squared\" measurement is 70 inches x 70 inches, which equals 4,900 inches squared.  4. Divide the total from step 2 (number of lb x 703) by the total from step 3 (inches squared): 126,540 ÷ 4,900 = 25.8. This is " "your BMI.  To calculate your BMI in metric measurements:  1. Measure your weight in kilograms (kg).  2. Measure your height in meters (m). Then multiply that number by itself to get a measurement called \"meters squared.\"  ? For example, for a person who is 1.75 m tall, the \"meters squared\" measurement is 1.75 m x 1.75 m, which is equal to 3.1 meters squared.  3. Divide the number of kilograms (your weight) by the meters squared number. In this example: 70 ÷ 3.1 = 22.6. This is your BMI.  What do the results mean?  BMI charts are used to identify whether you are underweight, normal weight, overweight, or obese. The following guidelines will be used:  · Underweight: BMI less than 18.5.  · Normal weight: BMI between 18.5 and 24.9.  · Overweight: BMI between 25 and 29.9.  · Obese: BMI of 30 or above.  Keep these notes in mind:  · Weight includes both fat and muscle, so someone with a muscular build, such as an athlete, may have a BMI that is higher than 24.9. In cases like these, BMI is not an accurate measure of body fat.  · To determine if excess body fat is the cause of a BMI of 25 or higher, further assessments may need to be done by a health care provider.  · BMI is usually interpreted in the same way for men and women.  Where to find more information  For more information about BMI, including tools to quickly calculate your BMI, go to these websites:  · Centers for Disease Control and Prevention: www.cdc.gov  · American Heart Association: www.heart.org  · National Heart, Lung, and Blood [place]: www.nhlbi.nih.gov  Summary  · Body mass index (BMI) is a number that is calculated from a person's weight and height.  · BMI may help estimate how much of a person's weight is composed of fat. BMI can help identify those who may be at higher risk for certain medical problems.  · BMI can be measured using English measurements or metric measurements.  · BMI charts are used to identify whether you are underweight, normal " weight, overweight, or obese.  This information is not intended to replace advice given to you by your health care provider. Make sure you discuss any questions you have with your health care provider.  Document Revised: 09/09/2020 Document Reviewed: 07/17/2020  Elsevier Patient Education © 2021 [employer]

## 2021-05-07 ENCOUNTER — TELEPHONE (OUTPATIENT)
Dept: FAMILY MEDICINE CLINIC | Facility: CLINIC | Age: 56
End: 2021-05-07

## 2021-05-07 RX ORDER — ZOLMITRIPTAN 2.5 MG/1
TABLET, FILM COATED ORAL
Qty: 18 TABLET | Refills: 2 | Status: SHIPPED | OUTPATIENT
Start: 2021-05-07 | End: 2021-07-19 | Stop reason: SDUPTHER

## 2021-05-07 NOTE — TELEPHONE ENCOUNTER
DELETE AFTER REVIEWING: Send the encounter HIGH priority, If patient has less than a 3 day supply. If the patient will run out of medication over the weekend add that information to the additional details line. Send this encounter to the clinical pool.    Caller: Shawnee Suarez    Relationship: Self    Best call back number: [phone]    Medication needed:   Requested Prescriptions     Pending Prescriptions Disp Refills   • ZOLMitriptan (ZOMIG) 2.5 MG tablet 18 tablet 2     Sig: TAKE 1 TABLET BY MOUTH TWICE DAILY AS NEEDED FOR HEADACHE     Does the patient have less than a 3 day supply:  [] Yes  [] No    What is the patient's preferred pharmacy: [facility] [address] [phone] [facility] [phone] FX           DELETE AFTER READING TO PATIENT: “Thank you for sharing this information with me. I will send a message to the clinical team. Please allow 48 hours for the clinical staff to follow up on this request.”

## 2021-06-11 ENCOUNTER — OFFICE VISIT (OUTPATIENT)
Dept: FAMILY MEDICINE CLINIC | Facility: CLINIC | Age: 56
End: 2021-06-11

## 2021-06-11 VITALS
HEIGHT: 62 IN | BODY MASS INDEX: 27.97 KG/M2 | RESPIRATION RATE: 16 BRPM | DIASTOLIC BLOOD PRESSURE: 78 MMHG | SYSTOLIC BLOOD PRESSURE: 118 MMHG | OXYGEN SATURATION: 98 % | TEMPERATURE: 98 F | HEART RATE: 83 BPM | WEIGHT: 152 LBS

## 2021-06-11 DIAGNOSIS — F41.9 ANXIETY: ICD-10-CM

## 2021-06-11 PROCEDURE — 99213 OFFICE O/P EST LOW 20 MIN: CPT | Performed by: FAMILY MEDICINE

## 2021-06-11 RX ORDER — DULOXETIN HYDROCHLORIDE 60 MG/1
60 CAPSULE, DELAYED RELEASE ORAL DAILY
Qty: 90 CAPSULE | Refills: 3 | Status: SHIPPED | OUTPATIENT
Start: 2021-06-11 | End: 2022-06-20 | Stop reason: SDUPTHER

## 2021-06-11 RX ORDER — CLONAZEPAM 1 MG/1
1 TABLET ORAL 2 TIMES DAILY PRN
Qty: 60 TABLET | Refills: 2 | Status: SHIPPED | OUTPATIENT
Start: 2021-06-11 | End: 2021-09-14 | Stop reason: SDUPTHER

## 2021-06-11 NOTE — PROGRESS NOTES
Subjective   Shawnee Suarez is a 56 y.o. female.     56-year-old female with chronic anxiety and arthralgia      The following portions of the patient's history were reviewed and updated as appropriate: allergies, current medications, past family history, past medical history, past social history, past surgical history and problem list.    Review of Systems   Musculoskeletal: Positive for arthralgias.   Psychiatric/Behavioral: The patient is nervous/anxious.        Objective   Physical Exam  Vitals and nursing note reviewed.   Constitutional:       Appearance: Normal appearance.   Cardiovascular:      Rate and Rhythm: Normal rate and regular rhythm.      Pulses: Normal pulses.      Heart sounds: Normal heart sounds.   Pulmonary:      Effort: Pulmonary effort is normal.      Breath sounds: Normal breath sounds.   Musculoskeletal:      Right lower leg: No edema.      Left lower leg: No edema.   Neurological:      General: No focal deficit present.      Mental Status: She is alert and oriented to person, place, and time.   Psychiatric:         Mood and Affect: Mood normal.         Behavior: Behavior normal.         Thought Content: Thought content normal.         Judgment: Judgment normal.       CONTROLLED SUBSTANCE TRACKING 12/23/2019 3/3/2020 4/14/2020 7/1/2020 9/28/2020 3/11/2021 6/11/2021   [facility] Ludin 12/23/2019 3/3/2020 4/14/2020 7/1/2020 7/1/2020 3/11/2021 6/11/2021   Report Number - - [id number] Dr. Tran reviewed through [facility] -   [facility] UDS 4/10/2019 3/3/2020 3/3/2020 3/3/2020 3/4/2020 3/11/2021 3/11/2021   Last Controlled Substance Agreement 4/10/2019 3/3/2020 3/3/2020 3/4/2020 3/4/2020 3/11/2021 3/11/2021       Assessment/Plan   Diagnoses and all orders for this visit:    1. Anxiety  -     clonazePAM (KlonoPIN) 1 MG tablet; Take 1 tablet by mouth 2 (Two) Times a Day As Needed for Anxiety. 1 twice daily as needed for anxiety  Dispense: 60 tablet; Refill: 2    Other orders  -     DULoxetine  (CYMBALTA) 60 MG capsule; Take 1 capsule by mouth Daily.  Dispense: 90 capsule; Refill: 3         Plan above-titrate off Trintellix onto Cymbalta

## 2021-07-08 ENCOUNTER — TELEPHONE (OUTPATIENT)
Dept: OBSTETRICS AND GYNECOLOGY | Facility: CLINIC | Age: 56
End: 2021-07-08

## 2021-07-08 ENCOUNTER — OFFICE VISIT (OUTPATIENT)
Dept: FAMILY MEDICINE CLINIC | Facility: CLINIC | Age: 56
End: 2021-07-08

## 2021-07-08 VITALS
WEIGHT: 150.4 LBS | HEART RATE: 107 BPM | BODY MASS INDEX: 27.68 KG/M2 | TEMPERATURE: 97.4 F | SYSTOLIC BLOOD PRESSURE: 112 MMHG | RESPIRATION RATE: 16 BRPM | DIASTOLIC BLOOD PRESSURE: 68 MMHG | HEIGHT: 62 IN | OXYGEN SATURATION: 93 %

## 2021-07-08 DIAGNOSIS — R39.15 URGENCY OF URINATION: Primary | ICD-10-CM

## 2021-07-08 LAB
BILIRUB BLD-MCNC: ABNORMAL MG/DL
CLARITY, POC: CLEAR
COLOR UR: YELLOW
GLUCOSE UR STRIP-MCNC: NEGATIVE MG/DL
KETONES UR QL: ABNORMAL
LEUKOCYTE EST, POC: NEGATIVE
NITRITE UR-MCNC: NEGATIVE MG/ML
PH UR: 5 [PH] (ref 5–8)
PROT UR STRIP-MCNC: NEGATIVE MG/DL
RBC # UR STRIP: NEGATIVE /UL
SP GR UR: 1.03 (ref 1–1.03)
UROBILINOGEN UR QL: NORMAL

## 2021-07-08 PROCEDURE — 81003 URINALYSIS AUTO W/O SCOPE: CPT | Performed by: NURSE PRACTITIONER

## 2021-07-08 PROCEDURE — 99213 OFFICE O/P EST LOW 20 MIN: CPT | Performed by: NURSE PRACTITIONER

## 2021-07-08 RX ORDER — SULFAMETHOXAZOLE AND TRIMETHOPRIM 800; 160 MG/1; MG/1
1 TABLET ORAL 2 TIMES DAILY
Qty: 14 TABLET | Refills: 0 | Status: SHIPPED | OUTPATIENT
Start: 2021-07-08 | End: 2021-09-14

## 2021-07-08 RX ORDER — PHENAZOPYRIDINE HYDROCHLORIDE 200 MG/1
200 TABLET, FILM COATED ORAL 3 TIMES DAILY PRN
Qty: 6 TABLET | Refills: 0 | Status: SHIPPED | OUTPATIENT
Start: 2021-07-08 | End: 2021-11-09

## 2021-07-08 NOTE — PROGRESS NOTES
CC: urinary urgency    History:  Shawnee Suarez is a 56 y.o. female who presents today for evaluation of the above problems.      Urinary urgency. Feels that it is progressing. Has been present for about a week.  The urge to void is uncomfortable and more frequent than normal.     HPI  ROS:  Review of Systems   Genitourinary: Positive for frequency and urgency. Negative for difficulty urinating.       No Known Allergies  Past Medical History:   Diagnosis Date   • Anxiety    • Depression    • History of transfusion    • Hyperlipidemia    • Migraines    • Ovarian cyst     x 2      Past Surgical History:   Procedure Laterality Date   • APPENDECTOMY     • BREAST BIOPSY Right 11/1989   • CHOLECYSTECTOMY     • COLONOSCOPY  11/22/2013    Normal exam repeat in 10 years   • COLONOSCOPY N/A 11/13/2018    Normal but prep was only fair repeat in 2 years   • COLONOSCOPY N/A 1/22/2021    Procedure: COLONOSCOPY WITH ANESTHESIA;  Surgeon: Da Miles MD;  Location: [facility] ENDOSCOPY;  Service: Gastroenterology;  Laterality: N/A;  pre: screen  post: normal  Marlon Tran MD     • D & C HYSTEROSCOPY ENDOMETRIAL ABLATION N/A 2/6/2017    Procedure: DILATATION AND CURETTAGE HYSTEROSCOPY NOVASURE ENDOMETRIAL ABLATION;  Surgeon: Ana Barrios MD;  Location: [facility] OR;  Service:    • DIAGNOSTIC LAPAROSCOPY N/A 6/17/2019    Procedure: DIAGNOSTIC LAPAROSCOPY;  Surgeon: Ana Barrios MD;  Location: [facility] OR;  Service: Obstetrics/Gynecology   • OVARIAN CYST REMOVAL      x 2   • SALPINGO OOPHORECTOMY Bilateral 6/17/2019    Procedure: SALPINGO OOPHORECTOMY LAPAROSCOPIC;  Surgeon: Ana Barrios MD;  Location: [facility] OR;  Service: Obstetrics/Gynecology     Family History   Problem Relation Age of Onset   • Ovarian cancer Mother 72   • Brain cancer Father    • No Known Problems Brother    • No Known Problems Daughter    • No Known Problems Maternal Grandmother    • No Known Problems Maternal Grandfather    • No Known Problems  Paternal Grandmother    • Brain cancer Paternal Grandfather    • Heart disease Brother    • No Known Problems Daughter    • Breast cancer Other 28   • No Known Problems Maternal Aunt    • No Known Problems Paternal Aunt    • Uterine cancer Neg Hx    • Colon cancer Neg Hx    • Melanoma Neg Hx    • Prostate cancer Neg Hx    • BRCA 1/2 Neg Hx    • Endometrial cancer Neg Hx    • Colon polyps Neg Hx       reports that she has never smoked. She has never used smokeless tobacco. She reports that she does not drink alcohol and does not use drugs.      Current Outpatient Medications:   •  Calcium-Vitamin D-Vitamin K (Viactiv Calcium Plus D) 650-12.5-40 MG-MCG-MCG chewable tablet, Chew 2 (two) times a day., Disp: , Rfl:   •  Cholecalciferol (Vitamin D3) 125 MCG (5000 UT) chewable tablet, Chew 5,000 Units Daily., Disp: , Rfl:   •  clonazePAM (KlonoPIN) 1 MG tablet, Take 1 tablet by mouth 2 (Two) Times a Day As Needed for Anxiety. 1 twice daily as needed for anxiety, Disp: 60 tablet, Rfl: 2  •  DULoxetine (CYMBALTA) 60 MG capsule, Take 1 capsule by mouth Daily., Disp: 90 capsule, Rfl: 3  •  fluticasone (FLONASE) 50 MCG/ACT nasal spray, 2 sprays into each nostril Daily., Disp: , Rfl:   •  Lactobacillus (REPHRESH PRO-B PO), Take 1 tablet by mouth Daily., Disp: , Rfl:   •  loratadine (ALLERGY) 10 MG tablet, Take 10 mg by mouth Daily., Disp: , Rfl:   •  Multiple Vitamins-Minerals (WOMENS 50+ MULTI VITAMIN/MIN PO), Take  by mouth., Disp: , Rfl:   •  rosuvastatin (Crestor) 10 MG tablet, Take 1 tablet by mouth Daily., Disp: 90 tablet, Rfl: 2  •  ZOLMitriptan (ZOMIG) 2.5 MG tablet, TAKE 1 TABLET BY MOUTH TWICE DAILY AS NEEDED FOR HEADACHE, Disp: 18 tablet, Rfl: 2  •  phenazopyridine (Pyridium) 200 MG tablet, Take 1 tablet by mouth 3 (Three) Times a Day As Needed for Bladder Spasms., Disp: 6 tablet, Rfl: 0  •  sulfamethoxazole-trimethoprim (Bactrim DS) 800-160 MG per tablet, Take 1 tablet by mouth 2 (Two) Times a Day., Disp: 14 tablet,  "Rfl: 0    OBJECTIVE:  /68 (BP Location: Left arm, Patient Position: Sitting, Cuff Size: Adult)   Pulse 107   Temp 97.4 °F (36.3 °C) (Temporal)   Resp 16   Ht 157.5 cm (62\")   Wt 68.2 kg (150 lb 6.4 oz)   LMP  (LMP Unknown)   SpO2 93%   BMI 27.51 kg/m²    Physical Exam  Vitals reviewed.   Constitutional:       Appearance: She is well-developed.   Cardiovascular:      Rate and Rhythm: Normal rate.   Pulmonary:      Effort: Pulmonary effort is normal.   Abdominal:      Palpations: Abdomen is soft.      Tenderness: There is no abdominal tenderness. There is no right CVA tenderness or left CVA tenderness.   Neurological:      Mental Status: She is alert and oriented to person, place, and time.   Psychiatric:         Behavior: Behavior normal.         Assessment/Plan    Diagnoses and all orders for this visit:    1. Urgency of urination (Primary)  -     POC Urinalysis Dipstick, Multipro  -     phenazopyridine (Pyridium) 200 MG tablet; Take 1 tablet by mouth 3 (Three) Times a Day As Needed for Bladder Spasms.  Dispense: 6 tablet; Refill: 0  -     sulfamethoxazole-trimethoprim (Bactrim DS) 800-160 MG per tablet; Take 1 tablet by mouth 2 (Two) Times a Day.  Dispense: 14 tablet; Refill: 0    Urine looks ok. Patient advised to increase water intake and take pyridium. If no improvement, then take antibiotics.     An After Visit Summary was printed and given to the patient at discharge.  Return if symptoms worsen or fail to improve, for Next scheduled follow up.       MICHELLE Arnold 7/8/21    Electronically signed.  "

## 2021-07-08 NOTE — TELEPHONE ENCOUNTER
PC wanting to know if Janett could call her in something for a UTI, pt c/o frequency & urgency. I told pt she would need to c/i for U/A. Pt states she can't come in this wk, states she'll call her PCP.

## 2021-07-19 RX ORDER — ZOLMITRIPTAN 2.5 MG/1
TABLET, FILM COATED ORAL
Qty: 18 TABLET | Refills: 2 | OUTPATIENT
Start: 2021-07-19

## 2021-07-19 RX ORDER — ZOLMITRIPTAN 2.5 MG/1
TABLET, FILM COATED ORAL
Qty: 18 TABLET | Refills: 2 | Status: SHIPPED | OUTPATIENT
Start: 2021-07-19 | End: 2022-06-30 | Stop reason: SDUPTHER

## 2021-07-28 NOTE — PROGRESS NOTES
[facility]  Shawnee Suarez  : 1965  MRN: [id number]  CSN: [id number]    History and Physical    Subjective   Shawnee Suarez is a 54 y.o. year old  who presents for consultation about surgery due to family history of ovarian cancer in her mother (age 71) - a maternal aunt also had breast cancer as a young woman (age uncertain, but definitely premenopausal).  The patient's PCP has strongly encouraged her to have a hysterectomy.    Past Medical History:   Diagnosis Date   • Anxiety    • Depression    • History of transfusion    • Hyperlipidemia    • Ovarian cyst      Past Surgical History:   Procedure Laterality Date   • APPENDECTOMY     • BREAST BIOPSY Right     unsure when   • CHOLECYSTECTOMY     • COLONOSCOPY  2013    Normal exam repeat in 10 years   • COLONOSCOPY N/A 2018    Procedure: COLONOSCOPY WITH ANESTHESIA;  Surgeon: Da Miles MD;  Location: [facility] ENDOSCOPY;  Service: Gastroenterology   • D&C HYSTEROSCOPY ENDOMETRIAL ABLATION N/A 2017    Procedure: DILATATION AND CURETTAGE HYSTEROSCOPY NOVASURE ENDOMETRIAL ABLATION;  Surgeon: Ana Barrios MD;  Location: [facility] OR;  Service:    • OVARIAN CYST REMOVAL       Social History    Tobacco Use      Smoking status: Never Smoker      Smokeless tobacco: Never Used      Current Outpatient Medications:   •  cholecalciferol (VITAMIN D3) 1000 units tablet, Take 1,000 Units by mouth Daily., Disp: , Rfl:   •  clonazePAM (KlonoPIN) 1 MG tablet, Take 1 tablet by mouth 2 (Two) Times a Day As Needed for Seizures., Disp: 60 tablet, Rfl: 2  •  fluticasone (FLONASE) 50 MCG/ACT nasal spray, 2 sprays into each nostril Daily., Disp: , Rfl:   •  Lactobacillus (REPHRESH PRO-B PO), Take 1 tablet by mouth Daily., Disp: , Rfl:   •  loratadine (ALLERGY) 10 MG tablet, Take 10 mg by mouth Daily., Disp: , Rfl:   •  Multiple Vitamins-Minerals (WOMENS 50+ MULTI VITAMIN/MIN PO), Take  by mouth., Disp: , Rfl:   •  rosuvastatin (CRESTOR) 10 MG tablet, Take 1  This patient was seen at [address] [place] [address] Urgent Care while in their vehicle due to COVID-19 pandemic with PPE and focused examination in order to decrease community viral transmission. The patient/guardian gave verbal consent to treat. Foot Pain  This is a new problem. The current episode started yesterday. The problem occurs constantly. Pertinent negatives include no chest pain and no abdominal pain. Associated symptoms comments: Left foot pain- yesterday, he dropped heavy weight on his foot  Able to work with some pain all day yesterday  Later pain and swelling increased  Today feels better . The symptoms are aggravated by walking. The symptoms are relieved by rest. He has tried nothing for the symptoms. No past medical history on file. No past surgical history on file. No family history on file. Social History     Socioeconomic History    Marital status:      Spouse name: Not on file    Number of children: Not on file    Years of education: Not on file    Highest education level: Not on file   Occupational History    Not on file   Tobacco Use    Smoking status: Never Smoker    Smokeless tobacco: Never Used   Substance and Sexual Activity    Alcohol use: Yes    Drug use: Not on file    Sexual activity: Not on file   Other Topics Concern    Not on file   Social History Narrative    Not on file     Social Determinants of Health     Financial Resource Strain:     Difficulty of Paying Living Expenses:    Food Insecurity:     Worried About Running Out of Food in the Last Year:     [address] in the Last Year:    Transportation Needs:     Lack of Transportation (Medical):      Lack of Transportation (Non-Medical):    Physical Activity:     Days of Exercise per Week:     Minutes of Exercise per Session:    Stress:     Feeling of Stress :    Social Connections:     Frequency of Communication with Friends and Family:     Frequency of Social Gatherings with "tablet by mouth Daily., Disp: 90 tablet, Rfl: 2  •  Vortioxetine HBr (TRINTELLIX) 20 MG tablet, Take 20 mg by mouth Daily., Disp: 90 tablet, Rfl: 1  •  ZOLMitriptan (ZOMIG) 2.5 MG tablet, Take 1 tablet by mouth 2 (Two) Times a Day. As needed for headaches, Disp: 6 tablet, Rfl: 3    No Known Allergies    Family History   Problem Relation Age of Onset   • Ovarian cancer Mother 72   • Brain cancer Father    • No Known Problems Brother    • No Known Problems Daughter    • No Known Problems Maternal Grandmother    • No Known Problems Maternal Grandfather    • No Known Problems Paternal Grandmother    • Brain cancer Paternal Grandfather    • Heart disease Brother    • No Known Problems Daughter    • Breast cancer Other 28   • No Known Problems Maternal Aunt    • No Known Problems Paternal Aunt    • Uterine cancer Neg Hx    • Colon cancer Neg Hx    • Melanoma Neg Hx    • Prostate cancer Neg Hx    • BRCA 1/2 Neg Hx    • Endometrial cancer Neg Hx    • Colon polyps Neg Hx      Review of Systems   Constitutional: Negative for activity change and unexpected weight change.   Respiratory: Negative for shortness of breath.    Cardiovascular: Negative for chest pain.   Gastrointestinal: Positive for constipation (chronic, manages with diet). Negative for abdominal pain and diarrhea.   Genitourinary: Positive for enuresis (rare with sneezing or laughing). Negative for difficulty urinating, pelvic pain, vaginal bleeding, vaginal discharge and vaginal pain.   Musculoskeletal: Negative for arthralgias and back pain.   Psychiatric/Behavioral: Negative for dysphoric mood. The patient is not nervous/anxious.         Well-managed with meds         Objective   BP 96/62   Ht 160 cm (63\")   Wt 67.6 kg (149 lb)   BMI 26.39 kg/m²     Physical Exam   Physical Exam   Constitutional: She is oriented to person, place, and time. She appears well-developed and well-nourished. No distress.   HENT:   Head: Normocephalic and atraumatic.   Eyes: EOM " Friends and Family:     Attends Worship Services:     Active Member of Clubs or Organizations:     Attends Club or Organization Meetings:     Marital Status:    Intimate Partner Violence:     Fear of Current or Ex-Partner:     Emotionally Abused:     Physically Abused:     Sexually Abused: ALLERGIES: Patient has no known allergies. Review of Systems   Cardiovascular: Negative for chest pain. Gastrointestinal: Negative for abdominal pain. Musculoskeletal: Positive for gait problem (mild). Negative for arthralgias, back pain and joint swelling. All other systems reviewed and are negative. Vitals:    07/28/21 1535   BP: 111/76   Pulse: 76   Resp: 16   Temp: 98.4 °F (36.9 °C)   SpO2: 99%   Weight: 141 lb 8 oz (64.2 kg)   Height: 5' 6\" (1.676 m)       Physical Exam  Vitals and nursing note reviewed. Musculoskeletal:      Right ankle: Normal.      Left ankle: Normal.      Right foot: Normal.      Left foot: Decreased range of motion. Swelling and tenderness present. No bony tenderness or crepitus. Feet:          MDM    Procedures             ICD-10-CM ICD-9-CM    1. Contusion of left foot including toes, initial encounter  S90.32XA 924.20     S90.122A 924.3      No imaging required  [facility]ICE Rx - aleve 220 mg samples give- use 1-2 tab bid  No orders of the defined types were placed in this encounter. No results found for any visits on 07/28/21. The patients condition was discussed with the patient and they understand. The patient is to follow up with primary care doctor. If signs and symptoms become worse the pt is to go to the ER. The patient is to take medications as prescribed. are normal.   Neck: Normal range of motion.   Pulmonary/Chest: Effort normal.   Abdominal: Soft. She exhibits no distension. There is no tenderness.   Genitourinary:   Genitourinary Comments: U/s today: Uterus 5.17 X 3.97 X 3.22, with lining too thin to identify.  R ovary normal, L not visualized.   Musculoskeletal: Normal range of motion.   Neurological: She is alert and oriented to person, place, and time.   Skin: Skin is warm and dry.   Psychiatric: She has a normal mood and affect. Her behavior is normal. Judgment normal.   Nursing note and vitals reviewed.      Labs  Lab Results   Component Value Date     09/25/2018    HGB 16.1 (H) 09/25/2018    HCT 49.2 (H) 09/25/2018    WBC 5.49 09/25/2018     03/26/2019    K 4.8 03/26/2019     03/26/2019    CO2 24.6 03/26/2019    BUN 14 03/26/2019    CREATININE 0.69 03/26/2019    GLUCOSE 87 12/25/2016    ALBUMIN 4.80 03/26/2019    CALCIUM 10.2 03/26/2019    AST 20 03/26/2019    ALT 18 03/26/2019    BILITOT 0.5 03/26/2019        Assessment & Plan    Shawnee was seen today for fhx ovarian cancer.    Diagnoses and all orders for this visit:    Family history of ovarian cancer: The patient was strongly advised to have a hysterectomy with BSO by her primary care provider, but the patient is hesitant.  Patient has a family member who required a hysterectomy and then had complications of both posterior straightening prolapse as well as colostomy.  The patient is anxious with possibility of ovarian cancer, since her own mother had ovarian cancer, but is also very aware that post history to be prolapse can be a real result of surgery.  The patient did have bracket testing which was negative.  We have also discussed annual ultrasound surveillance as a possible conservative plan, but the patient was advised that no data has proven this to increase survival, only earlier detection.  Risks of laparoscopic surgery were reviewed to include, but are not limited to, the  possibility of bowel perforation requiring possible bowel resection and/or colostomy, possible bladder injury or possible and possible vascular injury which could require the need for a blood transfusion.  Possible need for conversion to a laparotomy was also discussed.  The patient's questions were answered to her satisfaction.  Post-op restrictions and typical post-op course were also reviewed.  The patient has elected for laparoscopic BSO, but would like to keep her uterus and cervix for support.  She even had questions about a supracervical hysterectomy but was discouraged from this since any future need for trachelectomy would carry an even higher risk of laparoscopic or surgical injury.  Surgery is being scheduled for June 17.  All questions were answered to the satisfaction of both the patient and her .  Comments:  Patient's BRCA testing was negative.    BMI 26.0-26.9,adult        Ana Barrios MD  5/15/2019  11:29 AM

## 2021-08-06 ENCOUNTER — TELEMEDICINE (OUTPATIENT)
Dept: OBSTETRICS AND GYNECOLOGY | Facility: CLINIC | Age: 56
End: 2021-08-06

## 2021-08-06 VITALS — HEIGHT: 63 IN | BODY MASS INDEX: 27.46 KG/M2 | WEIGHT: 155 LBS

## 2021-08-06 DIAGNOSIS — R63.5 WEIGHT GAIN: Primary | ICD-10-CM

## 2021-08-06 PROCEDURE — 99213 OFFICE O/P EST LOW 20 MIN: CPT | Performed by: NURSE PRACTITIONER

## 2021-08-06 RX ORDER — PHENTERMINE HYDROCHLORIDE 37.5 MG/1
37.5 TABLET ORAL
Qty: 30 TABLET | Refills: 0 | Status: SHIPPED | OUTPATIENT
Start: 2021-08-06 | End: 2021-09-03 | Stop reason: SDUPTHER

## 2021-08-06 RX ORDER — PHENTERMINE HYDROCHLORIDE 37.5 MG/1
37.5 TABLET ORAL
Qty: 30 TABLET | Refills: 0 | Status: CANCELLED | OUTPATIENT
Start: 2021-08-06

## 2021-08-06 NOTE — PROGRESS NOTES
"Subjective     Shawnee Suarez is a 56 y.o. female    You have chosen to receive care through a telehealth visit.  Do you consent to use a video/audio connection for your medical care today? Yes    Patient has gained weight and would like to resume phentermine.            Ht 160 cm (63\")   Wt 70.3 kg (155 lb)   LMP  (LMP Unknown)   BMI 27.46 kg/m²     Outpatient Encounter Medications as of 8/6/2021   Medication Sig Dispense Refill   • Calcium-Vitamin D-Vitamin K (Viactiv Calcium Plus D) 650-12.5-40 MG-MCG-MCG chewable tablet Chew 2 (two) times a day.     • Cholecalciferol (Vitamin D3) 125 MCG (5000 UT) chewable tablet Chew 5,000 Units Daily.     • clonazePAM (KlonoPIN) 1 MG tablet Take 1 tablet by mouth 2 (Two) Times a Day As Needed for Anxiety. 1 twice daily as needed for anxiety 60 tablet 2   • DULoxetine (CYMBALTA) 60 MG capsule Take 1 capsule by mouth Daily. 90 capsule 3   • fluticasone (FLONASE) 50 MCG/ACT nasal spray 2 sprays into each nostril Daily.     • Lactobacillus (REPHRESH PRO-B PO) Take 1 tablet by mouth Daily.     • loratadine (ALLERGY) 10 MG tablet Take 10 mg by mouth Daily.     • Multiple Vitamins-Minerals (WOMENS 50+ MULTI VITAMIN/MIN PO) Take  by mouth.     • phenazopyridine (Pyridium) 200 MG tablet Take 1 tablet by mouth 3 (Three) Times a Day As Needed for Bladder Spasms. 6 tablet 0   • rosuvastatin (Crestor) 10 MG tablet Take 1 tablet by mouth Daily. 90 tablet 2   • sulfamethoxazole-trimethoprim (Bactrim DS) 800-160 MG per tablet Take 1 tablet by mouth 2 (Two) Times a Day. 14 tablet 0   • ZOLMitriptan (ZOMIG) 2.5 MG tablet TAKE 1 TABLET BY MOUTH TWICE DAILY AS NEEDED FOR HEADACHE 18 tablet 2   • phentermine (Adipex-P) 37.5 MG tablet Take 1 tablet by mouth Every Morning Before Breakfast. 30 tablet 0     No facility-administered encounter medications on file as of 8/6/2021.       Surgical History  Past Surgical History:   Procedure Laterality Date   • APPENDECTOMY     • BREAST BIOPSY Right " 11/1989   • CHOLECYSTECTOMY     • COLONOSCOPY  11/22/2013    Normal exam repeat in 10 years   • COLONOSCOPY N/A 11/13/2018    Normal but prep was only fair repeat in 2 years   • COLONOSCOPY N/A 1/22/2021    Procedure: COLONOSCOPY WITH ANESTHESIA;  Surgeon: Da Miles MD;  Location: [facility] ENDOSCOPY;  Service: Gastroenterology;  Laterality: N/A;  pre: screen  post: normal  Marlon Tran MD     • D & C HYSTEROSCOPY ENDOMETRIAL ABLATION N/A 2/6/2017    Procedure: DILATATION AND CURETTAGE HYSTEROSCOPY NOVASURE ENDOMETRIAL ABLATION;  Surgeon: Ana Barrios MD;  Location: [facility] OR;  Service:    • DIAGNOSTIC LAPAROSCOPY N/A 6/17/2019    Procedure: DIAGNOSTIC LAPAROSCOPY;  Surgeon: Ana Barrios MD;  Location: [facility] OR;  Service: Obstetrics/Gynecology   • OVARIAN CYST REMOVAL      x 2   • SALPINGO OOPHORECTOMY Bilateral 6/17/2019    Procedure: SALPINGO OOPHORECTOMY LAPAROSCOPIC;  Surgeon: Ana Barrios MD;  Location: [facility] OR;  Service: Obstetrics/Gynecology       Family History  Family History   Problem Relation Age of Onset   • Ovarian cancer Mother 72   • Brain cancer Father    • No Known Problems Brother    • No Known Problems Daughter    • No Known Problems Maternal Grandmother    • No Known Problems Maternal Grandfather    • No Known Problems Paternal Grandmother    • Brain cancer Paternal Grandfather    • Heart disease Brother    • No Known Problems Daughter    • Breast cancer Other 28   • No Known Problems Maternal Aunt    • No Known Problems Paternal Aunt    • Uterine cancer Neg Hx    • Colon cancer Neg Hx    • Melanoma Neg Hx    • Prostate cancer Neg Hx    • BRCA 1/2 Neg Hx    • Endometrial cancer Neg Hx    • Colon polyps Neg Hx        The following portions of the patient's history were reviewed and updated as appropriate: allergies, current medications, past family history, past social history, past surgical history and problem list.    Review of Systems   Constitutional: Positive for  unexpected weight gain. Negative for activity change, appetite change, chills, diaphoresis, fatigue, fever and unexpected weight loss.   HENT: Negative for congestion, dental problem, drooling, ear discharge, ear pain, facial swelling, hearing loss, mouth sores, nosebleeds, postnasal drip, rhinorrhea, sinus pressure, sneezing, sore throat, swollen glands, tinnitus, trouble swallowing and voice change.    Eyes: Negative for blurred vision, double vision, photophobia, pain, discharge, redness, itching and visual disturbance.   Respiratory: Negative for apnea, cough, choking, chest tightness, shortness of breath, wheezing and stridor.    Cardiovascular: Negative for chest pain, palpitations and leg swelling.   Gastrointestinal: Negative for abdominal distention, abdominal pain, anal bleeding, blood in stool, constipation, diarrhea, nausea, rectal pain, vomiting, GERD and indigestion.   Endocrine: Negative for cold intolerance, heat intolerance, polydipsia, polyphagia and polyuria.   Genitourinary: Negative for amenorrhea, breast discharge, breast lump, breast pain, decreased libido, decreased urine volume, difficulty urinating, dyspareunia, dysuria, flank pain, frequency, genital sores, hematuria, menstrual problem, pelvic pain, pelvic pressure, urgency, urinary incontinence, vaginal bleeding, vaginal discharge and vaginal pain.   Musculoskeletal: Negative for arthralgias, back pain, gait problem, joint swelling, myalgias, neck pain, neck stiffness and bursitis.   Skin: Negative for color change, dry skin and rash.   Allergic/Immunologic: Negative for environmental allergies, food allergies and immunocompromised state.   Neurological: Negative for dizziness, tremors, seizures, syncope, facial asymmetry, speech difficulty, weakness, light-headedness, numbness, headache, memory problem and confusion.   Hematological: Negative for adenopathy. Does not bruise/bleed easily.   Psychiatric/Behavioral: Negative for agitation,  behavioral problems, decreased concentration, dysphoric mood, hallucinations, self-injury, sleep disturbance, suicidal ideas, negative for hyperactivity, depressed mood and stress. The patient is not nervous/anxious.        Objective   Physical Exam  Vitals and nursing note reviewed.   Constitutional:       Appearance: She is well-developed.   HENT:      Head: Normocephalic and atraumatic.   Eyes:      General:         Right eye: No discharge.         Left eye: No discharge.      Conjunctiva/sclera: Conjunctivae normal.   Neck:      Thyroid: No thyromegaly.   Pulmonary:      Effort: Pulmonary effort is normal.   Musculoskeletal:      Cervical back: Normal range of motion.   Skin:     General: Skin is warm and dry.   Neurological:      Mental Status: She is alert and oriented to person, place, and time.   Psychiatric:         Mood and Affect: Mood normal.         Behavior: Behavior normal.         Thought Content: Thought content normal.         Judgment: Judgment normal.         Assessment/Plan   Diagnoses and all orders for this visit:    1. Weight gain (Primary)  Comments:  Patient has gained weight and would like to resume phentermine.  Prescription is given.  Ludin is reviewed.  Orders:  -     phentermine (Adipex-P) 37.5 MG tablet; Take 1 tablet by mouth Every Morning Before Breakfast.  Dispense: 30 tablet; Refill: 0         This was an audio and video enabled telemedicine encounter.    Patient's Body mass index is 27.46 kg/m². indicating that she is overweight (BMI 25-29.9). Obesity-related health conditions include the following: none. Obesity is worsening. BMI is is above average; BMI management plan is completed. We discussed portion control and increasing exercise..      Janett Davila, APRN  8/6/2021

## 2021-09-03 ENCOUNTER — TELEMEDICINE (OUTPATIENT)
Dept: OBSTETRICS AND GYNECOLOGY | Facility: CLINIC | Age: 56
End: 2021-09-03

## 2021-09-03 VITALS — WEIGHT: 156 LBS | BODY MASS INDEX: 27.64 KG/M2 | HEIGHT: 63 IN

## 2021-09-03 DIAGNOSIS — R63.5 WEIGHT GAIN: ICD-10-CM

## 2021-09-03 PROCEDURE — 99213 OFFICE O/P EST LOW 20 MIN: CPT | Performed by: NURSE PRACTITIONER

## 2021-09-03 RX ORDER — PHENTERMINE HYDROCHLORIDE 37.5 MG/1
37.5 TABLET ORAL
Qty: 30 TABLET | Refills: 0 | Status: SHIPPED | OUTPATIENT
Start: 2021-09-03 | End: 2021-11-09

## 2021-09-03 NOTE — PROGRESS NOTES
"Subjective     Shawnee Suarez is a 56 y.o. female    You have chosen to receive care through a telehealth visit.  Do you consent to use a video/audio connection for your medical care today? Yes    Here for follow up on weight loss. She has been on Phentermine for 1 month. She has lost 0 pounds. She has lost a total of 0 pounds. She has not had any side effects from the medication. She has been doing well with diet and exercise and has been drinking at least 64 ozs. of water daily. We discussed increasing Protein in her diet.          Ht 160 cm (63\")   Wt 70.8 kg (156 lb)   LMP  (LMP Unknown)   BMI 27.63 kg/m²     Outpatient Encounter Medications as of 9/3/2021   Medication Sig Dispense Refill   • Calcium-Vitamin D-Vitamin K (Viactiv Calcium Plus D) 650-12.5-40 MG-MCG-MCG chewable tablet Chew 2 (two) times a day.     • Cholecalciferol (Vitamin D3) 125 MCG (5000 UT) chewable tablet Chew 5,000 Units Daily.     • clonazePAM (KlonoPIN) 1 MG tablet Take 1 tablet by mouth 2 (Two) Times a Day As Needed for Anxiety. 1 twice daily as needed for anxiety 60 tablet 2   • DULoxetine (CYMBALTA) 60 MG capsule Take 1 capsule by mouth Daily. 90 capsule 3   • fluticasone (FLONASE) 50 MCG/ACT nasal spray 2 sprays into each nostril Daily.     • Lactobacillus (REPHRESH PRO-B PO) Take 1 tablet by mouth Daily.     • loratadine (ALLERGY) 10 MG tablet Take 10 mg by mouth Daily.     • Multiple Vitamins-Minerals (WOMENS 50+ MULTI VITAMIN/MIN PO) Take  by mouth.     • phenazopyridine (Pyridium) 200 MG tablet Take 1 tablet by mouth 3 (Three) Times a Day As Needed for Bladder Spasms. 6 tablet 0   • phentermine (Adipex-P) 37.5 MG tablet Take 1 tablet by mouth Every Morning Before Breakfast. 30 tablet 0   • rosuvastatin (Crestor) 10 MG tablet Take 1 tablet by mouth Daily. 90 tablet 2   • sulfamethoxazole-trimethoprim (Bactrim DS) 800-160 MG per tablet Take 1 tablet by mouth 2 (Two) Times a Day. 14 tablet 0   • ZOLMitriptan (ZOMIG) 2.5 MG tablet " TAKE 1 TABLET BY MOUTH TWICE DAILY AS NEEDED FOR HEADACHE 18 tablet 2   • [DISCONTINUED] phentermine (Adipex-P) 37.5 MG tablet Take 1 tablet by mouth Every Morning Before Breakfast. 30 tablet 0   • metFORMIN (GLUCOPHAGE) 500 MG tablet Take 1 tablet by mouth 2 (Two) Times a Day With Meals. 60 tablet 3     No facility-administered encounter medications on file as of 9/3/2021.       Surgical History  Past Surgical History:   Procedure Laterality Date   • APPENDECTOMY     • BREAST BIOPSY Right 11/1989   • CHOLECYSTECTOMY     • COLONOSCOPY  11/22/2013    Normal exam repeat in 10 years   • COLONOSCOPY N/A 11/13/2018    Normal but prep was only fair repeat in 2 years   • COLONOSCOPY N/A 1/22/2021    Procedure: COLONOSCOPY WITH ANESTHESIA;  Surgeon: Da Miles MD;  Location: [facility] ENDOSCOPY;  Service: Gastroenterology;  Laterality: N/A;  pre: screen  post: normal  Marlon Tran MD     • D & C HYSTEROSCOPY ENDOMETRIAL ABLATION N/A 2/6/2017    Procedure: DILATATION AND CURETTAGE HYSTEROSCOPY NOVASURE ENDOMETRIAL ABLATION;  Surgeon: Ana Barrios MD;  Location: [facility] OR;  Service:    • DIAGNOSTIC LAPAROSCOPY N/A 6/17/2019    Procedure: DIAGNOSTIC LAPAROSCOPY;  Surgeon: Ana Barrios MD;  Location: [facility] OR;  Service: Obstetrics/Gynecology   • OVARIAN CYST REMOVAL      x 2   • SALPINGO OOPHORECTOMY Bilateral 6/17/2019    Procedure: SALPINGO OOPHORECTOMY LAPAROSCOPIC;  Surgeon: Ana Barrios MD;  Location: [facility] OR;  Service: Obstetrics/Gynecology       Family History  Family History   Problem Relation Age of Onset   • Ovarian cancer Mother 72   • Brain cancer Father    • No Known Problems Brother    • No Known Problems Daughter    • No Known Problems Maternal Grandmother    • No Known Problems Maternal Grandfather    • No Known Problems Paternal Grandmother    • Brain cancer Paternal Grandfather    • Heart disease Brother    • No Known Problems Daughter    • Breast cancer Other 28   • No Known Problems  Maternal Aunt    • No Known Problems Paternal Aunt    • Uterine cancer Neg Hx    • Colon cancer Neg Hx    • Melanoma Neg Hx    • Prostate cancer Neg Hx    • BRCA 1/2 Neg Hx    • Endometrial cancer Neg Hx    • Colon polyps Neg Hx        The following portions of the patient's history were reviewed and updated as appropriate: allergies, current medications, past family history, past medical history, past social history, past surgical history and problem list.    Review of Systems   Constitutional: Negative for activity change, appetite change, chills, diaphoresis, fatigue, fever, unexpected weight gain and unexpected weight loss.   HENT: Negative for congestion, dental problem, drooling, ear discharge, ear pain, facial swelling, hearing loss, mouth sores, nosebleeds, postnasal drip, rhinorrhea, sinus pressure, sneezing, sore throat, swollen glands, tinnitus, trouble swallowing and voice change.    Eyes: Negative for blurred vision, double vision, photophobia, pain, discharge, redness, itching and visual disturbance.   Respiratory: Negative for apnea, cough, choking, chest tightness, shortness of breath, wheezing and stridor.    Cardiovascular: Negative for chest pain, palpitations and leg swelling.   Gastrointestinal: Negative for abdominal distention, abdominal pain, anal bleeding, blood in stool, constipation, diarrhea, nausea, rectal pain, vomiting, GERD and indigestion.   Endocrine: Negative for cold intolerance, heat intolerance, polydipsia, polyphagia and polyuria.   Genitourinary: Negative for amenorrhea, breast discharge, breast lump, breast pain, decreased libido, decreased urine volume, difficulty urinating, dyspareunia, dysuria, flank pain, frequency, genital sores, hematuria, menstrual problem, pelvic pain, pelvic pressure, urgency, urinary incontinence, vaginal bleeding, vaginal discharge and vaginal pain.   Musculoskeletal: Negative for arthralgias, back pain, gait problem, joint swelling, myalgias, neck  pain, neck stiffness and bursitis.   Skin: Negative for color change, dry skin and rash.   Allergic/Immunologic: Negative for environmental allergies, food allergies and immunocompromised state.   Neurological: Negative for dizziness, tremors, seizures, syncope, facial asymmetry, speech difficulty, weakness, light-headedness, numbness, headache, memory problem and confusion.   Hematological: Negative for adenopathy. Does not bruise/bleed easily.   Psychiatric/Behavioral: Negative for agitation, behavioral problems, decreased concentration, dysphoric mood, hallucinations, self-injury, sleep disturbance, suicidal ideas, negative for hyperactivity, depressed mood and stress. The patient is not nervous/anxious.        Objective   Physical Exam  Vitals and nursing note reviewed.   Constitutional:       Appearance: She is well-developed.   HENT:      Head: Normocephalic and atraumatic.   Eyes:      General:         Right eye: No discharge.         Left eye: No discharge.      Conjunctiva/sclera: Conjunctivae normal.   Neck:      Thyroid: No thyromegaly.   Pulmonary:      Effort: Pulmonary effort is normal.   Musculoskeletal:      Cervical back: Normal range of motion.   Skin:     General: Skin is warm and dry.   Neurological:      Mental Status: She is alert and oriented to person, place, and time.   Psychiatric:         Mood and Affect: Mood normal.         Behavior: Behavior normal.         Thought Content: Thought content normal.         Judgment: Judgment normal.         Assessment/Plan   Diagnoses and all orders for this visit:    1. Weight gain  Comments:  This is her first month on phentermine.  She has not lost any weight.  We will try adding Metformin.  Prescriptions are sent.  Ludin is reviewed.  Orders:  -     phentermine (Adipex-P) 37.5 MG tablet; Take 1 tablet by mouth Every Morning Before Breakfast.  Dispense: 30 tablet; Refill: 0  -     metFORMIN (GLUCOPHAGE) 500 MG tablet; Take 1 tablet by mouth 2 (Two)  Times a Day With Meals.  Dispense: 60 tablet; Refill: 3       This was an audio and video enabled telemedicine encounter.    Patient's Body mass index is 27.63 kg/m². indicating that she is overweight (BMI 25-29.9). Obesity-related health conditions include the following: hypertension and dyslipidemias. Obesity is unchanged. BMI is is above average; BMI management plan is completed. We discussed portion control and increasing exercise..      Janett Davila, APRN  9/3/2021

## 2021-09-14 ENCOUNTER — OFFICE VISIT (OUTPATIENT)
Dept: FAMILY MEDICINE CLINIC | Facility: CLINIC | Age: 56
End: 2021-09-14

## 2021-09-14 VITALS
BODY MASS INDEX: 28.03 KG/M2 | HEIGHT: 63 IN | HEART RATE: 109 BPM | WEIGHT: 158.2 LBS | TEMPERATURE: 98.2 F | DIASTOLIC BLOOD PRESSURE: 79 MMHG | OXYGEN SATURATION: 98 % | SYSTOLIC BLOOD PRESSURE: 108 MMHG

## 2021-09-14 DIAGNOSIS — F41.9 ANXIETY: ICD-10-CM

## 2021-09-14 PROCEDURE — 99213 OFFICE O/P EST LOW 20 MIN: CPT | Performed by: FAMILY MEDICINE

## 2021-09-14 RX ORDER — CLONAZEPAM 1 MG/1
1 TABLET ORAL 2 TIMES DAILY PRN
Qty: 60 TABLET | Refills: 2 | Status: SHIPPED | OUTPATIENT
Start: 2021-09-14 | End: 2022-01-20

## 2021-09-14 NOTE — PATIENT INSTRUCTIONS
Mediterranean Diet  A Mediterranean diet refers to food and lifestyle choices that are based on the traditions of countries located on the [place]. This way of eating has been shown to help prevent certain conditions and improve outcomes for people who have chronic diseases, like kidney disease and heart disease.  What are tips for following this plan?  Lifestyle  · Cook and eat meals together with your family, when possible.  · Drink enough fluid to keep your urine clear or pale yellow.  · Be physically active every day. This includes:  ? Aerobic exercise like running or swimming.  ? Leisure activities like gardening, walking, or housework.  · Get 7-8 hours of sleep each night.  · If recommended by your health care provider, drink red wine in moderation. This means 1 glass a day for nonpregnant women and 2 glasses a day for men. A glass of wine equals 5 oz (150 mL).  Reading food labels    · Check the serving size of packaged foods. For foods such as rice and pasta, the serving size refers to the amount of cooked product, not dry.  · Check the total fat in packaged foods. Avoid foods that have saturated fat or trans fats.  · Check the ingredients list for added sugars, such as corn syrup.    Shopping  · At the grocery store, buy most of your food from the areas near the walls of the store. This includes:  ? Fresh fruits and vegetables (produce).  ? Grains, beans, nuts, and seeds. Some of these may be available in unpackaged forms or large amounts (in bulk).  ? Fresh seafood.  ? Poultry and eggs.  ? Low-fat dairy products.  · Buy whole ingredients instead of prepackaged foods.  · Buy fresh fruits and vegetables in-season from local farmers markets.  · Buy frozen fruits and vegetables in resealable bags.  · If you do not have access to quality fresh seafood, buy precooked frozen shrimp or canned fish, such as tuna, salmon, or sardines.  · Buy small amounts of raw or cooked vegetables, salads, or olives from  the deli or salad bar at your store.  · Stock your pantry so you always have certain foods on hand, such as olive oil, canned tuna, canned tomatoes, rice, pasta, and beans.  Cooking  · Cook foods with extra-virgin olive oil instead of using butter or other vegetable oils.  · Have meat as a side dish, and have vegetables or grains as your main dish. This means having meat in small portions or adding small amounts of meat to foods like pasta or stew.  · Use beans or vegetables instead of meat in common dishes like chili or lasagna.  · [place] with different cooking methods. Try roasting or broiling vegetables instead of steaming or sautéeing them.  · Add frozen vegetables to soups, stews, pasta, or rice.  · Add nuts or seeds for added healthy fat at each meal. You can add these to yogurt, salads, or vegetable dishes.  · Marinate fish or vegetables using olive oil, lemon juice, garlic, and fresh herbs.  Meal planning    · Plan to eat 1 vegetarian meal one day each week. Try to work up to 2 vegetarian meals, if possible.  · Eat seafood 2 or more times a week.  · Have healthy snacks readily available, such as:  ? Vegetable sticks with hummus.  ? Greek yogurt.  ? Fruit and nut trail mix.  · Eat balanced meals throughout the week. This includes:  ? Fruit: 2-3 servings a day  ? Vegetables: 4-5 servings a day  ? Low-fat dairy: 2 servings a day  ? Fish, poultry, or lean meat: 1 serving a day  ? Beans and legumes: 2 or more servings a week  ? Nuts and seeds: 1-2 servings a day  ? Whole grains: 6-8 servings a day  ? Extra-virgin olive oil: 3-4 servings a day  · Limit red meat and sweets to only a few servings a month    What are my food choices?  · Mediterranean diet  ? Recommended  § Grains: Whole-grain pasta. Brown rice. Bulgar wheat. Polenta. Couscous. Whole-wheat bread. Oatmeal. Quinoa.  § Vegetables: Artichokes. Beets. Broccoli. Cabbage. Carrots. Eggplant. Green beans. Chard. Kale. Spinach. Onions. Leeks. Peas. Squash.  Tomatoes. Peppers. Radishes.  § Fruits: Apples. Apricots. Avocado. Berries. Bananas. Cherries. Dates. Figs. Grapes. Jane. Melon. Oranges. Peaches. Plums. Pomegranate.  § Meats and other protein foods: Beans. Almonds. Sunflower seeds. Pine nuts. Peanuts. Cod. Dora. Scallops. Shrimp. Tuna. Tilapia. Clams. Oysters. Eggs.  § Dairy: Low-fat milk. Cheese. Greek yogurt.  § Beverages: Water. Red wine. Herbal tea.  § Fats and oils: Extra virgin olive oil. Avocado oil. Grape seed oil.  § Sweets and desserts: Greek yogurt with honey. Baked apples. Poached pears. Trail mix.  § Seasoning and other foods: Basil. Cilantro. Coriander. Cumin. Mint. Parsley. Shawn. Rosemary. Tarragon. Garlic. Oregano. Thyme. Pepper. Balsalmic vinegar. Tahini. Hummus. Tomato sauce. Olives. Mushrooms.  ? Limit these  § Grains: Prepackaged pasta or rice dishes. Prepackaged cereal with added sugar.  § Vegetables: Deep fried potatoes (french fries).  § Fruits: Fruit canned in syrup.  § Meats and other protein foods: Beef. Pork. Lamb. Poultry with skin. Hot dogs. Peña.  § Dairy: Ice cream. Sour cream. Whole milk.  § Beverages: Juice. Sugar-sweetened soft drinks. Beer. Liquor and spirits.  § Fats and oils: Butter. Canola oil. Vegetable oil. Beef fat (tallow). Lard.  § Sweets and desserts: Cookies. Cakes. Pies. Candy.  § Seasoning and other foods: Mayonnaise. Premade sauces and marinades.  The items listed may not be a complete list. Talk with your dietitian about what dietary choices are right for you.  Summary  · The Mediterranean diet includes both food and lifestyle choices.  · Eat a variety of fresh fruits and vegetables, beans, nuts, seeds, and whole grains.  · Limit the amount of red meat and sweets that you eat.  · Talk with your health care provider about whether it is safe for you to drink red wine in moderation. This means 1 glass a day for nonpregnant women and 2 glasses a day for men. A glass of wine equals 5 oz (150 mL).  This information  is not intended to replace advice given to you by your health care provider. Make sure you discuss any questions you have with your health care provider.  Document Revised: 08/17/2017 Document Reviewed: 08/10/2017  Elsevier Patient Education © 2020 [employer] Inc[employer]

## 2021-09-14 NOTE — PROGRESS NOTES
Stephanie Suarez is a 56 y.o. female.     56-year-old female with chronic anxiety      The following portions of the patient's history were reviewed and updated as appropriate: allergies, current medications, past family history, past medical history, past social history, past surgical history and problem list.    Review of Systems   Neurological: Negative for dizziness and headaches.   Psychiatric/Behavioral: Negative for dysphoric mood and suicidal ideas. The patient is nervous/anxious.        Objective   Physical Exam  Vitals and nursing note reviewed.   Constitutional:       Appearance: Normal appearance.   Cardiovascular:      Rate and Rhythm: Normal rate and regular rhythm.      Pulses: Normal pulses.      Heart sounds: Normal heart sounds.   Pulmonary:      Effort: Pulmonary effort is normal.      Breath sounds: Normal breath sounds.   Musculoskeletal:      Right lower leg: No edema.      Left lower leg: No edema.   Neurological:      General: No focal deficit present.      Mental Status: She is alert and oriented to person, place, and time.   Psychiatric:         Mood and Affect: Mood normal.         Behavior: Behavior normal.         Thought Content: Thought content normal.         Judgment: Judgment normal.         Assessment/Plan   Diagnoses and all orders for this visit:    1. Anxiety  -     clonazePAM (KlonoPIN) 1 MG tablet; Take 1 tablet by mouth 2 (Two) Times a Day As Needed for Anxiety. 1 twice daily as needed for anxiety  Dispense: 60 tablet; Refill: 2         Plan start exercising on return in 3 months complete annual lab with A1c

## 2021-10-19 ENCOUNTER — TELEPHONE (OUTPATIENT)
Dept: FAMILY MEDICINE CLINIC | Facility: CLINIC | Age: 56
End: 2021-10-19

## 2021-10-19 RX ORDER — ROSUVASTATIN CALCIUM 10 MG/1
TABLET, COATED ORAL
Qty: 90 TABLET | Refills: 2 | Status: SHIPPED | OUTPATIENT
Start: 2021-10-19 | End: 2022-06-20 | Stop reason: SDUPTHER

## 2021-10-19 NOTE — TELEPHONE ENCOUNTER
Caller: Shawnee Suarez    Relationship: Self    Best call back number: [phone]    What medication are you requesting:     Something to treat    What are your current symptoms: Cough    How long have you been experiencing symptoms:     3 days    Have you had these symptoms before:    [x] Yes  [] No    Have you been treated for these symptoms before:   [] Yes  [x] No    If a prescription is needed, what is your preferred pharmacy and phone number:        [facility] [place], [place] - [address] [facility] [phone] [facility] [phone]

## 2021-10-19 NOTE — TELEPHONE ENCOUNTER
Rx Refill Note  Requested Prescriptions     Pending Prescriptions Disp Refills   • rosuvastatin (CRESTOR) 10 MG tablet [Pharmacy Med Name: ROSUVASTATIN CALCIUM 10MG TABS] 90 tablet 2     Sig: TAKE ONE TABLET BY MOUTH EVERY DAY      Last office visit with prescribing clinician: 9/14/2021      Next office visit with prescribing clinician: 12/7/2021            Bree Lockett MA  10/19/21, 10:11 CDT

## 2021-10-20 NOTE — TELEPHONE ENCOUNTER
Spoke with patient and advised.  States she is feeling some better since she called.  Does not want to schedule appt at this time.

## 2021-10-20 NOTE — TELEPHONE ENCOUNTER
Pt is requesting cough medicine.  Per message she has been coughing for 3 days.     Please advise.

## 2021-11-09 ENCOUNTER — OFFICE VISIT (OUTPATIENT)
Dept: OBSTETRICS AND GYNECOLOGY | Facility: CLINIC | Age: 56
End: 2021-11-09

## 2021-11-09 VITALS
SYSTOLIC BLOOD PRESSURE: 122 MMHG | HEIGHT: 63 IN | BODY MASS INDEX: 28.35 KG/M2 | DIASTOLIC BLOOD PRESSURE: 82 MMHG | WEIGHT: 160 LBS

## 2021-11-09 DIAGNOSIS — Z92.29 COVID-19 VACCINE SERIES COMPLETED: ICD-10-CM

## 2021-11-09 DIAGNOSIS — K59.00 CONSTIPATION, UNSPECIFIED CONSTIPATION TYPE: ICD-10-CM

## 2021-11-09 DIAGNOSIS — Z01.419 WELL WOMAN EXAM WITH ROUTINE GYNECOLOGICAL EXAM: Primary | ICD-10-CM

## 2021-11-09 DIAGNOSIS — Z12.31 ENCOUNTER FOR SCREENING MAMMOGRAM FOR BREAST CANCER: ICD-10-CM

## 2021-11-09 DIAGNOSIS — Z80.3 FAMILY HISTORY OF BREAST CANCER: ICD-10-CM

## 2021-11-09 DIAGNOSIS — Z15.89 GENETIC SUSCEPTIBILITY TO OTHER DISEASE: ICD-10-CM

## 2021-11-09 DIAGNOSIS — N89.8 VAGINAL DRYNESS: ICD-10-CM

## 2021-11-09 PROCEDURE — 99396 PREV VISIT EST AGE 40-64: CPT | Performed by: NURSE PRACTITIONER

## 2021-11-09 PROCEDURE — G0123 SCREEN CERV/VAG THIN LAYER: HCPCS | Performed by: NURSE PRACTITIONER

## 2021-11-09 PROCEDURE — 87624 HPV HI-RISK TYP POOLED RSLT: CPT | Performed by: NURSE PRACTITIONER

## 2021-11-09 NOTE — PATIENT INSTRUCTIONS
"BMI for Adults  What is BMI?  Body mass index (BMI) is a number that is calculated from a person's weight and height. BMI can help estimate how much of a person's weight is composed of fat. BMI does not measure body fat directly. Rather, it is an alternative to procedures that directly measure body fat, which can be difficult and expensive.  BMI can help identify people who may be at higher risk for certain medical problems.  What are BMI measurements used for?  BMI is used as a screening tool to identify possible weight problems. It helps determine whether a person is obese, overweight, a healthy weight, or underweight.  BMI is useful for:  · Identifying a weight problem that may be related to a medical condition or may increase the risk for medical problems.  · Promoting changes, such as changes in diet and exercise, to help reach a healthy weight. BMI screening can be repeated to see if these changes are working.  How is BMI calculated?  BMI involves measuring your weight in relation to your height. Both height and weight are measured, and the BMI is calculated from those numbers. This can be done either in English (U.S[place] or metric measurements. Note that charts and online BMI calculators are available to help you find your BMI quickly and easily without having to do these calculations yourself.  To calculate your BMI in English (U.S[place] measurements:    1. Measure your weight in pounds (lb).  2. Multiply the number of pounds by 703.  ? For example, for a person who weighs 180 lb, multiply that number by 703, which equals 126,540.  3. Measure your height in inches. Then multiply that number by itself to get a measurement called \"inches squared.\"  ? For example, for a person who is 70 inches tall, the \"inches squared\" measurement is 70 inches x 70 inches, which equals 4,900 inches squared.  4. Divide the total from step 2 (number of lb x 703) by the total from step 3 (inches squared): 126,540 ÷ 4,900 = 25.8. This is " "your BMI.    To calculate your BMI in metric measurements:  1. Measure your weight in kilograms (kg).  2. Measure your height in meters (m). Then multiply that number by itself to get a measurement called \"meters squared.\"  ? For example, for a person who is 1.75 m tall, the \"meters squared\" measurement is 1.75 m x 1.75 m, which is equal to 3.1 meters squared.  3. Divide the number of kilograms (your weight) by the meters squared number. In this example: 70 ÷ 3.1 = 22.6. This is your BMI.  What do the results mean?  BMI charts are used to identify whether you are underweight, normal weight, overweight, or obese. The following guidelines will be used:  · Underweight: BMI less than 18.5.  · Normal weight: BMI between 18.5 and 24.9.  · Overweight: BMI between 25 and 29.9.  · Obese: BMI of 30 or above.  Keep these notes in mind:  · Weight includes both fat and muscle, so someone with a muscular build, such as an athlete, may have a BMI that is higher than 24.9. In cases like these, BMI is not an accurate measure of body fat.  · To determine if excess body fat is the cause of a BMI of 25 or higher, further assessments may need to be done by a health care provider.  · BMI is usually interpreted in the same way for men and women.  Where to find more information  For more information about BMI, including tools to quickly calculate your BMI, go to these websites:  · Centers for Disease Control and Prevention: www.cdc.gov  · American Heart Association: www.heart.org  · National Heart, Lung, and Blood Vendor: www.nhlbi.nih.gov  Summary  · Body mass index (BMI) is a number that is calculated from a person's weight and height.  · BMI may help estimate how much of a person's weight is composed of fat. BMI can help identify those who may be at higher risk for certain medical problems.  · BMI can be measured using English measurements or metric measurements.  · BMI charts are used to identify whether you are underweight, normal " weight, overweight, or obese.  This information is not intended to replace advice given to you by your health care provider. Make sure you discuss any questions you have with your health care provider.  Document Revised: 09/09/2020 Document Reviewed: 07/17/2020  Elsevier Patient Education © 2021 Elsevier Inc.

## 2021-11-09 NOTE — PROGRESS NOTES
"Subjective     Shawnee Suarez is a 56 y.o. female    Patient is here today for yearly checkup.  She has complaints of significant constipation.    Gynecologic Exam  The patient's pertinent negatives include no pelvic pain, vaginal bleeding or vaginal discharge. The patient is experiencing no pain. Associated symptoms include constipation. Pertinent negatives include no abdominal pain, anorexia, back pain, chills, diarrhea, discolored urine, dysuria, fever, flank pain, frequency, headaches, hematuria, joint pain, joint swelling, nausea, painful intercourse, rash, sore throat, urgency or vomiting. She is sexually active. She is postmenopausal.         /82   Ht 160 cm (62.99\")   Wt 72.6 kg (160 lb)   LMP  (LMP Unknown)   BMI 28.35 kg/m²     Outpatient Encounter Medications as of 11/9/2021   Medication Sig Dispense Refill   • Calcium-Vitamin D-Vitamin K (Viactiv Calcium Plus D) 650-12.5-40 MG-MCG-MCG chewable tablet Chew 2 (two) times a day.     • Cholecalciferol (Vitamin D3) 125 MCG (5000 UT) chewable tablet Chew 5,000 Units Daily.     • clonazePAM (KlonoPIN) 1 MG tablet Take 1 tablet by mouth 2 (Two) Times a Day As Needed for Anxiety. 1 twice daily as needed for anxiety 60 tablet 2   • DULoxetine (CYMBALTA) 60 MG capsule Take 1 capsule by mouth Daily. 90 capsule 3   • fluticasone (FLONASE) 50 MCG/ACT nasal spray 2 sprays into each nostril Daily.     • loratadine (ALLERGY) 10 MG tablet Take 10 mg by mouth Daily.     • Multiple Vitamins-Minerals (WOMENS 50+ MULTI VITAMIN/MIN PO) Take  by mouth.     • rosuvastatin (CRESTOR) 10 MG tablet TAKE ONE TABLET BY MOUTH EVERY DAY 90 tablet 2   • ZOLMitriptan (ZOMIG) 2.5 MG tablet TAKE 1 TABLET BY MOUTH TWICE DAILY AS NEEDED FOR HEADACHE 18 tablet 2   • [DISCONTINUED] metFORMIN (GLUCOPHAGE) 500 MG tablet Take 1 tablet by mouth 2 (Two) Times a Day With Meals. 60 tablet 3   • [DISCONTINUED] phenazopyridine (Pyridium) 200 MG tablet Take 1 tablet by mouth 3 (Three) Times a " Day As Needed for Bladder Spasms. 6 tablet 0   • [DISCONTINUED] phentermine (Adipex-P) 37.5 MG tablet Take 1 tablet by mouth Every Morning Before Breakfast. 30 tablet 0     No facility-administered encounter medications on file as of 11/9/2021.       Surgical History  Past Surgical History:   Procedure Laterality Date   • APPENDECTOMY     • BREAST BIOPSY Right 11/1989   • CHOLECYSTECTOMY     • COLONOSCOPY  11/22/2013    Normal exam repeat in 10 years   • COLONOSCOPY N/A 11/13/2018    Normal but prep was only fair repeat in 2 years   • COLONOSCOPY N/A 1/22/2021    Procedure: COLONOSCOPY WITH ANESTHESIA;  Surgeon: Da Miles MD;  Location: [facility] ENDOSCOPY;  Service: Gastroenterology;  Laterality: N/A;  pre: screen  post: Marlon Saenz MD     • D & C HYSTEROSCOPY ENDOMETRIAL ABLATION N/A 2/6/2017    Procedure: DILATATION AND CURETTAGE HYSTEROSCOPY NOVASURE ENDOMETRIAL ABLATION;  Surgeon: Ana Barrios MD;  Location: [facility] OR;  Service:    • DIAGNOSTIC LAPAROSCOPY N/A 6/17/2019    Procedure: DIAGNOSTIC LAPAROSCOPY;  Surgeon: Ana Barrios MD;  Location: [facility] OR;  Service: Obstetrics/Gynecology   • OVARIAN CYST REMOVAL      x 2   • SALPINGO OOPHORECTOMY Bilateral 6/17/2019    Procedure: SALPINGO OOPHORECTOMY LAPAROSCOPIC;  Surgeon: Ana Barrios MD;  Location: [facility] OR;  Service: Obstetrics/Gynecology       Family History  Family History   Problem Relation Age of Onset   • Ovarian cancer Mother 72   • Brain cancer Father    • No Known Problems Brother    • No Known Problems Daughter    • No Known Problems Maternal Grandmother    • No Known Problems Maternal Grandfather    • No Known Problems Paternal Grandmother    • Brain cancer Paternal Grandfather    • Heart disease Brother    • No Known Problems Daughter    • Breast cancer Other 28   • No Known Problems Maternal Aunt    • No Known Problems Paternal Aunt    • Uterine cancer Neg Hx    • Colon cancer Neg Hx    • Melanoma Neg Hx    •  Prostate cancer Neg Hx    • BRCA 1/2 Neg Hx    • Endometrial cancer Neg Hx    • Colon polyps Neg Hx        The following portions of the patient's history were reviewed and updated as appropriate: allergies, current medications, past family history, past medical history, past social history, past surgical history and problem list.    Review of Systems   Constitutional: Negative for activity change, appetite change, chills, diaphoresis, fatigue, fever, unexpected weight gain and unexpected weight loss.   HENT: Negative for congestion, dental problem, drooling, ear discharge, ear pain, facial swelling, hearing loss, mouth sores, nosebleeds, postnasal drip, rhinorrhea, sinus pressure, sneezing, sore throat, swollen glands, tinnitus, trouble swallowing and voice change.    Eyes: Negative for blurred vision, double vision, photophobia, pain, discharge, redness, itching and visual disturbance.   Respiratory: Negative for apnea, cough, choking, chest tightness, shortness of breath, wheezing and stridor.    Cardiovascular: Negative for chest pain, palpitations and leg swelling.   Gastrointestinal: Positive for constipation. Negative for abdominal distention, abdominal pain, anal bleeding, anorexia, blood in stool, diarrhea, nausea, rectal pain, vomiting, GERD and indigestion.   Endocrine: Negative for cold intolerance, heat intolerance, polydipsia, polyphagia and polyuria.   Genitourinary: Negative for amenorrhea, breast discharge, breast lump, breast pain, decreased libido, decreased urine volume, difficulty urinating, dyspareunia, dysuria, flank pain, frequency, genital sores, hematuria, menstrual problem, pelvic pain, pelvic pressure, urgency, urinary incontinence, vaginal bleeding, vaginal discharge and vaginal pain.   Musculoskeletal: Negative for arthralgias, back pain, gait problem, joint pain, joint swelling, myalgias, neck pain, neck stiffness and bursitis.   Skin: Negative for color change, dry skin and rash.    Allergic/Immunologic: Negative for environmental allergies, food allergies and immunocompromised state.   Neurological: Negative for dizziness, tremors, seizures, syncope, facial asymmetry, speech difficulty, weakness, light-headedness, numbness, headache, memory problem and confusion.   Hematological: Negative for adenopathy. Does not bruise/bleed easily.   Psychiatric/Behavioral: Negative for agitation, behavioral problems, decreased concentration, dysphoric mood, hallucinations, self-injury, sleep disturbance, suicidal ideas, negative for hyperactivity, depressed mood and stress. The patient is not nervous/anxious.        Objective   Physical Exam  Vitals and nursing note reviewed. Exam conducted with a chaperone present.   Constitutional:       General: She is not in acute distress.     Appearance: She is well-developed. She is not diaphoretic.   HENT:      Head: Normocephalic.      Right Ear: External ear normal.      Left Ear: External ear normal.      Nose: Nose normal.   Eyes:      General: No scleral icterus.        Right eye: No discharge.         Left eye: No discharge.      Conjunctiva/sclera: Conjunctivae normal.      Pupils: Pupils are equal, round, and reactive to light.   Neck:      Thyroid: No thyromegaly.      Vascular: No carotid bruit.      Trachea: No tracheal deviation.   Cardiovascular:      Rate and Rhythm: Normal rate and regular rhythm.      Heart sounds: Normal heart sounds. No murmur heard.      Pulmonary:      Effort: Pulmonary effort is normal. No respiratory distress.      Breath sounds: Normal breath sounds. No wheezing.   Chest:   Breasts: Breasts are symmetrical.      Right: Normal. No swelling, bleeding, inverted nipple, mass, nipple discharge, skin change, tenderness, axillary adenopathy or supraclavicular adenopathy.      Left: Normal. No swelling, bleeding, inverted nipple, mass, nipple discharge, skin change, tenderness, axillary adenopathy or supraclavicular adenopathy.        Abdominal:      General: There is no distension.      Palpations: Abdomen is soft. There is no mass.      Tenderness: There is no abdominal tenderness. There is no right CVA tenderness, left CVA tenderness or guarding.      Hernia: No hernia is present. There is no hernia in the left inguinal area or right inguinal area.   Genitourinary:     General: Normal vulva.      Exam position: Lithotomy position.      Labia:         Right: No rash, tenderness, lesion or injury.         Left: No rash, tenderness, lesion or injury.       Vagina: Normal. No signs of injury and foreign body. No vaginal discharge, erythema, tenderness or bleeding.      Cervix: Normal.      Uterus: Normal. Not enlarged, not fixed and not tender.       Adnexa:         Right: No mass, tenderness or fullness.          Left: No mass, tenderness or fullness.        Rectum: Normal. No mass.      Comments: Adnexa are surgically absent  BSU normal  Urethral meatus  Normal  Perineum  Normal  Musculoskeletal:         General: No tenderness. Normal range of motion.      Cervical back: Normal range of motion and neck supple.   Lymphadenopathy:      Head:      Right side of head: No submental, submandibular, tonsillar, preauricular, posterior auricular or occipital adenopathy.      Left side of head: No submental, submandibular, tonsillar, preauricular, posterior auricular or occipital adenopathy.      Cervical: No cervical adenopathy.      Right cervical: No superficial, deep or posterior cervical adenopathy.     Left cervical: No superficial, deep or posterior cervical adenopathy.      Upper Body:      Right upper body: No supraclavicular, axillary or pectoral adenopathy.      Left upper body: No supraclavicular, axillary or pectoral adenopathy.      Lower Body: No right inguinal adenopathy. No left inguinal adenopathy.   Skin:     General: Skin is warm and dry.      Findings: No bruising, erythema or rash.   Neurological:      Mental Status: She is  alert and oriented to person, place, and time.      Coordination: Coordination normal.   Psychiatric:         Mood and Affect: Mood normal.         Behavior: Behavior normal.         Thought Content: Thought content normal.         Judgment: Judgment normal.         Assessment/Plan   Diagnoses and all orders for this visit:    1. Well woman exam with routine gynecological exam (Primary)  Normal GYN exam. Will have lab work at PCP. Encouraged SBE, pt is aware how to do self breast exam and the importance of same. Discussed weight management and importance of maintaining a healthy weight. Discussed Vitamin D intake and the importance of adequate vitamin D for both Bone Health and a healthy immune system.  Discussed Daily exercise and the importance of same, in regards to a healthy heart as well as helping to maintain her weight and improving her mental health.  BMI 28.4.  Colonoscopy is up to date.  Mammogram will be scheduled at [facility].  Pap smear is done per [facility]P guidelines.  -     Liquid-based Pap Smear, Screening    2. Encounter for screening mammogram for breast cancer  Comments:  Patient will have mammogram at [facility].  Orders:  -     Mammo Diagnostic Digital Tomosynthesis Bilateral With CAD; Future  -     US Breast Right Limited; Future    3. Constipation, unspecified constipation type  Comments:  Patient is having significant constipation.  She has recently had a negative colonoscopy.  She has labs done with Dr. Tran.    4. 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  She will be doing colonoscopies every 3 years.    5. Family history of breast cancer  Comments:  Patient's genetic screen was negative for breast cancer.    6. COVID-19 vaccine series completed  Comments:  Patient has completed her Covid vaccine.    7. Vaginal dryness  Comments:  Patient will try coconut oil daily.         Patient's  Body mass index is 28.35 kg/m². indicating that she is overweight (BMI 25-29.9). Obesity-related health conditions include the following: dyslipidemias. Obesity is unchanged. BMI is is above average; BMI management plan is completed. We discussed portion control and increasing exercise..      Janett Davila, APRN  11/9/2021

## 2021-11-11 RX ORDER — METRONIDAZOLE 500 MG/1
500 TABLET ORAL 2 TIMES DAILY
Qty: 14 TABLET | Refills: 0 | Status: SHIPPED | OUTPATIENT
Start: 2021-11-11 | End: 2021-11-18

## 2021-11-29 ENCOUNTER — HOSPITAL ENCOUNTER (OUTPATIENT)
Dept: ULTRASOUND IMAGING | Facility: HOSPITAL | Age: 56
Discharge: HOME | End: 2021-11-29

## 2021-11-29 ENCOUNTER — HOSPITAL ENCOUNTER (OUTPATIENT)
Dept: MAMMOGRAPHY | Facility: HOSPITAL | Age: 56
Discharge: HOME | End: 2021-11-29

## 2021-11-29 DIAGNOSIS — Z12.31 ENCOUNTER FOR SCREENING MAMMOGRAM FOR BREAST CANCER: ICD-10-CM

## 2021-11-29 PROCEDURE — G0279 TOMOSYNTHESIS, MAMMO: HCPCS

## 2021-11-29 PROCEDURE — 77066 DX MAMMO INCL CAD BI: CPT

## 2021-11-30 ENCOUNTER — CLINICAL SUPPORT (OUTPATIENT)
Dept: FAMILY MEDICINE CLINIC | Facility: CLINIC | Age: 56
End: 2021-11-30

## 2021-11-30 ENCOUNTER — TELEMEDICINE (OUTPATIENT)
Dept: FAMILY MEDICINE CLINIC | Facility: CLINIC | Age: 56
End: 2021-11-30

## 2021-11-30 DIAGNOSIS — J06.9 UPPER RESPIRATORY TRACT INFECTION, UNSPECIFIED TYPE: Primary | ICD-10-CM

## 2021-11-30 DIAGNOSIS — R05.9 COUGH: ICD-10-CM

## 2021-11-30 PROCEDURE — 99213 OFFICE O/P EST LOW 20 MIN: CPT | Performed by: NURSE PRACTITIONER

## 2021-11-30 RX ORDER — AZITHROMYCIN 250 MG/1
TABLET, FILM COATED ORAL
Qty: 6 TABLET | Refills: 0 | Status: SHIPPED | OUTPATIENT
Start: 2021-11-30 | End: 2021-12-07

## 2021-11-30 RX ORDER — PREDNISONE 10 MG/1
TABLET ORAL
Qty: 21 TABLET | Refills: 0 | Status: SHIPPED | OUTPATIENT
Start: 2021-11-30 | End: 2022-03-29

## 2021-11-30 NOTE — PROGRESS NOTES
Patient had telehealth visit with provider.  Covid test was ordered.  Patient called upon arrival to office.  Patient remained in car to be swabbed.  I wore N95, personal glasses, face shield, gown and gloves.

## 2021-11-30 NOTE — PROGRESS NOTES
CC:  uri    History:  Shawnee Suarez is a 56 y.o. female who presents today for evaluation of the above problems.      Symptoms started in October after visiting [employer]. Did get better, but grandkids came back the 18th of November.   Cough is occasionally productive. Does have a lot of nasal congestion. Hoarse in the morning.    HPI  ROS:  Review of Systems   Constitutional: Negative for fever.   HENT: Positive for congestion and sore throat.    Respiratory: Positive for cough. Negative for shortness of breath.    Gastrointestinal: Negative for constipation, diarrhea, nausea and vomiting.   Musculoskeletal: Negative for myalgias.   Neurological: Negative for headaches.       No Known Allergies  Past Medical History:   Diagnosis Date   • Anxiety    • Depression    • History of transfusion    • Hyperlipidemia    • Migraines    • Ovarian cyst     x 2      Past Surgical History:   Procedure Laterality Date   • APPENDECTOMY     • BREAST BIOPSY Right 11/1989   • CHOLECYSTECTOMY     • COLONOSCOPY  11/22/2013    Normal exam repeat in 10 years   • COLONOSCOPY N/A 11/13/2018    Normal but prep was only fair repeat in 2 years   • COLONOSCOPY N/A 1/22/2021    Procedure: COLONOSCOPY WITH ANESTHESIA;  Surgeon: Da Miles MD;  Location: [facility] ENDOSCOPY;  Service: Gastroenterology;  Laterality: N/A;  pre: screen  post: normal  Marlon Tran MD     • D & C HYSTEROSCOPY ENDOMETRIAL ABLATION N/A 2/6/2017    Procedure: DILATATION AND CURETTAGE HYSTEROSCOPY NOVASURE ENDOMETRIAL ABLATION;  Surgeon: Ana Barrios MD;  Location: [facility] OR;  Service:    • DIAGNOSTIC LAPAROSCOPY N/A 6/17/2019    Procedure: DIAGNOSTIC LAPAROSCOPY;  Surgeon: Ana Barrios MD;  Location: [facility] OR;  Service: Obstetrics/Gynecology   • OVARIAN CYST REMOVAL      x 2   • SALPINGO OOPHORECTOMY Bilateral 6/17/2019    Procedure: SALPINGO OOPHORECTOMY LAPAROSCOPIC;  Surgeon: Ana Barrios MD;  Location: [facility] OR;  Service: Obstetrics/Gynecology      Family History   Problem Relation Age of Onset   • Ovarian cancer Mother 72   • Brain cancer Father    • No Known Problems Brother    • No Known Problems Daughter    • No Known Problems Maternal Grandmother    • No Known Problems Maternal Grandfather    • No Known Problems Paternal Grandmother    • Brain cancer Paternal Grandfather    • Heart disease Brother    • No Known Problems Daughter    • Breast cancer Other 28   • No Known Problems Maternal Aunt    • No Known Problems Paternal Aunt    • Uterine cancer Neg Hx    • Colon cancer Neg Hx    • Melanoma Neg Hx    • Prostate cancer Neg Hx    • BRCA 1/2 Neg Hx    • Endometrial cancer Neg Hx    • Colon polyps Neg Hx       reports that she has never smoked. She has never used smokeless tobacco. She reports that she does not drink alcohol and does not use drugs.      Current Outpatient Medications:   •  azithromycin (Zithromax) 250 MG tablet, Take 2 tablets the first day, then 1 tablet daily for 4 days., Disp: 6 tablet, Rfl: 0  •  Calcium-Vitamin D-Vitamin K (Viactiv Calcium Plus D) 650-12.5-40 MG-MCG-MCG chewable tablet, Chew 2 (two) times a day., Disp: , Rfl:   •  Cholecalciferol (Vitamin D3) 125 MCG (5000 UT) chewable tablet, Chew 5,000 Units Daily., Disp: , Rfl:   •  clonazePAM (KlonoPIN) 1 MG tablet, Take 1 tablet by mouth 2 (Two) Times a Day As Needed for Anxiety. 1 twice daily as needed for anxiety, Disp: 60 tablet, Rfl: 2  •  DULoxetine (CYMBALTA) 60 MG capsule, Take 1 capsule by mouth Daily., Disp: 90 capsule, Rfl: 3  •  fluticasone (FLONASE) 50 MCG/ACT nasal spray, 2 sprays into each nostril Daily., Disp: , Rfl:   •  loratadine (ALLERGY) 10 MG tablet, Take 10 mg by mouth Daily., Disp: , Rfl:   •  Multiple Vitamins-Minerals (WOMENS 50+ MULTI VITAMIN/MIN PO), Take  by mouth., Disp: , Rfl:   •  predniSONE (DELTASONE) 10 MG (21) dose pack, Use as directed on package, Disp: 21 tablet, Rfl: 0  •  rosuvastatin (CRESTOR) 10 MG tablet, TAKE ONE TABLET BY MOUTH EVERY  DAY, Disp: 90 tablet, Rfl: 2  •  ZOLMitriptan (ZOMIG) 2.5 MG tablet, TAKE 1 TABLET BY MOUTH TWICE DAILY AS NEEDED FOR HEADACHE, Disp: 18 tablet, Rfl: 2    OBJECTIVE:  LMP  (LMP Unknown)    Physical Exam  Constitutional:       Appearance: She is well-developed.   Pulmonary:      Effort: Pulmonary effort is normal.   Neurological:      Mental Status: She is alert and oriented to person, place, and time.   Psychiatric:         Behavior: Behavior normal.         Assessment/Plan    Diagnoses and all orders for this visit:    1. Upper respiratory tract infection, unspecified type (Primary)    2. Cough  -     COVID-19,[facility] ROUTINE, NP/OP SWAB IN TRANSPORT MEDIA OR ESWAB 72 HR TAT - Swab, Nasopharynx  -     azithromycin (Zithromax) 250 MG tablet; Take 2 tablets the first day, then 1 tablet daily for 4 days.  Dispense: 6 tablet; Refill: 0  -     predniSONE (DELTASONE) 10 MG (21) dose pack; Use as directed on package  Dispense: 21 tablet; Refill: 0    This visit was completed via secure Zoom connection.       An After Visit Summary was printed and given to the patient at discharge.  Return if symptoms worsen or fail to improve, for Next scheduled follow up.         MICHELLE Arnold 11/30/21  Electronically signed.

## 2021-12-01 LAB
LABCORP SARS-COV-2, NAA 2 DAY TAT: NORMAL
SARS-COV-2 RNA RESP QL NAA+PROBE: NOT DETECTED

## 2021-12-02 ENCOUNTER — APPOINTMENT (OUTPATIENT)
Dept: ULTRASOUND IMAGING | Facility: HOSPITAL | Age: 56
End: 2021-12-02

## 2021-12-02 ENCOUNTER — APPOINTMENT (OUTPATIENT)
Dept: MAMMOGRAPHY | Facility: HOSPITAL | Age: 56
End: 2021-12-02

## 2021-12-03 ENCOUNTER — OFFICE VISIT (OUTPATIENT)
Dept: FAMILY MEDICINE CLINIC | Facility: CLINIC | Age: 56
End: 2021-12-03

## 2021-12-03 VITALS
RESPIRATION RATE: 18 BRPM | SYSTOLIC BLOOD PRESSURE: 114 MMHG | OXYGEN SATURATION: 96 % | HEART RATE: 94 BPM | BODY MASS INDEX: 30.14 KG/M2 | DIASTOLIC BLOOD PRESSURE: 66 MMHG | WEIGHT: 163.8 LBS | TEMPERATURE: 97.6 F | HEIGHT: 62 IN

## 2021-12-03 DIAGNOSIS — R30.0 DYSURIA: Primary | ICD-10-CM

## 2021-12-03 LAB
BILIRUB BLD-MCNC: NEGATIVE MG/DL
CLARITY, POC: CLEAR
COLOR UR: YELLOW
GLUCOSE UR STRIP-MCNC: NEGATIVE MG/DL
KETONES UR QL: ABNORMAL
LEUKOCYTE EST, POC: NEGATIVE
NITRITE UR-MCNC: NEGATIVE MG/ML
PH UR: 5 [PH] (ref 5–8)
PROT UR STRIP-MCNC: NEGATIVE MG/DL
RBC # UR STRIP: NEGATIVE /UL
SP GR UR: 1.03 (ref 1–1.03)
UROBILINOGEN UR QL: NORMAL

## 2021-12-03 PROCEDURE — 99213 OFFICE O/P EST LOW 20 MIN: CPT | Performed by: FAMILY MEDICINE

## 2021-12-03 PROCEDURE — 51798 US URINE CAPACITY MEASURE: CPT | Performed by: FAMILY MEDICINE

## 2021-12-03 PROCEDURE — 81003 URINALYSIS AUTO W/O SCOPE: CPT | Performed by: FAMILY MEDICINE

## 2021-12-03 NOTE — PROGRESS NOTES
Bladder Scan interpretation  Estimation of residual urine via abdominal ultrasound  Residual Urine: 0 ml  Indication: Urgency  Position: Supine  Examination: Incremental scanning of the suprapubic area using 3 MHz transducer using copious amounts of acoustic gel.   Findings: An anechoic area was demonstrated which represented the bladder, with measurement of residual urine as noted. I inspected this myself. In that the residual urine was stable or insignificant, no treatment will be necessary at this time.

## 2021-12-03 NOTE — PROGRESS NOTES
Stephanie Suarez is a 56 y.o. female.     56-year-old female with dysuria and recent bronchitis      The following portions of the patient's history were reviewed and updated as appropriate: allergies, current medications, past family history, past medical history, past social history, past surgical history and problem list.    Review of Systems   Respiratory: Positive for cough. Negative for shortness of breath.    Cardiovascular: Negative for chest pain and leg swelling.   Genitourinary: Positive for dysuria.       Objective   Physical Exam  Vitals and nursing note reviewed.   Constitutional:       Appearance: Normal appearance.   Cardiovascular:      Rate and Rhythm: Normal rate and regular rhythm.      Pulses: Normal pulses.      Heart sounds: Normal heart sounds.   Pulmonary:      Effort: Pulmonary effort is normal.      Breath sounds: Normal breath sounds.   Abdominal:      Palpations: Abdomen is soft. There is no mass.   Musculoskeletal:      Right lower leg: No edema.      Left lower leg: No edema.   Skin:     Capillary Refill: Capillary refill takes less than 2 seconds.   Neurological:      General: No focal deficit present.      Mental Status: She is alert and oriented to person, place, and time.   Psychiatric:         Mood and Affect: Mood normal.         Behavior: Behavior normal.         Thought Content: Thought content normal.         Judgment: Judgment normal.         Assessment/Plan   Diagnoses and all orders for this visit:    1. Dysuria (Primary)       Bladder scan no residual-continue medicine

## 2021-12-07 ENCOUNTER — OFFICE VISIT (OUTPATIENT)
Dept: FAMILY MEDICINE CLINIC | Facility: CLINIC | Age: 56
End: 2021-12-07

## 2021-12-07 VITALS
RESPIRATION RATE: 18 BRPM | HEIGHT: 62 IN | SYSTOLIC BLOOD PRESSURE: 118 MMHG | OXYGEN SATURATION: 98 % | WEIGHT: 163.2 LBS | HEART RATE: 88 BPM | DIASTOLIC BLOOD PRESSURE: 68 MMHG | BODY MASS INDEX: 30.03 KG/M2 | TEMPERATURE: 97.8 F

## 2021-12-07 DIAGNOSIS — R73.9 ELEVATED BLOOD SUGAR: ICD-10-CM

## 2021-12-07 DIAGNOSIS — E55.9 VITAMIN D DEFICIENCY: ICD-10-CM

## 2021-12-07 DIAGNOSIS — R41.3 MEMORY LOSS: ICD-10-CM

## 2021-12-07 DIAGNOSIS — R05.9 COUGH: ICD-10-CM

## 2021-12-07 DIAGNOSIS — Z00.00 ROUTINE GENERAL MEDICAL EXAMINATION AT A HEALTH CARE FACILITY: Primary | ICD-10-CM

## 2021-12-07 DIAGNOSIS — M54.9 BACK PAIN, UNSPECIFIED BACK LOCATION, UNSPECIFIED BACK PAIN LATERALITY, UNSPECIFIED CHRONICITY: ICD-10-CM

## 2021-12-07 DIAGNOSIS — Z23 NEED FOR SHINGLES VACCINE: ICD-10-CM

## 2021-12-07 LAB
BILIRUB BLD-MCNC: NEGATIVE MG/DL
CLARITY, POC: CLEAR
COLOR UR: YELLOW
GLUCOSE UR STRIP-MCNC: NEGATIVE MG/DL
KETONES UR QL: NEGATIVE
LEUKOCYTE EST, POC: NEGATIVE
NITRITE UR-MCNC: NEGATIVE MG/ML
PH UR: 5.5 [PH] (ref 5–8)
PROT UR STRIP-MCNC: NEGATIVE MG/DL
RBC # UR STRIP: NEGATIVE /UL
SP GR UR: 1.02 (ref 1–1.03)
UROBILINOGEN UR QL: NORMAL

## 2021-12-07 PROCEDURE — 81003 URINALYSIS AUTO W/O SCOPE: CPT | Performed by: FAMILY MEDICINE

## 2021-12-07 PROCEDURE — 99396 PREV VISIT EST AGE 40-64: CPT | Performed by: FAMILY MEDICINE

## 2021-12-07 NOTE — PROGRESS NOTES
Stephanie Suarez is a 56 y.o. female.     56-year-old female with hyperlipidemia for annual exam       The following portions of the patient's history were reviewed and updated as appropriate: allergies, current medications, past family history, past medical history, past social history, past surgical history and problem list.    Review of Systems   Respiratory: Positive for cough. Negative for shortness of breath.    Cardiovascular: Negative for chest pain and leg swelling.        Stress test a year and half ago   Gastrointestinal:        Colonoscopy 11 months ago   Genitourinary: Negative for difficulty urinating.   All other systems reviewed and are negative.      Objective   Physical Exam  Vitals and nursing note reviewed.   Constitutional:       Appearance: Normal appearance.   HENT:      Right Ear: Tympanic membrane and ear canal normal.      Left Ear: Tympanic membrane and ear canal normal.   Eyes:      Extraocular Movements: Extraocular movements intact.      Pupils: Pupils are equal, round, and reactive to light.   Neck:      Vascular: No carotid bruit.   Cardiovascular:      Rate and Rhythm: Normal rate and regular rhythm.      Pulses: Normal pulses.      Heart sounds: Normal heart sounds.   Pulmonary:      Effort: Pulmonary effort is normal.      Breath sounds: Normal breath sounds.   Abdominal:      General: Abdomen is flat.      Palpations: Abdomen is soft. There is no mass.   Genitourinary:     Comments: Breast and gynecologic exam by another provider  Musculoskeletal:      Right lower leg: No edema.      Left lower leg: No edema.   Lymphadenopathy:      Cervical: No cervical adenopathy.   Skin:     General: Skin is warm and dry.      Capillary Refill: Capillary refill takes less than 2 seconds.   Neurological:      General: No focal deficit present.      Mental Status: She is alert and oriented to person, place, and time.   Psychiatric:         Mood and Affect: Mood normal.         Behavior:  Behavior normal.         Thought Content: Thought content normal.         Judgment: Judgment normal.         Assessment/Plan   Problems Addressed this Visit     None      Visit Diagnoses     Routine general medical examination at a health care facility    -  Primary    Relevant Orders    TSH    T4, free    CBC & Differential    Comprehensive Metabolic Panel    Lipid Panel    Back pain, unspecified back location, unspecified back pain laterality, unspecified chronicity        Memory loss        Relevant Orders    Vitamin B12    Folate    Vitamin D deficiency        Relevant Orders    Vitamin D 25 Hydroxy    Elevated blood sugar        Relevant Orders    Hemoglobin A1c    POC Urinalysis Dipstick, Multipro (Completed)    Cough        Relevant Orders    XR Chest PA & Lateral    Need for shingles vaccine        Relevant Medications    Zoster Vac Recomb Adjuvanted 50 MCG/0.5ML reconstituted suspension      Diagnoses       Codes Comments    Routine general medical examination at a health care facility    -  Primary ICD-10-CM: Z00.00  ICD-9-CM: V70.0     Back pain, unspecified back location, unspecified back pain laterality, unspecified chronicity     ICD-10-CM: M54.9  ICD-9-CM: 724.5     Memory loss     ICD-10-CM: R41.3  ICD-9-CM: 780.93     Vitamin D deficiency     ICD-10-CM: E55.9  ICD-9-CM: 268.9     Elevated blood sugar     ICD-10-CM: R73.9  ICD-9-CM: 790.29     Cough     ICD-10-CM: R05.9  ICD-9-CM: 786.2     Need for shingles vaccine     ICD-10-CM: Z23  ICD-9-CM: V04.89         Plan-with persistent cough will get chest x-ray otherwise avoid phentermine

## 2021-12-07 NOTE — PATIENT INSTRUCTIONS
Exercising to Stay Healthy  To become healthy and stay healthy, it is recommended that you do moderate-intensity and vigorous-intensity exercise. You can tell that you are exercising at a moderate intensity if your heart starts beating faster and you start breathing faster but can still hold a conversation. You can tell that you are exercising at a vigorous intensity if you are breathing much harder and faster and cannot hold a conversation while exercising.  Exercising regularly is important. It has many health benefits, such as:  · Improving overall fitness, flexibility, and endurance.  · Increasing bone density.  · Helping with weight control.  · Decreasing body fat.  · Increasing muscle strength.  · Reducing stress and tension.  · Improving overall health.  How often should I exercise?  Choose an activity that you enjoy, and set realistic goals. Your health care provider can help you make an activity plan that works for you.  Exercise regularly as told by your health care provider. This may include:  · Doing strength training two times a week, such as:  ? Lifting weights.  ? Using resistance bands.  ? Push-ups.  ? Sit-ups.  ? Yoga.  · Doing a certain intensity of exercise for a given amount of time. Choose from these options:  ? A total of 150 minutes of moderate-intensity exercise every week.  ? A total of 75 minutes of vigorous-intensity exercise every week.  ? A mix of moderate-intensity and vigorous-intensity exercise every week.  Children, pregnant women, people who have not exercised regularly, people who are overweight, and older adults may need to talk with a health care provider about what activities are safe to do. If you have a medical condition, be sure to talk with your health care provider before you start a new exercise program.  What are some exercise ideas?  Moderate-intensity exercise ideas include:  · Walking 1 mile (1.6 km) in about 15  minutes.  · Biking.  · Hiking.  · Golfing.  · Dancing.  · Water aerobics.  Vigorous-intensity exercise ideas include:  · Walking 4.5 miles (7.2 km) or more in about 1 hour.  · Jogging or running 5 miles (8 km) in about 1 hour.  · Biking 10 miles (16.1 km) or more in about 1 hour.  · Lap swimming.  · Roller-skating or in-line skating.  · Cross-country skiing.  · Vigorous competitive sports, such as football, basketball, and soccer.  · Jumping rope.  · Aerobic dancing.  What are some everyday activities that can help me to get exercise?  · Yard work, such as:  ? Pushing a .  ? Raking and bagging leaves.  · Washing your car.  · Pushing a stroller.  · Shoveling snow.  · Gardening.  · Washing windows or floors.  How can I be more active in my day-to-day activities?  · Use stairs instead of an elevator.  · Take a walk during your lunch break.  · If you drive, park your car farther away from your work or school.  · If you take public transportation, get off one stop early and walk the rest of the way.  · Stand up or walk around during all of your indoor phone calls.  · Get up, stretch, and walk around every 30 minutes throughout the day.  · Enjoy exercise with a friend. Support to continue exercising will help you keep a regular routine of activity.  What guidelines can I follow while exercising?  · Before you start a new exercise program, talk with your health care provider.  · Do not exercise so much that you hurt yourself, feel dizzy, or get very short of breath.  · Wear comfortable clothes and wear shoes with good support.  · Drink plenty of water while you exercise to prevent dehydration or heat stroke.  · Work out until your breathing and your heartbeat get faster.  Where to find more information  · [place] Department of Health and Human Services: www.hhs.gov  · Centers for Disease Control and Prevention (CDC): www.cdc.gov  Summary  · Exercising regularly is important. It will improve your overall fitness,  flexibility, and endurance.  · Regular exercise also will improve your overall health. It can help you control your weight, reduce stress, and improve your bone density.  · Do not exercise so much that you hurt yourself, feel dizzy, or get very short of breath.  · Before you start a new exercise program, talk with your health care provider.  This information is not intended to replace advice given to you by your health care provider. Make sure you discuss any questions you have with your health care provider.  Document Revised: 11/30/2018 Document Reviewed: 11/08/2018  Elsevier Patient Education © 2021 [employer]  Mediterranean Diet  A Mediterranean diet refers to food and lifestyle choices that are based on the traditions of countries located on the [place]. This way of eating has been shown to help prevent certain conditions and improve outcomes for people who have chronic diseases, like kidney disease and heart disease.  What are tips for following this plan?  Lifestyle  · Cook and eat meals together with your family, when possible.  · Drink enough fluid to keep your urine clear or pale yellow.  · Be physically active every day. This includes:  ? Aerobic exercise like running or swimming.  ? Leisure activities like gardening, walking, or housework.  · Get 7-8 hours of sleep each night.  · If recommended by your health care provider, drink red wine in moderation. This means 1 glass a day for nonpregnant women and 2 glasses a day for men. A glass of wine equals 5 oz (150 mL).  Reading food labels    · Check the serving size of packaged foods. For foods such as rice and pasta, the serving size refers to the amount of cooked product, not dry.  · Check the total fat in packaged foods. Avoid foods that have saturated fat or trans fats.  · Check the ingredients list for added sugars, such as corn syrup.    Shopping  · At the grocery store, buy most of your food from the areas near the walls of the store.  This includes:  ? Fresh fruits and vegetables (produce).  ? Grains, beans, nuts, and seeds. Some of these may be available in unpackaged forms or large amounts (in bulk).  ? Fresh seafood.  ? Poultry and eggs.  ? Low-fat dairy products.  · Buy whole ingredients instead of prepackaged foods.  · Buy fresh fruits and vegetables in-season from local farmers markets.  · Buy frozen fruits and vegetables in resealable bags.  · If you do not have access to quality fresh seafood, buy precooked frozen shrimp or canned fish, such as tuna, salmon, or sardines.  · Buy small amounts of raw or cooked vegetables, salads, or olives from the deli or salad bar at your store.  · Stock your pantry so you always have certain foods on hand, such as olive oil, canned tuna, canned tomatoes, rice, pasta, and beans.  Cooking  · Cook foods with extra-virgin olive oil instead of using butter or other vegetable oils.  · Have meat as a side dish, and have vegetables or grains as your main dish. This means having meat in small portions or adding small amounts of meat to foods like pasta or stew.  · Use beans or vegetables instead of meat in common dishes like chili or lasagna.  · [place] with different cooking methods. Try roasting or broiling vegetables instead of steaming or sautéeing them.  · Add frozen vegetables to soups, stews, pasta, or rice.  · Add nuts or seeds for added healthy fat at each meal. You can add these to yogurt, salads, or vegetable dishes.  · Marinate fish or vegetables using olive oil, lemon juice, garlic, and fresh herbs.  Meal planning    · Plan to eat 1 vegetarian meal one day each week. Try to work up to 2 vegetarian meals, if possible.  · Eat seafood 2 or more times a week.  · Have healthy snacks readily available, such as:  ? Vegetable sticks with hummus.  ? Greek yogurt.  ? Fruit and nut trail mix.  · Eat balanced meals throughout the week. This includes:  ? Fruit: 2-3 servings a day  ? Vegetables: 4-5 servings a  day  ? Low-fat dairy: 2 servings a day  ? Fish, poultry, or lean meat: 1 serving a day  ? Beans and legumes: 2 or more servings a week  ? Nuts and seeds: 1-2 servings a day  ? Whole grains: 6-8 servings a day  ? Extra-virgin olive oil: 3-4 servings a day  · Limit red meat and sweets to only a few servings a month    What are my food choices?  · Mediterranean diet  ? Recommended  § Grains: Whole-grain pasta. Brown rice. Bulgar wheat. Polenta. Couscous. Whole-wheat bread. Oatmeal. Quinoa.  § Vegetables: Artichokes. Beets. Broccoli. Cabbage. Carrots. Eggplant. Green beans. Chard. Kale. Spinach. Onions. Leeks. Peas. Squash. Tomatoes. Peppers. Radishes.  § Fruits: Apples. Apricots. Avocado. Berries. Bananas. Cherries. Dates. Figs. Grapes. Jane. Melon. Oranges. Peaches. Plums. Pomegranate.  § Meats and other protein foods: Beans. Almonds. Sunflower seeds. Pine nuts. Peanuts. Cod. [place]. Scallops. Shrimp. Tuna. Tilapia. Clams. Oysters. Eggs.  § Dairy: Low-fat milk. Cheese. Greek yogurt.  § Beverages: Water. Red wine. Herbal tea.  § Fats and oils: Extra virgin olive oil. Avocado oil. Grape seed oil.  § Sweets and desserts: Greek yogurt with honey. Baked apples. Poached pears. Trail mix.  § Seasoning and other foods: Basil. Cilantro. Coriander. Cumin. Mint. Parsley. Shawn. Rosemary. Tarragon. Garlic. Oregano. Thyme. Pepper. Balsalmic vinegar. Tahini. Hummus. Tomato sauce. Olives. Mushrooms.  ? Limit these  § Grains: Prepackaged pasta or rice dishes. Prepackaged cereal with added sugar.  § Vegetables: Deep fried potatoes (french fries).  § Fruits: Fruit canned in syrup.  § Meats and other protein foods: Beef. Pork. Lamb. Poultry with skin. Hot dogs. Peña.  § Dairy: Ice cream. Sour cream. Whole milk.  § Beverages: Juice. Sugar-sweetened soft drinks. Beer. Liquor and spirits.  § Fats and oils: Butter. Canola oil. Vegetable oil. Beef fat (tallow). Lard.  § Sweets and desserts: Cookies. Cakes. Pies. Candy.  § Seasoning and  other foods: Mayonnaise. Premade sauces and marinades.  The items listed may not be a complete list. Talk with your dietitian about what dietary choices are right for you.  Summary  · The Mediterranean diet includes both food and lifestyle choices.  · Eat a variety of fresh fruits and vegetables, beans, nuts, seeds, and whole grains.  · Limit the amount of red meat and sweets that you eat.  · Talk with your health care provider about whether it is safe for you to drink red wine in moderation. This means 1 glass a day for nonpregnant women and 2 glasses a day for men. A glass of wine equals 5 oz (150 mL).  This information is not intended to replace advice given to you by your health care provider. Make sure you discuss any questions you have with your health care provider.  Document Revised: 08/17/2017 Document Reviewed: 08/10/2017  Elsevier Patient Education © 2020 [employer] Inc[employer]

## 2021-12-08 LAB
25(OH)D3+25(OH)D2 SERPL-MCNC: 71.4 NG/ML (ref 30–100)
ALBUMIN SERPL-MCNC: 4.3 G/DL (ref 3.5–5.2)
ALBUMIN/GLOB SERPL: 1.4 G/DL
ALP SERPL-CCNC: 63 U/L (ref 39–117)
ALT SERPL-CCNC: 33 U/L (ref 1–33)
AST SERPL-CCNC: 16 U/L (ref 1–32)
BASOPHILS # BLD AUTO: 0.07 10*3/MM3 (ref 0–0.2)
BASOPHILS NFR BLD AUTO: 0.8 % (ref 0–1.5)
BILIRUB SERPL-MCNC: 0.3 MG/DL (ref 0–1.2)
BUN SERPL-MCNC: 16 MG/DL (ref 6–20)
BUN/CREAT SERPL: 18.2 (ref 7–25)
CALCIUM SERPL-MCNC: 10.4 MG/DL (ref 8.6–10.5)
CHLORIDE SERPL-SCNC: 102 MMOL/L (ref 98–107)
CHOLEST SERPL-MCNC: 205 MG/DL (ref 0–200)
CO2 SERPL-SCNC: 30.5 MMOL/L (ref 22–29)
CREAT SERPL-MCNC: 0.88 MG/DL (ref 0.57–1)
EOSINOPHIL # BLD AUTO: 0.2 10*3/MM3 (ref 0–0.4)
EOSINOPHIL NFR BLD AUTO: 2.3 % (ref 0.3–6.2)
ERYTHROCYTE [DISTWIDTH] IN BLOOD BY AUTOMATED COUNT: 12.7 % (ref 12.3–15.4)
FOLATE SERPL-MCNC: >20 NG/ML (ref 4.78–24.2)
GLOBULIN SER CALC-MCNC: 3.1 GM/DL
GLUCOSE SERPL-MCNC: 91 MG/DL (ref 65–99)
HBA1C MFR BLD: 5.9 % (ref 4.8–5.6)
HCT VFR BLD AUTO: 43.9 % (ref 34–46.6)
HDLC SERPL-MCNC: 88 MG/DL (ref 40–60)
HGB BLD-MCNC: 14.2 G/DL (ref 12–15.9)
IMM GRANULOCYTES # BLD AUTO: 0.06 10*3/MM3 (ref 0–0.05)
IMM GRANULOCYTES NFR BLD AUTO: 0.7 % (ref 0–0.5)
LDLC SERPL CALC-MCNC: 95 MG/DL (ref 0–100)
LYMPHOCYTES # BLD AUTO: 3.44 10*3/MM3 (ref 0.7–3.1)
LYMPHOCYTES NFR BLD AUTO: 39 % (ref 19.6–45.3)
MCH RBC QN AUTO: 30.1 PG (ref 26.6–33)
MCHC RBC AUTO-ENTMCNC: 32.3 G/DL (ref 31.5–35.7)
MCV RBC AUTO: 93 FL (ref 79–97)
MONOCYTES # BLD AUTO: 0.76 10*3/MM3 (ref 0.1–0.9)
MONOCYTES NFR BLD AUTO: 8.6 % (ref 5–12)
NEUTROPHILS # BLD AUTO: 4.28 10*3/MM3 (ref 1.7–7)
NEUTROPHILS NFR BLD AUTO: 48.6 % (ref 42.7–76)
NRBC BLD AUTO-RTO: 0 /100 WBC (ref 0–0.2)
PLATELET # BLD AUTO: 240 10*3/MM3 (ref 140–450)
POTASSIUM SERPL-SCNC: 4.4 MMOL/L (ref 3.5–5.2)
PROT SERPL-MCNC: 7.4 G/DL (ref 6–8.5)
RBC # BLD AUTO: 4.72 10*6/MM3 (ref 3.77–5.28)
SODIUM SERPL-SCNC: 142 MMOL/L (ref 136–145)
T4 FREE SERPL-MCNC: 1.19 NG/DL (ref 0.93–1.7)
TRIGL SERPL-MCNC: 127 MG/DL (ref 0–150)
TSH SERPL DL<=0.005 MIU/L-ACNC: 1.72 UIU/ML (ref 0.27–4.2)
VIT B12 SERPL-MCNC: 518 PG/ML (ref 211–946)
VLDLC SERPL CALC-MCNC: 22 MG/DL (ref 5–40)
WBC # BLD AUTO: 8.81 10*3/MM3 (ref 3.4–10.8)

## 2021-12-28 ENCOUNTER — TELEPHONE (OUTPATIENT)
Dept: FAMILY MEDICINE CLINIC | Facility: CLINIC | Age: 56
End: 2021-12-28

## 2021-12-28 DIAGNOSIS — M25.561 PAIN IN BOTH KNEES, UNSPECIFIED CHRONICITY: Primary | ICD-10-CM

## 2021-12-28 DIAGNOSIS — M25.562 PAIN IN BOTH KNEES, UNSPECIFIED CHRONICITY: Primary | ICD-10-CM

## 2021-12-28 NOTE — TELEPHONE ENCOUNTER
Caller: Shawnee Suarez    Relationship: Self    Best call back number: [phone]    What is the medical concern/diagnosis: KNEES ARE IN PAIN    What specialty or service is being requested: ORTHOPAEDIC    What is the provider, practice or medical service name: DR JOEL SHAHID    What is the office location: [facility]    Any additional details: PATIENT IS HAVING BILATERAL KNEE PAIN AND WOULD LIKE TO SEE ORTHOPEDIC

## 2022-01-20 DIAGNOSIS — F41.9 ANXIETY: ICD-10-CM

## 2022-01-20 RX ORDER — CLONAZEPAM 1 MG/1
TABLET ORAL
Qty: 60 TABLET | Refills: 0 | Status: SHIPPED | OUTPATIENT
Start: 2022-01-20 | End: 2022-02-21

## 2022-01-20 NOTE — TELEPHONE ENCOUNTER
Rx Refill Note  Requested Prescriptions     Pending Prescriptions Disp Refills   • clonazePAM (KlonoPIN) 1 MG tablet [Pharmacy Med Name: CLONAZEPAM 1MG TABS] 60 tablet 2     Sig: TAKE ONE TABLET BY MOUTH TWICE A DAY AS NEEDED FOR ANXIETY      Last office visit with prescribing clinician: 12/7/2021      Next office visit with prescribing clinician: Visit date not found   CPE done 12/2021    Last Rx given 09/14/2021  Pain therapy appt 12/07/2021  Contract & UDS 03/11/2021    Is Refill Pharmacy correct?: yes    Bree Pate MA  01/20/22, 12:34 CST

## 2022-02-21 DIAGNOSIS — F41.9 ANXIETY: ICD-10-CM

## 2022-02-21 RX ORDER — CLONAZEPAM 1 MG/1
TABLET ORAL
Qty: 60 TABLET | Refills: 0 | Status: SHIPPED | OUTPATIENT
Start: 2022-02-21 | End: 2022-03-29 | Stop reason: SDUPTHER

## 2022-03-29 ENCOUNTER — OFFICE VISIT (OUTPATIENT)
Dept: FAMILY MEDICINE CLINIC | Facility: CLINIC | Age: 57
End: 2022-03-29

## 2022-03-29 VITALS
BODY MASS INDEX: 28.63 KG/M2 | WEIGHT: 155.6 LBS | TEMPERATURE: 97.1 F | HEART RATE: 73 BPM | OXYGEN SATURATION: 98 % | SYSTOLIC BLOOD PRESSURE: 111 MMHG | DIASTOLIC BLOOD PRESSURE: 76 MMHG | HEIGHT: 62 IN

## 2022-03-29 DIAGNOSIS — Z51.81 THERAPEUTIC DRUG MONITORING: Primary | ICD-10-CM

## 2022-03-29 DIAGNOSIS — F41.9 ANXIETY: ICD-10-CM

## 2022-03-29 PROCEDURE — 99213 OFFICE O/P EST LOW 20 MIN: CPT | Performed by: NURSE PRACTITIONER

## 2022-03-29 RX ORDER — CLONAZEPAM 1 MG/1
TABLET ORAL
Qty: 60 TABLET | Refills: 0 | OUTPATIENT
Start: 2022-03-29

## 2022-03-29 RX ORDER — CLONAZEPAM 1 MG/1
1 TABLET ORAL 2 TIMES DAILY PRN
Qty: 60 TABLET | Refills: 0 | Status: SHIPPED | OUTPATIENT
Start: 2022-03-29 | End: 2022-05-04

## 2022-03-29 RX ORDER — MELOXICAM 15 MG/1
15 TABLET ORAL DAILY
COMMUNITY
Start: 2022-03-28 | End: 2022-06-20 | Stop reason: SDUPTHER

## 2022-03-29 NOTE — PROGRESS NOTES
CC: anxiety    History:  Shawnee Suarez is a 56 y.o. female who presents today for evaluation of the above problems.      Patient notes that she is under a great deal of stress in helping care for her mother who has cancer. Patient is in no way resentful of care and is very glad to care for her and says that she truly wants to care for her. Unfortunately immediately prior to me coming in the room she was upset by a phone encounter so she is tearful today, but states that she typically does well and her anxiety is generally well controlled.    CONTROLLED SUBSTANCE TRACKING 4/14/2020 7/1/2020 9/28/2020 3/11/2021 6/11/2021 9/14/2021 3/29/2022   Last Ludin 4/14/2020 7/1/2020 7/1/2020 3/11/2021 6/11/2021 9/14/2021 3/29/2022   Report Number [id number] Dr. Tran reviewed through EP[facility] - reviewed through [facility] reviewed through [facility]   Last UDS 3/3/2020 3/3/2020 3/4/2020 3/11/2021 3/11/2021 3/11/2021 3/29/2022   Last Controlled Substance Agreement 3/3/2020 3/4/2020 3/4/2020 3/11/2021 3/11/2021 3/11/2021 3/29/2022         HPI  ROS:  Review of Systems   Psychiatric/Behavioral: The patient is nervous/anxious.        No Known Allergies  Past Medical History:   Diagnosis Date   • Anxiety    • Depression    • History of transfusion    • Hyperlipidemia    • Migraines    • Ovarian cyst     x 2      Past Surgical History:   Procedure Laterality Date   • APPENDECTOMY     • BREAST BIOPSY Right 11/1989   • CHOLECYSTECTOMY     • COLONOSCOPY  11/22/2013    Normal exam repeat in 10 years   • COLONOSCOPY N/A 11/13/2018    Normal but prep was only fair repeat in 2 years   • COLONOSCOPY N/A 1/22/2021    Procedure: COLONOSCOPY WITH ANESTHESIA;  Surgeon: Da Miles MD;  [facility] ENDOSCOPY;  Service: Gastroenterology;  Laterality: N/A;  pre: screen  post: normal  Marlon Tran MD     • D & C HYSTEROSCOPY ENDOMETRIAL ABLATION N/A 2/6/2017    Procedure: DILATATION AND CURETTAGE HYSTEROSCOPY NOVASURE  ENDOMETRIAL ABLATION;  Surgeon: Ana Barrios MD;  Location:  [facility] OR;  Service:    • DIAGNOSTIC LAPAROSCOPY N/A 6/17/2019    Procedure: DIAGNOSTIC LAPAROSCOPY;  Surgeon: Ana Barrios MD;  Location:  [facility] OR;  Service: Obstetrics/Gynecology   • OVARIAN CYST REMOVAL      x 2   • SALPINGO OOPHORECTOMY Bilateral 6/17/2019    Procedure: SALPINGO OOPHORECTOMY LAPAROSCOPIC;  Surgeon: Ana Barrios MD;  Location:  [facility] OR;  Service: Obstetrics/Gynecology     Family History   Problem Relation Age of Onset   • Ovarian cancer Mother 72   • Brain cancer Father    • No Known Problems Brother    • No Known Problems Daughter    • No Known Problems Maternal Grandmother    • No Known Problems Maternal Grandfather    • No Known Problems Paternal Grandmother    • Brain cancer Paternal Grandfather    • Heart disease Brother    • No Known Problems Daughter    • Breast cancer Other 28   • No Known Problems Maternal Aunt    • No Known Problems Paternal Aunt    • Uterine cancer Neg Hx    • Colon cancer Neg Hx    • Melanoma Neg Hx    • Prostate cancer Neg Hx    • BRCA 1/2 Neg Hx    • Endometrial cancer Neg Hx    • Colon polyps Neg Hx       reports that she has never smoked. She has never used smokeless tobacco. She reports that she does not drink alcohol and does not use drugs.      Current Outpatient Medications:   •  Calcium-Vitamin D-Vitamin K (Viactiv Calcium Plus D) 650-12.5-40 MG-MCG-MCG chewable tablet, Chew 2 (two) times a day., Disp: , Rfl:   •  Cholecalciferol (Vitamin D3) 125 MCG (5000 UT) chewable tablet, Chew 5,000 Units Daily., Disp: , Rfl:   •  clonazePAM (KlonoPIN) 1 MG tablet, Take 1 tablet by mouth 2 (Two) Times a Day As Needed for Anxiety., Disp: 60 tablet, Rfl: 0  •  DULoxetine (CYMBALTA) 60 MG capsule, Take 1 capsule by mouth Daily., Disp: 90 capsule, Rfl: 3  •  fluticasone (FLONASE) 50 MCG/ACT nasal spray, 2 sprays into each nostril Daily., Disp: , Rfl:   •  loratadine (CLARITIN) 10 MG tablet, Take 10 mg by  "mouth Daily., Disp: , Rfl:   •  Multiple Vitamins-Minerals (WOMENS 50+ MULTI VITAMIN/MIN PO), Take  by mouth., Disp: , Rfl:   •  rosuvastatin (CRESTOR) 10 MG tablet, TAKE ONE TABLET BY MOUTH EVERY DAY, Disp: 90 tablet, Rfl: 2  •  ZOLMitriptan (ZOMIG) 2.5 MG tablet, TAKE 1 TABLET BY MOUTH TWICE DAILY AS NEEDED FOR HEADACHE, Disp: 18 tablet, Rfl: 2  •  meloxicam (MOBIC) 15 MG tablet, Take 15 mg by mouth Daily., Disp: , Rfl:     OBJECTIVE:  /76 (BP Location: Left arm, Patient Position: Sitting, Cuff Size: Adult)   Pulse 73   Temp 97.1 °F (36.2 °C) (Temporal)   Ht 157.5 cm (62\")   Wt 70.6 kg (155 lb 9.6 oz)   LMP  (LMP Unknown)   SpO2 98%   Breastfeeding No   BMI 28.46 kg/m²    Physical Exam  Vitals reviewed.   Constitutional:       Appearance: She is well-developed.   Cardiovascular:      Rate and Rhythm: Normal rate and regular rhythm.      Heart sounds: Normal heart sounds.   Pulmonary:      Effort: Pulmonary effort is normal.      Breath sounds: Normal breath sounds.   Neurological:      Mental Status: She is alert and oriented to person, place, and time.   Psychiatric:         Mood and Affect: Affect is tearful.         Behavior: Behavior normal.         Assessment/Plan    Diagnoses and all orders for this visit:    1. Therapeutic drug monitoring (Primary)  -     ToxASSURE Select 13 (MW) - Urine, Clean Catch    2. Anxiety  -     clonazePAM (KlonoPIN) 1 MG tablet; Take 1 tablet by mouth 2 (Two) Times a Day As Needed for Anxiety.  Dispense: 60 tablet; Refill: 0    Ludin reviewed and appropriate. Contract and UDS today.     An After Visit Summary was printed and given to the patient at discharge.  Return in about 3 months (around 6/29/2022), or if symptoms worsen or fail to improve, for Next scheduled follow up.       MICHELLE Arnold 3/29/22    Electronically signed.  "

## 2022-03-29 NOTE — TELEPHONE ENCOUNTER
Pain therapy appt 12/07/2021  Contract & UDS 03/11/2021      Patient contacted and appt scheduled for today.

## 2022-04-02 LAB — DRUGS UR: NORMAL

## 2022-05-04 DIAGNOSIS — F41.9 ANXIETY: ICD-10-CM

## 2022-05-04 RX ORDER — CLONAZEPAM 1 MG/1
TABLET ORAL
Qty: 60 TABLET | Refills: 0 | Status: SHIPPED | OUTPATIENT
Start: 2022-05-04 | End: 2022-06-08 | Stop reason: SDUPTHER

## 2022-05-04 NOTE — TELEPHONE ENCOUNTER
Rx Refill Note  Requested Prescriptions     Pending Prescriptions Disp Refills   • clonazePAM (KlonoPIN) 1 MG tablet [Pharmacy Med Name: CLONAZEPAM 1MG TABS] 60 tablet 0     Sig: TAKE ONE TABLET BY MOUTH TWICE A DAY AS NEEDED FOR ANXIETY.      Last office visit with prescribing clinician: 3/29/2022      Next office visit with prescribing clinician: 6/8/2022     Pain therapy appt 03/29/2022  Contract & UDS 03/29/2022    {TIP  Please add Last Relevant Lab Date if appropriate: 12/07/2021    {TIP  Is Refill Pharmacy correct?: yes    Bree Pate MA  05/04/22, 15:04 CDT

## 2022-06-08 ENCOUNTER — OFFICE VISIT (OUTPATIENT)
Dept: FAMILY MEDICINE CLINIC | Facility: CLINIC | Age: 57
End: 2022-06-08

## 2022-06-08 VITALS
SYSTOLIC BLOOD PRESSURE: 117 MMHG | WEIGHT: 143.2 LBS | HEART RATE: 78 BPM | DIASTOLIC BLOOD PRESSURE: 83 MMHG | BODY MASS INDEX: 26.35 KG/M2 | TEMPERATURE: 97.1 F | OXYGEN SATURATION: 98 % | HEIGHT: 62 IN

## 2022-06-08 DIAGNOSIS — Z23 NEED FOR SHINGLES VACCINE: ICD-10-CM

## 2022-06-08 DIAGNOSIS — F41.9 ANXIETY: ICD-10-CM

## 2022-06-08 DIAGNOSIS — G43.909 MIGRAINE WITHOUT STATUS MIGRAINOSUS, NOT INTRACTABLE, UNSPECIFIED MIGRAINE TYPE: ICD-10-CM

## 2022-06-08 DIAGNOSIS — E78.2 MIXED HYPERLIPIDEMIA: Primary | ICD-10-CM

## 2022-06-08 PROBLEM — Z12.11 ENCOUNTER FOR SCREENING FOR MALIGNANT NEOPLASM OF COLON: Status: RESOLVED | Noted: 2018-08-29 | Resolved: 2022-06-08

## 2022-06-08 PROCEDURE — 99214 OFFICE O/P EST MOD 30 MIN: CPT | Performed by: NURSE PRACTITIONER

## 2022-06-08 RX ORDER — CLONAZEPAM 1 MG/1
1 TABLET ORAL 3 TIMES DAILY PRN
Qty: 65 TABLET | Refills: 0 | Status: SHIPPED | OUTPATIENT
Start: 2022-06-08 | End: 2022-07-25

## 2022-06-08 RX ORDER — ZOSTER VACCINE RECOMBINANT, ADJUVANTED 50 MCG/0.5
0.5 KIT INTRAMUSCULAR ONCE
Qty: 1 EACH | Refills: 1 | Status: SHIPPED | OUTPATIENT
Start: 2022-06-08 | End: 2022-06-08

## 2022-06-08 NOTE — PROGRESS NOTES
CC: 6 month check    History:  Shawnee Suarez is a 57 y.o. female who presents today for evaluation of the above problems.      Patient reports that she is doing well overall, however, has been struggling with anxiety at times.  Notes that she is having to take her mother back and forth to [place] for cancer treatments and that her mother's stress level affects her making her anxious as well. States that at times she has to take an extra clonazepam during the day, however, she frequently only takes a 1/2 tablet in the morning. In looking at her Ludin, she has routinely gone longer than a month between fills.     Otherwise, she states that she does have some problems with arthritis in her knees and hands, however, her mobic does control it well overall.       HPI  ROS:  Review of Systems   Musculoskeletal: Positive for arthralgias.   Psychiatric/Behavioral: The patient is nervous/anxious.        No Known Allergies  Past Medical History:   Diagnosis Date   • Anxiety    • Depression    • History of transfusion    • Hyperlipidemia    • Migraines    • Migraines 6/8/2022   • Ovarian cyst     x 2      Past Surgical History:   Procedure Laterality Date   • APPENDECTOMY     • BREAST BIOPSY Right 11/1989   • CHOLECYSTECTOMY     • COLONOSCOPY  11/22/2013    Normal exam repeat in 10 years   • COLONOSCOPY N/A 11/13/2018    Normal but prep was only fair repeat in 2 years   • COLONOSCOPY N/A 1/22/2021    Procedure: COLONOSCOPY WITH ANESTHESIA;  Surgeon: Da Miles MD;  Location: [facility] ENDOSCOPY;  Service: Gastroenterology;  Laterality: N/A;  pre: screen  post: normal  Marlon Tran MD     • D & C HYSTEROSCOPY ENDOMETRIAL ABLATION N/A 2/6/2017    Procedure: DILATATION AND CURETTAGE HYSTEROSCOPY NOVASURE ENDOMETRIAL ABLATION;  Surgeon: Ana Barrios MD;  Location: [facility] OR;  Service:    • DIAGNOSTIC LAPAROSCOPY N/A 6/17/2019    Procedure: DIAGNOSTIC LAPAROSCOPY;  Surgeon: Ana Barrios MD;  Location: [facility]  OR;  Service: Obstetrics/Gynecology   • OVARIAN CYST REMOVAL      x 2   • SALPINGO OOPHORECTOMY Bilateral 6/17/2019    Procedure: SALPINGO OOPHORECTOMY LAPAROSCOPIC;  Surgeon: Ana Barrios MD;  Location: [facility] OR;  Service: Obstetrics/Gynecology     Family History   Problem Relation Age of Onset   • Ovarian cancer Mother 72   • Brain cancer Father    • No Known Problems Brother    • No Known Problems Daughter    • No Known Problems Maternal Grandmother    • No Known Problems Maternal Grandfather    • No Known Problems Paternal Grandmother    • Brain cancer Paternal Grandfather    • Heart disease Brother    • No Known Problems Daughter    • Breast cancer Other 28   • No Known Problems Maternal Aunt    • No Known Problems Paternal Aunt    • Uterine cancer Neg Hx    • Colon cancer Neg Hx    • Melanoma Neg Hx    • Prostate cancer Neg Hx    • BRCA 1/2 Neg Hx    • Endometrial cancer Neg Hx    • Colon polyps Neg Hx       reports that she has never smoked. She has never used smokeless tobacco. She reports that she does not drink alcohol and does not use drugs.      Current Outpatient Medications:   •  Calcium-Vitamin D-Vitamin K (Viactiv Calcium Plus D) 650-12.5-40 MG-MCG-MCG chewable tablet, Chew 2 (two) times a day., Disp: , Rfl:   •  Cholecalciferol (Vitamin D3) 125 MCG (5000 UT) chewable tablet, Chew 5,000 Units Daily., Disp: , Rfl:   •  clonazePAM (KlonoPIN) 1 MG tablet, Take 1 tablet by mouth 3 (Three) Times a Day As Needed for Anxiety., Disp: 65 tablet, Rfl: 0  •  DULoxetine (CYMBALTA) 60 MG capsule, Take 1 capsule by mouth Daily., Disp: 90 capsule, Rfl: 3  •  fluticasone (FLONASE) 50 MCG/ACT nasal spray, 2 sprays into each nostril Daily., Disp: , Rfl:   •  loratadine (CLARITIN) 10 MG tablet, Take 10 mg by mouth Daily., Disp: , Rfl:   •  meloxicam (MOBIC) 15 MG tablet, Take 15 mg by mouth Daily., Disp: , Rfl:   •  Multiple Vitamins-Minerals (WOMENS 50+ MULTI VITAMIN/MIN PO), Take  by mouth., Disp: , Rfl:   •   "rosuvastatin (CRESTOR) 10 MG tablet, TAKE ONE TABLET BY MOUTH EVERY DAY, Disp: 90 tablet, Rfl: 2  •  ZOLMitriptan (ZOMIG) 2.5 MG tablet, TAKE 1 TABLET BY MOUTH TWICE DAILY AS NEEDED FOR HEADACHE, Disp: 18 tablet, Rfl: 2  •  Zoster Vac Recomb Adjuvanted (Shingrix) 50 MCG/0.5ML reconstituted suspension, Inject 0.5 mL into the appropriate muscle as directed by prescriber 1 (One) Time for 1 dose., Disp: 1 each, Rfl: 1    OBJECTIVE:  /83 (BP Location: Left arm, Patient Position: Sitting, Cuff Size: Adult)   Pulse 78   Temp 97.1 °F (36.2 °C) (Temporal)   Ht 157.5 cm (62\")   Wt 65 kg (143 lb 3.2 oz)   LMP  (LMP Unknown)   SpO2 98%   Breastfeeding No   BMI 26.19 kg/m²    Physical Exam  Vitals reviewed.   Constitutional:       Appearance: She is well-developed.   Cardiovascular:      Rate and Rhythm: Normal rate and regular rhythm.      Heart sounds: Normal heart sounds.   Pulmonary:      Effort: Pulmonary effort is normal.      Breath sounds: Normal breath sounds.   Neurological:      Mental Status: She is alert and oriented to person, place, and time.   Psychiatric:         Behavior: Behavior normal.         Assessment/Plan    Diagnoses and all orders for this visit:    1. Mixed hyperlipidemia (Primary)  -     Comprehensive Metabolic Panel  -     Lipid Panel    2. Need for shingles vaccine  -     Zoster Vac Recomb Adjuvanted (Shingrix) 50 MCG/0.5ML reconstituted suspension; Inject 0.5 mL into the appropriate muscle as directed by prescriber 1 (One) Time for 1 dose.  Dispense: 1 each; Refill: 1    3. Migraine without status migrainosus, not intractable, unspecified migraine type  -     Comprehensive Metabolic Panel    4. Anxiety  -     clonazePAM (KlonoPIN) 1 MG tablet; Take 1 tablet by mouth 3 (Three) Times a Day As Needed for Anxiety.  Dispense: 65 tablet; Refill: 0    Contract and UDS up to date. Ludin reviewed and appropriate. Will change RX to three times per day as needed on Klonopin, however, she is " advised that this should strictly be on an as needed dose. I am only sending in 5 extra tablets monthly.     An After Visit Summary was printed and given to the patient at discharge.  Return if symptoms worsen or fail to improve, for Next scheduled follow up.       MICHELLE Arnold 6/8/22    Electronically signed.

## 2022-06-09 LAB
ALBUMIN SERPL-MCNC: 4.6 G/DL (ref 3.5–5.2)
ALBUMIN/GLOB SERPL: 1.8 G/DL
ALP SERPL-CCNC: 55 U/L (ref 39–117)
ALT SERPL-CCNC: 20 U/L (ref 1–33)
AST SERPL-CCNC: 20 U/L (ref 1–32)
BILIRUB SERPL-MCNC: 0.7 MG/DL (ref 0–1.2)
BUN SERPL-MCNC: 14 MG/DL (ref 6–20)
BUN/CREAT SERPL: 18.7 (ref 7–25)
CALCIUM SERPL-MCNC: 10.2 MG/DL (ref 8.6–10.5)
CHLORIDE SERPL-SCNC: 106 MMOL/L (ref 98–107)
CHOLEST SERPL-MCNC: 185 MG/DL (ref 0–200)
CO2 SERPL-SCNC: 27.1 MMOL/L (ref 22–29)
CREAT SERPL-MCNC: 0.75 MG/DL (ref 0.57–1)
EGFRCR SERPLBLD CKD-EPI 2021: 91.8 ML/MIN/1.73
GLOBULIN SER CALC-MCNC: 2.5 GM/DL
GLUCOSE SERPL-MCNC: 100 MG/DL (ref 65–99)
HDLC SERPL-MCNC: 75 MG/DL (ref 40–60)
LDLC SERPL CALC-MCNC: 92 MG/DL (ref 0–100)
POTASSIUM SERPL-SCNC: 4.2 MMOL/L (ref 3.5–5.2)
PROT SERPL-MCNC: 7.1 G/DL (ref 6–8.5)
SODIUM SERPL-SCNC: 144 MMOL/L (ref 136–145)
TRIGL SERPL-MCNC: 103 MG/DL (ref 0–150)
VLDLC SERPL CALC-MCNC: 18 MG/DL (ref 5–40)

## 2022-06-20 DIAGNOSIS — F41.9 ANXIETY: ICD-10-CM

## 2022-06-20 RX ORDER — ROSUVASTATIN CALCIUM 10 MG/1
10 TABLET, COATED ORAL DAILY
Qty: 8 TABLET | Refills: 0 | Status: SHIPPED | OUTPATIENT
Start: 2022-06-20 | End: 2022-07-20

## 2022-06-20 RX ORDER — DULOXETIN HYDROCHLORIDE 60 MG/1
60 CAPSULE, DELAYED RELEASE ORAL DAILY
Qty: 8 CAPSULE | Refills: 0 | Status: SHIPPED | OUTPATIENT
Start: 2022-06-20 | End: 2022-06-30

## 2022-06-20 RX ORDER — MELOXICAM 15 MG/1
15 TABLET ORAL DAILY
Qty: 8 TABLET | Refills: 0 | Status: SHIPPED | OUTPATIENT
Start: 2022-06-20 | End: 2022-06-30 | Stop reason: SDUPTHER

## 2022-06-20 RX ORDER — CLONAZEPAM 1 MG/1
1 TABLET ORAL 3 TIMES DAILY PRN
Qty: 24 TABLET | Refills: 0 | OUTPATIENT
Start: 2022-06-20

## 2022-06-20 NOTE — TELEPHONE ENCOUNTER
Pain therapy appt 6/8/22  Contract & UDS 03/29/2022    Rx Refill Note  Requested Prescriptions     Pending Prescriptions Disp Refills   • DULoxetine (CYMBALTA) 60 MG capsule 90 capsule 3     Sig: Take 1 capsule by mouth Daily.   • meloxicam (MOBIC) 15 MG tablet       Sig: Take 1 tablet by mouth Daily.   • clonazePAM (KlonoPIN) 1 MG tablet 65 tablet 0     Sig: Take 1 tablet by mouth 3 (Three) Times a Day As Needed for Anxiety.   • rosuvastatin (CRESTOR) 10 MG tablet 90 tablet 2     Sig: Take 1 tablet by mouth Daily.      Last office visit with prescribing clinician: 12/7/2021      Next office visit with prescribing clinician: 9/1/2022       {TIP  Please add Last Relevant Lab 6/8/22    Bree Lockett MA  06/20/22, 07:32 CDT

## 2022-06-20 NOTE — TELEPHONE ENCOUNTER
Caller: Shawnee Suarez    Relationship: Self    Best call back number: [phone]     Requested Prescriptions:   Requested Prescriptions     Pending Prescriptions Disp Refills   • DULoxetine (CYMBALTA) 60 MG capsule 90 capsule 3     Sig: Take 1 capsule by mouth Daily.   • meloxicam (MOBIC) 15 MG tablet       Sig: Take 1 tablet by mouth Daily.   • clonazePAM (KlonoPIN) 1 MG tablet 65 tablet 0     Sig: Take 1 tablet by mouth 3 (Three) Times a Day As Needed for Anxiety.   • rosuvastatin (CRESTOR) 10 MG tablet 90 tablet 2     Sig: Take 1 tablet by mouth Daily.        Pharmacy where request should be sent: [facility] #[id number] - [address] AT [address] [phone] PH - [phone]      Additional details provided by patient:     Patient is in [place] on vacation, and she is requesting an 8 day supply.     Does the patient have less than a 3 day supply:  [x] Yes  [] No    Lionel York Rep   06/20/22 07:26 CDT

## 2022-06-30 RX ORDER — DULOXETIN HYDROCHLORIDE 60 MG/1
CAPSULE, DELAYED RELEASE ORAL
Qty: 90 CAPSULE | Refills: 3 | Status: SHIPPED | OUTPATIENT
Start: 2022-06-30

## 2022-06-30 RX ORDER — ZOLMITRIPTAN 2.5 MG/1
TABLET, FILM COATED ORAL
Qty: 18 TABLET | Refills: 0 | Status: SHIPPED | OUTPATIENT
Start: 2022-06-30 | End: 2022-09-01 | Stop reason: SDUPTHER

## 2022-06-30 RX ORDER — MELOXICAM 15 MG/1
15 TABLET ORAL DAILY
Qty: 8 TABLET | Refills: 0 | Status: SHIPPED | OUTPATIENT
Start: 2022-06-30 | End: 2022-07-11

## 2022-06-30 RX ORDER — ZOLMITRIPTAN 2.5 MG/1
TABLET, FILM COATED ORAL
Qty: 18 TABLET | Refills: 2 | OUTPATIENT
Start: 2022-06-30

## 2022-06-30 NOTE — TELEPHONE ENCOUNTER
Caller: Shawnee Suarez    Relationship: Self    Best call back number: [phone]    Requested Prescriptions:   Requested Prescriptions     Pending Prescriptions Disp Refills   • meloxicam (MOBIC) 15 MG tablet 8 tablet 0     Sig: Take 1 tablet by mouth Daily.   • ZOLMitriptan (ZOMIG) 2.5 MG tablet 18 tablet 2     Sig: TAKE 1 TABLET BY MOUTH TWICE DAILY AS NEEDED FOR HEADACHE        Pharmacy where request should be sent: [facility] [address] [phone] [facility] [phone] [facility]     Does the patient have less than a 3 day supply:  [x] Yes  [] No    Lionel Nick Rep   06/30/22 13:02 CDT

## 2022-06-30 NOTE — TELEPHONE ENCOUNTER
Rx Refill Note  Requested Prescriptions     Pending Prescriptions Disp Refills   • DULoxetine (CYMBALTA) 60 MG capsule [Pharmacy Med Name: DULOXETINE HCL 60MG CPEP] 90 capsule 3     Sig: TAKE ONE CAPSULE BY MOUTH EVERY DAY      Last office visit with prescribing clinician: 6/8/22  Next office visit with prescribing clinician: 9/1/2022            Bree Lockett MA  06/30/22, 09:13 CDT

## 2022-06-30 NOTE — TELEPHONE ENCOUNTER
Rx Refill Note  Requested Prescriptions     Pending Prescriptions Disp Refills   • meloxicam (MOBIC) 15 MG tablet 8 tablet 0     Sig: Take 1 tablet by mouth Daily.   • ZOLMitriptan (ZOMIG) 2.5 MG tablet 18 tablet 2     Sig: TAKE 1 TABLET BY MOUTH TWICE DAILY AS NEEDED FOR HEADACHE      Last office visit with prescribing clinician: 12/7/2021      Next office visit with prescribing clinician: 9/1/2022   CPE done 12/08/2022    {TIP  Please add Last Relevant Lab Date if appropriate: 06/08/2022    {TIP  Is Refill Pharmacy correct?: yes    Bree Pate MA  06/30/22, 13:19 CDT

## 2022-07-11 RX ORDER — MELOXICAM 15 MG/1
TABLET ORAL
Qty: 90 TABLET | Refills: 1 | Status: SHIPPED | OUTPATIENT
Start: 2022-07-11 | End: 2023-01-16

## 2022-07-11 NOTE — TELEPHONE ENCOUNTER
Rx Refill Note  Requested Prescriptions     Pending Prescriptions Disp Refills   • meloxicam (MOBIC) 15 MG tablet [Pharmacy Med Name: MELOXICAM 15MG TABS] 8 tablet 0     Sig: TAKE ONE TABLET BY MOUTH EVERY DAY      Last office visit with prescribing clinician: 6/8/2022      Next office visit with prescribing clinician: 12/13/2022            Bree Lockett MA  07/11/22, 11:10 CDT

## 2022-07-20 RX ORDER — ROSUVASTATIN CALCIUM 10 MG/1
TABLET, COATED ORAL
Qty: 90 TABLET | Refills: 1 | Status: SHIPPED | OUTPATIENT
Start: 2022-07-20 | End: 2023-01-09 | Stop reason: SINTOL

## 2022-07-20 NOTE — TELEPHONE ENCOUNTER
Rx Refill Note  Requested Prescriptions     Pending Prescriptions Disp Refills   • rosuvastatin (CRESTOR) 10 MG tablet [Pharmacy Med Name: ROSUVASTATIN CALCIUM 10MG TABS] 90 tablet 2     Sig: TAKE ONE TABLET BY MOUTH EVERY DAY      Last office visit with prescribing clinician: 6/8/2022      Next office visit with prescribing clinician: 12/13/2022       {TIP  Please add Last Relevant Lab 6/8/22    Bree Lockett MA  07/20/22, 07:27 CDT

## 2022-07-25 DIAGNOSIS — F41.9 ANXIETY: ICD-10-CM

## 2022-07-25 RX ORDER — CLONAZEPAM 1 MG/1
TABLET ORAL
Qty: 65 TABLET | Refills: 0 | Status: SHIPPED | OUTPATIENT
Start: 2022-07-25 | End: 2022-09-01 | Stop reason: SDUPTHER

## 2022-07-25 NOTE — TELEPHONE ENCOUNTER
Pain therapy appt 6/8/22  Contract & UDS 03/29/2022    Rx Refill Note  Requested Prescriptions     Pending Prescriptions Disp Refills   • clonazePAM (KlonoPIN) 1 MG tablet [Pharmacy Med Name: CLONAZEPAM 1MG TABS] 65 tablet 0     Sig: TAKE ONE TABLET BY MOUTH THREE TIMES A DAY AS NEEDED FOR ANXIETY      Last office visit with prescribing clinician: 6/8/2022      Next office visit with prescribing clinician: 12/13/2022            Bree Lockett MA  07/25/22, 09:11 CDT

## 2022-08-18 ENCOUNTER — OFFICE VISIT (OUTPATIENT)
Dept: FAMILY MEDICINE CLINIC | Facility: CLINIC | Age: 57
End: 2022-08-18

## 2022-08-18 VITALS
SYSTOLIC BLOOD PRESSURE: 116 MMHG | HEART RATE: 92 BPM | OXYGEN SATURATION: 98 % | BODY MASS INDEX: 28.26 KG/M2 | DIASTOLIC BLOOD PRESSURE: 80 MMHG | HEIGHT: 62 IN | WEIGHT: 153.6 LBS | TEMPERATURE: 97.7 F

## 2022-08-18 DIAGNOSIS — G47.00 INSOMNIA, UNSPECIFIED TYPE: ICD-10-CM

## 2022-08-18 DIAGNOSIS — G89.29 CHRONIC NONINTRACTABLE HEADACHE, UNSPECIFIED HEADACHE TYPE: ICD-10-CM

## 2022-08-18 DIAGNOSIS — R51.9 CHRONIC NONINTRACTABLE HEADACHE, UNSPECIFIED HEADACHE TYPE: ICD-10-CM

## 2022-08-18 DIAGNOSIS — R42 VERTIGO: Primary | ICD-10-CM

## 2022-08-18 PROCEDURE — 99214 OFFICE O/P EST MOD 30 MIN: CPT | Performed by: NURSE PRACTITIONER

## 2022-08-18 RX ORDER — MECLIZINE HYDROCHLORIDE 25 MG/1
25 TABLET ORAL 3 TIMES DAILY PRN
Qty: 90 TABLET | Refills: 0 | Status: SHIPPED | OUTPATIENT
Start: 2022-08-18 | End: 2022-12-07

## 2022-08-18 NOTE — PROGRESS NOTES
"CC: headache, dizziness    History:  Shawnee Suarez is a 57 y.o. female who presents today for evaluation of the above problems.      Patient complains of feeling lightheaded and dizzy for approximately 2 weeks.  Symptoms are worse when she changes position.  At times has lay down in bed and felt like room was spinning.  States that she constantly feels somewhat \"fuzzy headed\" and has noticed a overall lack of appetite where food does not sound as good to her.  She will go ahead and eat and does not have vomiting.  She notes that she is not sleeping well.  Wakes up frequently during the night.  Estimates that she is getting around 6 hours of sleep, however, notes that this is not a normal amount for her.  She has also noticed some blurred vision over the last 1 to 2 days.  Patient's mother passed away in July and the insomnia has been an issue since prior to her mother's death.  The dizziness and lightheadedness is newer.  Patient's father passed away at the age of 47 from brain cancer.  She states that she did try taking 2 of her 's trazodone tablets a few nights ago and that she felt very foggy and groggy the next morning.      HPI  ROS:  Review of Systems   Constitutional: Positive for appetite change.   HENT: Negative for congestion and postnasal drip.    Respiratory: Negative for cough and shortness of breath.    Neurological: Positive for light-headedness and headaches.       No Known Allergies  Past Medical History:   Diagnosis Date   • Anxiety    • Depression    • History of transfusion    • Hyperlipidemia    • Migraines    • Migraines 6/8/2022   • Ovarian cyst     x 2      Past Surgical History:   Procedure Laterality Date   • APPENDECTOMY     • BREAST BIOPSY Right 11/1989   • CHOLECYSTECTOMY     • COLONOSCOPY  11/22/2013    Normal exam repeat in 10 years   • COLONOSCOPY N/A 11/13/2018    Normal but prep was only fair repeat in 2 years   • COLONOSCOPY N/A 1/22/2021    Procedure: COLONOSCOPY WITH " ANESTHESIA;  Surgeon: Da Miles MD;  Location: [facility] ENDOSCOPY;  Service: Gastroenterology;  Laterality: N/A;  pre: screen  post: normal  Marlon Tran MD     • D & C HYSTEROSCOPY ENDOMETRIAL ABLATION N/A 2/6/2017    Procedure: DILATATION AND CURETTAGE HYSTEROSCOPY NOVASURE ENDOMETRIAL ABLATION;  Surgeon: Ana Barrios MD;  Location: [facility] OR;  Service:    • DIAGNOSTIC LAPAROSCOPY N/A 6/17/2019    Procedure: DIAGNOSTIC LAPAROSCOPY;  Surgeon: Ana Barrios MD;  Location: [facility] OR;  Service: Obstetrics/Gynecology   • OVARIAN CYST REMOVAL      x 2   • SALPINGO OOPHORECTOMY Bilateral 6/17/2019    Procedure: SALPINGO OOPHORECTOMY LAPAROSCOPIC;  Surgeon: Ana Barrios MD;  Location: [facility] OR;  Service: Obstetrics/Gynecology     Family History   Problem Relation Age of Onset   • Ovarian cancer Mother 72   • Brain cancer Father    • No Known Problems Brother    • No Known Problems Daughter    • No Known Problems Maternal Grandmother    • No Known Problems Maternal Grandfather    • No Known Problems Paternal Grandmother    • Brain cancer Paternal Grandfather    • Heart disease Brother    • No Known Problems Daughter    • Breast cancer Other 28   • No Known Problems Maternal Aunt    • No Known Problems Paternal Aunt    • Uterine cancer Neg Hx    • Colon cancer Neg Hx    • Melanoma Neg Hx    • Prostate cancer Neg Hx    • BRCA 1/2 Neg Hx    • Endometrial cancer Neg Hx    • Colon polyps Neg Hx       reports that she has never smoked. She has never used smokeless tobacco. She reports that she does not drink alcohol and does not use drugs.      Current Outpatient Medications:   •  Calcium-Vitamin D-Vitamin K (Viactiv Calcium Plus D) 650-12.5-40 MG-MCG-MCG chewable tablet, Chew 2 (two) times a day., Disp: , Rfl:   •  Cholecalciferol (Vitamin D3) 125 MCG (5000 UT) chewable tablet, Chew 5,000 Units Daily., Disp: , Rfl:   •  clonazePAM (KlonoPIN) 1 MG tablet, TAKE ONE TABLET BY MOUTH THREE TIMES A DAY AS  "NEEDED FOR ANXIETY, Disp: 65 tablet, Rfl: 0  •  DULoxetine (CYMBALTA) 60 MG capsule, TAKE ONE CAPSULE BY MOUTH EVERY DAY, Disp: 90 capsule, Rfl: 3  •  fluticasone (FLONASE) 50 MCG/ACT nasal spray, 2 sprays into each nostril Daily., Disp: , Rfl:   •  loratadine (CLARITIN) 10 MG tablet, Take 10 mg by mouth Daily., Disp: , Rfl:   •  meloxicam (MOBIC) 15 MG tablet, TAKE ONE TABLET BY MOUTH EVERY DAY, Disp: 90 tablet, Rfl: 1  •  Multiple Vitamins-Minerals (WOMENS 50+ MULTI VITAMIN/MIN PO), Take  by mouth., Disp: , Rfl:   •  rosuvastatin (CRESTOR) 10 MG tablet, TAKE ONE TABLET BY MOUTH EVERY DAY, Disp: 90 tablet, Rfl: 1  •  ZOLMitriptan (ZOMIG) 2.5 MG tablet, TAKE 1 TABLET BY MOUTH TWICE DAILY AS NEEDED FOR HEADACHE, Disp: 18 tablet, Rfl: 0  •  meclizine (ANTIVERT) 25 MG tablet, Take 1 tablet by mouth 3 (Three) Times a Day As Needed for Dizziness., Disp: 90 tablet, Rfl: 0    OBJECTIVE:  /80 (BP Location: Left arm, Patient Position: Sitting, Cuff Size: Adult)   Pulse 92   Temp 97.7 °F (36.5 °C) (Temporal)   Ht 157.5 cm (62\")   Wt 69.7 kg (153 lb 9.6 oz)   LMP  (LMP Unknown)   SpO2 98%   Breastfeeding No   BMI 28.09 kg/m²    Physical Exam  Vitals reviewed.   Constitutional:       Appearance: She is well-developed.   Eyes:      Extraocular Movements: Extraocular movements intact.      Right eye: No nystagmus.      Left eye: No nystagmus.      Pupils: Pupils are equal, round, and reactive to light.   Cardiovascular:      Rate and Rhythm: Normal rate.   Pulmonary:      Effort: Pulmonary effort is normal.   Neurological:      Mental Status: She is alert and oriented to person, place, and time.   Psychiatric:         Behavior: Behavior normal.         Assessment/Plan    Diagnoses and all orders for this visit:    1. Vertigo (Primary)  -     meclizine (ANTIVERT) 25 MG tablet; Take 1 tablet by mouth 3 (Three) Times a Day As Needed for Dizziness.  Dispense: 90 tablet; Refill: 0    2. Insomnia, unspecified type    3. " Chronic nonintractable headache, unspecified headache type  -     MRI Brain Without Contrast; Future    Advised that she try taking one trazodone tablet tonight and see how that works.  Will contact office to let us know.  I suspect her low grade constant headache is likely related lack of restorative sleep, but will get MRI due to significant history.     An After Visit Summary was printed and given to the patient at discharge.  Return if symptoms worsen or fail to improve, for Next scheduled follow up.       MICHELLE Arnold 8/18/22    Electronically signed.

## 2022-08-19 ENCOUNTER — TELEPHONE (OUTPATIENT)
Dept: FAMILY MEDICINE CLINIC | Facility: CLINIC | Age: 57
End: 2022-08-19

## 2022-08-19 RX ORDER — TRAZODONE HYDROCHLORIDE 50 MG/1
50 TABLET ORAL NIGHTLY
Qty: 90 TABLET | Refills: 1 | Status: SHIPPED | OUTPATIENT
Start: 2022-08-19 | End: 2023-02-16

## 2022-08-19 NOTE — TELEPHONE ENCOUNTER
Caller: Shawnee Suarez    Relationship: Self    Best call back number: [phone]    What is the best time to reach you: ANYTIME    Who are you requesting to speak with (clinical staff, provider,  specific staff member): CLINICAL      What was the call regarding: TC GAVE PATIENT TRAZADONE 50 MG TO TRY FOR SLEEPING AND WANTED CALL BACK ONCE PATIENT TRIED IT. PATIENT STATES THAT IT WORKED SO A PRESCRIPTION WOULD BE GREAT    Do you require a callback: YES ONCE SENT    [facility] [place], [place] - [address] [facility] [address] [facility] [phone] [facility] [phone]   [phone]

## 2022-08-22 ENCOUNTER — OFFICE VISIT (OUTPATIENT)
Dept: FAMILY MEDICINE CLINIC | Facility: CLINIC | Age: 57
End: 2022-08-22

## 2022-08-22 VITALS
HEART RATE: 108 BPM | OXYGEN SATURATION: 97 % | SYSTOLIC BLOOD PRESSURE: 113 MMHG | DIASTOLIC BLOOD PRESSURE: 82 MMHG | WEIGHT: 153 LBS | TEMPERATURE: 98.2 F | BODY MASS INDEX: 28.16 KG/M2 | HEIGHT: 62 IN

## 2022-08-22 DIAGNOSIS — J34.89 SINUS DRAINAGE: Primary | ICD-10-CM

## 2022-08-22 PROCEDURE — 99213 OFFICE O/P EST LOW 20 MIN: CPT | Performed by: NURSE PRACTITIONER

## 2022-08-22 RX ORDER — PREDNISONE 10 MG/1
TABLET ORAL
Qty: 21 TABLET | Refills: 0 | Status: SHIPPED | OUTPATIENT
Start: 2022-08-22 | End: 2022-09-01

## 2022-08-22 RX ORDER — AZITHROMYCIN 250 MG/1
TABLET, FILM COATED ORAL
Qty: 6 TABLET | Refills: 0 | Status: SHIPPED | OUTPATIENT
Start: 2022-08-22 | End: 2022-09-01

## 2022-08-23 NOTE — PROGRESS NOTES
CC: sore throat, headache    History:  Shawnee Suarez is a 57 y.o. female who presents today for evaluation of the above problems.      Patient has had a sore throat and headache for 2 days, however, she has been having some dizziness and feeling off balance for the past week in addition to this. The sore throat has been waking her up at night and gets better as the day goes on.       HPI  ROS:  Review of Systems   Constitutional: Negative for fever.   HENT: Positive for sore throat. Negative for congestion.        No Known Allergies  Past Medical History:   Diagnosis Date   • Anxiety    • Depression    • History of transfusion    • Hyperlipidemia    • Migraines    • Migraines 6/8/2022   • Ovarian cyst     x 2      Past Surgical History:   Procedure Laterality Date   • APPENDECTOMY     • BREAST BIOPSY Right 11/1989   • CHOLECYSTECTOMY     • COLONOSCOPY  11/22/2013    Normal exam repeat in 10 years   • COLONOSCOPY N/A 11/13/2018    Normal but prep was only fair repeat in 2 years   • COLONOSCOPY N/A 1/22/2021    Procedure: COLONOSCOPY WITH ANESTHESIA;  Surgeon: Da Miles MD;  Location: [facility] ENDOSCOPY;  Service: Gastroenterology;  Laterality: N/A;  pre: screen  post: normal  Marlon Tran MD     • D & C HYSTEROSCOPY ENDOMETRIAL ABLATION N/A 2/6/2017    Procedure: DILATATION AND CURETTAGE HYSTEROSCOPY NOVASURE ENDOMETRIAL ABLATION;  Surgeon: Ana Barrios MD;  Location: [facility] OR;  Service:    • DIAGNOSTIC LAPAROSCOPY N/A 6/17/2019    Procedure: DIAGNOSTIC LAPAROSCOPY;  Surgeon: Ana Barrios MD;  Location: [facility] OR;  Service: Obstetrics/Gynecology   • OVARIAN CYST REMOVAL      x 2   • SALPINGO OOPHORECTOMY Bilateral 6/17/2019    Procedure: SALPINGO OOPHORECTOMY LAPAROSCOPIC;  Surgeon: Ana Barrios MD;  Location: [facility] OR;  Service: Obstetrics/Gynecology     Family History   Problem Relation Age of Onset   • Ovarian cancer Mother 72   • Brain cancer Father    • No Known Problems Brother    • No  Known Problems Daughter    • No Known Problems Maternal Grandmother    • No Known Problems Maternal Grandfather    • No Known Problems Paternal Grandmother    • Brain cancer Paternal Grandfather    • Heart disease Brother    • No Known Problems Daughter    • Breast cancer Other 28   • No Known Problems Maternal Aunt    • No Known Problems Paternal Aunt    • Uterine cancer Neg Hx    • Colon cancer Neg Hx    • Melanoma Neg Hx    • Prostate cancer Neg Hx    • BRCA 1/2 Neg Hx    • Endometrial cancer Neg Hx    • Colon polyps Neg Hx       reports that she has never smoked. She has never used smokeless tobacco. She reports that she does not drink alcohol and does not use drugs.      Current Outpatient Medications:   •  Calcium-Vitamin D-Vitamin K (Viactiv Calcium Plus D) 650-12.5-40 MG-MCG-MCG chewable tablet, Chew 2 (two) times a day., Disp: , Rfl:   •  Cholecalciferol (Vitamin D3) 125 MCG (5000 UT) chewable tablet, Chew 5,000 Units Daily., Disp: , Rfl:   •  clonazePAM (KlonoPIN) 1 MG tablet, TAKE ONE TABLET BY MOUTH THREE TIMES A DAY AS NEEDED FOR ANXIETY, Disp: 65 tablet, Rfl: 0  •  DULoxetine (CYMBALTA) 60 MG capsule, TAKE ONE CAPSULE BY MOUTH EVERY DAY, Disp: 90 capsule, Rfl: 3  •  fluticasone (FLONASE) 50 MCG/ACT nasal spray, 2 sprays into each nostril Daily., Disp: , Rfl:   •  loratadine (CLARITIN) 10 MG tablet, Take 10 mg by mouth Daily., Disp: , Rfl:   •  meclizine (ANTIVERT) 25 MG tablet, Take 1 tablet by mouth 3 (Three) Times a Day As Needed for Dizziness., Disp: 90 tablet, Rfl: 0  •  meloxicam (MOBIC) 15 MG tablet, TAKE ONE TABLET BY MOUTH EVERY DAY, Disp: 90 tablet, Rfl: 1  •  Multiple Vitamins-Minerals (WOMENS 50+ MULTI VITAMIN/MIN PO), Take  by mouth., Disp: , Rfl:   •  rosuvastatin (CRESTOR) 10 MG tablet, TAKE ONE TABLET BY MOUTH EVERY DAY, Disp: 90 tablet, Rfl: 1  •  traZODone (DESYREL) 50 MG tablet, Take 1 tablet by mouth Every Night., Disp: 90 tablet, Rfl: 1  •  ZOLMitriptan (ZOMIG) 2.5 MG tablet, TAKE 1  "TABLET BY MOUTH TWICE DAILY AS NEEDED FOR HEADACHE, Disp: 18 tablet, Rfl: 0  •  azithromycin (Zithromax) 250 MG tablet, Take 2 tablets the first day, then 1 tablet daily for 4 days., Disp: 6 tablet, Rfl: 0  •  predniSONE (DELTASONE) 10 MG (21) dose pack, Use as directed on package, Disp: 21 tablet, Rfl: 0    OBJECTIVE:  /82 (BP Location: Left arm, Patient Position: Sitting, Cuff Size: Adult)   Pulse 108   Temp 98.2 °F (36.8 °C) (Temporal)   Ht 157.5 cm (62\") Comment: pt reported  Wt 69.4 kg (153 lb) Comment: pt reported  LMP  (LMP Unknown)   SpO2 97%   Breastfeeding No   BMI 27.98 kg/m²    Physical Exam  Vitals reviewed.   Constitutional:       Appearance: She is well-developed.   HENT:      Right Ear: Tympanic membrane, ear canal and external ear normal.      Left Ear: Tympanic membrane, ear canal and external ear normal.      Mouth/Throat:      Mouth: Mucous membranes are moist.      Pharynx: Oropharynx is clear.   Cardiovascular:      Rate and Rhythm: Normal rate and regular rhythm.      Heart sounds: Normal heart sounds.   Pulmonary:      Effort: Pulmonary effort is normal.      Breath sounds: Normal breath sounds.   Neurological:      Mental Status: She is alert and oriented to person, place, and time.   Psychiatric:         Behavior: Behavior normal.         Assessment/Plan    Diagnoses and all orders for this visit:    1. Sinus drainage (Primary)  -     predniSONE (DELTASONE) 10 MG (21) dose pack; Use as directed on package  Dispense: 21 tablet; Refill: 0  -     azithromycin (Zithromax) 250 MG tablet; Take 2 tablets the first day, then 1 tablet daily for 4 days.  Dispense: 6 tablet; Refill: 0    Exam is benign, however, suspect that the dizziness and fogginess that she has been experiencing may also be related to her current symptoms.  We will go ahead and treat as a sinus infection.    An After Visit Summary was printed and given to the patient at discharge.  Return if symptoms worsen or fail " to improve, for Next scheduled follow up.       Yajaira Jimenez, MICHELLE 8/22/2022    Electronically signed.

## 2022-08-24 ENCOUNTER — TELEPHONE (OUTPATIENT)
Dept: FAMILY MEDICINE CLINIC | Facility: CLINIC | Age: 57
End: 2022-08-24

## 2022-08-24 NOTE — TELEPHONE ENCOUNTER
Caller: Shawnee Suarez    Relationship: Self    Best call back number: [phone]    Caller requesting test results: SELF     What test was performed: MRI OF HEAD     When was the test performed: 08/23/22    Where was the test performed: [facility]

## 2022-09-01 ENCOUNTER — OFFICE VISIT (OUTPATIENT)
Dept: FAMILY MEDICINE CLINIC | Facility: CLINIC | Age: 57
End: 2022-09-01

## 2022-09-01 VITALS
SYSTOLIC BLOOD PRESSURE: 116 MMHG | RESPIRATION RATE: 18 BRPM | TEMPERATURE: 97.1 F | HEART RATE: 112 BPM | WEIGHT: 155.8 LBS | DIASTOLIC BLOOD PRESSURE: 72 MMHG | BODY MASS INDEX: 28.67 KG/M2 | OXYGEN SATURATION: 97 % | HEIGHT: 62 IN

## 2022-09-01 DIAGNOSIS — F41.9 ANXIETY: ICD-10-CM

## 2022-09-01 PROCEDURE — 99213 OFFICE O/P EST LOW 20 MIN: CPT | Performed by: FAMILY MEDICINE

## 2022-09-01 RX ORDER — ZOLMITRIPTAN 2.5 MG/1
TABLET, FILM COATED ORAL
Qty: 18 TABLET | Refills: 3 | Status: SHIPPED | OUTPATIENT
Start: 2022-09-01

## 2022-09-01 RX ORDER — CLONAZEPAM 1 MG/1
1 TABLET ORAL 3 TIMES DAILY PRN
Qty: 65 TABLET | Refills: 0 | Status: SHIPPED | OUTPATIENT
Start: 2022-09-01 | End: 2022-10-13

## 2022-09-01 NOTE — PROGRESS NOTES
Subjective   Shawnee Suarez is a 57 y.o. female.     57-year-old female with chronic anxiety-just lost her mother from ovarian cancer and recent back pain      The following portions of the patient's history were reviewed and updated as appropriate: allergies, current medications, past family history, past medical history, past social history, past surgical history and problem list.    Review of Systems   HENT: Positive for postnasal drip.         Continue Zyrtec and Flonase for allergies   Musculoskeletal: Positive for arthralgias and back pain.   Neurological:        No radicular component   Psychiatric/Behavioral: The patient is nervous/anxious.        Objective   Physical Exam  Vitals and nursing note reviewed.   Constitutional:       Appearance: Normal appearance.   Cardiovascular:      Rate and Rhythm: Normal rate and regular rhythm.   Pulmonary:      Effort: Pulmonary effort is normal.      Breath sounds: Normal breath sounds.   Abdominal:      Tenderness: There is no right CVA tenderness or left CVA tenderness.   Musculoskeletal:      Right lower leg: No edema.      Left lower leg: No edema.   Skin:     General: Skin is warm and dry.      Comments: Recent insect bites   Neurological:      General: No focal deficit present.      Mental Status: She is alert and oriented to person, place, and time.   Psychiatric:         Mood and Affect: Mood normal.         Behavior: Behavior normal.         Thought Content: Thought content normal.         Judgment: Judgment normal.      Comments: Anxiety under control         Assessment & Plan   Diagnoses and all orders for this visit:    1. Anxiety  -     clonazePAM (KlonoPIN) 1 MG tablet; Take 1 tablet by mouth 3 (Three) Times a Day As Needed for Anxiety. for anxiety  Dispense: 65 tablet; Refill: 0    Other orders  -     ZOLMitriptan (ZOMIG) 2.5 MG tablet; TAKE 1 TABLET BY MOUTH TWICE DAILY AS NEEDED FOR HEADACHE  Dispense: 18 tablet; Refill: 3       Plan- above  CONTROLLED  SUBSTANCE TRACKING 9/28/2020 3/11/2021 6/11/2021 9/14/2021 3/29/2022 6/8/2022 9/1/2022   Last [place] 7/1/2020 3/11/2021 6/11/2021 9/14/2021 3/29/2022 6/8/2022 9/1/2022   Report Number [id number] Dr. Tran reviewed through EPIC - reviewed through EPIC reviewed through [facility] reviewed through epic -   Last UDS 3/4/2020 3/11/2021 3/11/2021 3/11/2021 3/29/2022 3/29/2022 3/29/2022   Last Controlled Substance Agreement 3/4/2020 3/11/2021 3/11/2021 3/11/2021 3/29/2022 3/29/2022 3/29/2022

## 2022-10-13 DIAGNOSIS — F41.9 ANXIETY: ICD-10-CM

## 2022-10-13 RX ORDER — CLONAZEPAM 1 MG/1
TABLET ORAL
Qty: 65 TABLET | Refills: 0 | Status: SHIPPED | OUTPATIENT
Start: 2022-10-13 | End: 2022-11-16

## 2022-10-13 NOTE — TELEPHONE ENCOUNTER
Pain therapy appt 09/01/2022  Contract & UDS 03/29/2022    Rx Refill Note  Requested Prescriptions     Pending Prescriptions Disp Refills   • clonazePAM (KlonoPIN) 1 MG tablet [Pharmacy Med Name: CLONAZEPAM 1MG TABS] 65 tablet 0     Sig: TAKE ONE TABLET BY MOUTH THREE TIMES A DAY AS NEEDED FOR ANXIETY      Last office visit with prescribing clinician: 9/1/2022      Next office visit with prescribing clinician: 11/30/2022            Bree Lockett MA  10/13/22, 11:55 CDT

## 2022-10-17 ENCOUNTER — TRANSCRIBE ORDERS (OUTPATIENT)
Dept: ADMINISTRATIVE | Facility: HOSPITAL | Age: 57
End: 2022-10-17

## 2022-10-17 DIAGNOSIS — Z12.31 ENCOUNTER FOR SCREENING MAMMOGRAM FOR MALIGNANT NEOPLASM OF BREAST: Primary | ICD-10-CM

## 2022-10-20 ENCOUNTER — IMMUNIZATION (OUTPATIENT)
Dept: FAMILY MEDICINE CLINIC | Facility: CLINIC | Age: 57
End: 2022-10-20

## 2022-10-20 ENCOUNTER — FLU SHOT (OUTPATIENT)
Dept: FAMILY MEDICINE CLINIC | Facility: CLINIC | Age: 57
End: 2022-10-20

## 2022-10-20 DIAGNOSIS — Z23 NEED FOR VACCINATION: Primary | ICD-10-CM

## 2022-10-20 DIAGNOSIS — Z23 NEED FOR INFLUENZA VACCINATION: ICD-10-CM

## 2022-10-20 PROCEDURE — 0124A COVID-19 (PFIZER) BIVALENT BOOSTER 12+YRS: CPT | Performed by: NURSE PRACTITIONER

## 2022-10-20 PROCEDURE — 90686 IIV4 VACC NO PRSV 0.5 ML IM: CPT | Performed by: NURSE PRACTITIONER

## 2022-10-20 PROCEDURE — 91312 COVID-19 (PFIZER) BIVALENT BOOSTER 12+YRS: CPT | Performed by: NURSE PRACTITIONER

## 2022-10-20 PROCEDURE — 90471 IMMUNIZATION ADMIN: CPT | Performed by: NURSE PRACTITIONER

## 2022-11-16 ENCOUNTER — TELEPHONE (OUTPATIENT)
Dept: FAMILY MEDICINE CLINIC | Facility: CLINIC | Age: 57
End: 2022-11-16

## 2022-11-16 DIAGNOSIS — F41.9 ANXIETY: ICD-10-CM

## 2022-11-16 RX ORDER — CLONAZEPAM 1 MG/1
1 TABLET ORAL 3 TIMES DAILY PRN
Qty: 65 TABLET | Refills: 0 | Status: CANCELLED | OUTPATIENT
Start: 2022-11-16

## 2022-11-16 RX ORDER — CLONAZEPAM 1 MG/1
TABLET ORAL
Qty: 65 TABLET | Refills: 0 | Status: SHIPPED | OUTPATIENT
Start: 2022-11-16 | End: 2022-12-08

## 2022-11-16 NOTE — TELEPHONE ENCOUNTER
Caller: Erick Shawnee MAHENDRA    Relationship: Self    Best call back number: [phone]    Requested Prescriptions:   Requested Prescriptions     Pending Prescriptions Disp Refills   • clonazePAM (KlonoPIN) 1 MG tablet 65 tablet 0     Sig: Take 1 tablet by mouth 3 (Three) Times a Day As Needed. for anxiety        Pharmacy where request should be sent: [facility] [place], [place] - [address] [phone] [facility] [phone] FX     Additional details provided by patient: ONLY ONE LEFT    Does the patient have less than a 3 day supply:  [x] Yes  [] No    Lionel Conroy Rep   11/16/22 10:39 CST

## 2022-11-16 NOTE — TELEPHONE ENCOUNTER
Pain therapy appt 09/01/2022  Contract & UDS 03/29/2022    Rx Refill Note  Requested Prescriptions     Pending Prescriptions Disp Refills   • clonazePAM (KlonoPIN) 1 MG tablet [Pharmacy Med Name: CLONAZEPAM 1MG TABS] 65 tablet 0     Sig: TAKE ONE TABLET BY MOUTH THREE TIMES A DAY AS NEEDED FOR ANXIETY      Last office visit with prescribing clinician: 8/22/2022      Next office visit with prescribing clinician: 12/13/2022            Bree Lockett MA  11/16/22, 08:43 CST

## 2022-11-30 ENCOUNTER — TELEPHONE (OUTPATIENT)
Dept: FAMILY MEDICINE CLINIC | Facility: CLINIC | Age: 57
End: 2022-11-30

## 2022-11-30 ENCOUNTER — OFFICE VISIT (OUTPATIENT)
Dept: FAMILY MEDICINE CLINIC | Facility: CLINIC | Age: 57
End: 2022-11-30

## 2022-11-30 DIAGNOSIS — R53.83 OTHER FATIGUE: ICD-10-CM

## 2022-11-30 DIAGNOSIS — T14.90XA TRAUMA: ICD-10-CM

## 2022-11-30 DIAGNOSIS — R73.9 ELEVATED BLOOD SUGAR: Primary | ICD-10-CM

## 2022-11-30 NOTE — TELEPHONE ENCOUNTER
Caller: Shawnee Suarez    Relationship: Self    Best call back number: [phone]      Who are you requesting to speak with (clinical staff, provider,  specific staff member): CLINICAL STAFF    Do you know the name of the person who called: PATIENT    What was the call regarding: IMAGING DONE TODAY---PATIENT SAID IT HURTS TO BREATH    Do you require a callback: YES

## 2022-12-02 ENCOUNTER — OFFICE VISIT (OUTPATIENT)
Dept: FAMILY MEDICINE CLINIC | Facility: CLINIC | Age: 57
End: 2022-12-02

## 2022-12-02 VITALS
WEIGHT: 167.2 LBS | DIASTOLIC BLOOD PRESSURE: 72 MMHG | HEART RATE: 90 BPM | TEMPERATURE: 97.1 F | SYSTOLIC BLOOD PRESSURE: 124 MMHG | BODY MASS INDEX: 30.77 KG/M2 | OXYGEN SATURATION: 98 % | HEIGHT: 62 IN | RESPIRATION RATE: 16 BRPM

## 2022-12-02 DIAGNOSIS — R53.83 OTHER FATIGUE: Primary | ICD-10-CM

## 2022-12-02 DIAGNOSIS — F41.9 ANXIETY: ICD-10-CM

## 2022-12-02 DIAGNOSIS — M60.9 MYOSITIS, UNSPECIFIED MYOSITIS TYPE, UNSPECIFIED SITE: ICD-10-CM

## 2022-12-02 PROCEDURE — 99213 OFFICE O/P EST LOW 20 MIN: CPT | Performed by: FAMILY MEDICINE
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
## 2022-12-03 LAB
ALBUMIN SERPL-MCNC: 4.9 G/DL (ref 3.5–5.2)
ALBUMIN/GLOB SERPL: 2 G/DL
ALP SERPL-CCNC: 62 U/L (ref 39–117)
ALT SERPL-CCNC: 32 U/L (ref 1–33)
AST SERPL-CCNC: 25 U/L (ref 1–32)
BASOPHILS # BLD AUTO: 0.03 10*3/MM3 (ref 0–0.2)
BASOPHILS NFR BLD AUTO: 0.6 % (ref 0–1.5)
BILIRUB SERPL-MCNC: 0.4 MG/DL (ref 0–1.2)
BUN SERPL-MCNC: 21 MG/DL (ref 6–20)
BUN/CREAT SERPL: 31.8 (ref 7–25)
CALCIUM SERPL-MCNC: 10.1 MG/DL (ref 8.6–10.5)
CHLORIDE SERPL-SCNC: 105 MMOL/L (ref 98–107)
CK SERPL-CCNC: 83 U/L (ref 20–180)
CO2 SERPL-SCNC: 28.6 MMOL/L (ref 22–29)
CREAT SERPL-MCNC: 0.66 MG/DL (ref 0.57–1)
CRP SERPL-MCNC: <0.3 MG/DL (ref 0–0.5)
EGFRCR SERPLBLD CKD-EPI 2021: 101.2 ML/MIN/1.73
EOSINOPHIL # BLD AUTO: 0.18 10*3/MM3 (ref 0–0.4)
EOSINOPHIL NFR BLD AUTO: 3.4 % (ref 0.3–6.2)
ERYTHROCYTE [DISTWIDTH] IN BLOOD BY AUTOMATED COUNT: 12.4 % (ref 12.3–15.4)
ERYTHROCYTE [SEDIMENTATION RATE] IN BLOOD BY WESTERGREN METHOD: 12 MM/HR (ref 0–30)
GLOBULIN SER CALC-MCNC: 2.4 GM/DL
GLUCOSE SERPL-MCNC: 103 MG/DL (ref 65–99)
HCT VFR BLD AUTO: 41.5 % (ref 34–46.6)
HGB BLD-MCNC: 13.6 G/DL (ref 12–15.9)
IMM GRANULOCYTES # BLD AUTO: 0.02 10*3/MM3 (ref 0–0.05)
IMM GRANULOCYTES NFR BLD AUTO: 0.4 % (ref 0–0.5)
LYMPHOCYTES # BLD AUTO: 1.64 10*3/MM3 (ref 0.7–3.1)
LYMPHOCYTES NFR BLD AUTO: 31.3 % (ref 19.6–45.3)
MCH RBC QN AUTO: 30.2 PG (ref 26.6–33)
MCHC RBC AUTO-ENTMCNC: 32.8 G/DL (ref 31.5–35.7)
MCV RBC AUTO: 92 FL (ref 79–97)
MONOCYTES # BLD AUTO: 0.53 10*3/MM3 (ref 0.1–0.9)
MONOCYTES NFR BLD AUTO: 10.1 % (ref 5–12)
NEUTROPHILS # BLD AUTO: 2.84 10*3/MM3 (ref 1.7–7)
NEUTROPHILS NFR BLD AUTO: 54.2 % (ref 42.7–76)
NRBC BLD AUTO-RTO: 0 /100 WBC (ref 0–0.2)
PLATELET # BLD AUTO: 207 10*3/MM3 (ref 140–450)
POTASSIUM SERPL-SCNC: 4 MMOL/L (ref 3.5–5.2)
PROT SERPL-MCNC: 7.3 G/DL (ref 6–8.5)
RBC # BLD AUTO: 4.51 10*6/MM3 (ref 3.77–5.28)
SODIUM SERPL-SCNC: 142 MMOL/L (ref 136–145)
WBC # BLD AUTO: 5.24 10*3/MM3 (ref 3.4–10.8)

## 2022-12-07 DIAGNOSIS — R42 VERTIGO: ICD-10-CM

## 2022-12-07 RX ORDER — MECLIZINE HYDROCHLORIDE 25 MG/1
TABLET ORAL
Qty: 90 TABLET | Refills: 1 | Status: SHIPPED | OUTPATIENT
Start: 2022-12-07 | End: 2023-02-28

## 2022-12-07 NOTE — TELEPHONE ENCOUNTER
Rx Refill Note  Requested Prescriptions     Pending Prescriptions Disp Refills   • meclizine (ANTIVERT) 25 MG tablet [Pharmacy Med Name: MECLIZINE HCL 25MG TABS] 90 tablet 0     Sig: TAKE ONE TABLET BY MOUTH THREE TIMES A DAY AS NEEDED FOR DIZZINESS      Last office visit with prescribing clinician: 12/2/22  Last telemedicine visit with prescribing clinician: 12/13/2022   Next office visit with prescribing clinician: 12/13/2022                         Would you like a call back once the refill request has been completed: [] Yes [] No    If the office needs to give you a call back, can they leave a voicemail: [] Yes [] No    Bree Lockett MA  12/07/22, 14:40 CST

## 2022-12-08 ENCOUNTER — TELEPHONE (OUTPATIENT)
Dept: FAMILY MEDICINE CLINIC | Facility: CLINIC | Age: 57
End: 2022-12-08

## 2022-12-08 DIAGNOSIS — F41.9 ANXIETY: ICD-10-CM

## 2022-12-08 RX ORDER — CLONAZEPAM 1 MG/1
TABLET ORAL
Qty: 65 TABLET | Refills: 0 | Status: SHIPPED | OUTPATIENT
Start: 2022-12-08 | End: 2023-01-24

## 2022-12-08 NOTE — TELEPHONE ENCOUNTER
Caller: YOLY BAINS     Relationship: SELF     Best call back number:    [phone] (Mobile)         What is the best time to reach you: ANYTIME     Who are you requesting to speak with (clinical staff, provider,  specific staff member): CLINICAL     Do you know the name of the person who called: CLINICAL     What was the call regarding: REQUESTING CALL BACK TO BE ADVISED WHY SHE WAS PRESCRIBED MECLAZINE     Do you require a callback: YES

## 2022-12-08 NOTE — TELEPHONE ENCOUNTER
Rx Refill Note  Requested Prescriptions     Pending Prescriptions Disp Refills   • clonazePAM (KlonoPIN) 1 MG tablet [Pharmacy Med Name: CLONAZEPAM 1MG TABS] 65 tablet 0     Sig: TAKE ONE TABLET BY MOUTH THREE TIMES A DAY AS NEEDED FOR AXIETY      Last office visit with prescribing clinician: 12/2/2022   Last telemedicine visit with prescribing clinician: 12/13/2022   Next office visit with prescribing clinician: Visit date not found     Pain therapy appt 12/02/2022  Contract & UDS 03/29/2022                      Would you like a call back once the refill request has been completed: [] Yes [] No    If the office needs to give you a call back, can they leave a voicemail: [] Yes [] No    Bree Pate MA  12/08/22, 12:59 CST

## 2022-12-13 ENCOUNTER — OFFICE VISIT (OUTPATIENT)
Dept: FAMILY MEDICINE CLINIC | Facility: CLINIC | Age: 57
End: 2022-12-13

## 2022-12-13 VITALS
HEIGHT: 62 IN | DIASTOLIC BLOOD PRESSURE: 85 MMHG | TEMPERATURE: 97.5 F | BODY MASS INDEX: 30.44 KG/M2 | WEIGHT: 165.4 LBS | OXYGEN SATURATION: 98 % | SYSTOLIC BLOOD PRESSURE: 126 MMHG | HEART RATE: 87 BPM

## 2022-12-13 DIAGNOSIS — E78.2 MIXED HYPERLIPIDEMIA: ICD-10-CM

## 2022-12-13 DIAGNOSIS — F41.9 ANXIETY: ICD-10-CM

## 2022-12-13 DIAGNOSIS — R73.9 ELEVATED BLOOD SUGAR: ICD-10-CM

## 2022-12-13 DIAGNOSIS — Z00.00 ANNUAL PHYSICAL EXAM: Primary | ICD-10-CM

## 2022-12-13 DIAGNOSIS — R82.90 ABNORMAL URINALYSIS: ICD-10-CM

## 2022-12-13 DIAGNOSIS — Z00.00 ROUTINE GENERAL MEDICAL EXAMINATION AT A HEALTH CARE FACILITY: ICD-10-CM

## 2022-12-13 DIAGNOSIS — G43.909 MIGRAINE WITHOUT STATUS MIGRAINOSUS, NOT INTRACTABLE, UNSPECIFIED MIGRAINE TYPE: ICD-10-CM

## 2022-12-13 DIAGNOSIS — G62.9 NEUROPATHY: ICD-10-CM

## 2022-12-13 DIAGNOSIS — R53.83 FATIGUE, UNSPECIFIED TYPE: ICD-10-CM

## 2022-12-13 LAB
BILIRUB BLD-MCNC: NEGATIVE MG/DL
CLARITY, POC: CLEAR
COLOR UR: YELLOW
GLUCOSE UR STRIP-MCNC: NEGATIVE MG/DL
KETONES UR QL: NEGATIVE
LEUKOCYTE EST, POC: ABNORMAL
NITRITE UR-MCNC: NEGATIVE MG/ML
PH UR: 6 [PH] (ref 5–8)
PROT UR STRIP-MCNC: NEGATIVE MG/DL
RBC # UR STRIP: NEGATIVE /UL
SP GR UR: 1.03 (ref 1–1.03)
UROBILINOGEN UR QL: ABNORMAL

## 2022-12-13 PROCEDURE — 81003 URINALYSIS AUTO W/O SCOPE: CPT | Performed by: NURSE PRACTITIONER

## 2022-12-13 PROCEDURE — 99396 PREV VISIT EST AGE 40-64: CPT | Performed by: NURSE PRACTITIONER
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
## 2022-12-14 LAB
ALBUMIN SERPL-MCNC: 4.9 G/DL (ref 3.5–5.2)
ALBUMIN/GLOB SERPL: 2.3 G/DL
ALP SERPL-CCNC: 62 U/L (ref 39–117)
ALT SERPL-CCNC: 25 U/L (ref 1–33)
AST SERPL-CCNC: 22 U/L (ref 1–32)
BASOPHILS # BLD AUTO: 0.05 10*3/MM3 (ref 0–0.2)
BASOPHILS NFR BLD AUTO: 1.3 % (ref 0–1.5)
BILIRUB SERPL-MCNC: 0.5 MG/DL (ref 0–1.2)
BUN SERPL-MCNC: 21 MG/DL (ref 6–20)
BUN/CREAT SERPL: 28.4 (ref 7–25)
CALCIUM SERPL-MCNC: 10.1 MG/DL (ref 8.6–10.5)
CHLORIDE SERPL-SCNC: 101 MMOL/L (ref 98–107)
CHOLEST SERPL-MCNC: 209 MG/DL (ref 0–200)
CO2 SERPL-SCNC: 29.7 MMOL/L (ref 22–29)
CREAT SERPL-MCNC: 0.74 MG/DL (ref 0.57–1)
EGFRCR SERPLBLD CKD-EPI 2021: 93.3 ML/MIN/1.73
EOSINOPHIL # BLD AUTO: 0.19 10*3/MM3 (ref 0–0.4)
EOSINOPHIL NFR BLD AUTO: 4.8 % (ref 0.3–6.2)
ERYTHROCYTE [DISTWIDTH] IN BLOOD BY AUTOMATED COUNT: 12.5 % (ref 12.3–15.4)
GLOBULIN SER CALC-MCNC: 2.1 GM/DL
GLUCOSE SERPL-MCNC: 106 MG/DL (ref 65–99)
HBA1C MFR BLD: 5.7 % (ref 4.8–5.6)
HCT VFR BLD AUTO: 44.2 % (ref 34–46.6)
HDLC SERPL-MCNC: 70 MG/DL (ref 40–60)
HGB BLD-MCNC: 14.7 G/DL (ref 12–15.9)
IMM GRANULOCYTES # BLD AUTO: 0 10*3/MM3 (ref 0–0.05)
IMM GRANULOCYTES NFR BLD AUTO: 0 % (ref 0–0.5)
LDLC SERPL CALC-MCNC: 113 MG/DL (ref 0–100)
LYMPHOCYTES # BLD AUTO: 1.22 10*3/MM3 (ref 0.7–3.1)
LYMPHOCYTES NFR BLD AUTO: 31 % (ref 19.6–45.3)
MCH RBC QN AUTO: 30.8 PG (ref 26.6–33)
MCHC RBC AUTO-ENTMCNC: 33.3 G/DL (ref 31.5–35.7)
MCV RBC AUTO: 92.5 FL (ref 79–97)
MONOCYTES # BLD AUTO: 0.46 10*3/MM3 (ref 0.1–0.9)
MONOCYTES NFR BLD AUTO: 11.7 % (ref 5–12)
NEUTROPHILS # BLD AUTO: 2.02 10*3/MM3 (ref 1.7–7)
NEUTROPHILS NFR BLD AUTO: 51.2 % (ref 42.7–76)
NRBC BLD AUTO-RTO: 0 /100 WBC (ref 0–0.2)
PLATELET # BLD AUTO: 184 10*3/MM3 (ref 140–450)
POTASSIUM SERPL-SCNC: 4.5 MMOL/L (ref 3.5–5.2)
PROT SERPL-MCNC: 7 G/DL (ref 6–8.5)
RBC # BLD AUTO: 4.78 10*6/MM3 (ref 3.77–5.28)
SODIUM SERPL-SCNC: 137 MMOL/L (ref 136–145)
T4 FREE SERPL-MCNC: 1.02 NG/DL (ref 0.93–1.7)
TRIGL SERPL-MCNC: 151 MG/DL (ref 0–150)
TSH SERPL DL<=0.005 MIU/L-ACNC: 0.99 UIU/ML (ref 0.27–4.2)
VLDLC SERPL CALC-MCNC: 26 MG/DL (ref 5–40)
WBC # BLD AUTO: 3.94 10*3/MM3 (ref 3.4–10.8)

## 2022-12-16 LAB
BACTERIA UR CULT: NORMAL
BACTERIA UR CULT: NORMAL

## 2022-12-16 NOTE — PROGRESS NOTES
Was it muscle cramping? And did side effect improve after stopping medication?  If this is the case, we can send in a different one.

## 2023-01-03 ENCOUNTER — TELEPHONE (OUTPATIENT)
Dept: FAMILY MEDICINE CLINIC | Facility: CLINIC | Age: 58
End: 2023-01-03
Payer: COMMERCIAL

## 2023-01-03 NOTE — TELEPHONE ENCOUNTER
Caller: Shawnee Suarez    Relationship: Self    Best call back number: [phone]    What is the best time to reach you: SOON PLEASE    Who are you requesting to speak with (clinical staff, provider,  specific staff member): PROVIDER OR CLINICAL STAFF        What was the call regarding: PATIENT REQUESTING A CALL BACK TO DISCUSS POSSIBLE CHANGE TO HER CHOLESTEROL MEDICATION DUE TO BONE PAIN DOES SHE NEED TO MAKE AN APPOINTMENT  PATIENT STATES SHE HAS BEEN OFF THE MEDICATION FOR ABOUT 6 WEEKS NOW AND THE BONE/JOINT PAIN HAS RESOLVED     Do you require a callback: YES

## 2023-01-05 ENCOUNTER — HOSPITAL ENCOUNTER (OUTPATIENT)
Dept: MAMMOGRAPHY | Facility: HOSPITAL | Age: 58
Discharge: HOME | End: 2023-01-05
Admitting: NURSE PRACTITIONER
Payer: COMMERCIAL

## 2023-01-05 ENCOUNTER — OFFICE VISIT (OUTPATIENT)
Dept: OBSTETRICS AND GYNECOLOGY | Facility: CLINIC | Age: 58
End: 2023-01-05
Payer: COMMERCIAL

## 2023-01-05 VITALS
WEIGHT: 165 LBS | SYSTOLIC BLOOD PRESSURE: 110 MMHG | DIASTOLIC BLOOD PRESSURE: 82 MMHG | HEIGHT: 62 IN | BODY MASS INDEX: 30.36 KG/M2

## 2023-01-05 DIAGNOSIS — Z12.31 ENCOUNTER FOR SCREENING MAMMOGRAM FOR BREAST CANCER: ICD-10-CM

## 2023-01-05 DIAGNOSIS — Z01.419 WELL WOMAN EXAM WITH ROUTINE GYNECOLOGICAL EXAM: Primary | ICD-10-CM

## 2023-01-05 DIAGNOSIS — Z13.820 ENCOUNTER FOR SCREENING FOR OSTEOPOROSIS: ICD-10-CM

## 2023-01-05 DIAGNOSIS — Z12.31 ENCOUNTER FOR SCREENING MAMMOGRAM FOR MALIGNANT NEOPLASM OF BREAST: ICD-10-CM

## 2023-01-05 PROCEDURE — G0123 SCREEN CERV/VAG THIN LAYER: HCPCS | Performed by: NURSE PRACTITIONER

## 2023-01-05 PROCEDURE — 77063 BREAST TOMOSYNTHESIS BI: CPT

## 2023-01-05 PROCEDURE — 77067 SCR MAMMO BI INCL CAD: CPT

## 2023-01-05 PROCEDURE — 87624 HPV HI-RISK TYP POOLED RSLT: CPT | Performed by: NURSE PRACTITIONER

## 2023-01-05 PROCEDURE — 99396 PREV VISIT EST AGE 40-64: CPT | Performed by: NURSE PRACTITIONER

## 2023-01-05 RX ORDER — CALCIUM CARBONATE 200(500)MG
1 TABLET,CHEWABLE ORAL DAILY
COMMUNITY

## 2023-01-05 NOTE — PATIENT INSTRUCTIONS
Health Maintenance, Female  Adopting a healthy lifestyle and getting preventive care are important in promoting health and wellness. Ask your health care provider about:  The right schedule for you to have regular tests and exams.  Things you can do on your own to prevent diseases and keep yourself healthy.  What should I know about diet, weight, and exercise?  Eat a healthy diet    Eat a diet that includes plenty of vegetables, fruits, low-fat dairy products, and lean protein.  Do not eat a lot of foods that are high in solid fats, added sugars, or sodium.    Maintain a healthy weight  Body mass index (BMI) is used to identify weight problems. It estimates body fat based on height and weight. Your health care provider can help determine your BMI and help you achieve or maintain a healthy weight.  Get regular exercise  Get regular exercise. This is one of the most important things you can do for your health. Most adults should:  Exercise for at least 150 minutes each week. The exercise should increase your heart rate and make you sweat (moderate-intensity exercise).  Do strengthening exercises at least twice a week. This is in addition to the moderate-intensity exercise.  Spend less time sitting. Even light physical activity can be beneficial.  Start  Vitamin D 5000IU per day. Vitamin D has been shown to improve your mood, help with fatigue and increase your immune system. A normal Vitamin D in women should be 50-70.  You should have your Vitamin D checked yearly  Watch cholesterol and blood lipids  Have your blood tested for lipids and cholesterol at 20 years of age, then have this test every 3 years.  Have your cholesterol levels checked more often if:  Your lipid or cholesterol levels are high.  You are older than 30 years of age.  You are at high risk for heart disease.  What should I know about cancer screening?  Depending on your health history and family history, you may need to have cancer screening at  various ages. This may include screening for:  Breast cancer.  Cervical cancer.  Colorectal cancer.  Skin cancer.  Lung cancer.  What should I know about heart disease, diabetes, and high blood pressure?  Blood pressure and heart disease  High blood pressure causes heart disease and increases the risk of stroke. This is more likely to develop in people who have high blood pressure readings, are of  descent, or are overweight.  Have your blood pressure checked:                  Every year.  Diabetes  Have regular diabetes screenings. This checks your fasting blood sugar level. Have the screening done:  Once every year after age 30 if you are at a normal weight and have a low risk for diabetes.  More often and at a younger age if you are overweight or have a high risk for diabetes.  What should I know about preventing infection?  Hepatitis B  If you have a higher risk for hepatitis B, you should be screened for this virus. Talk with your health care provider to find out if you are at risk for hepatitis B infection.  Hepatitis C  Testing is recommended for:  Everyone born from 1945 through 1965.  Anyone with known risk factors for hepatitis C.  Sexually transmitted infections (STIs)  Get screened for STIs, including gonorrhea and chlamydia, if:  You are sexually active and are younger than 24 years of age.  You are older than 24 years of age and your health care provider tells you that you are at risk for this type of infection.  Your sexual activity has changed since you were last screened, and you are at increased risk for chlamydia or gonorrhea. Ask your health care provider if you are at risk.  Ask your health care provider about whether you are at high risk for HIV. Your health care provider may recommend a prescription medicine to help prevent HIV infection. If you choose to take medicine to prevent HIV, you should first get tested for HIV. You should then be tested every 3 months for as long as you are  taking the medicine.  Pregnancy  If you are about to stop having your period (premenopausal) and you may become pregnant, seek counseling before you get pregnant.  Take 400 to 800 micrograms (mcg) of folic acid every day if you become pregnant.  Ask for birth control (contraception) if you want to prevent pregnancy.  Osteoporosis and menopause  Osteoporosis is a disease in which the bones lose minerals and strength with aging. This can result in bone fractures. If you are 55 years old or older, or if you are at risk for osteoporosis and fractures, ask your health care provider if you should:  Be screened for bone loss.  Take a calcium or vitamin D supplement to lower your risk of fractures.  Be given hormone replacement therapy (HRT) to treat symptoms of menopause.  Follow these instructions at home:  Lifestyle  Do not use any products that contain nicotine or tobacco, such as cigarettes, e-cigarettes, and chewing tobacco. If you need help quitting, ask your health care provider.  Do not use street drugs.  Do not share needles.  Ask your health care provider for help if you need support or information about quitting drugs.  Alcohol use  Do not drink alcohol if:  Your health care provider tells you not to drink.  You are pregnant, may be pregnant, or are planning to become pregnant.  If you drink alcohol:  Limit how much you use to 0-1 drink a day.  Limit intake if you are breastfeeding.  Be aware of how much alcohol is in your drink. In the [place] one drink equals one 12 oz bottle of beer (355 mL), one 5 oz glass of wine (148 mL), or one 1½ oz glass of hard liquor (44 mL).  General instructions  Schedule regular health, dental, and eye exams.  Stay current with your vaccines.  Tell your health care provider if:  You often feel depressed.  You have ever been abused or do not feel safe at home.  Summary  Adopting a healthy lifestyle and getting preventive care are important in promoting health and wellness.  Follow  your health care provider's instructions about healthy diet, exercising, and getting tested or screened for diseases.  Follow your health care provider's instructions on monitoring your cholesterol and blood pressure.  This information is not intended to replace advice given to you by your health care provider. Make sure you discuss any questions you have with your health care provider.  Document Revised: 12/11/2019 Document Reviewed: 12/11/2019  Elsevier Patient Education © 2021 [employer]

## 2023-01-05 NOTE — TELEPHONE ENCOUNTER
Patient requesting different cholesterol medication.  This was discussed last month about changing due to body aches.

## 2023-01-05 NOTE — PROGRESS NOTES
Subjective     Shawnee Suarez is a 57 y.o. female    History of Present Illness  Patient is here today for yearly checkup.  She has no complaints.  Gynecologic Exam  The patient's pertinent negatives include no pelvic pain, vaginal bleeding or vaginal discharge. The patient is experiencing no pain. Pertinent negatives include no abdominal pain, anorexia, back pain, chills, constipation, diarrhea, discolored urine, dysuria, fever, flank pain, frequency, headaches, hematuria, joint pain, joint swelling, nausea, painful intercourse, rash, sore throat, urgency or vomiting. She is sexually active. She is postmenopausal.         /82   Ht 157.5 cm (62\")   Wt 74.8 kg (165 lb)   LMP  (LMP Unknown)   BMI 30.18 kg/m²     Outpatient Encounter Medications as of 1/5/2023   Medication Sig Dispense Refill   • calcium carbonate (TUMS) 500 MG chewable tablet Chew 1 tablet Daily.     • Cholecalciferol (Vitamin D3) 125 MCG (5000 UT) chewable tablet Chew 5,000 Units Daily.     • clonazePAM (KlonoPIN) 1 MG tablet TAKE ONE TABLET BY MOUTH THREE TIMES A DAY AS NEEDED FOR AXIETY 65 tablet 0   • docusate sodium (COLACE) 50 MG capsule Take  by mouth 2 (Two) Times a Day.     • DULoxetine (CYMBALTA) 60 MG capsule TAKE ONE CAPSULE BY MOUTH EVERY DAY 90 capsule 3   • meloxicam (MOBIC) 15 MG tablet TAKE ONE TABLET BY MOUTH EVERY DAY 90 tablet 1   • Multiple Vitamins-Minerals (WOMENS 50+ MULTI VITAMIN/MIN PO) Take  by mouth.     • traZODone (DESYREL) 50 MG tablet Take 1 tablet by mouth Every Night. 90 tablet 1   • ZOLMitriptan (ZOMIG) 2.5 MG tablet TAKE 1 TABLET BY MOUTH TWICE DAILY AS NEEDED FOR HEADACHE 18 tablet 3   • Calcium-Vitamin D-Vitamin K (Viactiv Calcium Plus D) 650-12.5-40 MG-MCG-MCG chewable tablet Chew 2 (two) times a day.     • fluticasone (FLONASE) 50 MCG/ACT nasal spray 2 sprays into each nostril Daily.     • loratadine (CLARITIN) 10 MG tablet Take 10 mg by mouth Daily.     • meclizine (ANTIVERT) 25 MG tablet TAKE ONE  TABLET BY MOUTH THREE TIMES A DAY AS NEEDED FOR DIZZINESS 90 tablet 1   • rosuvastatin (CRESTOR) 10 MG tablet TAKE ONE TABLET BY MOUTH EVERY DAY 90 tablet 1     No facility-administered encounter medications on file as of 1/5/2023.       Surgical History  Past Surgical History:   Procedure Laterality Date   • APPENDECTOMY     • BREAST BIOPSY Right 11/1989   • CHOLECYSTECTOMY     • COLONOSCOPY  11/22/2013    Normal exam repeat in 10 years   • COLONOSCOPY N/A 11/13/2018    Normal but prep was only fair repeat in 2 years   • COLONOSCOPY N/A 1/22/2021    Procedure: COLONOSCOPY WITH ANESTHESIA;  Surgeon: Da Miles MD;  Location: [facility];  Service: Gastroenterology;  Laterality: N/A;  pre: screen  post: normal  Marlon Tran MD     • D & C HYSTEROSCOPY ENDOMETRIAL ABLATION N/A 2/6/2017    Procedure: DILATATION AND CURETTAGE HYSTEROSCOPY NOVASURE ENDOMETRIAL ABLATION;  Surgeon: Ana Barrios MD;  Location: [facility] OR;  Service:    • DIAGNOSTIC LAPAROSCOPY N/A 6/17/2019    Procedure: DIAGNOSTIC LAPAROSCOPY;  Surgeon: Ana Barrios MD;  Location: [facility] OR;  Service: Obstetrics/Gynecology   • OVARIAN CYST REMOVAL      x 2   • SALPINGO OOPHORECTOMY Bilateral 6/17/2019    Procedure: SALPINGO OOPHORECTOMY LAPAROSCOPIC;  Surgeon: Ana Barrios MD;  Location: [facility] OR;  Service: Obstetrics/Gynecology       Family History  Family History   Problem Relation Age of Onset   • Brain cancer Father    • Ovarian cancer Mother 72   • No Known Problems Brother    • Heart disease Brother    • No Known Problems Daughter    • No Known Problems Daughter    • Brain cancer Paternal Grandfather    • No Known Problems Paternal Grandmother    • No Known Problems Maternal Grandmother    • No Known Problems Maternal Grandfather    • No Known Problems Maternal Aunt    • No Known Problems Paternal Aunt    • Breast cancer Other 28   • Uterine cancer Neg Hx    • Colon cancer Neg Hx    • Melanoma Neg Hx    • Prostate cancer  Neg Hx    • BRCA 1/2 Neg Hx    • Endometrial cancer Neg Hx    • Colon polyps Neg Hx        The following portions of the patient's history were reviewed and updated as appropriate: allergies, current medications, past family history, past medical history, past social history, past surgical history, and problem list.    Review of Systems   Constitutional: Negative for activity change, appetite change, chills, diaphoresis, fatigue, fever, unexpected weight gain and unexpected weight loss.   HENT: Negative for congestion, dental problem, drooling, ear discharge, ear pain, facial swelling, hearing loss, mouth sores, nosebleeds, postnasal drip, rhinorrhea, sinus pressure, sneezing, sore throat, swollen glands, tinnitus, trouble swallowing and voice change.    Eyes: Negative for blurred vision, double vision, photophobia, pain, discharge, redness, itching and visual disturbance.   Respiratory: Negative for apnea, cough, choking, chest tightness, shortness of breath, wheezing and stridor.    Cardiovascular: Negative for chest pain, palpitations and leg swelling.   Gastrointestinal: Negative for abdominal distention, abdominal pain, anal bleeding, anorexia, blood in stool, constipation, diarrhea, nausea, rectal pain, vomiting, GERD and indigestion.   Endocrine: Negative for cold intolerance, heat intolerance, polydipsia, polyphagia and polyuria.   Genitourinary: Negative for amenorrhea, breast discharge, breast lump, breast pain, decreased libido, decreased urine volume, difficulty urinating, dyspareunia, dysuria, flank pain, frequency, genital sores, hematuria, menstrual problem, pelvic pain, pelvic pressure, urgency, urinary incontinence, vaginal bleeding, vaginal discharge and vaginal pain.   Musculoskeletal: Negative for arthralgias, back pain, gait problem, joint pain, joint swelling, myalgias, neck pain, neck stiffness and bursitis.   Skin: Negative for color change, dry skin and rash.   Allergic/Immunologic: Negative  for environmental allergies, food allergies and immunocompromised state.   Neurological: Negative for dizziness, tremors, seizures, syncope, facial asymmetry, speech difficulty, weakness, light-headedness, numbness, headache, memory problem and confusion.   Hematological: Negative for adenopathy. Does not bruise/bleed easily.   Psychiatric/Behavioral: Negative for agitation, behavioral problems, decreased concentration, dysphoric mood, hallucinations, self-injury, sleep disturbance, suicidal ideas, negative for hyperactivity, depressed mood and stress. The patient is not nervous/anxious.        Objective   Physical Exam  Vitals and nursing note reviewed. Exam conducted with a chaperone present.   Constitutional:       General: She is not in acute distress.     Appearance: She is well-developed. She is not diaphoretic.   HENT:      Head: Normocephalic.      Right Ear: External ear normal.      Left Ear: External ear normal.      Nose: Nose normal.   Eyes:      General: No scleral icterus.        Right eye: No discharge.         Left eye: No discharge.      Conjunctiva/sclera: Conjunctivae normal.      Pupils: Pupils are equal, round, and reactive to light.   Neck:      Thyroid: No thyromegaly.      Vascular: No carotid bruit.      Trachea: No tracheal deviation.   Cardiovascular:      Rate and Rhythm: Normal rate and regular rhythm.      Heart sounds: Normal heart sounds. No murmur heard.  Pulmonary:      Effort: Pulmonary effort is normal. No respiratory distress.      Breath sounds: Normal breath sounds. No wheezing.   Chest:   Breasts:     Breasts are symmetrical.      Right: Normal. No swelling, bleeding, inverted nipple, mass, nipple discharge, skin change or tenderness.      Left: Normal. No swelling, bleeding, inverted nipple, mass, nipple discharge, skin change or tenderness.   Abdominal:      General: There is no distension.      Palpations: Abdomen is soft. There is no mass.      Tenderness: There is no  abdominal tenderness. There is no right CVA tenderness, left CVA tenderness or guarding.      Hernia: No hernia is present. There is no hernia in the left inguinal area or right inguinal area.   Genitourinary:     General: Normal vulva.      Exam position: Lithotomy position.      Labia:         Right: No rash, tenderness, lesion or injury.         Left: No rash, tenderness, lesion or injury.       Vagina: Normal. No signs of injury and foreign body. No vaginal discharge, erythema, tenderness or bleeding.      Cervix: Normal.      Uterus: Normal. Not enlarged, not fixed and not tender.       Adnexa:         Right: No mass, tenderness or fullness.          Left: No mass, tenderness or fullness.        Rectum: Normal. No mass.      Comments: Tubes and ovaries are surgically absent  BSU normal  Urethral meatus  Normal  Perineum  Normal  Musculoskeletal:         General: No tenderness. Normal range of motion.      Cervical back: Normal range of motion and neck supple.   Lymphadenopathy:      Head:      Right side of head: No submental, submandibular, tonsillar, preauricular, posterior auricular or occipital adenopathy.      Left side of head: No submental, submandibular, tonsillar, preauricular, posterior auricular or occipital adenopathy.      Cervical: No cervical adenopathy.      Right cervical: No superficial, deep or posterior cervical adenopathy.     Left cervical: No superficial, deep or posterior cervical adenopathy.      Upper Body:      Right upper body: No supraclavicular, axillary or pectoral adenopathy.      Left upper body: No supraclavicular, axillary or pectoral adenopathy.      Lower Body: No right inguinal adenopathy. No left inguinal adenopathy.   Skin:     General: Skin is warm and dry.      Findings: No bruising, erythema or rash.   Neurological:      Mental Status: She is alert and oriented to person, place, and time.      Coordination: Coordination normal.   Psychiatric:         Mood and Affect:  Mood normal.         Behavior: Behavior normal.         Thought Content: Thought content normal.         Judgment: Judgment normal.         Assessment & Plan   Diagnoses and all orders for this visit:    1. Well woman exam with routine gynecological exam (Primary)  Normal GYN exam. Will have lab work at PCP. Encouraged SBE, pt is aware how to do self breast exam and the importance of same. Discussed weight management and importance of maintaining a healthy weight. Discussed Vitamin D intake and the importance of adequate vitamin D for both Bone Health and a healthy immune system.  Discussed Daily exercise and the importance of same, in regards to a healthy heart as well as helping to maintain her weight and improving her mental health.  BMI 30.2.  Colonoscopy is up to date.  Mammogram and bone density will be scheduled at [facility].  Pap smear is done per [facility]P guidelines.  -     Liquid-based Pap Smear, Screening    2. Encounter for screening mammogram for breast cancer  Comments:  Patient will have mammogram at [facility].    3. Encounter for screening for osteoporosis  Comments:  Patient will have a bone density at [facility].  Orders:  -     DEXA Bone Density Axial; Future         BMI is >= 30 and <35. (Class 1 Obesity). The following options were offered after discussion;: exercise counseling/recommendations and nutrition counseling/recommendations      Janett Davila, APRN  1/5/2023

## 2023-01-06 LAB
GEN CATEG CVX/VAG CYTO-IMP: NORMAL
HPV I/H RISK 4 DNA CVX QL PROBE+SIG AMP: NOT DETECTED
LAB AP CASE REPORT: NORMAL
LAB AP GYN ADDITIONAL INFORMATION: NORMAL
LAB AP GYN OTHER FINDINGS: NORMAL
Lab: NORMAL
PATH INTERP SPEC-IMP: NORMAL
STAT OF ADQ CVX/VAG CYTO-IMP: NORMAL

## 2023-01-09 RX ORDER — LOVASTATIN 10 MG/1
10 TABLET ORAL NIGHTLY
Qty: 30 TABLET | Refills: 0 | Status: SHIPPED | OUTPATIENT
Start: 2023-01-09 | End: 2023-02-07

## 2023-01-12 ENCOUNTER — HOSPITAL ENCOUNTER (OUTPATIENT)
Dept: BONE DENSITY | Facility: HOSPITAL | Age: 58
Discharge: HOME | End: 2023-01-12
Admitting: NURSE PRACTITIONER
Payer: COMMERCIAL

## 2023-01-12 DIAGNOSIS — Z13.820 ENCOUNTER FOR SCREENING FOR OSTEOPOROSIS: ICD-10-CM

## 2023-01-12 PROCEDURE — 77080 DXA BONE DENSITY AXIAL: CPT

## 2023-01-16 RX ORDER — MELOXICAM 15 MG/1
TABLET ORAL
Qty: 90 TABLET | Refills: 1 | Status: SHIPPED | OUTPATIENT
Start: 2023-01-16

## 2023-01-16 NOTE — TELEPHONE ENCOUNTER
Rx Refill Note  Requested Prescriptions     Pending Prescriptions Disp Refills   • meloxicam (MOBIC) 15 MG tablet [Pharmacy Med Name: MELOXICAM 15MG TABS] 90 tablet 1     Sig: TAKE ONE TABLET BY MOUTH EVERY DAY      Last office visit with prescribing clinician: 12/13/2022   Last telemedicine visit with prescribing clinician: 6/13/2023   Next office visit with prescribing clinician: Visit date not found                         Would you like a call back once the refill request has been completed: [] Yes [] No    If the office needs to give you a call back, can they leave a voicemail: [] Yes [] No    Bree Lockett MA  01/16/23, 07:37 CST

## 2023-01-24 DIAGNOSIS — F41.9 ANXIETY: ICD-10-CM

## 2023-01-24 RX ORDER — CLONAZEPAM 1 MG/1
TABLET ORAL
Qty: 65 TABLET | Refills: 0 | Status: SHIPPED | OUTPATIENT
Start: 2023-01-24 | End: 2023-03-06 | Stop reason: SDUPTHER

## 2023-01-24 NOTE — TELEPHONE ENCOUNTER
Rx Refill Note  Requested Prescriptions     Pending Prescriptions Disp Refills   • clonazePAM (KlonoPIN) 1 MG tablet [Pharmacy Med Name: CLONAZEPAM 1MG TABS] 65 tablet 0     Sig: TAKE ONE TABLET BY MOUTH THREE TIMES A DAY AS NEEDED FOR ANXIETY      Last office visit with prescribing clinician: 12/13/2022   Last telemedicine visit with prescribing clinician: 6/13/2023   Next office visit with prescribing clinician: Visit date not found   CPE done 12/13/2022    Pain therapy appt 12/02/2022  Contract & UDS 03/29/2022    {TIP  Please add Last Relevant Lab Date if appropriate: 03/29/2022               {TIP  Is Refill Pharmacy correct?: yes      Would you like a call back once the refill request has been completed: [] Yes [] No    If the office needs to give you a call back, can they leave a voicemail: [] Yes [] No    Bree Pate MA  01/24/23, 09:19 CST

## 2023-02-07 DIAGNOSIS — E78.2 MIXED HYPERLIPIDEMIA: Primary | ICD-10-CM

## 2023-02-07 RX ORDER — LOVASTATIN 10 MG/1
10 TABLET ORAL NIGHTLY
Qty: 90 TABLET | Refills: 2 | Status: SHIPPED | OUTPATIENT
Start: 2023-02-07

## 2023-02-07 NOTE — TELEPHONE ENCOUNTER
Rx Refill Note  Requested Prescriptions     Pending Prescriptions Disp Refills   • lovastatin (MEVACOR) 10 MG tablet [Pharmacy Med Name: LOVASTATIN 10MG TABS] 30 tablet 0     Sig: TAKE ONE TABLET BY MOUTH NIGHTLY - AFTER 30 DAYS IF NO BODY ACHES CALL DR AND REQUEST 3 MONTH SUPPLY      Last office visit with prescribing clinician: 12/13/2022   Last telemedicine visit with prescribing clinician: 6/13/2023   Next office visit with prescribing clinician: 6/13/2023       {TIP  Please add Last Relevant Lab 12/13/22                 Would you like a call back once the refill request has been completed: [] Yes [] No    If the office needs to give you a call back, can they leave a voicemail: [] Yes [] No    Bree Lockett MA  02/07/23, 09:33 CST

## 2023-02-16 RX ORDER — TRAZODONE HYDROCHLORIDE 50 MG/1
TABLET ORAL
Qty: 90 TABLET | Refills: 3 | Status: SHIPPED | OUTPATIENT
Start: 2023-02-16

## 2023-02-16 NOTE — TELEPHONE ENCOUNTER
Rx Refill Note  Requested Prescriptions     Pending Prescriptions Disp Refills   • traZODone (DESYREL) 50 MG tablet [Pharmacy Med Name: TRAZODONE HCL 50MG TABS] 90 tablet 1     Sig: TAKE ONE TABLET BY MOUTH NIGHTLY      Last office visit with prescribing clinician: 12/13/2022   Last telemedicine visit with prescribing clinician: 6/13/2023   Next office visit with prescribing clinician: Visit date not found                         Would you like a call back once the refill request has been completed: [] Yes [] No    If the office needs to give you a call back, can they leave a voicemail: [] Yes [] No    Bree Lockett MA  02/16/23, 12:06 CST

## 2023-02-28 ENCOUNTER — OFFICE VISIT (OUTPATIENT)
Dept: FAMILY MEDICINE CLINIC | Facility: CLINIC | Age: 58
End: 2023-02-28
Payer: COMMERCIAL

## 2023-02-28 VITALS
DIASTOLIC BLOOD PRESSURE: 79 MMHG | BODY MASS INDEX: 29.52 KG/M2 | WEIGHT: 166.6 LBS | TEMPERATURE: 96.9 F | HEIGHT: 63 IN | SYSTOLIC BLOOD PRESSURE: 117 MMHG | OXYGEN SATURATION: 98 % | HEART RATE: 103 BPM

## 2023-02-28 DIAGNOSIS — R09.81 NASAL CONGESTION: ICD-10-CM

## 2023-02-28 DIAGNOSIS — J30.89 NON-SEASONAL ALLERGIC RHINITIS, UNSPECIFIED TRIGGER: Primary | ICD-10-CM

## 2023-02-28 LAB
EXPIRATION DATE: NORMAL
FLUAV AG UPPER RESP QL IA.RAPID: NOT DETECTED
FLUBV AG UPPER RESP QL IA.RAPID: NOT DETECTED
INTERNAL CONTROL: NORMAL
Lab: NORMAL
SARS-COV-2 AG UPPER RESP QL IA.RAPID: NOT DETECTED

## 2023-02-28 PROCEDURE — 87428 SARSCOV & INF VIR A&B AG IA: CPT | Performed by: NURSE PRACTITIONER

## 2023-02-28 PROCEDURE — 99213 OFFICE O/P EST LOW 20 MIN: CPT | Performed by: NURSE PRACTITIONER
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
## 2023-03-06 ENCOUNTER — OFFICE VISIT (OUTPATIENT)
Dept: FAMILY MEDICINE CLINIC | Facility: CLINIC | Age: 58
End: 2023-03-06
Payer: COMMERCIAL

## 2023-03-06 VITALS
BODY MASS INDEX: 29.98 KG/M2 | HEIGHT: 63 IN | OXYGEN SATURATION: 98 % | SYSTOLIC BLOOD PRESSURE: 116 MMHG | TEMPERATURE: 98.2 F | WEIGHT: 169.2 LBS | HEART RATE: 111 BPM | DIASTOLIC BLOOD PRESSURE: 82 MMHG

## 2023-03-06 DIAGNOSIS — F41.9 ANXIETY: Primary | ICD-10-CM

## 2023-03-06 DIAGNOSIS — F41.9 ANXIETY: ICD-10-CM

## 2023-03-06 PROCEDURE — 99213 OFFICE O/P EST LOW 20 MIN: CPT | Performed by: NURSE PRACTITIONER

## 2023-03-06 RX ORDER — CLONAZEPAM 1 MG/1
1 TABLET ORAL 3 TIMES DAILY PRN
Qty: 65 TABLET | Refills: 0 | Status: SHIPPED | OUTPATIENT
Start: 2023-03-06

## 2023-03-06 RX ORDER — CLONAZEPAM 1 MG/1
TABLET ORAL
Qty: 65 TABLET | Refills: 0 | OUTPATIENT
Start: 2023-03-06

## 2023-03-06 NOTE — PROGRESS NOTES
CC: anxiety    History:  Shawnee Suarez is a 57 y.o. female who presents today for evaluation of the above problems.      Patient is here for controlled medication monitoring of her clonazepam.  She is due for contract and UDS today.  Her Ludin is reviewed and appropriate.  She continues to typically take 1-1/2 tablets daily..  On other days when she has increased stress she may take additional medication, up to 3 times daily total.    CONTROLLED SUBSTANCE TRACKING 6/11/2021 9/14/2021 3/29/2022 6/8/2022 9/1/2022 12/2/2022 3/6/2023   Last Ludin 6/11/2021 9/14/2021 3/29/2022 6/8/2022 9/1/2022 12/2/2022 3/6/2023   Report Number - reviewed through EPIC reviewed through [facility] reviewed through epic - - reviewed through [facility]   Last UDS 3/11/2021 3/11/2021 3/29/2022 3/29/2022 3/29/2022 3/29/2022 3/6/2023   Last Controlled Substance Agreement 3/11/2021 3/11/2021 3/29/2022 3/29/2022 3/29/2022 3/29/2022 3/6/2023     HPI  ROS:  Review of Systems   Psychiatric/Behavioral: The patient is nervous/anxious.        Allergies   Allergen Reactions   • Crestor [Rosuvastatin] Other (See Comments)     BODY ACHES     Past Medical History:   Diagnosis Date   • Anxiety    • Depression    • History of transfusion    • Hyperlipidemia    • Migraines    • Migraines 6/8/2022   • Ovarian cyst     x 2      Past Surgical History:   Procedure Laterality Date   • APPENDECTOMY     • BREAST BIOPSY Right 11/1989   • CHOLECYSTECTOMY     • COLONOSCOPY  11/22/2013    Normal exam repeat in 10 years   • COLONOSCOPY N/A 11/13/2018    Normal but prep was only fair repeat in 2 years   • COLONOSCOPY N/A 1/22/2021    Procedure: COLONOSCOPY WITH ANESTHESIA;  Surgeon: Da Miles MD;  Location: [facility] ENDOSCOPY;  Service: Gastroenterology;  Laterality: N/A;  pre: screen  post: normal  Marlon Tran MD     • D & C HYSTEROSCOPY ENDOMETRIAL ABLATION N/A 2/6/2017    Procedure: DILATATION AND CURETTAGE HYSTEROSCOPY NOVASURE ENDOMETRIAL ABLATION;  Surgeon:  Ana Barrios MD;  Location:  [facility] OR;  Service:    • DIAGNOSTIC LAPAROSCOPY N/A 6/17/2019    Procedure: DIAGNOSTIC LAPAROSCOPY;  Surgeon: Ana Barrios MD;  Location:  [facility] OR;  Service: Obstetrics/Gynecology   • OVARIAN CYST REMOVAL      x 2   • SALPINGO OOPHORECTOMY Bilateral 6/17/2019    Procedure: SALPINGO OOPHORECTOMY LAPAROSCOPIC;  Surgeon: Ana Barrios MD;  Location:  [facility] OR;  Service: Obstetrics/Gynecology     Family History   Problem Relation Age of Onset   • Brain cancer Father    • Ovarian cancer Mother 72   • No Known Problems Brother    • Heart disease Brother    • No Known Problems Daughter    • No Known Problems Daughter    • Brain cancer Paternal Grandfather    • No Known Problems Paternal Grandmother    • No Known Problems Maternal Grandmother    • No Known Problems Maternal Grandfather    • No Known Problems Maternal Aunt    • No Known Problems Paternal Aunt    • Breast cancer Other 28   • Uterine cancer Neg Hx    • Colon cancer Neg Hx    • Melanoma Neg Hx    • Prostate cancer Neg Hx    • BRCA 1/2 Neg Hx    • Endometrial cancer Neg Hx    • Colon polyps Neg Hx       reports that she has never smoked. She has never used smokeless tobacco. She reports that she does not drink alcohol and does not use drugs.      Current Outpatient Medications:   •  calcium carbonate (TUMS) 500 MG chewable tablet, Chew 1 tablet Daily., Disp: , Rfl:   •  Calcium-Vitamin D-Vitamin K (Viactiv Calcium Plus D) 650-12.5-40 MG-MCG-MCG chewable tablet, Chew 2 (two) times a day., Disp: , Rfl:   •  Cholecalciferol (Vitamin D3) 125 MCG (5000 UT) chewable tablet, Chew 5,000 Units Daily., Disp: , Rfl:   •  clonazePAM (KlonoPIN) 1 MG tablet, Take 1 tablet by mouth 3 (Three) Times a Day As Needed for Anxiety. for anxiety, Disp: 65 tablet, Rfl: 0  •  docusate sodium (COLACE) 50 MG capsule, Take  by mouth 2 (Two) Times a Day., Disp: , Rfl:   •  DULoxetine (CYMBALTA) 60 MG capsule, TAKE ONE CAPSULE BY MOUTH EVERY DAY, Disp: 90  "capsule, Rfl: 3  •  fluticasone (FLONASE) 50 MCG/ACT nasal spray, 2 sprays into the nostril(s) as directed by provider Daily., Disp: , Rfl:   •  loratadine (CLARITIN) 10 MG tablet, Take 1 tablet by mouth Daily., Disp: , Rfl:   •  lovastatin (MEVACOR) 10 MG tablet, Take 1 tablet by mouth Every Night., Disp: 90 tablet, Rfl: 2  •  meloxicam (MOBIC) 15 MG tablet, TAKE ONE TABLET BY MOUTH EVERY DAY, Disp: 90 tablet, Rfl: 1  •  Multiple Vitamins-Minerals (WOMENS 50+ MULTI VITAMIN/MIN PO), Take  by mouth., Disp: , Rfl:   •  traZODone (DESYREL) 50 MG tablet, TAKE ONE TABLET BY MOUTH NIGHTLY, Disp: 90 tablet, Rfl: 3  •  ZOLMitriptan (ZOMIG) 2.5 MG tablet, TAKE 1 TABLET BY MOUTH TWICE DAILY AS NEEDED FOR HEADACHE, Disp: 18 tablet, Rfl: 3    OBJECTIVE:  /82 (BP Location: Left arm, Patient Position: Sitting, Cuff Size: Adult)   Pulse 111   Temp 98.2 °F (36.8 °C) (Temporal)   Ht 160 cm (63\")   Wt 76.7 kg (169 lb 3.2 oz)   LMP  (LMP Unknown)   SpO2 98%   BMI 29.97 kg/m²    Physical Exam  Vitals reviewed.   Constitutional:       Appearance: She is well-developed.   Cardiovascular:      Rate and Rhythm: Normal rate and regular rhythm.      Heart sounds: Normal heart sounds.   Pulmonary:      Effort: Pulmonary effort is normal.      Breath sounds: Normal breath sounds.   Neurological:      Mental Status: She is alert and oriented to person, place, and time.   Psychiatric:         Behavior: Behavior normal.         Assessment/Plan    Diagnoses and all orders for this visit:    1. Anxiety (Primary)  -     ToxASSURE Select 13 (MW) - Urine, Clean Catch  -     clonazePAM (KlonoPIN) 1 MG tablet; Take 1 tablet by mouth 3 (Three) Times a Day As Needed for Anxiety. for anxiety  Dispense: 65 tablet; Refill: 0    UDS today.  Contract renewed.  Ludin reviewed and appropriate.    An After Visit Summary was printed and given to the patient at discharge.  Return in about 3 months (around 6/6/2023) for Next scheduled follow up.     "   Yajaira Jimenez APRMARIO 3/6/2023    Electronically signed.

## 2023-03-06 NOTE — TELEPHONE ENCOUNTER
Rx Refill Note  Requested Prescriptions     Pending Prescriptions Disp Refills   • clonazePAM (KlonoPIN) 1 MG tablet [Pharmacy Med Name: CLONAZEPAM 1MG TABS] 65 tablet 0     Sig: TAKE ONE TABLET BY MOUTH THREE TIMES A DAY AS NEEDED FOR ANXIETY      Last office visit with prescribing clinician: 12/2/2022   Last telemedicine visit with prescribing clinician: 3/6/2023   Next office visit with prescribing clinician: 6/13/2023   Pt coming in today for appt                      Would you like a call back once the refill request has been completed: [] Yes [] No    If the office needs to give you a call back, can they leave a voicemail: [] Yes [] No    Bree Pate MA  03/06/23, 10:32 CST

## 2023-03-12 LAB — DRUGS UR: NORMAL

## 2023-03-20 ENCOUNTER — OFFICE VISIT (OUTPATIENT)
Dept: FAMILY MEDICINE CLINIC | Facility: CLINIC | Age: 58
End: 2023-03-20
Payer: COMMERCIAL

## 2023-03-20 VITALS
SYSTOLIC BLOOD PRESSURE: 128 MMHG | TEMPERATURE: 98 F | BODY MASS INDEX: 30.05 KG/M2 | OXYGEN SATURATION: 97 % | HEIGHT: 63 IN | DIASTOLIC BLOOD PRESSURE: 88 MMHG | HEART RATE: 106 BPM | WEIGHT: 169.6 LBS

## 2023-03-20 DIAGNOSIS — R30.0 DYSURIA: ICD-10-CM

## 2023-03-20 DIAGNOSIS — R82.90 ABNORMAL URINALYSIS: ICD-10-CM

## 2023-03-20 DIAGNOSIS — N30.00 ACUTE CYSTITIS WITHOUT HEMATURIA: Primary | ICD-10-CM

## 2023-03-20 LAB
BILIRUB BLD-MCNC: ABNORMAL MG/DL
CLARITY, POC: ABNORMAL
COLOR UR: ABNORMAL
GLUCOSE UR STRIP-MCNC: NEGATIVE MG/DL
KETONES UR QL: ABNORMAL
LEUKOCYTE EST, POC: ABNORMAL
NITRITE UR-MCNC: NEGATIVE MG/ML
PH UR: 5 [PH] (ref 5–8)
PROT UR STRIP-MCNC: ABNORMAL MG/DL
RBC # UR STRIP: ABNORMAL /UL
SP GR UR: 1.03 (ref 1–1.03)
UROBILINOGEN UR QL: ABNORMAL

## 2023-03-20 PROCEDURE — 81003 URINALYSIS AUTO W/O SCOPE: CPT | Performed by: NURSE PRACTITIONER

## 2023-03-20 PROCEDURE — 99213 OFFICE O/P EST LOW 20 MIN: CPT | Performed by: NURSE PRACTITIONER

## 2023-03-20 RX ORDER — PHENAZOPYRIDINE HYDROCHLORIDE 200 MG/1
200 TABLET, FILM COATED ORAL 3 TIMES DAILY PRN
Qty: 6 TABLET | Refills: 0 | Status: SHIPPED | OUTPATIENT
Start: 2023-03-20

## 2023-03-20 RX ORDER — SULFAMETHOXAZOLE AND TRIMETHOPRIM 800; 160 MG/1; MG/1
1 TABLET ORAL 2 TIMES DAILY
Qty: 14 TABLET | Refills: 0 | Status: SHIPPED | OUTPATIENT
Start: 2023-03-20

## 2023-03-20 NOTE — PROGRESS NOTES
CC: urinary frequency    History:  Shanwee Suarez is a 57 y.o. female who presents today for evaluation of the above problems.      Patient complains of urinary frequency and discomfort for two days. Has also noticed odor to urine on occasion.     HPI  ROS:  Review of Systems   Genitourinary: Positive for dysuria and frequency.       Allergies   Allergen Reactions   • Crestor [Rosuvastatin] Other (See Comments)     BODY ACHES     Past Medical History:   Diagnosis Date   • Anxiety    • Depression    • History of transfusion    • Hyperlipidemia    • Migraines    • Migraines 6/8/2022   • Ovarian cyst     x 2      Past Surgical History:   Procedure Laterality Date   • APPENDECTOMY     • BREAST BIOPSY Right 11/1989   • CHOLECYSTECTOMY     • COLONOSCOPY  11/22/2013    Normal exam repeat in 10 years   • COLONOSCOPY N/A 11/13/2018    Normal but prep was only fair repeat in 2 years   • COLONOSCOPY N/A 1/22/2021    Procedure: COLONOSCOPY WITH ANESTHESIA;  Surgeon: Da Miles MD;  Location: [facility] ENDOSCOPY;  Service: Gastroenterology;  Laterality: N/A;  pre: screen  post: normal  Marlon Tran MD     • D & C HYSTEROSCOPY ENDOMETRIAL ABLATION N/A 2/6/2017    Procedure: DILATATION AND CURETTAGE HYSTEROSCOPY NOVASURE ENDOMETRIAL ABLATION;  Surgeon: Ana Barrios MD;  Location: [facility] OR;  Service:    • DIAGNOSTIC LAPAROSCOPY N/A 6/17/2019    Procedure: DIAGNOSTIC LAPAROSCOPY;  Surgeon: Ana Barrios MD;  Location: [facility] OR;  Service: Obstetrics/Gynecology   • OVARIAN CYST REMOVAL      x 2   • SALPINGO OOPHORECTOMY Bilateral 6/17/2019    Procedure: SALPINGO OOPHORECTOMY LAPAROSCOPIC;  Surgeon: Ana Barrios MD;  Location: [facility] OR;  Service: Obstetrics/Gynecology     Family History   Problem Relation Age of Onset   • Brain cancer Father    • Ovarian cancer Mother 72   • No Known Problems Brother    • Heart disease Brother    • No Known Problems Daughter    • No Known Problems Daughter    • Brain cancer  Paternal Grandfather    • No Known Problems Paternal Grandmother    • No Known Problems Maternal Grandmother    • No Known Problems Maternal Grandfather    • No Known Problems Maternal Aunt    • No Known Problems Paternal Aunt    • Breast cancer Other 28   • Uterine cancer Neg Hx    • Colon cancer Neg Hx    • Melanoma Neg Hx    • Prostate cancer Neg Hx    • BRCA 1/2 Neg Hx    • Endometrial cancer Neg Hx    • Colon polyps Neg Hx       reports that she has never smoked. She has never used smokeless tobacco. She reports that she does not drink alcohol and does not use drugs.      Current Outpatient Medications:   •  calcium carbonate (TUMS) 500 MG chewable tablet, Chew 1 tablet Daily., Disp: , Rfl:   •  Calcium-Vitamin D-Vitamin K (Viactiv Calcium Plus D) 650-12.5-40 MG-MCG-MCG chewable tablet, Chew 2 (two) times a day., Disp: , Rfl:   •  Cholecalciferol (Vitamin D3) 125 MCG (5000 UT) chewable tablet, Chew 5,000 Units Daily., Disp: , Rfl:   •  clonazePAM (KlonoPIN) 1 MG tablet, Take 1 tablet by mouth 3 (Three) Times a Day As Needed for Anxiety. for anxiety, Disp: 65 tablet, Rfl: 0  •  docusate sodium (COLACE) 50 MG capsule, Take  by mouth 2 (Two) Times a Day., Disp: , Rfl:   •  DULoxetine (CYMBALTA) 60 MG capsule, TAKE ONE CAPSULE BY MOUTH EVERY DAY, Disp: 90 capsule, Rfl: 3  •  fluticasone (FLONASE) 50 MCG/ACT nasal spray, 2 sprays into the nostril(s) as directed by provider Daily., Disp: , Rfl:   •  loratadine (CLARITIN) 10 MG tablet, Take 1 tablet by mouth Daily., Disp: , Rfl:   •  lovastatin (MEVACOR) 10 MG tablet, Take 1 tablet by mouth Every Night., Disp: 90 tablet, Rfl: 2  •  meloxicam (MOBIC) 15 MG tablet, TAKE ONE TABLET BY MOUTH EVERY DAY, Disp: 90 tablet, Rfl: 1  •  Multiple Vitamins-Minerals (WOMENS 50+ MULTI VITAMIN/MIN PO), Take  by mouth., Disp: , Rfl:   •  traZODone (DESYREL) 50 MG tablet, TAKE ONE TABLET BY MOUTH NIGHTLY, Disp: 90 tablet, Rfl: 3  •  ZOLMitriptan (ZOMIG) 2.5 MG tablet, TAKE 1 TABLET BY  "MOUTH TWICE DAILY AS NEEDED FOR HEADACHE, Disp: 18 tablet, Rfl: 3  •  phenazopyridine (Pyridium) 200 MG tablet, Take 1 tablet by mouth 3 (Three) Times a Day As Needed for Bladder Spasms., Disp: 6 tablet, Rfl: 0  •  sulfamethoxazole-trimethoprim (Bactrim DS) 800-160 MG per tablet, Take 1 tablet by mouth 2 (Two) Times a Day., Disp: 14 tablet, Rfl: 0    OBJECTIVE:  /88 (BP Location: Left arm, Patient Position: Sitting, Cuff Size: Adult)   Pulse 106   Temp 98 °F (36.7 °C) (Temporal)   Ht 160 cm (63\")   Wt 76.9 kg (169 lb 9.6 oz)   LMP  (LMP Unknown)   SpO2 97%   BMI 30.04 kg/m²    Physical Exam  Vitals reviewed.   Constitutional:       Appearance: She is well-developed.   Cardiovascular:      Rate and Rhythm: Normal rate.   Pulmonary:      Effort: Pulmonary effort is normal.   Neurological:      Mental Status: She is alert and oriented to person, place, and time.   Psychiatric:         Behavior: Behavior normal.         Assessment/Plan    Diagnoses and all orders for this visit:    1. Acute cystitis without hematuria (Primary)  -     sulfamethoxazole-trimethoprim (Bactrim DS) 800-160 MG per tablet; Take 1 tablet by mouth 2 (Two) Times a Day.  Dispense: 14 tablet; Refill: 0  -     phenazopyridine (Pyridium) 200 MG tablet; Take 1 tablet by mouth 3 (Three) Times a Day As Needed for Bladder Spasms.  Dispense: 6 tablet; Refill: 0    2. Abnormal urinalysis  -     Urine Culture - Urine, Urine, Clean Catch    3. Dysuria  -     POC Urinalysis Dipstick, Multipro        An After Visit Summary was printed and given to the patient at discharge.  Return if symptoms worsen or fail to improve, for Next scheduled follow up.       MICEHLLE Arnold 3/20/23    Electronically signed.  "

## 2023-03-23 LAB
BACTERIA UR CULT: ABNORMAL
BACTERIA UR CULT: ABNORMAL
OTHER ANTIBIOTIC SUSC ISLT: ABNORMAL

## 2023-04-17 DIAGNOSIS — F41.9 ANXIETY: ICD-10-CM

## 2023-04-17 RX ORDER — CLONAZEPAM 1 MG/1
TABLET ORAL
Qty: 65 TABLET | Refills: 0 | Status: SHIPPED | OUTPATIENT
Start: 2023-04-17

## 2023-04-17 NOTE — TELEPHONE ENCOUNTER
Pain therapy appt 3/6/23  Contract & UDS 03//6/23    Rx Refill Note  Requested Prescriptions     Pending Prescriptions Disp Refills   • clonazePAM (KlonoPIN) 1 MG tablet [Pharmacy Med Name: CLONAZEPAM 1MG TABS] 65 tablet 0     Sig: TAKE ONE TABLET BY MOUTH THREE TIMES A DAY AS NEEDED FOR ANXIETY      Last office visit with prescribing clinician: 3/20/2023   Last telemedicine visit with prescribing clinician: 6/13/2023   Next office visit with prescribing clinician: Visit date not found   Bree Lockett MA  04/17/23, 08:46 CDT

## 2023-04-20 ENCOUNTER — TELEPHONE (OUTPATIENT)
Dept: FAMILY MEDICINE CLINIC | Facility: CLINIC | Age: 58
End: 2023-04-20
Payer: COMMERCIAL

## 2023-04-20 DIAGNOSIS — G43.909 MIGRAINE WITHOUT STATUS MIGRAINOSUS, NOT INTRACTABLE, UNSPECIFIED MIGRAINE TYPE: Primary | ICD-10-CM

## 2023-04-20 RX ORDER — PREDNISONE 10 MG/1
TABLET ORAL
Qty: 21 EACH | Refills: 0 | Status: SHIPPED | OUTPATIENT
Start: 2023-04-20

## 2023-04-20 NOTE — TELEPHONE ENCOUNTER
With a longstanding history of migraines I think she needs to see the neurologist in [place]-set her up to see her

## 2023-04-20 NOTE — TELEPHONE ENCOUNTER
Patient has called back and advised Digna she has had migraine for 3 days with no relief.  She is taking prescribed medications.  Anything I need to advise her to do while she waits on neurology appt?

## 2023-04-20 NOTE — TELEPHONE ENCOUNTER
Caller: Shawnee Suarez    Relationship: Self    Best call back number: [phone]    What is the best time to reach you: ANYTIME    Who are you requesting to speak with (clinical staff, provider,  specific staff member): CLINICAL    What was the call regarding: STATES THAT MIGRAINES ARE CONTINUING ON MEDICATIONS THAT SHE'S TAKING. WANTED TO LOOK INTO MEDICAL ADVICE AS TO HOW TO CONTINUE TO BE ABLE TO HELP WITH MIRGAINES    Do you require a callback: YES

## 2023-05-09 ENCOUNTER — OFFICE VISIT (OUTPATIENT)
Dept: FAMILY MEDICINE CLINIC | Facility: CLINIC | Age: 58
End: 2023-05-09
Payer: COMMERCIAL

## 2023-05-09 VITALS
DIASTOLIC BLOOD PRESSURE: 84 MMHG | OXYGEN SATURATION: 95 % | WEIGHT: 167.2 LBS | HEART RATE: 116 BPM | BODY MASS INDEX: 29.62 KG/M2 | RESPIRATION RATE: 16 BRPM | HEIGHT: 63 IN | SYSTOLIC BLOOD PRESSURE: 122 MMHG | TEMPERATURE: 97.8 F

## 2023-05-09 DIAGNOSIS — R39.15 URINARY URGENCY: ICD-10-CM

## 2023-05-09 DIAGNOSIS — R31.29 HEMATURIA, MICROSCOPIC: ICD-10-CM

## 2023-05-09 DIAGNOSIS — N30.00 ACUTE CYSTITIS WITHOUT HEMATURIA: ICD-10-CM

## 2023-05-09 DIAGNOSIS — R31.9 HEMATURIA OF UNKNOWN CAUSE: Primary | ICD-10-CM

## 2023-05-09 DIAGNOSIS — Z80.9 FAMILY HISTORY OF CANCER: ICD-10-CM

## 2023-05-09 DIAGNOSIS — R31.29 MICROSCOPIC HEMATURIA: ICD-10-CM

## 2023-05-09 LAB
BILIRUB BLD-MCNC: NEGATIVE MG/DL
CLARITY, POC: CLEAR
COLOR UR: YELLOW
GLUCOSE UR STRIP-MCNC: NEGATIVE MG/DL
KETONES UR QL: ABNORMAL
LEUKOCYTE EST, POC: ABNORMAL
NITRITE UR-MCNC: NEGATIVE MG/ML
PH UR: 5 [PH] (ref 5–8)
PROT UR STRIP-MCNC: ABNORMAL MG/DL
RBC # UR STRIP: ABNORMAL /UL
SP GR UR: 1.03 (ref 1–1.03)
UROBILINOGEN UR QL: NORMAL

## 2023-05-09 RX ORDER — SULFAMETHOXAZOLE AND TRIMETHOPRIM 800; 160 MG/1; MG/1
1 TABLET ORAL 2 TIMES DAILY
Qty: 14 TABLET | Refills: 0 | Status: SHIPPED | OUTPATIENT
Start: 2023-05-09 | End: 2023-05-12

## 2023-05-09 RX ORDER — PHENAZOPYRIDINE HYDROCHLORIDE 200 MG/1
200 TABLET, FILM COATED ORAL 3 TIMES DAILY PRN
Qty: 6 TABLET | Refills: 0 | Status: SHIPPED | OUTPATIENT
Start: 2023-05-09

## 2023-05-09 NOTE — PROGRESS NOTES
Subjective   Shawnee Suarez is a 58 y.o. female.     History of Present Illness  58-year-old female with dysuria- 3 to 4 weeks ago with similar symptomatology with a positive urine culture      The following portions of the patient's history were reviewed and updated as appropriate: allergies, current medications, past family history, past medical history, past social history, past surgical history and problem list.    Review of Systems   Constitutional: Negative for chills and fever.   Respiratory: Negative for shortness of breath.    Cardiovascular: Negative for chest pain and leg swelling.   Genitourinary: Positive for dysuria and frequency.   Musculoskeletal: Positive for back pain.   Neurological: Positive for headaches.        History of migraines-has appointment with neurologist       Objective   Physical Exam  Vitals and nursing note reviewed.   Constitutional:       Appearance: Normal appearance.   Abdominal:      General: Abdomen is flat.      Palpations: Abdomen is soft.      Tenderness: There is no right CVA tenderness or left CVA tenderness.   Genitourinary:     Comments: Pelvic-atrophic vaginitis-cervix present had endometrial ablation-ovaries and tubes are gone bimanual basically normal no cystocele  Musculoskeletal:      Right lower leg: No edema.      Left lower leg: No edema.   Skin:     General: Skin is warm and dry.   Neurological:      General: No focal deficit present.      Mental Status: She is alert and oriented to person, place, and time.   Psychiatric:         Mood and Affect: Mood normal.         Thought Content: Thought content normal.         Judgment: Judgment normal.         Assessment & Plan   Diagnoses and all orders for this visit:    1. Hematuria of unknown cause (Primary)  -     Ambulatory Referral to Urology    2. Urinary urgency  -     POC Urinalysis Dipstick, Multipro    3. Acute cystitis without hematuria  -     sulfamethoxazole-trimethoprim (Bactrim DS) 800-160 MG per tablet;  Take 1 tablet by mouth 2 (Two) Times a Day.  Dispense: 14 tablet; Refill: 0  -     phenazopyridine (Pyridium) 200 MG tablet; Take 1 tablet by mouth 3 (Three) Times a Day As Needed for Bladder Spasms.  Dispense: 6 tablet; Refill: 0    4. Family history of cancer  -     Ambulatory Referral to Urology    5. Microscopic hematuria  -     CT Abdomen Pelvis Without Contrast; Future  -     Urine Culture - Urine, Urine, Clean Catch    6. Hematuria, microscopic  -     Ambulatory Referral to Urology       Plan above-CT abdomen pelvis with hematuria plus urology consult for cystoscopy-return urine after antibiotic-bladder scan showed no residual

## 2023-05-12 LAB
BACTERIA UR CULT: ABNORMAL
BACTERIA UR CULT: ABNORMAL
OTHER ANTIBIOTIC SUSC ISLT: ABNORMAL

## 2023-05-12 RX ORDER — CIPROFLOXACIN 500 MG/1
500 TABLET, FILM COATED ORAL 2 TIMES DAILY
Qty: 14 TABLET | Refills: 0 | Status: SHIPPED | OUTPATIENT
Start: 2023-05-12 | End: 2023-05-19

## 2023-05-16 ENCOUNTER — CLINICAL SUPPORT (OUTPATIENT)
Dept: FAMILY MEDICINE CLINIC | Facility: CLINIC | Age: 58
End: 2023-05-16
Payer: COMMERCIAL

## 2023-05-16 DIAGNOSIS — R31.9 HEMATURIA OF UNKNOWN CAUSE: Primary | ICD-10-CM

## 2023-05-16 LAB
BILIRUB BLD-MCNC: NEGATIVE MG/DL
CLARITY, POC: CLEAR
COLOR UR: YELLOW
GLUCOSE UR STRIP-MCNC: NEGATIVE MG/DL
KETONES UR QL: NEGATIVE
LEUKOCYTE EST, POC: NEGATIVE
NITRITE UR-MCNC: NEGATIVE MG/ML
PH UR: 5 [PH] (ref 5–8)
PROT UR STRIP-MCNC: NEGATIVE MG/DL
RBC # UR STRIP: NEGATIVE /UL
SP GR UR: 1.03 (ref 1–1.03)
UROBILINOGEN UR QL: NORMAL

## 2023-05-16 NOTE — PROGRESS NOTES
Patient returns for follow up urinalysis after completing antibiotics.  Patient is not currently having any urinary symptoms.  Urinalysis collected and results sent to Dr. Tran to review.      Urology referral - will not schedule until patient has had microscopic urinalysis.  Test ordered.

## 2023-05-17 LAB
APPEARANCE UR: CLEAR
BACTERIA #/AREA URNS HPF: ABNORMAL /[HPF]
BILIRUB UR QL STRIP: NEGATIVE
CASTS URNS QL MICRO: ABNORMAL /LPF
COLOR UR: YELLOW
EPI CELLS #/AREA URNS HPF: >10 /HPF (ref 0–10)
GLUCOSE UR QL STRIP: NEGATIVE
HGB UR QL STRIP: NEGATIVE
KETONES UR QL STRIP: NEGATIVE
LEUKOCYTE ESTERASE UR QL STRIP: NEGATIVE
MICRO URNS: NORMAL
MICRO URNS: NORMAL
NITRITE UR QL STRIP: NEGATIVE
PH UR STRIP: 5 [PH] (ref 5–7.5)
PROT UR QL STRIP: NEGATIVE
RBC #/AREA URNS HPF: ABNORMAL /HPF (ref 0–2)
SP GR UR STRIP: 1.02 (ref 1–1.03)
UROBILINOGEN UR STRIP-MCNC: 0.2 MG/DL (ref 0.2–1)
WBC #/AREA URNS HPF: ABNORMAL /HPF (ref 0–5)

## 2023-05-25 ENCOUNTER — OFFICE VISIT (OUTPATIENT)
Dept: UROLOGY | Facility: CLINIC | Age: 58
End: 2023-05-25
Payer: COMMERCIAL

## 2023-05-25 ENCOUNTER — HOSPITAL ENCOUNTER (OUTPATIENT)
Dept: CT IMAGING | Facility: HOSPITAL | Age: 58
Discharge: HOME | End: 2023-05-25
Admitting: FAMILY MEDICINE
Payer: COMMERCIAL

## 2023-05-25 VITALS — BODY MASS INDEX: 29.55 KG/M2 | HEIGHT: 63 IN | TEMPERATURE: 97.2 F | WEIGHT: 166.8 LBS

## 2023-05-25 DIAGNOSIS — R31.29 MICROSCOPIC HEMATURIA: ICD-10-CM

## 2023-05-25 DIAGNOSIS — N95.2 ATROPHIC VAGINITIS: Primary | ICD-10-CM

## 2023-05-25 DIAGNOSIS — N30.01 ACUTE CYSTITIS WITH HEMATURIA: ICD-10-CM

## 2023-05-25 PROCEDURE — 74176 CT ABD & PELVIS W/O CONTRAST: CPT

## 2023-05-25 RX ORDER — CONJUGATED ESTROGENS 0.62 MG/G
CREAM VAGINAL 2 TIMES WEEKLY
Qty: 30 G | Refills: 5 | Status: SHIPPED | OUTPATIENT
Start: 2023-05-25 | End: 2023-06-22

## 2023-05-25 NOTE — PROGRESS NOTES
"Subjective    Ms. Suarez is 58 y.o. female    Chief Complaint: Recurrent UTI    History of Present Illness    58-year-old female new patient referred for new onset of urinary tract infections that started March of this year for which patient states she has now had 2 urine culture positive infections with E. coli with the last being multidrug-resistant.  Patient was treated with Bactrim initially and then later switched to ciprofloxacin x7 days.  Patient reporting overall her urine urgency symptom has resolved however reports intermittent episodes of suprapubic discomfort.  Patient stating \"it is like phantom ovarian pains\".  Patient with a history in 2019 of having her ovaries and tubes removed by .  Patient reports has not been having issues with urinary tract infections until this year however has noted worsening vaginal dryness and irritation has previously tried coconut oil without success.    The following portions of the patient's history were reviewed and updated as appropriate: allergies, current medications, past family history, past medical history, past social history, past surgical history and problem list.    Review of Systems   Constitutional:  Negative for chills, fatigue and fever.   Gastrointestinal:  Negative for nausea and vomiting.   Genitourinary:  Positive for dyspareunia, pelvic pain and vaginal pain. Negative for decreased urine volume, difficulty urinating, dysuria, flank pain, frequency, hematuria and urgency.       Current Outpatient Medications:     calcium carbonate (TUMS) 500 MG chewable tablet, Chew 1 tablet Daily., Disp: , Rfl:     Calcium-Vitamin D-Vitamin K (Viactiv Calcium Plus D) 650-12.5-40 MG-MCG-MCG chewable tablet, Chew 2 (two) times a day., Disp: , Rfl:     Cholecalciferol (Vitamin D3) 125 MCG (5000 UT) chewable tablet, Chew 5,000 Units Daily., Disp: , Rfl:     clonazePAM (KlonoPIN) 1 MG tablet, TAKE ONE TABLET BY MOUTH THREE TIMES A DAY AS NEEDED FOR ANXIETY, Disp: " 65 tablet, Rfl: 0    docusate sodium (COLACE) 50 MG capsule, Take  by mouth 2 (Two) Times a Day., Disp: , Rfl:     DULoxetine (CYMBALTA) 60 MG capsule, TAKE ONE CAPSULE BY MOUTH EVERY DAY, Disp: 90 capsule, Rfl: 3    fluticasone (FLONASE) 50 MCG/ACT nasal spray, 2 sprays into the nostril(s) as directed by provider Daily., Disp: , Rfl:     lovastatin (MEVACOR) 10 MG tablet, Take 1 tablet by mouth Every Night., Disp: 90 tablet, Rfl: 2    meloxicam (MOBIC) 15 MG tablet, TAKE ONE TABLET BY MOUTH EVERY DAY, Disp: 90 tablet, Rfl: 1    Multiple Vitamins-Minerals (WOMENS 50+ MULTI VITAMIN/MIN PO), Take  by mouth., Disp: , Rfl:     traZODone (DESYREL) 50 MG tablet, TAKE ONE TABLET BY MOUTH NIGHTLY, Disp: 90 tablet, Rfl: 3    ZOLMitriptan (ZOMIG) 2.5 MG tablet, TAKE 1 TABLET BY MOUTH TWICE DAILY AS NEEDED FOR HEADACHE, Disp: 18 tablet, Rfl: 3    Estrogens Conjugated (Premarin) 0.625 MG/GM vaginal cream, Insert  into the vagina 2 (Two) Times a Week for 28 days. Use 0.5gms intravaginally as directed twice weekly for 21 days, then off seven days, Disp: 30 g, Rfl: 5    loratadine (CLARITIN) 10 MG tablet, Take 1 tablet by mouth Daily. (Patient not taking: Reported on 5/25/2023), Disp: , Rfl:     phenazopyridine (Pyridium) 200 MG tablet, Take 1 tablet by mouth 3 (Three) Times a Day As Needed for Bladder Spasms. (Patient not taking: Reported on 5/25/2023), Disp: 6 tablet, Rfl: 0    Past Medical History:   Diagnosis Date    Anxiety     Depression     History of transfusion     Hyperlipidemia     Migraines     Migraines 6/8/2022    Ovarian cyst     x 2        Past Surgical History:   Procedure Laterality Date    APPENDECTOMY      BREAST BIOPSY Right 11/1989    CHOLECYSTECTOMY      COLONOSCOPY  11/22/2013    Normal exam repeat in 10 years    COLONOSCOPY N/A 11/13/2018    Normal but prep was only fair repeat in 2 years    COLONOSCOPY N/A 1/22/2021    Procedure: COLONOSCOPY WITH ANESTHESIA;  Surgeon: Da Miles MD;  Location:   "[facility];  Service: Gastroenterology;  Laterality: N/A;  pre: screen  post: normal  Chelsea, Marlon Valente MD      D & C HYSTEROSCOPY ENDOMETRIAL ABLATION N/A 2/6/2017    Procedure: DILATATION AND CURETTAGE HYSTEROSCOPY NOVASURE ENDOMETRIAL ABLATION;  Surgeon: Ana Barrios MD;  Location:  [facility];  Service:     DIAGNOSTIC LAPAROSCOPY N/A 6/17/2019    Procedure: DIAGNOSTIC LAPAROSCOPY;  Surgeon: Ana Barrios MD;  Location:  [facility];  Service: Obstetrics/Gynecology    OVARIAN CYST REMOVAL      x 2    SALPINGO OOPHORECTOMY Bilateral 6/17/2019    Procedure: SALPINGO OOPHORECTOMY LAPAROSCOPIC;  Surgeon: Ana Barrios MD;  Location:  [facility];  Service: Obstetrics/Gynecology       Social History     Socioeconomic History    Marital status:    Tobacco Use    Smoking status: Never    Smokeless tobacco: Never   Vaping Use    Vaping Use: Never used   Substance and Sexual Activity    Alcohol use: No    Drug use: No    Sexual activity: Defer     Birth control/protection: Post-menopausal       Family History   Problem Relation Age of Onset    Brain cancer Father     Ovarian cancer Mother 72    No Known Problems Brother     Heart disease Brother     No Known Problems Daughter     No Known Problems Daughter     Brain cancer Paternal Grandfather     No Known Problems Paternal Grandmother     No Known Problems Maternal Grandmother     No Known Problems Maternal Grandfather     No Known Problems Maternal Aunt     No Known Problems Paternal Aunt     Breast cancer Other 28    Uterine cancer Neg Hx     Colon cancer Neg Hx     Melanoma Neg Hx     Prostate cancer Neg Hx     BRCA 1/2 Neg Hx     Endometrial cancer Neg Hx     Colon polyps Neg Hx        Objective    Temp 97.2 °F (36.2 °C)   Ht 160 cm (63\")   Wt 75.7 kg (166 lb 12.8 oz)   LMP  (LMP Unknown)   BMI 29.55 kg/m²     Physical Exam  Nursing note reviewed.   Constitutional:       General: She is not in acute distress.     Appearance: Normal appearance. She " is not ill-appearing.   HENT:      Nose: No congestion.   Abdominal:      Tenderness: There is no right CVA tenderness or left CVA tenderness.      Hernia: No hernia is present.   Skin:     General: Skin is warm and dry.   Neurological:      Mental Status: She is alert and oriented to person, place, and time.   Psychiatric:         Mood and Affect: Mood normal.         Behavior: Behavior normal.           Results for orders placed or performed in visit on 05/25/23   POC Urinalysis Dipstick, Multipro    Specimen: Urine   Result Value Ref Range    Color Yellow Yellow, Straw, Dark Yellow, Ana    Clarity, UA Clear Clear    Glucose, UA Negative Negative mg/dL    Bilirubin Negative Negative    Ketones, UA Negative Negative    Specific Gravity  1.030 1.005 - 1.030    Blood, UA Negative Negative    pH, Urine 5.0 5.0 - 8.0    Protein, POC Negative Negative mg/dL    Urobilinogen, UA 0.2 E.U./dL Normal, 0.2 E.U./dL    Nitrite, UA Negative Negative    Leukocytes Negative Negative     Assessment and Plan    Diagnoses and all orders for this visit:    1. Atrophic vaginitis (Primary)  -     POC Urinalysis Dipstick, Multipro  -     Estrogens Conjugated (Premarin) 0.625 MG/GM vaginal cream; Insert  into the vagina 2 (Two) Times a Week for 28 days. Use 0.5gms intravaginally as directed twice weekly for 21 days, then off seven days  Dispense: 30 g; Refill: 5    2. Acute cystitis with hematuria    58-year-old female new patient referred for new onset of urinary tract infections that started March of this year for which patient states she has now had 2 urine culture positive infections with E. coli with the last being multidrug-resistant.    Urinalysis today is clear no signs of infection    Patient reporting ongoing vaginal irritation and dryness has previously tried and failed coconut oil    Will start patient on vaginal estrogen cream 2 times weekly    We will have patient follow-up in 3 months for reevaluation

## 2023-06-01 DIAGNOSIS — F41.9 ANXIETY: ICD-10-CM

## 2023-06-01 RX ORDER — CLONAZEPAM 1 MG/1
TABLET ORAL
Qty: 65 TABLET | Refills: 0 | Status: SHIPPED | OUTPATIENT
Start: 2023-06-01

## 2023-06-01 NOTE — TELEPHONE ENCOUNTER
Pain therapy appt 3/6/23  Contract & UDS 03//6/23    Rx Refill Note  Requested Prescriptions     Pending Prescriptions Disp Refills   • clonazePAM (KlonoPIN) 1 MG tablet [Pharmacy Med Name: CLONAZEPAM 1MG TABS] 65 tablet 0     Sig: TAKE ONE TABLET BY MOUTH THREE TIMES A DAY AS NEEDED FOR ANXIETY      Last office visit with prescribing clinician: 5/9/2023   Last telemedicine visit with prescribing clinician: Visit date not found   Next office visit with prescribing clinician: 6/13/2023     Bree Lockett MA  06/01/23, 09:33 CDT

## 2023-06-12 DIAGNOSIS — N95.2 ATROPHIC VAGINITIS: Primary | ICD-10-CM

## 2023-06-12 RX ORDER — ESTRADIOL 0.1 MG/G
2 CREAM VAGINAL 2 TIMES WEEKLY
Qty: 42.5 G | Refills: 6 | Status: SHIPPED | OUTPATIENT
Start: 2023-06-12

## 2023-06-13 ENCOUNTER — OFFICE VISIT (OUTPATIENT)
Dept: FAMILY MEDICINE CLINIC | Facility: CLINIC | Age: 58
End: 2023-06-13
Payer: COMMERCIAL

## 2023-06-13 DIAGNOSIS — E78.2 MIXED HYPERLIPIDEMIA: ICD-10-CM

## 2023-06-13 DIAGNOSIS — R73.9 ELEVATED BLOOD SUGAR: Primary | ICD-10-CM

## 2023-06-14 ENCOUNTER — OFFICE VISIT (OUTPATIENT)
Dept: FAMILY MEDICINE CLINIC | Facility: CLINIC | Age: 58
End: 2023-06-14
Payer: COMMERCIAL

## 2023-06-14 VITALS
TEMPERATURE: 98.2 F | OXYGEN SATURATION: 95 % | WEIGHT: 165.6 LBS | HEIGHT: 63 IN | HEART RATE: 97 BPM | BODY MASS INDEX: 29.34 KG/M2 | DIASTOLIC BLOOD PRESSURE: 77 MMHG | SYSTOLIC BLOOD PRESSURE: 116 MMHG

## 2023-06-14 DIAGNOSIS — F41.9 ANXIETY: ICD-10-CM

## 2023-06-14 DIAGNOSIS — E78.2 MIXED HYPERLIPIDEMIA: Primary | ICD-10-CM

## 2023-06-14 RX ORDER — ZOLMITRIPTAN 2.5 MG/1
TABLET, FILM COATED ORAL
Qty: 18 TABLET | Refills: 3 | Status: SHIPPED | OUTPATIENT
Start: 2023-06-14

## 2023-06-14 NOTE — PROGRESS NOTES
Subjective   Shawnee Suarez is a 58 y.o. female.     History of Present Illness  58-year-old female with history of chronic anxiety and hyperlipidemia    The following portions of the patient's history were reviewed and updated as appropriate: allergies, current medications, past family history, past medical history, past social history, past surgical history, and problem list.      9/14/2021    11:00 AM 3/29/2022     9:00 AM 6/8/2022     8:00 AM 9/1/2022     8:00 AM 12/2/2022     2:00 PM 3/6/2023    12:00 PM 6/14/2023     7:00 AM   CONTROLLED SUBSTANCE TRACKING   Last [place] 9/14/2021 3/29/2022 6/8/2022 9/1/2022 12/2/2022 3/6/2023 6/14/2023   Report Number reviewed through EPIC reviewed through [facility] reviewed through epic   reviewed through epic reviewed through [facility]   Last UDS 3/11/2021 3/29/2022 3/29/2022 3/29/2022 3/29/2022 3/6/2023 3/6/2023   Last Controlled Substance Agreement 3/11/2021 3/29/2022 3/29/2022 3/29/2022 3/29/2022 3/6/2023 3/6/2023         Review of Systems   Respiratory:  Negative for shortness of breath.    Cardiovascular:  Negative for chest pain and leg swelling.   Neurological:  Positive for headaches.        History of migraines the neurologist for possibly changing management of migraines   Psychiatric/Behavioral:  The patient is nervous/anxious.      Objective   Physical Exam  Vitals and nursing note reviewed.   Constitutional:       Appearance: Normal appearance.   Eyes:      Extraocular Movements: Extraocular movements intact.      Pupils: Pupils are equal, round, and reactive to light.   Cardiovascular:      Rate and Rhythm: Normal rate and regular rhythm.   Pulmonary:      Effort: Pulmonary effort is normal.      Breath sounds: Normal breath sounds.   Abdominal:      General: Abdomen is flat.      Palpations: Abdomen is soft. There is no mass.   Musculoskeletal:      Right lower leg: No edema.      Left lower leg: No edema.   Skin:     General: Skin is warm and dry.   Neurological:       General: No focal deficit present.      Mental Status: She is alert and oriented to person, place, and time.   Psychiatric:         Mood and Affect: Mood normal.         Behavior: Behavior normal.         Thought Content: Thought content normal.         Judgment: Judgment normal.       Assessment & Plan   Diagnoses and all orders for this visit:    1. Mixed hyperlipidemia (Primary)    2. Anxiety    Other orders  -     ZOLMitriptan (ZOMIG) 2.5 MG tablet; TAKE 1 TABLET BY MOUTH TWICE DAILY AS NEEDED FOR HEADACHE  Dispense: 18 tablet; Refill: 3       Plan above-advised to play with Mobic to see if she needs it every day

## 2023-06-15 DIAGNOSIS — E78.2 MIXED HYPERLIPIDEMIA: Primary | ICD-10-CM

## 2023-06-15 LAB
ALBUMIN SERPL-MCNC: 4.4 G/DL (ref 3.5–5.2)
ALBUMIN/GLOB SERPL: 1.7 G/DL
ALP SERPL-CCNC: 62 U/L (ref 39–117)
ALT SERPL-CCNC: 27 U/L (ref 1–33)
AST SERPL-CCNC: 25 U/L (ref 1–32)
BILIRUB SERPL-MCNC: 0.2 MG/DL (ref 0–1.2)
BUN SERPL-MCNC: 25 MG/DL (ref 8–23)
BUN/CREAT SERPL: 32.9 (ref 7–25)
CALCIUM SERPL-MCNC: 10.8 MG/DL (ref 8.6–10.5)
CHLORIDE SERPL-SCNC: 106 MMOL/L (ref 98–107)
CHOLEST SERPL-MCNC: 245 MG/DL (ref 0–200)
CO2 SERPL-SCNC: 25.9 MMOL/L (ref 22–29)
CREAT SERPL-MCNC: 0.76 MG/DL (ref 0.57–1)
EGFRCR SERPLBLD CKD-EPI 2021: 89.8 ML/MIN/1.73
GLOBULIN SER CALC-MCNC: 2.6 GM/DL
GLUCOSE SERPL-MCNC: 102 MG/DL (ref 65–99)
HBA1C MFR BLD: 5.6 % (ref 4.8–5.6)
HDLC SERPL-MCNC: 64 MG/DL (ref 40–60)
LDLC SERPL CALC-MCNC: 143 MG/DL (ref 0–100)
POTASSIUM SERPL-SCNC: 4.3 MMOL/L (ref 3.5–5.2)
PROT SERPL-MCNC: 7 G/DL (ref 6–8.5)
SODIUM SERPL-SCNC: 142 MMOL/L (ref 136–145)
TRIGL SERPL-MCNC: 215 MG/DL (ref 0–150)
VLDLC SERPL CALC-MCNC: 38 MG/DL (ref 5–40)

## 2023-06-15 RX ORDER — ATORVASTATIN CALCIUM 20 MG/1
20 TABLET, FILM COATED ORAL NIGHTLY
Qty: 90 TABLET | Refills: 1 | Status: SHIPPED | OUTPATIENT
Start: 2023-06-15

## 2023-06-19 ENCOUNTER — TELEPHONE (OUTPATIENT)
Dept: NEUROLOGY | Facility: CLINIC | Age: 58
End: 2023-06-19
Payer: COMMERCIAL

## 2023-08-07 ENCOUNTER — TELEPHONE (OUTPATIENT)
Dept: NEUROLOGY | Facility: CLINIC | Age: 58
End: 2023-08-07
Payer: COMMERCIAL

## 2023-08-07 DIAGNOSIS — F41.9 ANXIETY: ICD-10-CM

## 2023-08-07 RX ORDER — CLONAZEPAM 1 MG/1
TABLET ORAL
Qty: 65 TABLET | Refills: 0 | Status: SHIPPED | OUTPATIENT
Start: 2023-08-07

## 2023-08-07 NOTE — TELEPHONE ENCOUNTER
Pain therapy appt 6/14/23  Contract & UDS 03//6/23    Rx Refill Note  Requested Prescriptions     Pending Prescriptions Disp Refills    clonazePAM (KlonoPIN) 1 MG tablet [Pharmacy Med Name: CLONAZEPAM 1MG TABS] 65 tablet 0     Sig: TAKE ONE TABLET BY MOUTH THREE TIMES A DAY AS NEEDED FOR ANXIETY      Last office visit with prescribing clinician: 7/10/2023   Last telemedicine visit with prescribing clinician: Visit date not found   Next office visit with prescribing clinician: 12/14/2023   {    Bree Lockett MA  08/07/23, 10:51 CDT

## 2023-08-08 ENCOUNTER — OFFICE VISIT (OUTPATIENT)
Dept: NEUROLOGY | Facility: CLINIC | Age: 58
End: 2023-08-08
Payer: COMMERCIAL

## 2023-08-08 VITALS
HEART RATE: 74 BPM | DIASTOLIC BLOOD PRESSURE: 76 MMHG | BODY MASS INDEX: 29.23 KG/M2 | WEIGHT: 165 LBS | SYSTOLIC BLOOD PRESSURE: 128 MMHG | HEIGHT: 63 IN | OXYGEN SATURATION: 96 %

## 2023-08-08 DIAGNOSIS — G43.009 MIGRAINE WITHOUT AURA AND WITHOUT STATUS MIGRAINOSUS, NOT INTRACTABLE: ICD-10-CM

## 2023-08-08 RX ORDER — PROPRANOLOL HYDROCHLORIDE 40 MG/1
40 TABLET ORAL 2 TIMES DAILY
Qty: 30 TABLET | Refills: 2 | Status: SHIPPED | OUTPATIENT
Start: 2023-08-08 | End: 2023-10-07

## 2023-08-08 NOTE — PROGRESS NOTES
"  Neurology Consult Note    [facility]  [address], [address]  [place] [address]  Phone: [phone]  Fax: [phone]    Referring Provider:   No ref. provider found  Primary Care Provider:  Marlon Tran MD    Reason for Consult:  Migraine  Subjective    Shawnee Suarez presents to [facility]    History of Present Illness  58-year-old female for follow-up of migraine.  I last saw this patient on 6/27/2023.  We started propranolol 40 mg once daily for prevention.  We continued Zomig 2.5 mg as needed for abortive. Migraines since early 30s.  Denies any major head trauma.  She reports 1-3 migraines monthly lasting anywhere from 1 to 3 days each.  Migraines are exacerbated by movement and lights.    Today patient presents by herself.  She reports improvement in her migraines.  She is now averaging only 1 \"bad migraine\" a month.  In July she had 3 headache days but these were all considered \"light\".  She did take Zomig with these.  She has tolerated the propranolol 40 mg well.  Her migraines are not classified as disabling now.  She is able to function.    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    Nonsmoker    Family history of ovarian cancer    Mixed hyperlipidemia    Migraines    Anxiety        Past Medical History:   Diagnosis Date    Anxiety     Depression     History of transfusion     Hyperlipidemia     Migraines     Migraines 6/8/2022    Ovarian cyst     x 2         Social History     Socioeconomic History    Marital status:    Tobacco Use    Smoking status: Never    Smokeless tobacco: Never   Vaping Use    Vaping Use: Never used   Substance and Sexual Activity    Alcohol use: No    Drug use: No    Sexual activity: Defer     Birth control/protection: Post-menopausal        Allergies   Allergen Reactions    Crestor [Rosuvastatin] Other (See Comments)     BODY ACHES          Current Outpatient " "Medications:     atorvastatin (LIPITOR) 20 MG tablet, Take 1 tablet by mouth Every Night., Disp: 90 tablet, Rfl: 1    Calcium-Vitamin D-Vitamin K (Viactiv Calcium Plus D) 650-12.5-40 MG-MCG-MCG chewable tablet, Chew 2 (two) times a day., Disp: , Rfl:     Cholecalciferol (Vitamin D3) 125 MCG (5000 UT) chewable tablet, Chew 5,000 Units Daily., Disp: , Rfl:     clonazePAM (KlonoPIN) 1 MG tablet, TAKE ONE TABLET BY MOUTH THREE TIMES A DAY AS NEEDED FOR ANXIETY, Disp: 65 tablet, Rfl: 0    docusate sodium (COLACE) 50 MG capsule, Take  by mouth 2 (Two) Times a Day., Disp: , Rfl:     DULoxetine (CYMBALTA) 60 MG capsule, TAKE ONE CAPSULE BY MOUTH EVERY DAY, Disp: 90 capsule, Rfl: 3    estradiol (ESTRACE) 0.1 MG/GM vaginal cream, Insert 2 g into the vagina 2 (Two) Times a Week., Disp: 42.5 g, Rfl: 6    fluticasone (FLONASE) 50 MCG/ACT nasal spray, 2 sprays into the nostril(s) as directed by provider Daily., Disp: , Rfl:     loratadine (CLARITIN) 10 MG tablet, Take 1 tablet by mouth Daily., Disp: , Rfl:     meloxicam (MOBIC) 15 MG tablet, TAKE ONE TABLET BY MOUTH EVERY DAY, Disp: 90 tablet, Rfl: 1    Multiple Vitamins-Minerals (WOMENS 50+ MULTI VITAMIN/MIN PO), Take  by mouth., Disp: , Rfl:     polyethylene glycol (MIRALAX) 17 g packet, Take 17 g by mouth Daily., Disp: , Rfl:     propranolol (INDERAL) 40 MG tablet, Take 1 tablet by mouth 2 (Two) Times a Day for 60 days., Disp: 30 tablet, Rfl: 2    traZODone (DESYREL) 50 MG tablet, TAKE ONE TABLET BY MOUTH NIGHTLY, Disp: 90 tablet, Rfl: 3    ZOLMitriptan (ZOMIG) 2.5 MG tablet, TAKE 1 TABLET BY MOUTH TWICE DAILY AS NEEDED FOR HEADACHE, Disp: 18 tablet, Rfl: 3       Objective   Vital Signs:   /76   Pulse 74   Ht 160 cm (63\")   Wt 74.8 kg (165 lb)   SpO2 96%   BMI 29.23 kg/mý       Physical Exam  Vitals and nursing note reviewed.   Constitutional:       General: She is not in acute distress.     Appearance: She is not ill-appearing.   HENT:      Head: Normocephalic. "   Eyes:      General: Lids are normal.      Extraocular Movements: Extraocular movements intact.      Pupils: Pupils are equal, round, and reactive to light.   Cardiovascular:      Rate and Rhythm: Normal rate.   Pulmonary:      Effort: Pulmonary effort is normal. No respiratory distress.   Skin:     General: Skin is warm and dry.   Neurological:      Mental Status: She is alert.      Motor: Motor strength is normal.   Psychiatric:         Speech: Speech normal.      Neurological Exam  Mental Status  Alert. Oriented to person, place, time and situation. Speech is normal. Language is fluent with no aphasia. Attention and concentration are normal.    Cranial Nerves  CN II: Visual fields full to confrontation.  CN III, IV, VI: Extraocular movements intact bilaterally. Normal lids and orbits bilaterally. Pupils equal round and reactive to light bilaterally.  CN V: Facial sensation is normal.  CN VII: Full and symmetric facial movement.  CN IX, X: Normal gag reflex.    Motor  Normal muscle bulk throughout. No fasciculations present. Normal muscle tone. No abnormal involuntary movements. Strength is 5/5 throughout all four extremities.    Gait  Casual gait is normal including stance, stride, and arm swing.    Result Review :   The following data was reviewed by: MICHELLE Johnson on 08/08/2023:       MRI Brain Without Contrast (08/23/2022 00:00)              Impression:  Shawnee Suarez is a 58 y.o. female who presents for follow-up of migraine.  She has done well with propranolol for preventative therapy.  She discontinued taking Zomig for abortive therapy.  Even though her headache days have decreased monthly, she does still have 1 bad headache days a month.  Patient would like to try to increase her propranolol to see if we can get more benefit.  This is very reasonable.    Diagnoses and all orders for this visit:    1. Migraine without aura and without status migrainosus, not intractable  -     propranolol  (INDERAL) 40 MG tablet; Take 1 tablet by mouth 2 (Two) Times a Day for 60 days.  Dispense: 30 tablet; Refill: 2        Plan:  Increase propanolol to 40 mg twice daily for preventative therapy  Continue Zomig 2.5 mg tablet as needed for abortive therapy  Patient to monitor blood pressure at home  Call with any concerns  Follow-up with me in 6 weeks    The patient and I have discussed the plan of care and she is in full agreement at this time.     Follow Up   Return in about 6 weeks (around 9/19/2023) for Migraine.            Pola Meneses, MICHELLE  08/08/23  10:40 CDT

## 2023-08-18 DIAGNOSIS — G43.009 MIGRAINE WITHOUT AURA AND WITHOUT STATUS MIGRAINOSUS, NOT INTRACTABLE: ICD-10-CM

## 2023-08-18 RX ORDER — PROPRANOLOL HYDROCHLORIDE 40 MG/1
40 TABLET ORAL 2 TIMES DAILY
Qty: 60 TABLET | Refills: 3 | Status: SHIPPED | OUTPATIENT
Start: 2023-08-18

## 2023-09-14 ENCOUNTER — OFFICE VISIT (OUTPATIENT)
Dept: FAMILY MEDICINE CLINIC | Facility: CLINIC | Age: 58
End: 2023-09-14
Payer: COMMERCIAL

## 2023-09-14 VITALS
TEMPERATURE: 96.2 F | HEART RATE: 85 BPM | HEIGHT: 63 IN | DIASTOLIC BLOOD PRESSURE: 83 MMHG | BODY MASS INDEX: 29.62 KG/M2 | SYSTOLIC BLOOD PRESSURE: 121 MMHG | WEIGHT: 167.2 LBS | OXYGEN SATURATION: 96 %

## 2023-09-14 DIAGNOSIS — J01.00 ACUTE NON-RECURRENT MAXILLARY SINUSITIS: ICD-10-CM

## 2023-09-14 DIAGNOSIS — R05.1 ACUTE COUGH: Primary | ICD-10-CM

## 2023-09-14 RX ORDER — PREDNISONE 10 MG/1
TABLET ORAL
Qty: 21 TABLET | Refills: 0 | Status: SHIPPED | OUTPATIENT
Start: 2023-09-14

## 2023-09-14 RX ORDER — AZITHROMYCIN 250 MG/1
TABLET, FILM COATED ORAL
Qty: 6 TABLET | Refills: 0 | Status: SHIPPED | OUTPATIENT
Start: 2023-09-14

## 2023-09-14 RX ORDER — TRIAMCINOLONE ACETONIDE 40 MG/ML
40 INJECTION, SUSPENSION INTRA-ARTICULAR; INTRAMUSCULAR ONCE
Status: COMPLETED | OUTPATIENT
Start: 2023-09-14 | End: 2023-09-14

## 2023-09-14 RX ADMIN — TRIAMCINOLONE ACETONIDE 40 MG: 40 INJECTION, SUSPENSION INTRA-ARTICULAR; INTRAMUSCULAR at 09:43

## 2023-09-14 NOTE — PROGRESS NOTES
CC: URI    History:  Shawnee Suarez is a 58 y.o. female who presents today for evaluation of the above problems.      URI   This is a new problem. The current episode started 1 to 4 weeks ago. The problem has been gradually worsening. There has been no fever. Associated symptoms include congestion and coughing. Associated symptoms comments: Postnasal drainage, sinus pressure, poor appetite. She has tried antihistamine (nasal steroid) for the symptoms. The treatment provided no relief.   ROS:  Review of Systems   Constitutional:  Positive for appetite change.   HENT:  Positive for congestion, postnasal drip and sinus pressure.         Throat irritation   Respiratory:  Positive for cough.      Allergies   Allergen Reactions    Crestor [Rosuvastatin] Other (See Comments)     BODY ACHES     Past Medical History:   Diagnosis Date    Anxiety     Depression     History of transfusion     Hyperlipidemia     Migraines     Migraines 6/8/2022    Ovarian cyst     x 2      Past Surgical History:   Procedure Laterality Date    APPENDECTOMY      BREAST BIOPSY Right 11/1989    CHOLECYSTECTOMY      COLONOSCOPY  11/22/2013    Normal exam repeat in 10 years    COLONOSCOPY N/A 11/13/2018    Normal but prep was only fair repeat in 2 years    COLONOSCOPY N/A 1/22/2021    Procedure: COLONOSCOPY WITH ANESTHESIA;  Surgeon: Da Miles MD;  Location: [facility] ENDOSCOPY;  Service: Gastroenterology;  Laterality: N/A;  pre: screen  post: normal  Marlon Tran MD      D & C HYSTEROSCOPY ENDOMETRIAL ABLATION N/A 2/6/2017    Procedure: DILATATION AND CURETTAGE HYSTEROSCOPY NOVASURE ENDOMETRIAL ABLATION;  Surgeon: Ana Barrios MD;  Location: [facility] OR;  Service:     DIAGNOSTIC LAPAROSCOPY N/A 6/17/2019    Procedure: DIAGNOSTIC LAPAROSCOPY;  Surgeon: Ana Barrios MD;  Location: [facility] OR;  Service: Obstetrics/Gynecology    OVARIAN CYST REMOVAL      x 2    SALPINGO OOPHORECTOMY Bilateral 6/17/2019    Procedure: SALPINGO OOPHORECTOMY  LAPAROSCOPIC;  Surgeon: Ana Barrios MD;  Location: [facility];  Service: Obstetrics/Gynecology     Family History   Problem Relation Age of Onset    Brain cancer Father     Ovarian cancer Mother 72    No Known Problems Brother     Heart disease Brother     No Known Problems Daughter     No Known Problems Daughter     Brain cancer Paternal Grandfather     No Known Problems Paternal Grandmother     No Known Problems Maternal Grandmother     No Known Problems Maternal Grandfather     No Known Problems Maternal Aunt     No Known Problems Paternal Aunt     Breast cancer Other 28    Uterine cancer Neg Hx     Colon cancer Neg Hx     Melanoma Neg Hx     Prostate cancer Neg Hx     BRCA 1/2 Neg Hx     Endometrial cancer Neg Hx     Colon polyps Neg Hx       reports that she has never smoked. She has never used smokeless tobacco. She reports that she does not drink alcohol and does not use drugs.      Current Outpatient Medications:     atorvastatin (LIPITOR) 20 MG tablet, Take 1 tablet by mouth Every Night., Disp: 90 tablet, Rfl: 1    Calcium-Vitamin D-Vitamin K (Viactiv Calcium Plus D) 650-12.5-40 MG-MCG-MCG chewable tablet, Chew 2 (two) times a day., Disp: , Rfl:     Cholecalciferol (Vitamin D3) 125 MCG (5000 UT) chewable tablet, Chew 5,000 Units Daily., Disp: , Rfl:     clonazePAM (KlonoPIN) 1 MG tablet, TAKE ONE TABLET BY MOUTH THREE TIMES A DAY AS NEEDED FOR ANXIETY, Disp: 65 tablet, Rfl: 0    docusate sodium (COLACE) 50 MG capsule, Take  by mouth 2 (Two) Times a Day., Disp: , Rfl:     DULoxetine (CYMBALTA) 60 MG capsule, TAKE ONE CAPSULE BY MOUTH EVERY DAY, Disp: 90 capsule, Rfl: 3    estradiol (ESTRACE) 0.1 MG/GM vaginal cream, Insert 2 g into the vagina 2 (Two) Times a Week., Disp: 42.5 g, Rfl: 6    fluticasone (FLONASE) 50 MCG/ACT nasal spray, 2 sprays into the nostril(s) as directed by provider Daily., Disp: , Rfl:     loratadine (CLARITIN) 10 MG tablet, Take 1 tablet by mouth Daily., Disp: , Rfl:     meloxicam  "(MOBIC) 15 MG tablet, TAKE ONE TABLET BY MOUTH EVERY DAY, Disp: 90 tablet, Rfl: 1    Multiple Vitamins-Minerals (WOMENS 50+ MULTI VITAMIN/MIN PO), Take  by mouth., Disp: , Rfl:     polyethylene glycol (MIRALAX) 17 g packet, Take 17 g by mouth Daily., Disp: , Rfl:     propranolol (INDERAL) 40 MG tablet, Take 1 tablet by mouth 2 (Two) Times a Day., Disp: 60 tablet, Rfl: 3    traZODone (DESYREL) 50 MG tablet, TAKE ONE TABLET BY MOUTH NIGHTLY, Disp: 90 tablet, Rfl: 3    ZOLMitriptan (ZOMIG) 2.5 MG tablet, TAKE 1 TABLET BY MOUTH TWICE DAILY AS NEEDED FOR HEADACHE, Disp: 18 tablet, Rfl: 3    azithromycin (Zithromax) 250 MG tablet, Take 2 tablets the first day, then 1 tablet daily for 4 days., Disp: 6 tablet, Rfl: 0    predniSONE (DELTASONE) 10 MG (21) dose pack, Use as directed on package, Disp: 21 tablet, Rfl: 0    Current Facility-Administered Medications:     triamcinolone acetonide (KENALOG-40) injection 40 mg, 40 mg, Intramuscular, Once, Yajaira Jimenez, APRN    OBJECTIVE:  /83 (BP Location: Left arm, Patient Position: Sitting, Cuff Size: Adult)   Pulse 85   Temp 96.2 °F (35.7 °C) (Temporal)   Ht 160 cm (63\")   Wt 75.8 kg (167 lb 3.2 oz)   LMP  (LMP Unknown)   SpO2 96%   BMI 29.62 kg/m²    Physical Exam  Vitals reviewed.   Constitutional:       Appearance: She is well-developed.   HENT:      Right Ear: Tympanic membrane, ear canal and external ear normal.      Left Ear: Tympanic membrane, ear canal and external ear normal.      Mouth/Throat:      Pharynx: Posterior oropharyngeal erythema present.   Cardiovascular:      Rate and Rhythm: Normal rate and regular rhythm.      Heart sounds: Normal heart sounds.   Pulmonary:      Effort: Pulmonary effort is normal.      Breath sounds: Normal breath sounds.   Neurological:      Mental Status: She is alert and oriented to person, place, and time.   Psychiatric:         Behavior: Behavior normal.       Assessment/Plan    Diagnoses and all orders for this " visit:    1. Acute cough (Primary)  -     POCT SARS-CoV-2 Antigen EVON + Flu    2. Acute non-recurrent maxillary sinusitis  -     triamcinolone acetonide (KENALOG-40) injection 40 mg  -     azithromycin (Zithromax) 250 MG tablet; Take 2 tablets the first day, then 1 tablet daily for 4 days.  Dispense: 6 tablet; Refill: 0  -     predniSONE (DELTASONE) 10 MG (21) dose pack; Use as directed on package  Dispense: 21 tablet; Refill: 0        An After Visit Summary was printed and given to the patient at discharge.  Return if symptoms worsen or fail to improve, for Next scheduled follow up.       MICHELLE Arnold 9/14/23    Electronically signed.

## 2023-09-18 ENCOUNTER — TELEPHONE (OUTPATIENT)
Dept: NEUROLOGY | Facility: CLINIC | Age: 58
End: 2023-09-18
Payer: COMMERCIAL

## 2023-09-18 DIAGNOSIS — G43.709 CHRONIC MIGRAINE WITHOUT AURA WITHOUT STATUS MIGRAINOSUS, NOT INTRACTABLE: Primary | ICD-10-CM

## 2023-09-18 RX ORDER — PROPRANOLOL HYDROCHLORIDE 80 MG/1
80 CAPSULE, EXTENDED RELEASE ORAL DAILY
Qty: 30 CAPSULE | Refills: 6 | Status: SHIPPED | OUTPATIENT
Start: 2023-09-18

## 2023-09-18 NOTE — TELEPHONE ENCOUNTER
CALLED PATIENT TO GET HER RESCHEDULED. GAVE HER APPOINTMENT DATE AND TIME FOR 10/6/23 @ 1030. PATIENT IS AWARE OF DATE AND TIME. ALSO SHE REQUESTED THAT SHE GET PROPANOLOL ER AS MORALES HAD SAID IF SHE PREFERRED THAT AFTER TRYING THE ORIGINAL TO LET HIM KNOW BUT SHE STATED SHE WOULD BE OUT BEFORE NEXT APPOINTMENT AND WOULD LIKE TO GO AHEAD AND DO THE EXTENDED RELEASE. SPOKE WITH MORALES AND HE STATED HE WOULD GET THAT SENT IN FOR HER. PATIENT REQUESTED [facility]. PATIENT VOICED UNDERSTANDING

## 2023-09-18 NOTE — TELEPHONE ENCOUNTER
"    Hub staff attempted to follow warm transfer process and was unsuccessful     Caller: Shawnee Suarez    Relationship to patient: Self    Best call back number: [phone]    Patient is needing: PATIENT IS REQUESTING FOR HER APPOINTMENT ON 09/25/23 AT 10:30 AM TO BE RESCHEDULED. PER [facility] PROTOCOL: \"PRACTICE ONLY TO SCHEDULE!\"      PLEASE REVIEW      THANK YOU        "

## 2023-09-27 DIAGNOSIS — E78.2 MIXED HYPERLIPIDEMIA: Primary | ICD-10-CM

## 2023-09-27 RX ORDER — ATORVASTATIN CALCIUM 40 MG/1
40 TABLET, FILM COATED ORAL NIGHTLY
Qty: 90 TABLET | Refills: 3 | Status: SHIPPED | OUTPATIENT
Start: 2023-09-27

## 2023-10-02 ENCOUNTER — FLU SHOT (OUTPATIENT)
Dept: FAMILY MEDICINE CLINIC | Facility: CLINIC | Age: 58
End: 2023-10-02
Payer: COMMERCIAL

## 2023-10-02 DIAGNOSIS — Z23 NEED FOR INFLUENZA VACCINATION: Primary | ICD-10-CM

## 2023-10-05 ENCOUNTER — TELEPHONE (OUTPATIENT)
Dept: NEUROLOGY | Facility: CLINIC | Age: 58
End: 2023-10-05
Payer: COMMERCIAL

## 2023-10-06 ENCOUNTER — OFFICE VISIT (OUTPATIENT)
Dept: NEUROLOGY | Facility: CLINIC | Age: 58
End: 2023-10-06
Payer: COMMERCIAL

## 2023-10-06 VITALS
DIASTOLIC BLOOD PRESSURE: 62 MMHG | SYSTOLIC BLOOD PRESSURE: 110 MMHG | HEART RATE: 78 BPM | HEIGHT: 63 IN | RESPIRATION RATE: 18 BRPM | WEIGHT: 168 LBS | BODY MASS INDEX: 29.77 KG/M2

## 2023-10-06 DIAGNOSIS — G43.709 CHRONIC MIGRAINE WITHOUT AURA WITHOUT STATUS MIGRAINOSUS, NOT INTRACTABLE: ICD-10-CM

## 2023-10-06 RX ORDER — PROPRANOLOL HYDROCHLORIDE 80 MG/1
80 CAPSULE, EXTENDED RELEASE ORAL DAILY
Qty: 90 CAPSULE | Refills: 3 | Status: SHIPPED | OUTPATIENT
Start: 2023-10-06 | End: 2024-10-05

## 2023-10-06 RX ORDER — ZOLMITRIPTAN 2.5 MG/1
TABLET, FILM COATED ORAL
Qty: 12 TABLET | Refills: 6 | Status: SHIPPED | OUTPATIENT
Start: 2023-10-06

## 2023-10-06 NOTE — PROGRESS NOTES
Neurology Consult Note    [facility]  [address], [address]  [place] [address]  Phone: [phone]  Fax: [phone]    Referring Provider:   No ref. provider found  Primary Care Provider:  Marlon Tran MD    Reason for Consult:  Migraine  Subjective        Shawnee Suarez presents to [facility]    History of Present Illness    Very pleasant 4-year-old female seen for follow-up of migraine.  Patient was last seen in clinic on 8/8/2023.  At that time we had increase patient's propranolol to 80 mg daily extended release for migraine prevention.  Additionally patient take Zomig 2.5 mg tablets for abortive therapy.  Since her last visit she only reports 3 headaches.  She did not classify these as migrainous.  [place]  She relates these headaches to allergies.  She has started taking antihistamines to help.  She does deny having 0 migraine days.  She is very pleased with this result.  For the 3 minor headache she has taken her Zomig to prevent this from proceeding into a migraine.  The Zomig does handle these headaches and relieves them.  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    Nonsmoker    Family history of ovarian cancer    Mixed hyperlipidemia    Migraines    Anxiety        Past Medical History:   Diagnosis Date    Anxiety     Depression     History of transfusion     Hyperlipidemia     Migraines     Migraines 6/8/2022    Ovarian cyst     x 2         Social History     Socioeconomic History    Marital status:    Tobacco Use    Smoking status: Never    Smokeless tobacco: Never   Vaping Use    Vaping Use: Never used   Substance and Sexual Activity    Alcohol use: No    Drug use: No    Sexual activity: Defer     Birth control/protection: Post-menopausal        Allergies   Allergen Reactions    Crestor [Rosuvastatin] Other (See Comments)     BODY ACHES     "      Current Outpatient Medications:     atorvastatin (Lipitor) 40 MG tablet, Take 1 tablet by mouth Every Night. (Patient taking differently: Take 2 tablets by mouth Every Night.), Disp: 90 tablet, Rfl: 3    Calcium-Vitamin D-Vitamin K (Viactiv Calcium Plus D) 650-12.5-40 MG-MCG-MCG chewable tablet, Chew 2 (two) times a day., Disp: , Rfl:     Cholecalciferol (Vitamin D3) 125 MCG (5000 UT) chewable tablet, Chew 5,000 Units Daily., Disp: , Rfl:     clonazePAM (KlonoPIN) 1 MG tablet, TAKE ONE TABLET BY MOUTH THREE TIMES A DAY AS NEEDED FOR ANXIETY, Disp: 65 tablet, Rfl: 0    docusate sodium (COLACE) 50 MG capsule, Take  by mouth 2 (Two) Times a Day., Disp: , Rfl:     DULoxetine (CYMBALTA) 60 MG capsule, TAKE ONE CAPSULE BY MOUTH EVERY DAY, Disp: 90 capsule, Rfl: 3    estradiol (ESTRACE) 0.1 MG/GM vaginal cream, Insert 2 g into the vagina 2 (Two) Times a Week., Disp: 42.5 g, Rfl: 6    fluticasone (FLONASE) 50 MCG/ACT nasal spray, 2 sprays into the nostril(s) as directed by provider Daily., Disp: , Rfl:     loratadine (CLARITIN) 10 MG tablet, Take 1 tablet by mouth Daily., Disp: , Rfl:     meloxicam (MOBIC) 15 MG tablet, TAKE ONE TABLET BY MOUTH EVERY DAY, Disp: 90 tablet, Rfl: 1    Multiple Vitamins-Minerals (WOMENS 50+ MULTI VITAMIN/MIN PO), Take  by mouth., Disp: , Rfl:     polyethylene glycol (MIRALAX) 17 g packet, Take 17 g by mouth Daily., Disp: , Rfl:     propranolol LA (INDERAL LA) 80 MG 24 hr capsule, Take 1 capsule by mouth Daily., Disp: 90 capsule, Rfl: 3    traZODone (DESYREL) 50 MG tablet, TAKE ONE TABLET BY MOUTH NIGHTLY, Disp: 90 tablet, Rfl: 3    ZOLMitriptan (ZOMIG) 2.5 MG tablet, TAKE 1 TABLET BY MOUTH TWICE DAILY AS NEEDED FOR HEADACHE, Disp: 12 tablet, Rfl: 6       Objective   Vital Signs:   /62 (BP Location: Left arm, Patient Position: Sitting)   Pulse 78   Resp 18   Ht 160 cm (63\")   Wt 76.2 kg (168 lb)   BMI 29.76 kg/m²       Physical Exam  Constitutional:       General: She is not in " acute distress.  HENT:      Head: Normocephalic.   Eyes:      General: Lids are normal.      Extraocular Movements: Extraocular movements intact.   Cardiovascular:      Rate and Rhythm: Normal rate.   Pulmonary:      Effort: Pulmonary effort is normal. No respiratory distress.   Skin:     General: Skin is warm and dry.      Capillary Refill: Capillary refill takes less than 2 seconds.   Neurological:      Mental Status: She is alert.   Psychiatric:         Speech: Speech normal.      Neurological Exam  Mental Status  Alert. Oriented to person, place, time and situation. Speech is normal. Language is fluent with no aphasia.    Cranial Nerves  CN III, IV, VI: Extraocular movements intact bilaterally. Normal lids and orbits bilaterally.  CN VII: Full and symmetric facial movement.    Motor  Normal muscle bulk throughout. No fasciculations present. Normal muscle tone. No abnormal involuntary movements.    Gait  Casual gait is normal including stance, stride, and arm swing.    Result Review :   The following data was reviewed by: MICHELLE Johnson on 10/06/2023:         Progress Notes by Pola Meneses APRN (08/08/2023 10:30)                Impression:  Shawnee Suarez is a 58 y.o. female who presents for follow-up of migraine.  She is done quite well with her new medication regimen.  Her blood pressures have been stable.  She has had 0 migraine days since her last visit.  She is experiencing some seasonal allergies which she is taking antihistamines for.  Did discuss with patient that allergies may contribute to migraine formation.  I will not be making any changes to her medication regimen.    Diagnoses and all orders for this visit:    1. Chronic migraine without aura without status migrainosus, not intractable  -     propranolol LA (INDERAL LA) 80 MG 24 hr capsule; Take 1 capsule by mouth Daily.  Dispense: 90 capsule; Refill: 3  -     ZOLMitriptan (ZOMIG) 2.5 MG tablet; TAKE 1 TABLET BY MOUTH TWICE DAILY AS  NEEDED FOR HEADACHE  Dispense: 12 tablet; Refill: 6        Plan:  Continue propranolol 80 mg extended release daily for migraine prevention  Continue Zomig 2.5 mg as needed for migraine   Avoid triggers  Follow-up with me in 6 months    The patient and I have discussed the plan of care and she is in full agreement at this time.     Follow Up   Return in about 6 months (around 2024) for Migraine.            Pola Meneses, MICHELLE  10/06/23  11:02 CDT

## 2023-10-07 DIAGNOSIS — G43.709 CHRONIC MIGRAINE WITHOUT AURA WITHOUT STATUS MIGRAINOSUS, NOT INTRACTABLE: ICD-10-CM

## 2023-10-07 DIAGNOSIS — F41.9 ANXIETY: ICD-10-CM

## 2023-10-09 ENCOUNTER — OFFICE VISIT (OUTPATIENT)
Dept: FAMILY MEDICINE CLINIC | Facility: CLINIC | Age: 58
End: 2023-10-09
Payer: COMMERCIAL

## 2023-10-09 VITALS
BODY MASS INDEX: 30.3 KG/M2 | HEIGHT: 63 IN | WEIGHT: 171 LBS | DIASTOLIC BLOOD PRESSURE: 78 MMHG | RESPIRATION RATE: 18 BRPM | SYSTOLIC BLOOD PRESSURE: 122 MMHG | TEMPERATURE: 97.6 F

## 2023-10-09 DIAGNOSIS — F41.9 ANXIETY: Primary | ICD-10-CM

## 2023-10-09 DIAGNOSIS — Z23 NEED FOR COVID-19 VACCINE: ICD-10-CM

## 2023-10-09 RX ORDER — CLONAZEPAM 1 MG/1
TABLET ORAL
Qty: 65 TABLET | Refills: 0 | OUTPATIENT
Start: 2023-10-09

## 2023-10-09 RX ORDER — ZOLMITRIPTAN 2.5 MG/1
TABLET, FILM COATED ORAL
Qty: 18 TABLET | Refills: 3 | OUTPATIENT
Start: 2023-10-09

## 2023-10-09 RX ORDER — CLONAZEPAM 1 MG/1
1 TABLET ORAL 3 TIMES DAILY PRN
Qty: 90 TABLET | Refills: 1 | Status: SHIPPED | OUTPATIENT
Start: 2023-10-09

## 2023-10-09 NOTE — PROGRESS NOTES
Subjective   Shawnee Suarez is a 58 y.o. female.     History of Present Illness  58-year-old female with chronic anxiety      The following portions of the patient's history were reviewed and updated as appropriate: allergies, current medications, past family history, past medical history, past social history, past surgical history, and problem list.    Review of Systems   Respiratory:  Negative for shortness of breath.    Cardiovascular:  Negative for chest pain and leg swelling.   Neurological:  Positive for headaches.        Migraine headaches-suggested a baby aspirin a day   Psychiatric/Behavioral:  The patient is nervous/anxious.        Objective   Physical Exam  Vitals and nursing note reviewed.   Constitutional:       Appearance: She is obese.   Eyes:      Extraocular Movements: Extraocular movements intact.      Pupils: Pupils are equal, round, and reactive to light.   Cardiovascular:      Rate and Rhythm: Normal rate and regular rhythm.   Pulmonary:      Effort: Pulmonary effort is normal.      Breath sounds: Normal breath sounds.   Abdominal:      General: Abdomen is flat.      Palpations: Abdomen is soft.   Musculoskeletal:      Right lower leg: No edema.      Left lower leg: No edema.   Skin:     General: Skin is warm and dry.   Neurological:      General: No focal deficit present.      Mental Status: She is alert and oriented to person, place, and time.   Psychiatric:         Mood and Affect: Mood normal.         Behavior: Behavior normal.         Thought Content: Thought content normal.         Judgment: Judgment normal.         Assessment & Plan   Diagnoses and all orders for this visit:    1. Anxiety (Primary)  -     clonazePAM (KlonoPIN) 1 MG tablet; Take 1 tablet by mouth 3 (Three) Times a Day As Needed for Anxiety (Anxiety). for anxiety  Dispense: 90 tablet; Refill: 1    2. Need for COVID-19 vaccine  -     COVID-19 F23 (Pfizer) 12yrs+ (COMIRNATY)         Plan above-stressed exercise-Flonase and  Zyrtec for allergies

## 2023-10-09 NOTE — TELEPHONE ENCOUNTER
"Rx Refill Note  Requested Prescriptions     Pending Prescriptions Disp Refills    ZOLMitriptan (ZOMIG) 2.5 MG tablet [Pharmacy Med Name: ZOLMITRIPTAN 2.5MG TABS] 18 tablet 3     Sig: TAKE ONE TABLET BY MOUTH TWICE A DAY AS NEEDED FOR HEADACHE    clonazePAM (KlonoPIN) 1 MG tablet [Pharmacy Med Name: CLONAZEPAM 1MG TABS] 65 tablet 0     Sig: TAKE ONE TABLET BY MOUTH THREE TIMES A DAY AS NEEDED FOR ANXIETY      Last office visit with prescribing clinician: 7/10/2023   Last telemedicine visit with prescribing clinician: Visit date not found   Next office visit with prescribing clinician: 12/14/2023     Pain therapy appt 6/14/23  Contract & UDS 03//6/23    Bree Lockett MA  10/09/23, 07:35 CDT    Relay     \"Patient is due controlled medication appointment.\"                  "

## 2023-12-06 VITALS — OXYGEN SATURATION: 98 % | HEIGHT: 63 IN

## 2023-12-06 RX ORDER — POLYETHYLENE GLYCOL 3350 17 G/17G
17 POWDER, FOR SOLUTION ORAL DAILY
COMMUNITY

## 2023-12-06 RX ORDER — MELOXICAM 15 MG/1
1 TABLET ORAL DAILY
COMMUNITY
Start: 2023-07-20

## 2023-12-06 RX ORDER — FLUTICASONE PROPIONATE 50 MCG
1 SPRAY, SUSPENSION (ML) NASAL DAILY
COMMUNITY

## 2023-12-06 RX ORDER — TRAZODONE HYDROCHLORIDE 50 MG/1
50 TABLET ORAL NIGHTLY
COMMUNITY

## 2023-12-06 RX ORDER — LORATADINE 10 MG/1
10 TABLET ORAL DAILY
COMMUNITY

## 2023-12-06 RX ORDER — PROPRANOLOL HYDROCHLORIDE 40 MG/1
40 TABLET ORAL 3 TIMES DAILY
COMMUNITY

## 2023-12-06 RX ORDER — ZOLMITRIPTAN 2.5 MG/1
2.5 TABLET, FILM COATED ORAL PRN
COMMUNITY

## 2023-12-06 RX ORDER — DULOXETIN HYDROCHLORIDE 60 MG/1
60 CAPSULE, DELAYED RELEASE ORAL DAILY
COMMUNITY

## 2023-12-06 RX ORDER — CLONAZEPAM 1 MG/1
1 TABLET ORAL 2 TIMES DAILY PRN
COMMUNITY

## 2023-12-06 RX ORDER — ATORVASTATIN CALCIUM 20 MG/1
20 TABLET, FILM COATED ORAL DAILY
COMMUNITY

## 2023-12-06 RX ORDER — ESTRADIOL 0.1 MG/G
2 CREAM VAGINAL DAILY
COMMUNITY

## 2023-12-12 ENCOUNTER — OFFICE VISIT (OUTPATIENT)
Dept: NEUROLOGY | Age: 58
End: 2023-12-12

## 2023-12-12 VITALS
DIASTOLIC BLOOD PRESSURE: 74 MMHG | HEART RATE: 84 BPM | OXYGEN SATURATION: 99 % | SYSTOLIC BLOOD PRESSURE: 106 MMHG | WEIGHT: 165 LBS | HEIGHT: 63 IN | BODY MASS INDEX: 29.23 KG/M2

## 2023-12-12 DIAGNOSIS — G47.33 OBSTRUCTIVE SLEEP APNEA: Primary | ICD-10-CM

## 2023-12-12 DIAGNOSIS — G47.00 INSOMNIA, UNSPECIFIED TYPE: ICD-10-CM

## 2023-12-12 DIAGNOSIS — G47.34 NOCTURNAL HYPOXEMIA: ICD-10-CM

## 2023-12-12 DIAGNOSIS — R06.83 SNORING: ICD-10-CM

## 2023-12-12 NOTE — PATIENT INSTRUCTIONS
long-term follow-up should be established. Annual visits are reasonable, with more frequent visits in between if new issues arise. The purpose of long-term follow-up is to assess usage and monitor for recurrent NEGRO, new side effects, air leakage, and fluctuations in body weight. WHERE TO GET MORE INFORMATION -- Your healthcare provider is the best source of information for questions and concerns related to your medical problem. Organizations  American Sleep Apnea Association  Provides information about sleep apnea to the public, publishes a newsletter, and serves as an advocate for people with the disorder. [address], [address] [facility]   [address], [address] [facility]   [email or web address]   [email or web address]   Tel: [phone]   Fax: [phone] [facility],[place] [address] organization that works to [employer] and safety by promoting public understanding of sleep and sleep disorders. Supports sleep-related education, research, and advocacy; produces and distributes educational materials to the public and healthcare professionals; and offers postdoctoral fellowships and grants for sleep researchers. [address] [place] [address]   [email or web address]   [email or web address]   Tel: [phone]   Fax: [phone]    Important information:  Medicare/private insurance CPAP/BiPAP/APAP requirements:  Medicare/private insurance has specific requirements for PAP compliance that must be met during the first 90 days of use to continue coverage for CPAP/BiPAP/APAP  from day 91 and beyond. The policy requires that patients use a PAP device 4 hours per 24 hour period, at least 70% of the time over a 30 day period. This data must be downloaded as a report direct from the PAP devices. This is called a compliance download. Your PAP supplier will assist you in this matter.      Note:  Where applicable, we will utilize PAP device efficiency

## 2023-12-12 NOTE — PROGRESS NOTES
REVIEW OF SYSTEMS    Constitutional: []Fever []Sweats []Chills [] Recent Injury   [x] Denies all unless marked  HENT:[]Headache  [] Head Injury  [] Sore Throat  [] Ear Pain  [] Dizziness [] Hearing Loss   [x] Denies all unless marked  Musculoskeletal: [] Arthralgia  [] Myalgias [] Muscle cramps  [] Muscle twitches   [x] Denies all unless marked   Spine:  [] Neck pain  [] Back pain  [] Sciatica  [x] Denies all unless marked  Neurological:[] Visual Disturbance [] Double Vision [] Slurred Speech [] Trouble swallowing  [] Vertigo [] Tingling [] Numbness [] Weakness [] Loss of Balance   [] Loss of Consciousness [] Memory Loss [] Seizures  [x] Denies all unless marked  Psychiatric/Behavioral:[] Depression [] Anxiety  [x] Denies all unless marked  Sleep: []  Insomnia [x] Sleep Disturbance [] Snoring [] Restless Legs [] Daytime Sleepiness [] Sleep Apnea  [] Denies all unless marked
issues and agrees with the care plan. I spent 37 minutes caring for Kisha Maddox  on this date of service.  This time includes time spent by me in the following activities:preparing for the visit, reviewing tests, performing a medically appropriate examination and/or evaluation , counseling and educating the patient/family/caregiver, ordering medications, tests, or procedures, documenting information in the medical record and care coordination

## 2023-12-14 ENCOUNTER — OFFICE VISIT (OUTPATIENT)
Dept: FAMILY MEDICINE CLINIC | Facility: CLINIC | Age: 58
End: 2023-12-14
Payer: COMMERCIAL

## 2023-12-14 VITALS
WEIGHT: 169.6 LBS | HEIGHT: 63 IN | TEMPERATURE: 97.6 F | DIASTOLIC BLOOD PRESSURE: 70 MMHG | OXYGEN SATURATION: 97 % | BODY MASS INDEX: 30.05 KG/M2 | SYSTOLIC BLOOD PRESSURE: 114 MMHG | HEART RATE: 64 BPM | RESPIRATION RATE: 16 BRPM

## 2023-12-14 DIAGNOSIS — E55.9 VITAMIN D DEFICIENCY: ICD-10-CM

## 2023-12-14 DIAGNOSIS — R73.9 ELEVATED BLOOD SUGAR: ICD-10-CM

## 2023-12-14 DIAGNOSIS — R39.15 URINARY URGENCY: Primary | ICD-10-CM

## 2023-12-14 DIAGNOSIS — Z00.00 ROUTINE GENERAL MEDICAL EXAMINATION AT A HEALTH CARE FACILITY: ICD-10-CM

## 2023-12-14 NOTE — PROGRESS NOTES
Chief Complaint   Patient presents with    Annual Exam     Fasting.  GYN handles mammos and paps       History:  Shawnee Suarez is a 58 y.o. female who presents today for evaluation of the above problems.      58-year-old female for annual exam        ROS:  Review of Systems   Respiratory:  Positive for apnea. Negative for shortness of breath.         Probable sleep study after the first year for probable sleep apnea that could be aggravating her migraines   Cardiovascular:  Negative for chest pain and leg swelling.   Gastrointestinal:         Colonoscopy 2021-GI recommended 10-year follow-up   Musculoskeletal:  Positive for arthralgias and back pain.   Neurological:         Vascular headaches   Psychiatric/Behavioral:  The patient is nervous/anxious.    All other systems reviewed and are negative.      Allergies   Allergen Reactions    Crestor [Rosuvastatin] Other (See Comments)     BODY ACHES     Past Medical History:   Diagnosis Date    Anxiety     Depression     History of transfusion     Hyperlipidemia     Migraines     Migraines 6/8/2022    Ovarian cyst     x 2      Past Surgical History:   Procedure Laterality Date    APPENDECTOMY      BREAST BIOPSY Right 11/1989    CHOLECYSTECTOMY      COLONOSCOPY  11/22/2013    Normal exam repeat in 10 years    COLONOSCOPY N/A 11/13/2018    Normal but prep was only fair repeat in 2 years    COLONOSCOPY N/A 1/22/2021    Procedure: COLONOSCOPY WITH ANESTHESIA;  Surgeon: Da Miles MD;  Location: [facility] ENDOSCOPY;  Service: Gastroenterology;  Laterality: N/A;  pre: screen  post: normal  Marlon Tran MD      D & C HYSTEROSCOPY ENDOMETRIAL ABLATION N/A 2/6/2017    Procedure: DILATATION AND CURETTAGE HYSTEROSCOPY NOVASURE ENDOMETRIAL ABLATION;  Surgeon: Ana Barrios MD;  Location: [facility] OR;  Service:     DIAGNOSTIC LAPAROSCOPY N/A 6/17/2019    Procedure: DIAGNOSTIC LAPAROSCOPY;  Surgeon: Ana Barrios MD;  Location: [facility] OR;  Service: Obstetrics/Gynecology     OVARIAN CYST REMOVAL      x 2    SALPINGO OOPHORECTOMY Bilateral 6/17/2019    Procedure: SALPINGO OOPHORECTOMY LAPAROSCOPIC;  Surgeon: Ana Barrios MD;  Location: [facility];  Service: Obstetrics/Gynecology     Family History   Problem Relation Age of Onset    Brain cancer Father     Ovarian cancer Mother 72    No Known Problems Brother     Heart disease Brother     No Known Problems Daughter     No Known Problems Daughter     Brain cancer Paternal Grandfather     No Known Problems Paternal Grandmother     No Known Problems Maternal Grandmother     No Known Problems Maternal Grandfather     No Known Problems Maternal Aunt     No Known Problems Paternal Aunt     Breast cancer Other 28    Uterine cancer Neg Hx     Colon cancer Neg Hx     Melanoma Neg Hx     Prostate cancer Neg Hx     BRCA 1/2 Neg Hx     Endometrial cancer Neg Hx     Colon polyps Neg Hx       reports that she has never smoked. She has never used smokeless tobacco. She reports that she does not drink alcohol and does not use drugs.      Current Outpatient Medications:     atorvastatin (Lipitor) 40 MG tablet, Take 1 tablet by mouth Every Night. (Patient taking differently: Take 2 tablets by mouth Every Night.), Disp: 90 tablet, Rfl: 3    Calcium-Vitamin D-Vitamin K (Viactiv Calcium Plus D) 650-12.5-40 MG-MCG-MCG chewable tablet, Chew 2 (two) times a day., Disp: , Rfl:     Cholecalciferol (Vitamin D3) 125 MCG (5000 UT) chewable tablet, Chew 5,000 Units Daily., Disp: , Rfl:     clonazePAM (KlonoPIN) 1 MG tablet, Take 1 tablet by mouth 3 (Three) Times a Day As Needed for Anxiety (Anxiety). for anxiety, Disp: 90 tablet, Rfl: 1    docusate sodium (COLACE) 50 MG capsule, Take 2 capsules by mouth Daily., Disp: , Rfl:     DULoxetine (CYMBALTA) 60 MG capsule, TAKE ONE CAPSULE BY MOUTH EVERY DAY, Disp: 90 capsule, Rfl: 3    estradiol (ESTRACE) 0.1 MG/GM vaginal cream, Insert 2 g into the vagina 2 (Two) Times a Week., Disp: 42.5 g, Rfl: 6    fluticasone  "(FLONASE) 50 MCG/ACT nasal spray, 2 sprays into the nostril(s) as directed by provider Daily., Disp: , Rfl:     loratadine (CLARITIN) 10 MG tablet, Take 1 tablet by mouth Daily., Disp: , Rfl:     meloxicam (MOBIC) 15 MG tablet, TAKE ONE TABLET BY MOUTH EVERY DAY, Disp: 90 tablet, Rfl: 1    Multiple Vitamins-Minerals (WOMENS 50+ MULTI VITAMIN/MIN PO), Take  by mouth., Disp: , Rfl:     polyethylene glycol (MIRALAX) 17 g packet, Take 17 g by mouth Daily., Disp: , Rfl:     propranolol LA (INDERAL LA) 80 MG 24 hr capsule, Take 1 capsule by mouth Daily., Disp: 90 capsule, Rfl: 3    traZODone (DESYREL) 50 MG tablet, TAKE ONE TABLET BY MOUTH NIGHTLY, Disp: 90 tablet, Rfl: 3    ZOLMitriptan (ZOMIG) 2.5 MG tablet, TAKE 1 TABLET BY MOUTH TWICE DAILY AS NEEDED FOR HEADACHE, Disp: 12 tablet, Rfl: 6    OBJECTIVE:  /70 (BP Location: Left arm, Patient Position: Sitting, Cuff Size: Adult)   Pulse 64   Temp 97.6 °F (36.4 °C) (Temporal)   Resp 16   Ht 160 cm (63\")   Wt 76.9 kg (169 lb 9.6 oz)   LMP  (LMP Unknown)   SpO2 97%   BMI 30.04 kg/m²    Physical Exam  Vitals and nursing note reviewed.   Constitutional:       Appearance: Normal appearance.   HENT:      Right Ear: Tympanic membrane and ear canal normal.      Left Ear: Tympanic membrane and ear canal normal.      Mouth/Throat:      Mouth: Mucous membranes are moist.      Pharynx: Oropharynx is clear.   Eyes:      Extraocular Movements: Extraocular movements intact.      Pupils: Pupils are equal, round, and reactive to light.   Neck:      Vascular: No carotid bruit.   Cardiovascular:      Rate and Rhythm: Normal rate and regular rhythm.      Pulses: Normal pulses.      Heart sounds: Normal heart sounds.   Pulmonary:      Effort: Pulmonary effort is normal.      Breath sounds: Normal breath sounds.      Comments: Breast and GYN exam by another provider  Abdominal:      General: Abdomen is flat.      Palpations: Abdomen is soft. There is no mass.   Genitourinary:     " Comments: Surgical past  Musculoskeletal:      Right lower leg: No edema.      Left lower leg: No edema.   Lymphadenopathy:      Cervical: No cervical adenopathy.   Skin:     General: Skin is warm and dry.   Neurological:      General: No focal deficit present.      Mental Status: She is alert and oriented to person, place, and time.   Psychiatric:         Mood and Affect: Mood normal.         Behavior: Behavior normal.         Thought Content: Thought content normal.         Judgment: Judgment normal.                   Health Maintenance:  Immunization History   Administered Date(s) Administered    COVID-19 (MODERNA) 1st,2nd,3rd Dose Monovalent 03/10/2021, 04/07/2021    COVID-19 (MODERNA) Monovalent Original Booster 11/11/2021    COVID-19 (PFIZER) BIVALENT 12+YRS 10/20/2022    COVID-19 F23 (PFIZER) 12YRS+ (COMIRNATY) 10/09/2023    Flu Vaccine Intradermal Quad 18-64YR 10/02/2017    Flublok 18+yrs 11/11/2021    Fluzone (or Fluarix & Flulaval for VFC) >6mos 09/28/2020, 10/20/2022, 10/02/2023    Influenza, Unspecified 10/20/2022    Pneumococcal Polysaccharide (PPSV23) 08/29/2017    Tdap 06/01/2015    flucelvax quad pfs =>4 YRS 09/25/2018, 09/26/2019    influenza Split 09/01/2016         3/29/2022     9:00 AM 6/8/2022     8:00 AM 9/1/2022     8:00 AM 12/2/2022     2:00 PM 3/6/2023    12:00 PM 6/14/2023     7:00 AM 10/9/2023    11:00 AM   CONTROLLED SUBSTANCE TRACKING   Last [place] 3/29/2022 6/8/2022 9/1/2022 12/2/2022 3/6/2023 6/14/2023 10/9/2023   Report Number reviewed through epic reviewed through epic   reviewed through epic reviewed through [facility]    Last UDS 3/29/2022 3/29/2022 3/29/2022 3/29/2022 3/6/2023 3/6/2023 3/6/2023   Last Controlled Substance Agreement 3/29/2022 3/29/2022 3/29/2022 3/29/2022 3/6/2023 3/6/2023 3/6/2023          Up-to-date but GYN exam by another provider    Assessment/Plan    Diagnoses and all orders for this visit:    1. Urinary urgency (Primary)    2. Routine general medical examination at  a health care facility  -     TSH  -     T4, free  -     CBC & Differential  -     Comprehensive Metabolic Panel  -     Lipid Panel    3. Elevated blood sugar  -     Hemoglobin A1c  -     POC Urinalysis Dipstick, Multipro    4. Vitamin D deficiency  -     Vitamin D,25-Hydroxy      Plan above-up-to-date on vaccinations    An After Visit Summary was printed and given to the patient at discharge.  Return in 6 months (on 6/14/2024).         Marlon Tran MD 12/14/2023   Electronically signed.

## 2023-12-15 DIAGNOSIS — E78.2 MIXED HYPERLIPIDEMIA: Primary | ICD-10-CM

## 2023-12-15 LAB
25(OH)D3+25(OH)D2 SERPL-MCNC: 55.8 NG/ML (ref 30–100)
ALBUMIN SERPL-MCNC: 4.8 G/DL (ref 3.5–5.2)
ALBUMIN/GLOB SERPL: 2.1 G/DL
ALP SERPL-CCNC: 78 U/L (ref 39–117)
ALT SERPL-CCNC: 29 U/L (ref 1–33)
AST SERPL-CCNC: 23 U/L (ref 1–32)
BASOPHILS # BLD AUTO: 0.06 10*3/MM3 (ref 0–0.2)
BASOPHILS NFR BLD AUTO: 1.2 % (ref 0–1.5)
BILIRUB SERPL-MCNC: 0.5 MG/DL (ref 0–1.2)
BUN SERPL-MCNC: 22 MG/DL (ref 8–23)
BUN/CREAT SERPL: 30.1 (ref 7–25)
CALCIUM SERPL-MCNC: 10.3 MG/DL (ref 8.6–10.5)
CHLORIDE SERPL-SCNC: 106 MMOL/L (ref 98–107)
CHOLEST SERPL-MCNC: 207 MG/DL (ref 0–200)
CO2 SERPL-SCNC: 24.7 MMOL/L (ref 22–29)
CREAT SERPL-MCNC: 0.73 MG/DL (ref 0.57–1)
EGFRCR SERPLBLD CKD-EPI 2021: 94.3 ML/MIN/1.73
EOSINOPHIL # BLD AUTO: 0.17 10*3/MM3 (ref 0–0.4)
EOSINOPHIL NFR BLD AUTO: 3.4 % (ref 0.3–6.2)
ERYTHROCYTE [DISTWIDTH] IN BLOOD BY AUTOMATED COUNT: 12.6 % (ref 12.3–15.4)
GLOBULIN SER CALC-MCNC: 2.3 GM/DL
GLUCOSE SERPL-MCNC: 120 MG/DL (ref 65–99)
HBA1C MFR BLD: 5.7 % (ref 4.8–5.6)
HCT VFR BLD AUTO: 41.7 % (ref 34–46.6)
HDLC SERPL-MCNC: 72 MG/DL (ref 40–60)
HGB BLD-MCNC: 14.4 G/DL (ref 12–15.9)
IMM GRANULOCYTES # BLD AUTO: 0.01 10*3/MM3 (ref 0–0.05)
IMM GRANULOCYTES NFR BLD AUTO: 0.2 % (ref 0–0.5)
LDLC SERPL CALC-MCNC: 111 MG/DL (ref 0–100)
LYMPHOCYTES # BLD AUTO: 1.37 10*3/MM3 (ref 0.7–3.1)
LYMPHOCYTES NFR BLD AUTO: 27.3 % (ref 19.6–45.3)
MCH RBC QN AUTO: 31.6 PG (ref 26.6–33)
MCHC RBC AUTO-ENTMCNC: 34.5 G/DL (ref 31.5–35.7)
MCV RBC AUTO: 91.4 FL (ref 79–97)
MONOCYTES # BLD AUTO: 0.56 10*3/MM3 (ref 0.1–0.9)
MONOCYTES NFR BLD AUTO: 11.2 % (ref 5–12)
NEUTROPHILS # BLD AUTO: 2.84 10*3/MM3 (ref 1.7–7)
NEUTROPHILS NFR BLD AUTO: 56.7 % (ref 42.7–76)
NRBC BLD AUTO-RTO: 0 /100 WBC (ref 0–0.2)
PLATELET # BLD AUTO: 179 10*3/MM3 (ref 140–450)
POTASSIUM SERPL-SCNC: 4.1 MMOL/L (ref 3.5–5.2)
PROT SERPL-MCNC: 7.1 G/DL (ref 6–8.5)
RBC # BLD AUTO: 4.56 10*6/MM3 (ref 3.77–5.28)
SODIUM SERPL-SCNC: 142 MMOL/L (ref 136–145)
T4 FREE SERPL-MCNC: 1.23 NG/DL (ref 0.93–1.7)
TRIGL SERPL-MCNC: 139 MG/DL (ref 0–150)
TSH SERPL DL<=0.005 MIU/L-ACNC: 1.48 UIU/ML (ref 0.27–4.2)
VLDLC SERPL CALC-MCNC: 24 MG/DL (ref 5–40)
WBC # BLD AUTO: 5.01 10*3/MM3 (ref 3.4–10.8)

## 2024-01-08 ENCOUNTER — OFFICE VISIT (OUTPATIENT)
Dept: OBSTETRICS AND GYNECOLOGY | Facility: CLINIC | Age: 59
End: 2024-01-08
Payer: COMMERCIAL

## 2024-01-08 VITALS
SYSTOLIC BLOOD PRESSURE: 106 MMHG | DIASTOLIC BLOOD PRESSURE: 72 MMHG | WEIGHT: 165 LBS | BODY MASS INDEX: 29.23 KG/M2 | HEIGHT: 63 IN

## 2024-01-08 DIAGNOSIS — Z80.41 FAMILY HISTORY OF OVARIAN CANCER: ICD-10-CM

## 2024-01-08 DIAGNOSIS — Z01.419 WELL WOMAN EXAM WITH ROUTINE GYNECOLOGICAL EXAM: Primary | ICD-10-CM

## 2024-01-08 DIAGNOSIS — Z15.89 MONOALLELIC MUTATION OF MUTYH GENE: ICD-10-CM

## 2024-01-08 DIAGNOSIS — Z12.31 ENCOUNTER FOR SCREENING MAMMOGRAM FOR BREAST CANCER: ICD-10-CM

## 2024-01-08 DIAGNOSIS — N89.8 VAGINAL DISCHARGE: ICD-10-CM

## 2024-01-08 PROCEDURE — 87563 M. GENITALIUM AMP PROBE: CPT | Performed by: NURSE PRACTITIONER

## 2024-01-08 PROCEDURE — 87661 TRICHOMONAS VAGINALIS AMPLIF: CPT | Performed by: NURSE PRACTITIONER

## 2024-01-08 PROCEDURE — 99396 PREV VISIT EST AGE 40-64: CPT | Performed by: NURSE PRACTITIONER

## 2024-01-08 PROCEDURE — 87624 HPV HI-RISK TYP POOLED RSLT: CPT | Performed by: NURSE PRACTITIONER

## 2024-01-08 PROCEDURE — 87512 GARDNER VAG DNA QUANT: CPT | Performed by: NURSE PRACTITIONER

## 2024-01-08 PROCEDURE — G0123 SCREEN CERV/VAG THIN LAYER: HCPCS | Performed by: NURSE PRACTITIONER

## 2024-01-08 PROCEDURE — 99213 OFFICE O/P EST LOW 20 MIN: CPT | Performed by: NURSE PRACTITIONER

## 2024-01-08 PROCEDURE — 87481 CANDIDA DNA AMP PROBE: CPT | Performed by: NURSE PRACTITIONER

## 2024-01-08 PROCEDURE — 87798 DETECT AGENT NOS DNA AMP: CPT | Performed by: NURSE PRACTITIONER

## 2024-01-08 RX ORDER — ASPIRIN 81 MG/1
81 TABLET ORAL DAILY
COMMUNITY

## 2024-01-08 NOTE — PROGRESS NOTES
"Subjective     Shawnee Suarez is a 58 y.o. female    History of Present Illness  Patient is here today for yearly checkup.  She feels that she may have a yeast infection.  She also complains of having a episode of diarrhea with every urination.  Gynecologic Exam  The patient's primary symptoms include vaginal discharge. The patient's pertinent negatives include no pelvic pain or vaginal bleeding. The patient is experiencing no pain. Associated symptoms include diarrhea. Pertinent negatives include no abdominal pain, anorexia, back pain, chills, constipation, discolored urine, dysuria, fever, flank pain, frequency, headaches, hematuria, joint pain, joint swelling, nausea, painful intercourse, rash, sore throat, urgency or vomiting. She is sexually active. She is postmenopausal.         /72   Ht 160 cm (62.99\")   Wt 74.8 kg (165 lb)   LMP  (LMP Unknown)   BMI 29.24 kg/m²     Outpatient Encounter Medications as of 1/8/2024   Medication Sig Dispense Refill    aspirin 81 MG EC tablet Take 1 tablet by mouth Daily.      atorvastatin (Lipitor) 40 MG tablet Take 1 tablet by mouth Every Night. 90 tablet 3    Calcium-Vitamin D-Vitamin K (Viactiv Calcium Plus D) 650-12.5-40 MG-MCG-MCG chewable tablet Chew 2 (two) times a day.      Cholecalciferol (Vitamin D3) 125 MCG (5000 UT) chewable tablet Chew 5,000 Units Daily.      clonazePAM (KlonoPIN) 1 MG tablet Take 1 tablet by mouth 3 (Three) Times a Day As Needed for Anxiety (Anxiety). for anxiety 90 tablet 1    docusate sodium (COLACE) 50 MG capsule Take 2 capsules by mouth Daily.      DULoxetine (CYMBALTA) 60 MG capsule TAKE ONE CAPSULE BY MOUTH EVERY DAY 90 capsule 3    fluticasone (FLONASE) 50 MCG/ACT nasal spray 2 sprays into the nostril(s) as directed by provider Daily.      loratadine (CLARITIN) 10 MG tablet Take 1 tablet by mouth Daily.      meloxicam (MOBIC) 15 MG tablet TAKE ONE TABLET BY MOUTH EVERY DAY 90 tablet 1    Multiple Vitamins-Minerals (WOMENS 50+ MULTI " VITAMIN/MIN PO) Take  by mouth.      polyethylene glycol (MIRALAX) 17 g packet Take 17 g by mouth Daily.      propranolol LA (INDERAL LA) 80 MG 24 hr capsule Take 1 capsule by mouth Daily. 90 capsule 3    traZODone (DESYREL) 50 MG tablet TAKE ONE TABLET BY MOUTH NIGHTLY 90 tablet 3    ZOLMitriptan (ZOMIG) 2.5 MG tablet TAKE 1 TABLET BY MOUTH TWICE DAILY AS NEEDED FOR HEADACHE 12 tablet 6    [DISCONTINUED] estradiol (ESTRACE) 0.1 MG/GM vaginal cream Insert 2 g into the vagina 2 (Two) Times a Week. 42.5 g 6     No facility-administered encounter medications on file as of 1/8/2024.       Past Medical History  Past Medical History:   Diagnosis Date    Anxiety     Depression     History of transfusion     Hyperlipidemia     Migraines     Migraines 6/8/2022    Ovarian cyst        Surgical History  Past Surgical History:   Procedure Laterality Date    APPENDECTOMY      BREAST BIOPSY Right 11/1989    CHOLECYSTECTOMY      COLONOSCOPY  11/22/2013    Normal exam repeat in 10 years    COLONOSCOPY N/A 11/13/2018    Normal but prep was only fair repeat in 2 years    COLONOSCOPY N/A 01/22/2021    Procedure: COLONOSCOPY WITH ANESTHESIA;  Surgeon: Da Miles MD;  Location: [facility] ENDOSCOPY;  Service: Gastroenterology;  Laterality: N/A;  pre: screen  post: normal  Marlon Tran MD      D & C HYSTEROSCOPY ENDOMETRIAL ABLATION N/A 02/06/2017    Procedure: DILATATION AND CURETTAGE HYSTEROSCOPY NOVASURE ENDOMETRIAL ABLATION;  Surgeon: Ana Barrios MD;  Location: [facility] OR;  Service:     DIAGNOSTIC LAPAROSCOPY N/A 06/17/2019    Procedure: DIAGNOSTIC LAPAROSCOPY;  Surgeon: Ana Barrios MD;  Location: [facility] OR;  Service: Obstetrics/Gynecology    OVARIAN CYST REMOVAL      x 2    SALPINGO OOPHORECTOMY Bilateral 06/17/2019    Procedure: SALPINGO OOPHORECTOMY LAPAROSCOPIC;  Surgeon: Ana Barrios MD;  Location: [facility] OR;  Service: Obstetrics/Gynecology    WISDOM TOOTH EXTRACTION         Family History  Family History    Problem Relation Age of Onset    Brain cancer Father     Ovarian cancer Mother 72    No Known Problems Brother     Heart disease Brother     No Known Problems Daughter     No Known Problems Daughter     Brain cancer Paternal Grandfather     No Known Problems Paternal Grandmother     No Known Problems Maternal Grandmother     No Known Problems Maternal Grandfather     No Known Problems Maternal Aunt     No Known Problems Paternal Aunt     Breast cancer Other 28    Uterine cancer Neg Hx     Colon cancer Neg Hx     Melanoma Neg Hx     Prostate cancer Neg Hx     BRCA 1/2 Neg Hx     Endometrial cancer Neg Hx     Colon polyps Neg Hx        The following portions of the patient's history were reviewed and updated as appropriate: allergies, current medications, past family history, past medical history, past social history, past surgical history, and problem list.    Review of Systems   Constitutional:  Negative for activity change, appetite change, chills, diaphoresis, fatigue, fever, unexpected weight gain and unexpected weight loss.   HENT:  Negative for congestion, dental problem, drooling, ear discharge, ear pain, facial swelling, hearing loss, mouth sores, nosebleeds, postnasal drip, rhinorrhea, sinus pressure, sneezing, sore throat, swollen glands, tinnitus, trouble swallowing and voice change.    Eyes:  Negative for blurred vision, double vision, photophobia, pain, discharge, redness, itching and visual disturbance.   Respiratory:  Negative for apnea, cough, choking, chest tightness, shortness of breath, wheezing and stridor.    Cardiovascular:  Negative for chest pain, palpitations and leg swelling.   Gastrointestinal:  Positive for diarrhea. Negative for abdominal distention, abdominal pain, anal bleeding, anorexia, blood in stool, constipation, nausea, rectal pain, vomiting, GERD and indigestion.   Endocrine: Negative for cold intolerance, heat intolerance, polydipsia, polyphagia and polyuria.   Genitourinary:   Positive for vaginal discharge. Negative for amenorrhea, breast discharge, breast lump, breast pain, decreased libido, decreased urine volume, difficulty urinating, dyspareunia, dysuria, flank pain, frequency, genital sores, hematuria, menstrual problem, pelvic pain, pelvic pressure, urgency, urinary incontinence, vaginal bleeding and vaginal pain.   Musculoskeletal:  Negative for arthralgias, back pain, gait problem, joint pain, joint swelling, myalgias, neck pain, neck stiffness and bursitis.   Skin:  Negative for color change, dry skin and rash.   Allergic/Immunologic: Negative for environmental allergies, food allergies and immunocompromised state.   Neurological:  Negative for dizziness, tremors, seizures, syncope, facial asymmetry, speech difficulty, weakness, light-headedness, numbness, headache, memory problem and confusion.   Hematological:  Negative for adenopathy. Does not bruise/bleed easily.   Psychiatric/Behavioral:  Negative for agitation, behavioral problems, decreased concentration, dysphoric mood, hallucinations, self-injury, sleep disturbance, suicidal ideas, negative for hyperactivity, depressed mood and stress. The patient is not nervous/anxious.        Objective   Physical Exam  Vitals and nursing note reviewed. Exam conducted with a chaperone present.   Constitutional:       General: She is not in acute distress.     Appearance: She is well-developed. She is not diaphoretic.   HENT:      Head: Normocephalic.      Right Ear: External ear normal.      Left Ear: External ear normal.      Nose: Nose normal.   Eyes:      General: No scleral icterus.        Right eye: No discharge.         Left eye: No discharge.      Conjunctiva/sclera: Conjunctivae normal.      Pupils: Pupils are equal, round, and reactive to light.   Neck:      Thyroid: No thyromegaly.      Vascular: No carotid bruit.      Trachea: No tracheal deviation.   Cardiovascular:      Rate and Rhythm: Normal rate and regular rhythm.       Heart sounds: Normal heart sounds. No murmur heard.  Pulmonary:      Effort: Pulmonary effort is normal. No respiratory distress.      Breath sounds: Normal breath sounds. No wheezing.   Chest:   Breasts:     Breasts are symmetrical.      Right: Normal. No swelling, bleeding, inverted nipple, mass, nipple discharge, skin change or tenderness.      Left: Normal. No swelling, bleeding, inverted nipple, mass, nipple discharge, skin change or tenderness.   Abdominal:      General: There is no distension.      Palpations: Abdomen is soft. There is no mass.      Tenderness: There is no abdominal tenderness. There is no right CVA tenderness, left CVA tenderness or guarding.      Hernia: No hernia is present. There is no hernia in the left inguinal area or right inguinal area.   Genitourinary:     General: Normal vulva.      Exam position: Lithotomy position.      Labia:         Right: No rash, tenderness, lesion or injury.         Left: No rash, tenderness, lesion or injury.       Vagina: No signs of injury and foreign body. Vaginal discharge present. No erythema, tenderness or bleeding.      Cervix: Normal.      Uterus: Normal. Not enlarged, not fixed and not tender.       Adnexa: Right adnexa normal and left adnexa normal.        Right: No mass, tenderness or fullness.          Left: No mass, tenderness or fullness.        Rectum: Normal. No mass.      Comments:   BSU normal  Urethral meatus  Normal  Perineum  Normal  Musculoskeletal:         General: No tenderness. Normal range of motion.      Cervical back: Normal range of motion and neck supple.   Lymphadenopathy:      Head:      Right side of head: No submental, submandibular, tonsillar, preauricular, posterior auricular or occipital adenopathy.      Left side of head: No submental, submandibular, tonsillar, preauricular, posterior auricular or occipital adenopathy.      Cervical: No cervical adenopathy.      Right cervical: No superficial, deep or posterior cervical  adenopathy.     Left cervical: No superficial, deep or posterior cervical adenopathy.      Upper Body:      Right upper body: No supraclavicular, axillary or pectoral adenopathy.      Left upper body: No supraclavicular, axillary or pectoral adenopathy.      Lower Body: No right inguinal adenopathy. No left inguinal adenopathy.   Skin:     General: Skin is warm and dry.      Findings: No bruising, erythema or rash.   Neurological:      Mental Status: She is alert and oriented to person, place, and time.      Coordination: Coordination normal.   Psychiatric:         Mood and Affect: Mood normal.         Behavior: Behavior normal.         Thought Content: Thought content normal.         Judgment: Judgment normal.         PHQ-9 Depression Screening  Little interest or pleasure in doing things? 0-->not at all   Feeling down, depressed, or hopeless? 0-->not at all   Trouble falling or staying asleep, or sleeping too much?     Feeling tired or having little energy?     Poor appetite or overeating?     Feeling bad about yourself - or that you are a failure or have let yourself or your family down?     Trouble concentrating on things, such as reading the newspaper or watching television?     Moving or speaking so slowly that other people could have noticed? Or the opposite - being so fidgety or restless that you have been moving around a lot more than usual?     Thoughts that you would be better off dead, or of hurting yourself in some way?     PHQ-9 Total Score 0   If you checked off any problems, how difficult have these problems made it for you to do your work, take care of things at home, or get along with other people?          Assessment & Plan   Diagnoses and all orders for this visit:    1. Well woman exam with routine gynecological exam (Primary)  Normal GYN exam. Will have lab work at PCP. Encouraged SBE, pt is aware how to do self breast exam and the importance of same. Discussed weight management and importance  of maintaining a healthy weight. Discussed Vitamin D intake and the importance of adequate vitamin D for both Bone Health and a healthy immune system.  Discussed Daily exercise and the importance of same, in regards to a healthy heart as well as helping to maintain her weight and improving her mental health.  BMI 29.2.  Colonoscopy be scheduled.  Mammogram will be scheduled at [facility].  Pap smear is done after  we discussed ASCCP guidelines.  After informed decision patient wishes to proceed with the Pap smear.        -     Liquid-based Pap Smear, Screening  -     Trichomonas vaginalis, PCR - ThinPrep Vial, Cervix, Endocervix    2. Encounter for screening mammogram for breast cancer  Comments:  Patient will have a mammogram at [facility].  Orders:  -     Mammo Screening Digital Tomosynthesis Bilateral With CAD; Future    3. Vaginal discharge  Comments:  Has a small amount of vaginal discharge.  BV panel is added to Pap smear.  Orders:  -     Liquid-based Pap Smear, Screening  -     Trichomonas vaginalis, PCR - ThinPrep Vial, Cervix, Endocervix    4. Family history of ovarian cancer  Comments:  Gene has family history of ovarian cancer.  She has MUTYH gene.  Increased risk of colon polyps.  she has had an oophorectomy.    5. Monoallelic mutation of MUTYH gene  Comments:  Patient is referred for colonoscopy as she is due.  Orders:  -     Ambulatory Referral For Screening Colonoscopy         BMI is >= 25 and <30. (Overweight) The following options were offered after discussion;: weight loss educational material (shared in after visit summary), exercise counseling/recommendations, and nutrition counseling/recommendations      Janett Davila, APRN  1/8/2024

## 2024-01-10 LAB
HPV I/H RISK 4 DNA CVX QL PROBE+SIG AMP: NOT DETECTED
TRICHOMONAS VAGINALIS PCR: NOT DETECTED

## 2024-01-12 LAB
GEN CATEG CVX/VAG CYTO-IMP: NORMAL
LAB AP CASE REPORT: NORMAL
LAB AP GYN ADDITIONAL INFORMATION: NORMAL
Lab: NORMAL
NCCN CRITERIA FLAG: ABNORMAL
PATH INTERP SPEC-IMP: NORMAL
STAT OF ADQ CVX/VAG CYTO-IMP: NORMAL
TYRER CUZICK SCORE: 9.1

## 2024-01-12 NOTE — PROGRESS NOTES
This patient recently took the CARE risk assessment for a mammogram appointment. Based on the patient's responses, NCCN criteria for genetic testing was met.  She had testing in 2018, which showed a positive finding in the MUTYH gene and a variant of unknown significance in the POLE gene.    The patient has declined genetic testing at this time, but states she may be interested in future testing.

## 2024-01-18 DIAGNOSIS — M77.9 INFLAMMATION AROUND JOINT: Primary | ICD-10-CM

## 2024-01-18 RX ORDER — MELOXICAM 15 MG/1
TABLET ORAL
Qty: 90 TABLET | Refills: 3 | Status: SHIPPED | OUTPATIENT
Start: 2024-01-18

## 2024-01-18 NOTE — TELEPHONE ENCOUNTER
Rx Refill Note  Requested Prescriptions     Pending Prescriptions Disp Refills    meloxicam (MOBIC) 15 MG tablet [Pharmacy Med Name: MELOXICAM 15MG TABS] 90 tablet 1     Sig: TAKE ONE TABLET BY MOUTH EVERY DAY      Last office visit with prescribing clinician: 9/14/2023   Last telemedicine visit with prescribing clinician: Visit date not found   Next office visit with prescribing clinician: Visit date not found   CPE done 12/14/2023    {TIP  Please add Last Relevant Lab Date if appropriate: 12/14/2023             {TIP  Is Refill Pharmacy correct?: Yes     Would you like a call back once the refill request has been completed: [] Yes [x] No    If the office needs to give you a call back, can they leave a voicemail: [] Yes [] No    Bree Pate MA  01/18/24, 09:57 CST

## 2024-01-22 ENCOUNTER — HOSPITAL ENCOUNTER (OUTPATIENT)
Dept: MAMMOGRAPHY | Facility: HOSPITAL | Age: 59
Discharge: HOME | End: 2024-01-22
Admitting: NURSE PRACTITIONER
Payer: COMMERCIAL

## 2024-01-22 DIAGNOSIS — Z12.31 ENCOUNTER FOR SCREENING MAMMOGRAM FOR BREAST CANCER: ICD-10-CM

## 2024-01-22 PROCEDURE — 77067 SCR MAMMO BI INCL CAD: CPT

## 2024-01-22 PROCEDURE — 77063 BREAST TOMOSYNTHESIS BI: CPT

## 2024-01-24 ENCOUNTER — OFFICE VISIT (OUTPATIENT)
Dept: FAMILY MEDICINE CLINIC | Facility: CLINIC | Age: 59
End: 2024-01-24
Payer: COMMERCIAL

## 2024-01-24 VITALS
OXYGEN SATURATION: 96 % | SYSTOLIC BLOOD PRESSURE: 111 MMHG | BODY MASS INDEX: 30.36 KG/M2 | DIASTOLIC BLOOD PRESSURE: 76 MMHG | HEIGHT: 62 IN | HEART RATE: 84 BPM | TEMPERATURE: 98.2 F | WEIGHT: 165 LBS

## 2024-01-24 DIAGNOSIS — J01.00 SUBACUTE MAXILLARY SINUSITIS: Primary | ICD-10-CM

## 2024-01-24 RX ORDER — TRIAMCINOLONE ACETONIDE 40 MG/ML
40 INJECTION, SUSPENSION INTRA-ARTICULAR; INTRAMUSCULAR ONCE
Status: COMPLETED | OUTPATIENT
Start: 2024-01-24 | End: 2024-01-24

## 2024-01-24 RX ORDER — AZITHROMYCIN 250 MG/1
TABLET, FILM COATED ORAL
Qty: 6 TABLET | Refills: 0 | Status: SHIPPED | OUTPATIENT
Start: 2024-01-24

## 2024-01-24 RX ADMIN — TRIAMCINOLONE ACETONIDE 40 MG: 40 INJECTION, SUSPENSION INTRA-ARTICULAR; INTRAMUSCULAR at 16:08

## 2024-01-24 NOTE — PROGRESS NOTES
Subjective   Shawnee Suarez is a 58 y.o. female.     Sore Throat   Associated symptoms include coughing. Pertinent negatives include no shortness of breath.   Cough  Associated symptoms include postnasal drip and a sore throat. Pertinent negatives include no chest pain or shortness of breath.       The following portions of the patient's history were reviewed and updated as appropriate: allergies, current medications, past family history, past medical history, past social history, past surgical history, and problem list.    Review of Systems   HENT:  Positive for postnasal drip and sore throat.    Respiratory:  Positive for cough. Negative for shortness of breath.    Cardiovascular:  Negative for chest pain.       Objective   Physical Exam  Vitals and nursing note reviewed.   Constitutional:       Appearance: Normal appearance.   HENT:      Mouth/Throat:      Mouth: Mucous membranes are moist.      Pharynx: Oropharynx is clear.   Eyes:      Extraocular Movements: Extraocular movements intact.      Pupils: Pupils are equal, round, and reactive to light.   Cardiovascular:      Rate and Rhythm: Normal rate and regular rhythm.   Pulmonary:      Effort: Pulmonary effort is normal.      Breath sounds: Normal breath sounds. No wheezing or rhonchi.   Musculoskeletal:      Right lower leg: No edema.      Left lower leg: No edema.   Skin:     General: Skin is warm and dry.   Neurological:      General: No focal deficit present.      Mental Status: She is alert and oriented to person, place, and time.   Psychiatric:         Mood and Affect: Mood normal.         Behavior: Behavior normal.         Thought Content: Thought content normal.         Judgment: Judgment normal.         Assessment & Plan   Diagnoses and all orders for this visit:    1. Subacute maxillary sinusitis (Primary)  -     POCT SARS-CoV-2 + Flu Antigen EVON  -     triamcinolone acetonide (KENALOG-40) injection 40 mg    Other orders  -     azithromycin (Zithromax  Z-Ryan) 250 MG tablet; Take 2 tablets the first day, then 1 tablet daily for 4 days.  Dispense: 6 tablet; Refill: 0         Plan-COVID flu negative-above, Ocean Spray and Zyrtec

## 2024-02-06 RX ORDER — TRAZODONE HYDROCHLORIDE 50 MG/1
50 TABLET ORAL EVERY EVENING
Qty: 90 TABLET | Refills: 3 | Status: SHIPPED | OUTPATIENT
Start: 2024-02-06

## 2024-02-06 NOTE — TELEPHONE ENCOUNTER
Rx Refill Note  Requested Prescriptions     Pending Prescriptions Disp Refills    traZODone (DESYREL) 50 MG tablet [Pharmacy Med Name: TRAZODONE HCL 50MG TABS] 90 tablet 3     Sig: TAKE ONE TABLET BY MOUTH IN THE EVENING      Last office visit with prescribing clinician: 1/24/24   Last telemedicine visit with prescribing clinician: Visit date not found   Next office visit with prescribing clinician: 6/18/24    Bree Lockett MA  02/06/24, 08:27 CST

## 2024-02-15 ENCOUNTER — OFFICE VISIT (OUTPATIENT)
Dept: GASTROENTEROLOGY | Facility: CLINIC | Age: 59
End: 2024-02-15
Payer: COMMERCIAL

## 2024-02-15 VITALS
WEIGHT: 163 LBS | HEART RATE: 75 BPM | TEMPERATURE: 98 F | BODY MASS INDEX: 28.88 KG/M2 | DIASTOLIC BLOOD PRESSURE: 68 MMHG | HEIGHT: 63 IN | OXYGEN SATURATION: 96 % | SYSTOLIC BLOOD PRESSURE: 110 MMHG

## 2024-02-15 DIAGNOSIS — Z15.89 GENETIC SUSCEPTIBILITY TO OTHER DISEASE: Primary | ICD-10-CM

## 2024-02-15 DIAGNOSIS — Z80.41 FAMILY HISTORY OF OVARIAN CANCER: ICD-10-CM

## 2024-02-20 ENCOUNTER — HOSPITAL ENCOUNTER (OUTPATIENT)
Dept: SLEEP CENTER | Age: 59
Discharge: HOME | End: 2024-02-22
Payer: COMMERCIAL

## 2024-02-20 DIAGNOSIS — G47.34 NOCTURNAL HYPOXEMIA: ICD-10-CM

## 2024-02-20 DIAGNOSIS — R06.83 SNORING: ICD-10-CM

## 2024-02-20 DIAGNOSIS — G47.33 OBSTRUCTIVE SLEEP APNEA: ICD-10-CM

## 2024-02-20 DIAGNOSIS — G47.00 INSOMNIA, UNSPECIFIED TYPE: ICD-10-CM

## 2024-02-20 PROCEDURE — 95810 POLYSOM 6/> YRS 4/> PARAM: CPT

## 2024-02-21 NOTE — PROGRESS NOTES
snoring. Pt voiced no complaints.    DME: [facility]     The study was reviewed briefly with Kisha Murray.  She will be notified of the formal results and recommendations after the study is scored and interpreted.  The report will be sent to his referring provider.    Technician: MAG Galvez

## 2024-03-06 DIAGNOSIS — F41.9 ANXIETY: ICD-10-CM

## 2024-03-06 RX ORDER — CLONAZEPAM 1 MG/1
1 TABLET ORAL 3 TIMES DAILY PRN
Qty: 90 TABLET | Refills: 1 | OUTPATIENT
Start: 2024-03-06

## 2024-03-08 ENCOUNTER — OFFICE VISIT (OUTPATIENT)
Dept: FAMILY MEDICINE CLINIC | Facility: CLINIC | Age: 59
End: 2024-03-08
Payer: COMMERCIAL

## 2024-03-08 VITALS
WEIGHT: 167.4 LBS | TEMPERATURE: 98.2 F | DIASTOLIC BLOOD PRESSURE: 75 MMHG | BODY MASS INDEX: 29.66 KG/M2 | HEART RATE: 83 BPM | SYSTOLIC BLOOD PRESSURE: 110 MMHG | HEIGHT: 63 IN | OXYGEN SATURATION: 98 %

## 2024-03-08 DIAGNOSIS — J06.9 UPPER RESPIRATORY TRACT INFECTION, UNSPECIFIED TYPE: Primary | ICD-10-CM

## 2024-03-08 DIAGNOSIS — F41.9 ANXIETY: ICD-10-CM

## 2024-03-08 DIAGNOSIS — Z51.81 THERAPEUTIC DRUG MONITORING: ICD-10-CM

## 2024-03-08 RX ORDER — AZITHROMYCIN 250 MG/1
TABLET, FILM COATED ORAL
Qty: 6 TABLET | Refills: 0 | Status: SHIPPED | OUTPATIENT
Start: 2024-03-08

## 2024-03-08 RX ORDER — CLONAZEPAM 1 MG/1
1 TABLET ORAL 3 TIMES DAILY PRN
Qty: 90 TABLET | Refills: 0 | Status: SHIPPED | OUTPATIENT
Start: 2024-03-08

## 2024-03-08 NOTE — PROGRESS NOTES
CC: controlled med monitoring - anxiety, URI    History:  Shawnee Suarez is a 58 y.o. female who presents today for evaluation of the above problems.      Patient complains of sore throat, cough, and headache for two days. Has been around grandchild who was also sick.     Also here for controlled med monitoring for clonazepam, which continues to work well. She has no problems with therapy.         6/8/2022     8:00 AM 9/1/2022     8:00 AM 12/2/2022     2:00 PM 3/6/2023    12:00 PM 6/14/2023     7:00 AM 10/9/2023    11:00 AM 3/8/2024    12:00 PM   CONTROLLED SUBSTANCE TRACKING   Last Ludin 6/8/2022 9/1/2022 12/2/2022 3/6/2023 6/14/2023 10/9/2023 3/8/2024   Report Number reviewed through epic   reviewed through epic reviewed through epic  reviewed through [facility]   Last UDS 3/29/2022 3/29/2022 3/29/2022 3/6/2023 3/6/2023 3/6/2023 3/8/2024   Last Controlled Substance Agreement 3/29/2022 3/29/2022 3/29/2022 3/6/2023 3/6/2023 3/6/2023 3/8/2024           HPI  ROS:  Review of Systems   Constitutional:  Negative for fever.   HENT:  Positive for sore throat. Negative for congestion.    Respiratory:  Positive for cough.    Neurological:  Positive for headaches.       Allergies   Allergen Reactions    Crestor [Rosuvastatin] Myalgia     Body aches     Past Medical History:   Diagnosis Date    Anxiety     Depression     History of transfusion     Hyperlipidemia     Migraines 06/08/2022    Ovarian cyst     x 2      Past Surgical History:   Procedure Laterality Date    APPENDECTOMY      BELPHAROPTOSIS REPAIR Bilateral 2023    BREAST BIOPSY Right 11/1989    CHOLECYSTECTOMY      COLONOSCOPY  11/22/2013    Normal exam repeat in 10 years    COLONOSCOPY N/A 11/13/2018    Normal but prep was only fair repeat in 2 years    COLONOSCOPY N/A 01/22/2021    The entire examined colon is normal on direct and retroflexion views; No specimens collected; Repeat 10 years    D & C HYSTEROSCOPY ENDOMETRIAL ABLATION N/A 02/06/2017    Procedure:  DILATATION AND CURETTAGE HYSTEROSCOPY NOVASURE ENDOMETRIAL ABLATION;  Surgeon: Ana Barrios MD;  Location:  [facility];  Service:     DIAGNOSTIC LAPAROSCOPY N/A 06/17/2019    Procedure: DIAGNOSTIC LAPAROSCOPY;  Surgeon: Ana Barrios MD;  Location:  [facility];  Service: Obstetrics/Gynecology    OVARIAN CYST REMOVAL      x 2    SALPINGO OOPHORECTOMY Bilateral 06/17/2019    Procedure: SALPINGO OOPHORECTOMY LAPAROSCOPIC;  Surgeon: Ana Barrios MD;  Location:  [facility];  Service: Obstetrics/Gynecology    WISDOM TOOTH EXTRACTION       Family History   Problem Relation Age of Onset    Ovarian cancer Mother 72    Brain cancer Father     No Known Problems Brother     Heart disease Brother     No Known Problems Maternal Aunt     No Known Problems Paternal Aunt     No Known Problems Maternal Grandmother     No Known Problems Maternal Grandfather     No Known Problems Paternal Grandmother     Brain cancer Paternal Grandfather     No Known Problems Daughter     No Known Problems Daughter     Breast cancer Other 28    Uterine cancer Neg Hx     Colon cancer Neg Hx     Melanoma Neg Hx     Prostate cancer Neg Hx     BRCA 1/2 Neg Hx     Endometrial cancer Neg Hx     Colon polyps Neg Hx     Esophageal cancer Neg Hx     Liver cancer Neg Hx     Stomach cancer Neg Hx     Rectal cancer Neg Hx     Liver disease Neg Hx       reports that she has never smoked. She has been exposed to tobacco smoke. She has never used smokeless tobacco. She reports that she does not drink alcohol and does not use drugs.      Current Outpatient Medications:     aspirin 81 MG EC tablet, Take 1 tablet by mouth Daily., Disp: , Rfl:     atorvastatin (Lipitor) 40 MG tablet, Take 1 tablet by mouth Every Night., Disp: 90 tablet, Rfl: 3    Calcium-Vitamin D-Vitamin K (Viactiv Calcium Plus D) 650-12.5-40 MG-MCG-MCG chewable tablet, Chew 1 tablet 2 (two) times a day., Disp: , Rfl:     Cholecalciferol (Vitamin D3) 125 MCG (5000 UT) chewable tablet, Chew 5,000 Units  "Daily., Disp: , Rfl:     clonazePAM (KlonoPIN) 1 MG tablet, Take 1 tablet by mouth 3 (Three) Times a Day As Needed for Anxiety (Anxiety). for anxiety, Disp: 90 tablet, Rfl: 0    Docusate Sodium 100 MG capsule, Take 1 tablet by mouth Daily., Disp: , Rfl:     DULoxetine (CYMBALTA) 60 MG capsule, TAKE ONE CAPSULE BY MOUTH EVERY DAY (Patient taking differently: Take 1 capsule by mouth Daily.), Disp: 90 capsule, Rfl: 3    fluticasone (FLONASE) 50 MCG/ACT nasal spray, 2 sprays into the nostril(s) as directed by provider As Needed., Disp: , Rfl:     loratadine (CLARITIN) 10 MG tablet, Take 1 tablet by mouth As Needed., Disp: , Rfl:     meloxicam (MOBIC) 15 MG tablet, TAKE ONE TABLET BY MOUTH EVERY DAY (Patient taking differently: Take 1 tablet by mouth Daily.), Disp: 90 tablet, Rfl: 3    propranolol LA (INDERAL LA) 80 MG 24 hr capsule, Take 1 capsule by mouth Daily., Disp: 90 capsule, Rfl: 3    traZODone (DESYREL) 50 MG tablet, TAKE ONE TABLET BY MOUTH IN THE EVENING, Disp: 90 tablet, Rfl: 3    ZOLMitriptan (ZOMIG) 2.5 MG tablet, TAKE 1 TABLET BY MOUTH TWICE DAILY AS NEEDED FOR HEADACHE (Patient taking differently: Take 1 tablet by mouth As Needed. TAKE 1 TABLET BY MOUTH TWICE DAILY AS NEEDED FOR HEADACHE), Disp: 12 tablet, Rfl: 6    azithromycin (Zithromax) 250 MG tablet, Take 2 tablets the first day, then 1 tablet daily for 4 days., Disp: 6 tablet, Rfl: 0    OBJECTIVE:  /75 (BP Location: Left arm, Patient Position: Sitting, Cuff Size: Adult)   Pulse 83   Temp 98.2 °F (36.8 °C) (Temporal)   Ht 160 cm (63\")   Wt 75.9 kg (167 lb 6.4 oz)   LMP  (LMP Unknown)   SpO2 98%   BMI 29.65 kg/m²    Physical Exam  Vitals reviewed.   Constitutional:       Appearance: She is well-developed.   Cardiovascular:      Rate and Rhythm: Normal rate and regular rhythm.      Heart sounds: Normal heart sounds.   Pulmonary:      Effort: Pulmonary effort is normal.      Breath sounds: Normal breath sounds.   Neurological:      Mental " Status: She is alert and oriented to person, place, and time.   Psychiatric:         Behavior: Behavior normal.         Assessment/Plan    Diagnoses and all orders for this visit:    1. Upper respiratory tract infection, unspecified type (Primary)  -     POCT SARS-CoV-2 Antigen EVON + Flu  -     azithromycin (Zithromax) 250 MG tablet; Take 2 tablets the first day, then 1 tablet daily for 4 days.  Dispense: 6 tablet; Refill: 0    2. Therapeutic drug monitoring  -     ToxASSURE Select 13 (MW) - Urine, Clean Catch    3. Anxiety  -     clonazePAM (KlonoPIN) 1 MG tablet; Take 1 tablet by mouth 3 (Three) Times a Day As Needed for Anxiety (Anxiety). for anxiety  Dispense: 90 tablet; Refill: 0    Swab negative.     Ludin reviewed and appropriate. Contract and UDS today.     An After Visit Summary was printed and given to the patient at discharge.  Return in about 3 months (around 6/8/2024), or if symptoms worsen or fail to improve, for Next scheduled follow up.       MICHELLE Arnold 3/8/24    Electronically signed.

## 2024-03-12 ENCOUNTER — FOLLOWUP TELEPHONE ENCOUNTER (OUTPATIENT)
Dept: SLEEP CENTER | Age: 59
End: 2024-03-12

## 2024-03-19 LAB — DRUGS UR: NORMAL

## 2024-04-04 ENCOUNTER — TELEPHONE (OUTPATIENT)
Dept: NEUROLOGY | Facility: CLINIC | Age: 59
End: 2024-04-04
Payer: COMMERCIAL

## 2024-04-04 NOTE — TELEPHONE ENCOUNTER
LM with patient in regards to her upcoming appointment with Pola Meneses NP on April 5th at 0930 am.  The appointment will be cancelled due to sickness.  Rescheduled for April 9th at 0930 am.

## 2024-04-04 NOTE — TELEPHONE ENCOUNTER
LM with patient once again in regards rescheduling her appointment with Pola Meneses NP.  I did ask the patient to contact me and we can find a date and time to reschedule her.,

## 2024-04-09 ENCOUNTER — TELEPHONE (OUTPATIENT)
Dept: GASTROENTEROLOGY | Facility: HOSPITAL | Age: 59
End: 2024-04-09
Payer: COMMERCIAL

## 2024-04-15 NOTE — TELEPHONE ENCOUNTER
Caller: Shawnee Suarez    Relationship: Self    Best call back number: [phone]    What is the best time to reach you: ANYTIME    Who are you requesting to speak with (clinical staff, provider,  specific staff member): SCHEDULING    What was the call regarding: PT IS NEEDING TO RESCHEDULE WHERE SHE MISSED ON 4/9/2024.    Is it okay if the provider responds through MyChart: NO

## 2024-04-23 ENCOUNTER — TELEPHONE (OUTPATIENT)
Dept: FAMILY MEDICINE CLINIC | Facility: CLINIC | Age: 59
End: 2024-04-23
Payer: COMMERCIAL

## 2024-04-23 DIAGNOSIS — M79.672 PAIN IN BOTH FEET: Primary | ICD-10-CM

## 2024-04-23 DIAGNOSIS — M79.671 PAIN IN BOTH FEET: Primary | ICD-10-CM

## 2024-04-23 NOTE — TELEPHONE ENCOUNTER
Set up to see podiatrist-use a light covering of solid Crisco and socks at night to help with dry feet

## 2024-04-23 NOTE — TELEPHONE ENCOUNTER
Caller: Shawnee Suarez    Relationship: Self    Best call back number: [phone]     What is the medical concern/diagnosis: CRACKED HEELS, ROUGH SKIN ON FEET    What specialty or service is being requested: PODIATRIST     What is the provider, practice or medical service name: WHOEVER IS RECOMMENDED    What is the office location: [place] IF POSSIBLE

## 2024-04-25 DIAGNOSIS — G43.709 CHRONIC MIGRAINE WITHOUT AURA WITHOUT STATUS MIGRAINOSUS, NOT INTRACTABLE: ICD-10-CM

## 2024-04-25 RX ORDER — PROPRANOLOL HYDROCHLORIDE 80 MG/1
80 CAPSULE, EXTENDED RELEASE ORAL DAILY
Qty: 90 CAPSULE | Refills: 3 | Status: CANCELLED | OUTPATIENT
Start: 2024-04-25 | End: 2025-04-25

## 2024-04-25 NOTE — TELEPHONE ENCOUNTER
Caller: Shawnee Suarez    Relationship: Self    Best call back number: [phone]    Requested Prescriptions:   Requested Prescriptions     Pending Prescriptions Disp Refills    propranolol LA (INDERAL LA) 80 MG 24 hr capsule 90 capsule 3     Sig: Take 1 capsule by mouth Daily.        Pharmacy where request should be sent: [facility] [place], [place] - [address] [facility] [address] [facility] [phone] [facility] [phone]      Last office visit with prescribing clinician: 10/6/2023   Last telemedicine visit with prescribing clinician: Visit date not found   Next office visit with prescribing clinician: 6/11/2024     Additional details provided by patient: PT REQUESTING REFILL    Does the patient have less than a 3 day supply:  [x] Yes  [] No    Would you like a call back once the refill request has been completed: [] Yes [] No    If the office needs to give you a call back, can they leave a voicemail: [] Yes [] No    Lionel Persaud Rep   04/25/24 10:13 CDT

## 2024-05-07 ENCOUNTER — ANESTHESIA EVENT (OUTPATIENT)
Dept: GASTROENTEROLOGY | Facility: HOSPITAL | Age: 59
End: 2024-05-07
Payer: COMMERCIAL

## 2024-05-07 ENCOUNTER — HOSPITAL ENCOUNTER (OUTPATIENT)
Facility: HOSPITAL | Age: 59
Setting detail: HOSPITAL OUTPATIENT SURGERY
Discharge: HOME | End: 2024-05-07
Attending: INTERNAL MEDICINE | Admitting: INTERNAL MEDICINE
Payer: COMMERCIAL

## 2024-05-07 ENCOUNTER — ANESTHESIA (OUTPATIENT)
Dept: GASTROENTEROLOGY | Facility: HOSPITAL | Age: 59
End: 2024-05-07
Payer: COMMERCIAL

## 2024-05-07 VITALS
HEART RATE: 76 BPM | WEIGHT: 171 LBS | RESPIRATION RATE: 18 BRPM | HEIGHT: 63 IN | BODY MASS INDEX: 30.3 KG/M2 | TEMPERATURE: 96.8 F | OXYGEN SATURATION: 96 % | DIASTOLIC BLOOD PRESSURE: 66 MMHG | SYSTOLIC BLOOD PRESSURE: 110 MMHG

## 2024-05-07 DIAGNOSIS — Z15.89 GENETIC SUSCEPTIBILITY TO OTHER DISEASE: ICD-10-CM

## 2024-05-07 PROCEDURE — 25810000003 SODIUM CHLORIDE 0.9 % SOLUTION: Performed by: ANESTHESIOLOGY

## 2024-05-07 PROCEDURE — 43239 EGD BIOPSY SINGLE/MULTIPLE: CPT | Performed by: INTERNAL MEDICINE

## 2024-05-07 PROCEDURE — 25010000002 PROPOFOL 10 MG/ML EMULSION: Performed by: NURSE ANESTHETIST, CERTIFIED REGISTERED

## 2024-05-07 PROCEDURE — 88305 TISSUE EXAM BY PATHOLOGIST: CPT | Performed by: INTERNAL MEDICINE

## 2024-05-07 PROCEDURE — 45378 DIAGNOSTIC COLONOSCOPY: CPT | Performed by: INTERNAL MEDICINE

## 2024-05-07 PROCEDURE — 87081 CULTURE SCREEN ONLY: CPT | Performed by: INTERNAL MEDICINE

## 2024-05-07 RX ORDER — PROPOFOL 10 MG/ML
VIAL (ML) INTRAVENOUS AS NEEDED
Status: DISCONTINUED | OUTPATIENT
Start: 2024-05-07 | End: 2024-05-07 | Stop reason: SURG

## 2024-05-07 RX ORDER — OMEPRAZOLE 20 MG/1
20 TABLET, DELAYED RELEASE ORAL DAILY
Qty: 84 TABLET | Refills: 3 | Status: SHIPPED | OUTPATIENT
Start: 2024-05-07

## 2024-05-07 RX ORDER — LIDOCAINE HYDROCHLORIDE 20 MG/ML
INJECTION, SOLUTION EPIDURAL; INFILTRATION; INTRACAUDAL; PERINEURAL AS NEEDED
Status: DISCONTINUED | OUTPATIENT
Start: 2024-05-07 | End: 2024-05-07 | Stop reason: SURG

## 2024-05-07 RX ORDER — SODIUM CHLORIDE 0.9 % (FLUSH) 0.9 %
10 SYRINGE (ML) INJECTION EVERY 12 HOURS SCHEDULED
Status: CANCELLED | OUTPATIENT
Start: 2024-05-07

## 2024-05-07 RX ORDER — SODIUM CHLORIDE 0.9 % (FLUSH) 0.9 %
10 SYRINGE (ML) INJECTION AS NEEDED
Status: CANCELLED | OUTPATIENT
Start: 2024-05-07

## 2024-05-07 RX ORDER — LIDOCAINE HYDROCHLORIDE 10 MG/ML
0.5 INJECTION, SOLUTION EPIDURAL; INFILTRATION; INTRACAUDAL; PERINEURAL ONCE AS NEEDED
Status: DISCONTINUED | OUTPATIENT
Start: 2024-05-07 | End: 2024-05-07 | Stop reason: HOSPADM

## 2024-05-07 RX ORDER — SODIUM CHLORIDE 9 MG/ML
40 INJECTION, SOLUTION INTRAVENOUS AS NEEDED
Status: CANCELLED | OUTPATIENT
Start: 2024-05-07

## 2024-05-07 RX ORDER — SODIUM CHLORIDE 9 MG/ML
100 INJECTION, SOLUTION INTRAVENOUS CONTINUOUS
Status: CANCELLED | OUTPATIENT
Start: 2024-05-07

## 2024-05-07 RX ORDER — MULTIPLE VITAMINS W/ MINERALS TAB 9MG-400MCG
1 TAB ORAL DAILY
COMMUNITY

## 2024-05-07 RX ORDER — SODIUM CHLORIDE 9 MG/ML
500 INJECTION, SOLUTION INTRAVENOUS CONTINUOUS PRN
Status: DISCONTINUED | OUTPATIENT
Start: 2024-05-07 | End: 2024-05-07 | Stop reason: HOSPADM

## 2024-05-07 RX ORDER — SODIUM CHLORIDE 0.9 % (FLUSH) 0.9 %
10 SYRINGE (ML) INJECTION AS NEEDED
Status: DISCONTINUED | OUTPATIENT
Start: 2024-05-07 | End: 2024-05-07 | Stop reason: HOSPADM

## 2024-05-07 RX ADMIN — GLYCOPYRROLATE 0.2 MG: 0.2 INJECTION INTRAMUSCULAR; INTRAVENOUS at 12:04

## 2024-05-07 RX ADMIN — LIDOCAINE HYDROCHLORIDE 50 MG: 20 INJECTION, SOLUTION EPIDURAL; INFILTRATION; INTRACAUDAL; PERINEURAL at 12:04

## 2024-05-07 RX ADMIN — SODIUM CHLORIDE 500 ML: 9 INJECTION, SOLUTION INTRAVENOUS at 10:42

## 2024-05-07 RX ADMIN — PROPOFOL 550 MG: 10 INJECTION, EMULSION INTRAVENOUS at 12:04

## 2024-05-08 LAB
CYTO UR: NORMAL
LAB AP CASE REPORT: NORMAL
Lab: NORMAL
PATH REPORT.FINAL DX SPEC: NORMAL
PATH REPORT.GROSS SPEC: NORMAL
UREASE TISS QL: NEGATIVE

## 2024-05-13 ENCOUNTER — TELEPHONE (OUTPATIENT)
Dept: FAMILY MEDICINE CLINIC | Facility: CLINIC | Age: 59
End: 2024-05-13
Payer: COMMERCIAL

## 2024-05-13 NOTE — TELEPHONE ENCOUNTER
Caller: Shawnee Suarez    Relationship: Self    Best call back number:     [phone] (Mobile)     What is the best time to reach you: ANY    What was the call regarding: THE PATIENT STATES THAT SHE WOULD LIKE TO KNOW IF DR. WEBSTER RECOMMENDS A GENERAL PHYSICIAN IN [place].

## 2024-05-14 ENCOUNTER — TELEPHONE (OUTPATIENT)
Dept: GASTROENTEROLOGY | Facility: CLINIC | Age: 59
End: 2024-05-14
Payer: COMMERCIAL

## 2024-05-14 NOTE — TELEPHONE ENCOUNTER
Patient called with questions about her EGD results. Went over that with her and answered her questions. Understanding voiced.

## 2024-05-17 DIAGNOSIS — F41.9 ANXIETY: ICD-10-CM

## 2024-05-17 NOTE — TELEPHONE ENCOUNTER
Rx Refill Note  Requested Prescriptions     Pending Prescriptions Disp Refills    clonazePAM (KlonoPIN) 1 MG tablet [Pharmacy Med Name: CLONAZEPAM 1MG TABS] 90 tablet 1     Sig: TAKE ONE TABLET BY MOUTH THREE TIMES A DAY AS NEEDED FOR ANXIETY      Last office visit with prescribing clinician: 1/24/2024   Last telemedicine visit with prescribing clinician: Visit date not found   Next office visit with prescribing clinician: 6/18/2024                         Would you like a call back once the refill request has been completed: [] Yes [] No    If the office needs to give you a call back, can they leave a voicemail: [] Yes [] No    Digna Beckwith, PCT  05/17/24, 16:12 CDT

## 2024-05-20 RX ORDER — CLONAZEPAM 1 MG/1
1 TABLET ORAL 3 TIMES DAILY PRN
Qty: 90 TABLET | Refills: 0 | Status: SHIPPED | OUTPATIENT
Start: 2024-05-20

## 2024-06-11 ENCOUNTER — OFFICE VISIT (OUTPATIENT)
Dept: NEUROLOGY | Facility: CLINIC | Age: 59
End: 2024-06-11
Payer: COMMERCIAL

## 2024-06-11 VITALS
SYSTOLIC BLOOD PRESSURE: 118 MMHG | BODY MASS INDEX: 30.51 KG/M2 | HEART RATE: 80 BPM | WEIGHT: 172.2 LBS | HEIGHT: 63 IN | DIASTOLIC BLOOD PRESSURE: 76 MMHG | RESPIRATION RATE: 20 BRPM

## 2024-06-11 DIAGNOSIS — G43.709 CHRONIC MIGRAINE WITHOUT AURA WITHOUT STATUS MIGRAINOSUS, NOT INTRACTABLE: Primary | ICD-10-CM

## 2024-06-11 PROCEDURE — 99214 OFFICE O/P EST MOD 30 MIN: CPT

## 2024-06-11 RX ORDER — PROPRANOLOL HYDROCHLORIDE 120 MG/1
120 CAPSULE, EXTENDED RELEASE ORAL DAILY
Qty: 30 CAPSULE | Refills: 6 | Status: SHIPPED | OUTPATIENT
Start: 2024-06-11 | End: 2025-06-11

## 2024-06-11 RX ORDER — ZOLMITRIPTAN 2.5 MG/1
2.5 TABLET, FILM COATED ORAL ONCE AS NEEDED
Qty: 12 TABLET | Refills: 0 | Status: SHIPPED | OUTPATIENT
Start: 2024-06-11

## 2024-06-11 NOTE — PROGRESS NOTES
"  Neurology Consult Note    [facility]  [address], [address]  [place] [address]  Phone: [phone]  Fax: [phone]    Referring Provider:   No ref. provider found  Primary Care Provider:  Marlon Tran MD    Reason for Consult:  Chronic migraine  Subjective      Shawnee Suarez presents to [facility]    History of Present Illness  59-year-old female seen for follow-up of chronic migraine.  Last seen in clinic on 10/6/2023.  At that time we had continued propranolol 80 mg extended release for migraine prevention as well as Zomig 2.5 mg as needed for .    Today patient presents by herself.  She does report for about 3 months she has been experiencing clusters of headaches.  These can be intermittent.  She notes the quality has remained the same however the intensity has increased.  She does notice the majority of time when she wakes up she will have these headaches.  They do read in the bilateral temples.  They then radiate into the jaws.  She notes Zomig does still help with an abortive regimen.  She can still feel \"fuzzy\" in the forehead region.  Since last being seen she has gotten a reading glasses.  She does note she gets good sleep with approximately 10 hours nightly.    Migraine preventative medications tried:  Propranolol 80 mg daily.  Good therapeutic response.    Migraine abortive medications tried:  Zomig 2.5 mg.     Migraine medications contraindicated:  Antidepressant medication class due to concurrent use of duloxetine, trazodone and Klonopin.      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    Nonsmoker    Family history of ovarian cancer    Mixed hyperlipidemia    Migraines    Anxiety        Past Medical History:   Diagnosis Date    Anxiety     Depression     History of transfusion     Hyperlipidemia     Migraines 2022    Ovarian cyst     x 2         Social History     Socioeconomic " History    Marital status:    Tobacco Use    Smoking status: Never     Passive exposure: Past    Smokeless tobacco: Never   Vaping Use    Vaping status: Never Used   Substance and Sexual Activity    Alcohol use: No    Drug use: No    Sexual activity: Not Currently     Partners: Male     Birth control/protection: Post-menopausal        Allergies   Allergen Reactions    Crestor [Rosuvastatin] Myalgia     Body aches          Current Outpatient Medications:     aspirin 81 MG EC tablet, Take 1 tablet by mouth Daily., Disp: , Rfl:     atorvastatin (Lipitor) 40 MG tablet, Take 1 tablet by mouth Every Night., Disp: 90 tablet, Rfl: 3    Calcium-Vitamin D-Vitamin K (Viactiv Calcium Plus D) 650-12.5-40 MG-MCG-MCG chewable tablet, Chew 1 tablet 2 (two) times a day., Disp: , Rfl:     clonazePAM (KlonoPIN) 1 MG tablet, TAKE ONE TABLET BY MOUTH THREE TIMES A DAY AS NEEDED FOR ANXIETY, Disp: 90 tablet, Rfl: 0    Docusate Sodium 100 MG capsule, Take 1 tablet by mouth Daily., Disp: , Rfl:     DULoxetine (CYMBALTA) 60 MG capsule, TAKE ONE CAPSULE BY MOUTH EVERY DAY (Patient taking differently: Take 1 capsule by mouth Daily.), Disp: 90 capsule, Rfl: 3    loratadine (CLARITIN) 10 MG tablet, Take 1 tablet by mouth As Needed., Disp: , Rfl:     meloxicam (MOBIC) 15 MG tablet, TAKE ONE TABLET BY MOUTH EVERY DAY (Patient taking differently: Take 1 tablet by mouth Daily.), Disp: 90 tablet, Rfl: 3    omeprazole OTC (PriLOSEC OTC) 20 MG EC tablet, Take 1 tablet by mouth Daily., Disp: 84 tablet, Rfl: 3    traZODone (DESYREL) 50 MG tablet, TAKE ONE TABLET BY MOUTH IN THE EVENING, Disp: 90 tablet, Rfl: 3    ZOLMitriptan (ZOMIG) 2.5 MG tablet, TAKE 1 TABLET BY MOUTH TWICE DAILY AS NEEDED FOR HEADACHE (Patient taking differently: Take 1 tablet by mouth As Needed. TAKE 1 TABLET BY MOUTH TWICE DAILY AS NEEDED FOR HEADACHE), Disp: 12 tablet, Rfl: 6    propranolol LA (Inderal LA) 120 MG 24 hr capsule, Take 1 capsule by mouth Daily., Disp: 30  "capsule, Rfl: 6    ZOLMitriptan (ZOMIG) 2.5 MG tablet, Take 1 tablet by mouth 1 (One) Time As Needed for Migraine., Disp: 12 tablet, Rfl: 0       Objective   Vital Signs:   /76 (BP Location: Right arm, Patient Position: Sitting)   Pulse 80   Resp 20   Ht 160 cm (62.99\")   Wt 78.1 kg (172 lb 3.2 oz)   BMI 30.51 kg/m²       Physical Exam  Vitals and nursing note reviewed.   Constitutional:       Appearance: Normal appearance.   HENT:      Head: Normocephalic.   Eyes:      General: Lids are normal.   Pulmonary:      Effort: Pulmonary effort is normal. No respiratory distress.   Skin:     General: Skin is warm and dry.   Neurological:      Mental Status: She is alert.      Motor: Motor strength is normal.  Psychiatric:         Speech: Speech normal.        Neurological Exam  Mental Status  Alert. Oriented to person, place, time and situation. Speech is normal. Language is fluent with no aphasia.    Cranial Nerves  CN III, IV, VI: Normal lids and orbits bilaterally.  CN VII: Full and symmetric facial movement.  CN XII: Tongue midline without atrophy or fasciculations.    Motor   Strength is 5/5 throughout all four extremities.    Gait  Casual gait is normal including stance, stride, and arm swing.      Result Review :   The following data was reviewed by: MICHELLE Johnson on 06/11/2024:       Progress Notes by Pola Meneses APRN (10/06/2023 10:30)                    Impression:  Shawnee Suarez is a 59 y.o. female who presents for follow-up of chronic migraine.  In regards to the increase in her frequency and intensity of her migraines, I would like to increase her propranolol.  I would be hesitant to add additional medications at this time.  Patient is in agreements with current plan of care.  She has no red flags with her symptoms.  No indication for neuroimaging is present.  In the event increasing her propranolol does not provide benefit, we could consider adding an antiepileptic, riboflavin, CRGP " injectable or Namenda for prevention.    Diagnoses and all orders for this visit:    1. Chronic migraine without aura without status migrainosus, not intractable (Primary)  -     propranolol LA (Inderal LA) 120 MG 24 hr capsule; Take 1 capsule by mouth Daily.  Dispense: 30 capsule; Refill: 6  -     ZOLMitriptan (ZOMIG) 2.5 MG tablet; Take 1 tablet by mouth 1 (One) Time As Needed for Migraine.  Dispense: 12 tablet; Refill: 0        Plan:  Increase propranolol to 120 mg daily for prevention  Continue Zomig 2.5 mg as needed for   Avoid known triggers  Follow-up with PCP as scheduled  Follow-up in our clinic in 6 weeks or sooner if needed    The patient and I have discussed the plan of care and she is in full agreement at this time.     Follow Up   Return in about 6 weeks (around 2024) for Migraine.            Pola Meneses, MICHELLE  24  15:27 CDT

## 2024-06-19 ENCOUNTER — TELEPHONE (OUTPATIENT)
Dept: FAMILY MEDICINE CLINIC | Facility: CLINIC | Age: 59
End: 2024-06-19
Payer: COMMERCIAL

## 2024-06-19 RX ORDER — AZITHROMYCIN 250 MG/1
TABLET, FILM COATED ORAL
Qty: 6 TABLET | Refills: 0 | Status: SHIPPED | OUTPATIENT
Start: 2024-06-19

## 2024-06-19 RX ORDER — PREDNISONE 10 MG/1
TABLET ORAL
Qty: 21 TABLET | Refills: 0 | Status: SHIPPED | OUTPATIENT
Start: 2024-06-19

## 2024-06-24 NOTE — PROGRESS NOTES
[facility] - PODIATRY    Today's Date: 06/27/2024     Patient Name: Shawnee Suarez  MRN: [id number]  CSN: [id number]  PCP: Marlon Tran MD  Referring Provider: Marlon Tran,*    SUBJECTIVE     Chief Complaint   Patient presents with    Establish Care     Marlon Tran 03/08/2024 MIN FEET PAIN- pt states has had dry cracked heels for years and would like to know how to heal them- pt pain 4/10 at worst when they are really cracked     HPI: Shawnee Suarez, a 59 y.o.female, comes to clinic as a(n) new patient complaining of foot pain. Patient has h/o anxiety, depression, hyperlipidemia, migraines .  Patient reports she has had issues with dry cracked heels since she was a child.  She reports occasionally the areas become thick and cracked causing fissures.  She reports she uses a pumice stone and lotion to the areas.  Denies open area or sores.  Denies numbness and tingling.  Patient is non-diabetic. Admits pain at 4/10 level, described as aching, and centered around bilateral heels . Relates previous treatment(s) including pumice stone, lotion . Denies any constitutional symptoms. No other pedal complaints at this time.    Past Medical History:   Diagnosis Date    Anxiety     Depression     History of transfusion     Hyperlipidemia     Migraines 06/08/2022    Ovarian cyst     x 2      Past Surgical History:   Procedure Laterality Date    APPENDECTOMY      BELPHAROPTOSIS REPAIR Bilateral 2023    BREAST BIOPSY Right 11/1989    CHOLECYSTECTOMY      COLONOSCOPY  11/22/2013    Normal exam repeat in 10 years    COLONOSCOPY N/A 11/13/2018    Normal but prep was only fair repeat in 2 years    COLONOSCOPY N/A 01/22/2021    The entire examined colon is normal on direct and retroflexion views; No specimens collected; Repeat 10 years    COLONOSCOPY N/A 5/7/2024    Procedure: COLONOSCOPY WITH ANESTHESIA;  Surgeon: Da Miles MD;  Location: [facility] ENDOSCOPY;  Service:  Gastroenterology;  Laterality: N/A;  pre op genetic susceptibility to other disease  post  pcp Marlon Tran    D & C HYSTEROSCOPY ENDOMETRIAL ABLATION N/A 02/06/2017    Procedure: DILATATION AND CURETTAGE HYSTEROSCOPY NOVASURE ENDOMETRIAL ABLATION;  Surgeon: Ana Barrios MD;  Location: [facility] OR;  Service:     DIAGNOSTIC LAPAROSCOPY N/A 06/17/2019    Procedure: DIAGNOSTIC LAPAROSCOPY;  Surgeon: Ana Barrios MD;  Location: [facility] OR;  Service: Obstetrics/Gynecology    ENDOSCOPY N/A 5/7/2024    Procedure: ESOPHAGOGASTRODUODENOSCOPY WITH ANESTHESIA;  Surgeon: Da Miles MD;  Location: [facility] ENDOSCOPY;  Service: Gastroenterology;  Laterality: N/A;  pre op genetic susceptibility to other disease  post gastritis, esophagitis  pcp Marlon Tran    OVARIAN CYST REMOVAL      x 2    SALPINGO OOPHORECTOMY Bilateral 06/17/2019    Procedure: SALPINGO OOPHORECTOMY LAPAROSCOPIC;  Surgeon: Ana Barrios MD;  Location: [facility] OR;  Service: Obstetrics/Gynecology    WISDOM TOOTH EXTRACTION       Family History   Problem Relation Age of Onset    Ovarian cancer Mother 72    Brain cancer Father     No Known Problems Brother     Heart disease Brother     No Known Problems Maternal Aunt     No Known Problems Paternal Aunt     No Known Problems Maternal Grandmother     No Known Problems Maternal Grandfather     No Known Problems Paternal Grandmother     Brain cancer Paternal Grandfather     No Known Problems Daughter     No Known Problems Daughter     Breast cancer Other 28    Uterine cancer Neg Hx     Colon cancer Neg Hx     Melanoma Neg Hx     Prostate cancer Neg Hx     BRCA 1/2 Neg Hx     Endometrial cancer Neg Hx     Colon polyps Neg Hx     Esophageal cancer Neg Hx     Liver cancer Neg Hx     Stomach cancer Neg Hx     Rectal cancer Neg Hx     Liver disease Neg Hx      Social History     Socioeconomic History    Marital status:    Tobacco Use    Smoking status: Never     Passive exposure: Past    Smokeless tobacco:  Never   Vaping Use    Vaping status: Never Used   Substance and Sexual Activity    Alcohol use: No    Drug use: No    Sexual activity: Not Currently     Partners: Male     Birth control/protection: Post-menopausal     Allergies   Allergen Reactions    Crestor [Rosuvastatin] Myalgia     Body aches     Current Outpatient Medications   Medication Sig Dispense Refill    aspirin 81 MG EC tablet Take 1 tablet by mouth Daily.      atorvastatin (Lipitor) 40 MG tablet Take 1 tablet by mouth Every Night. 90 tablet 3    Calcium-Vitamin D-Vitamin K (Viactiv Calcium Plus D) 650-12.5-40 MG-MCG-MCG chewable tablet Chew 1 tablet 2 (two) times a day.      Docusate Sodium 100 MG capsule Take 1 tablet by mouth Daily.      DULoxetine (CYMBALTA) 60 MG capsule TAKE ONE CAPSULE BY MOUTH EVERY DAY (Patient taking differently: Take 1 capsule by mouth Daily.) 90 capsule 3    loratadine (CLARITIN) 10 MG tablet Take 1 tablet by mouth As Needed.      omeprazole OTC (PriLOSEC OTC) 20 MG EC tablet Take 1 tablet by mouth Daily. 84 tablet 3    propranolol LA (Inderal LA) 120 MG 24 hr capsule Take 1 capsule by mouth Daily. 30 capsule 6    ZOLMitriptan (ZOMIG) 2.5 MG tablet TAKE 1 TABLET BY MOUTH TWICE DAILY AS NEEDED FOR HEADACHE (Patient taking differently: Take 1 tablet by mouth As Needed. TAKE 1 TABLET BY MOUTH TWICE DAILY AS NEEDED FOR HEADACHE) 12 tablet 6    ZOLMitriptan (ZOMIG) 2.5 MG tablet Take 1 tablet by mouth 1 (One) Time As Needed for Migraine. 12 tablet 0    clonazePAM (KlonoPIN) 1 MG tablet TAKE ONE TABLET BY MOUTH THREE TIMES A DAY AS NEEDED FOR ANXIETY (Patient not taking: Reported on 6/27/2024) 90 tablet 0    mirtazapine (REMERON) 15 MG tablet 1 nightly (Patient not taking: Reported on 6/27/2024) 30 tablet 2     No current facility-administered medications for this visit.     Review of Systems   Constitutional:  Negative for chills and fever.   HENT:  Negative for congestion.    Respiratory:  Negative for shortness of breath.     Cardiovascular:  Negative for chest pain and leg swelling.   Gastrointestinal:  Negative for constipation, diarrhea, nausea and vomiting.   Musculoskeletal: Negative.         Foot pain     Skin:  Negative for wound.   Neurological:  Negative for numbness.     OBJECTIVE     Vitals:    06/27/24 1011   BP: 132/80   Pulse: 86   SpO2: 96%       PHYSICAL EXAM  GEN:   Accompanied by none.     Foot/Ankle Exam    GENERAL  Appearance:  appears stated age  Orientation:  AAOx3  Affect:  appropriate  Gait:  unimpaired  Assistance:  independent  Right shoe gear: casual shoe  Left shoe gear: casual shoe    VASCULAR     Right Foot Vascularity   Dorsalis pedis:  2+  Posterior tibial:  2+  Skin temperature:  warm  Edema grading:  None  CFT:  3  Pedal hair growth:  Present  Varicosities:  none     Left Foot Vascularity   Dorsalis pedis:  2+  Posterior tibial:  2+  Skin temperature:  warm  Edema grading:  None  CFT:  3  Pedal hair growth:  Present  Varicosities:  none     NEUROLOGIC     Right Foot Neurologic   Normal sensation    Light touch sensation: normal  Vibratory sensation: normal  Hot/Cold sensation: normal     Left Foot Neurologic   Normal sensation    Light touch sensation: normal  Vibratory sensation: normal  Hot/Cold sensation:  normal    MUSCULOSKELETAL     Right Foot Musculoskeletal   Tenderness:  none    Arch:  Normal     Left Foot Musculoskeletal   Tenderness:  none  Arch:  Normal    MUSCLE STRENGTH     Right Foot Muscle Strength   Foot dorsiflexion:  5  Foot plantar flexion:  5  Foot inversion:  5  Foot eversion:  5     Left Foot Muscle Strength   Foot dorsiflexion:  5  Foot plantar flexion:  5  Foot inversion:  5  Foot eversion:  5    RANGE OF MOTION     Right Foot Range of Motion   Foot and ankle ROM within normal limits       Left Foot Range of Motion   Foot and ankle ROM within normal limits      DERMATOLOGIC      Right Foot Dermatologic   Skin  Positive for dryness.      Left Foot Dermatologic   Skin  Positive  for dryness.     RADIOLOGY/NUCLEAR:  No results found.    LABORATORY/CULTURE RESULTS:      PATHOLOGY RESULTS:       ASSESSMENT/PLAN     Diagnoses and all orders for this visit:    1. Heel pain, bilateral (Primary)      Comprehensive lower extremity examination and evaluation was performed.  Discussed findings and treatment plan including risks, benefits, and treatment options with patient in detail. Patient agreed with treatment plan.  Patient may maintain nails and calluses at home utilizing emery board or pumice stone between visits as needed   Recommend patient begin using urea cream 40% after using pumice stone to heels.  Recommend use of gel inserts to help decrease pressure to heels.  No fissures present today, but if this becomes an issue in the future patient may benefit from paring heel fissures.  An After Visit Summary was printed and given to the patient at discharge, including (if requested) any available informative/educational handouts regarding diagnosis, treatment, or medications. All questions were answered to patient/family satisfaction. Should symptoms fail to improve or worsen they agree to call or return to clinic or to go to the Emergency Department. Discussed the importance of following up with any needed screening tests/labs/specialist appointments and any requested follow-up recommended by me today. Importance of maintaining follow-up discussed and patient accepts that missed appointments can delay diagnosis and potentially lead to worsening of conditions.  Return if symptoms worsen or fail to improve., or sooner if acute issues arise.      This document has been electronically signed by MICHELLE Warren on June 27, 2024 12:04 CDT

## 2024-06-25 ENCOUNTER — OFFICE VISIT (OUTPATIENT)
Dept: FAMILY MEDICINE CLINIC | Facility: CLINIC | Age: 59
End: 2024-06-25
Payer: COMMERCIAL

## 2024-06-25 VITALS
BODY MASS INDEX: 30.69 KG/M2 | TEMPERATURE: 97.1 F | HEIGHT: 63 IN | WEIGHT: 173.2 LBS | DIASTOLIC BLOOD PRESSURE: 84 MMHG | SYSTOLIC BLOOD PRESSURE: 127 MMHG | OXYGEN SATURATION: 96 % | HEART RATE: 74 BPM

## 2024-06-25 DIAGNOSIS — F41.9 ANXIETY: Primary | ICD-10-CM

## 2024-06-25 PROCEDURE — 99213 OFFICE O/P EST LOW 20 MIN: CPT | Performed by: FAMILY MEDICINE

## 2024-06-25 RX ORDER — MIRTAZAPINE 15 MG/1
TABLET, FILM COATED ORAL
Qty: 30 TABLET | Refills: 2 | Status: SHIPPED | OUTPATIENT
Start: 2024-06-25

## 2024-06-25 NOTE — PROGRESS NOTES
9/1/2022     8:00 AM 12/2/2022     2:00 PM 3/6/2023    12:00 PM 6/14/2023     7:00 AM 10/9/2023    11:00 AM 3/8/2024    12:00 PM 6/25/2024     3:00 PM   CONTROLLED SUBSTANCE TRACKING   Last [place] 9/1/2022 12/2/2022 3/6/2023 6/14/2023 10/9/2023 3/8/2024 6/25/2024   Report Number   reviewed through epic reviewed through epic  reviewed through epic reviewed through [facility]   Last UDS 3/29/2022 3/29/2022 3/6/2023 3/6/2023 3/6/2023 3/8/2024 3/8/2024   Last Controlled Substance Agreement 3/29/2022 3/29/2022 3/6/2023 3/6/2023 3/6/2023 3/8/2024 3/8/2024     Stephanie Suarez is a 59 y.o. female.     History of Present Illness  59-year-old female with anxiety      The following portions of the patient's history were reviewed and updated as appropriate: allergies, current medications, past family history, past medical history, past social history, past surgical history, and problem list.    Review of Systems   Respiratory:  Negative for shortness of breath.    Cardiovascular:  Negative for chest pain and leg swelling.   Neurological:  Positive for headaches.        Migraines followed by neurology   Psychiatric/Behavioral:  The patient is nervous/anxious.        Objective   Physical Exam  Vitals and nursing note reviewed.   Constitutional:       Appearance: Normal appearance.   Eyes:      Extraocular Movements: Extraocular movements intact.      Pupils: Pupils are equal, round, and reactive to light.   Cardiovascular:      Rate and Rhythm: Normal rate and regular rhythm.   Pulmonary:      Effort: Pulmonary effort is normal.      Breath sounds: Normal breath sounds.   Musculoskeletal:      Right lower leg: No edema.      Left lower leg: No edema.   Skin:     General: Skin is warm and dry.   Neurological:      General: No focal deficit present.      Mental Status: She is alert and oriented to person, place, and time.   Psychiatric:         Mood and Affect: Mood normal.         Behavior: Behavior normal.          Thought Content: Thought content normal.         Judgment: Judgment normal.         Assessment & Plan   Diagnoses and all orders for this visit:    1. Anxiety (Primary)    Other orders  -     mirtazapine (REMERON) 15 MG tablet; 1 nightly  Dispense: 30 tablet; Refill: 2         Stop trazodone-talk to your neurologist about tapering Zomig

## 2024-06-26 ENCOUNTER — TELEPHONE (OUTPATIENT)
Age: 59
End: 2024-06-26
Payer: COMMERCIAL

## 2024-06-26 NOTE — TELEPHONE ENCOUNTER
Hub to relay  Spoke with patient regarding appt on 06/27/2024. Patient confirmed date and time off appt.

## 2024-06-27 ENCOUNTER — OFFICE VISIT (OUTPATIENT)
Age: 59
End: 2024-06-27
Payer: COMMERCIAL

## 2024-06-27 VITALS
SYSTOLIC BLOOD PRESSURE: 132 MMHG | HEART RATE: 86 BPM | WEIGHT: 172 LBS | OXYGEN SATURATION: 96 % | BODY MASS INDEX: 30.48 KG/M2 | DIASTOLIC BLOOD PRESSURE: 80 MMHG | HEIGHT: 63 IN

## 2024-06-27 DIAGNOSIS — M79.672 HEEL PAIN, BILATERAL: Primary | ICD-10-CM

## 2024-06-27 DIAGNOSIS — M79.671 HEEL PAIN, BILATERAL: Primary | ICD-10-CM

## 2024-06-27 PROBLEM — G47.34 NOCTURNAL HYPOXEMIA: Status: ACTIVE | Noted: 2023-12-12

## 2024-06-27 PROBLEM — G47.33 OBSTRUCTIVE SLEEP APNEA: Status: ACTIVE | Noted: 2023-12-12

## 2024-06-27 PROBLEM — G47.00 INSOMNIA: Status: ACTIVE | Noted: 2023-12-12

## 2024-06-27 PROBLEM — R06.83 SNORING: Status: ACTIVE | Noted: 2023-12-12

## 2024-06-28 ENCOUNTER — TELEPHONE (OUTPATIENT)
Dept: FAMILY MEDICINE CLINIC | Facility: CLINIC | Age: 59
End: 2024-06-28
Payer: COMMERCIAL

## 2024-06-28 NOTE — TELEPHONE ENCOUNTER
Patient took first dose of remeron last night.  Patient felt sluggish this morning.  This afternoon hands, feet, mouth are tingling.   Patient states these feelings are not normal for her.    Patient advised to hold medication until office calls her back and it could possibly be Monday due to provider not in office.

## 2024-06-28 NOTE — TELEPHONE ENCOUNTER
Contacted Dr. Tran by phone.  He advised for patient to hold tonight.  If medication is scored cut in half and try half tablet tomorrow night.  If it is not scored hold medication until Monday and he will follow up with patient.

## 2024-07-02 ENCOUNTER — OFFICE VISIT (OUTPATIENT)
Dept: FAMILY MEDICINE CLINIC | Facility: CLINIC | Age: 59
End: 2024-07-02
Payer: COMMERCIAL

## 2024-07-02 VITALS
HEART RATE: 78 BPM | SYSTOLIC BLOOD PRESSURE: 126 MMHG | WEIGHT: 171.6 LBS | HEIGHT: 62 IN | BODY MASS INDEX: 31.58 KG/M2 | DIASTOLIC BLOOD PRESSURE: 78 MMHG | RESPIRATION RATE: 16 BRPM | OXYGEN SATURATION: 96 % | TEMPERATURE: 97.6 F

## 2024-07-02 DIAGNOSIS — R07.9 CHEST PAIN, UNSPECIFIED TYPE: Primary | ICD-10-CM

## 2024-07-02 RX ORDER — BUTALBITAL, ACETAMINOPHEN AND CAFFEINE 50; 325; 40 MG/1; MG/1; MG/1
TABLET ORAL
Qty: 25 TABLET | Refills: 1 | Status: SHIPPED | OUTPATIENT
Start: 2024-07-02

## 2024-07-02 NOTE — PROGRESS NOTES
Subjective   Shawnee Suarez is a 59 y.o. female.     History of Present Illness  59-year-old female with chest pain and history of Zomig abuse      The following portions of the patient's history were reviewed and updated as appropriate: allergies, current medications, past family history, past medical history, past social history, past surgical history, and problem list.    Review of Systems   Respiratory:  Negative for shortness of breath.    Cardiovascular:  Positive for chest pain. Negative for leg swelling.   Neurological:  Positive for headaches.        Finch history of vascular headaches-managed by neurology-advised to stop Zomig   Psychiatric/Behavioral:  Positive for sleep disturbance.        Objective   Physical Exam  Vitals and nursing note reviewed.   Eyes:      Extraocular Movements: Extraocular movements intact.      Pupils: Pupils are equal, round, and reactive to light.   Cardiovascular:      Rate and Rhythm: Normal rate and regular rhythm.   Pulmonary:      Effort: Pulmonary effort is normal.      Breath sounds: Normal breath sounds.   Abdominal:      General: Abdomen is flat.      Palpations: Abdomen is soft.   Musculoskeletal:      Right lower leg: No edema.      Left lower leg: No edema.   Skin:     General: Skin is warm and dry.   Neurological:      General: No focal deficit present.      Mental Status: She is oriented to person, place, and time.   Psychiatric:         Mood and Affect: Mood normal.         Behavior: Behavior normal.         Thought Content: Thought content normal.         Judgment: Judgment normal.         Assessment & Plan   Diagnoses and all orders for this visit:    1. Chest pain, unspecified type (Primary)  -     ECG 12 Lead  -     CK  -     Troponin T  -     Ambulatory Referral to Cardiology    Other orders  -     butalbital-acetaminophen-caffeine (Esgic) -40 MG per tablet; 1 every 6 4 to 6 hours as needed for migraine  Dispense: 25 tablet; Refill: 1    EKG  abnormal-cardiology consult     has been taking trazodone and Remeron-stop trazodone take Remeron and Cymbalta-EKG abnormal possible anterior changes for ischemia-stop Zomig do not take-Fiorinal sent to [facility]

## 2024-07-03 LAB
CK SERPL-CCNC: 222 U/L (ref 32–182)
TROPONIN T SERPL HS-MCNC: <6 NG/L (ref 0–14)

## 2024-07-11 ENCOUNTER — TELEPHONE (OUTPATIENT)
Dept: NEUROLOGY | Facility: CLINIC | Age: 59
End: 2024-07-11
Payer: COMMERCIAL

## 2024-07-11 NOTE — TELEPHONE ENCOUNTER
Provider: CARMEN FERNANDEZ APRN     Caller: EHRSON    Relationship to Patient: SELF    Phone Number: [phone]    Reason for Call: CALLING TO LET PROVIDER KNOW SHE HAS BEEN HAVING MIGRAINES FOR THE LAST WEEK TO 10 DAYS.   STATED SHE HAS BEEN AT THE ER AT [facility].  STATED THE PCP TOOK HER OFF OF THE ZOMIG, BECAUSE IT CONSTRICTS THE BLOOD VESSELS.   STATED SHE HAD A HEART EPISODE DURING THE HEADACHES.  STATED THIS IS SOMETHING NEW.      STATED HER PCP PUT HER ON BUTALBITAL-ACETAMINOPHEN-CAFFEINE -40 MG ON  7-2-24.    PT IS SCHEDULED FOR 7-26-24 & WOULD LIKE A SOONER APPT.      When was the patient last seen: 6-11-24      PLEASE CALL & ADVISE

## 2024-07-11 NOTE — TELEPHONE ENCOUNTER
Patient notified that Pola Meneses NP can see her tomorrow(July 12th) at 3 pm.  Patient is in agreement with this date and time.

## 2024-07-12 ENCOUNTER — TELEMEDICINE (OUTPATIENT)
Dept: NEUROLOGY | Facility: CLINIC | Age: 59
End: 2024-07-12
Payer: COMMERCIAL

## 2024-07-12 DIAGNOSIS — G43.709 CHRONIC MIGRAINE WITHOUT AURA WITHOUT STATUS MIGRAINOSUS, NOT INTRACTABLE: Primary | ICD-10-CM

## 2024-07-12 RX ORDER — RIMEGEPANT SULFATE 75 MG/75MG
75 TABLET, ORALLY DISINTEGRATING ORAL ONCE AS NEEDED
Qty: 16 TABLET | Refills: 5 | Status: SHIPPED | OUTPATIENT
Start: 2024-07-12 | End: 2025-01-08

## 2024-07-12 RX ORDER — PROPRANOLOL HYDROCHLORIDE 80 MG/1
80 CAPSULE, EXTENDED RELEASE ORAL DAILY
Qty: 30 CAPSULE | Refills: 3 | Status: SHIPPED | OUTPATIENT
Start: 2024-07-12

## 2024-07-12 NOTE — PROGRESS NOTES
Neurology Consult Note    [facility]  [address], [address]  [place] [address]  Phone: [phone]  Fax: [phone]    Referring Provider:   No ref. provider found  Primary Care Provider:  Marlon Tran MD    Reason for Consult:  Problem visit  Subjective        Shawnee Suarez presents to [facility]    Migraine    59-year-old female seen for follow-up of chronic migraine.  Patient called in her office yesterday requesting a sooner appointment due to a problem visit.    Patient had presented to [facility] emergency room on 6/29/2024 regards to migraine that been ongoing for approximately 5 days.  Patient was given a migraine cocktail in the hospital and discharged home.  She notes she had a migraine for approximately 2 weeks.  At her last visit, she was seen on 6/11/2024.  At increased her propranolol from 80 mg daily to 120 mg.  Patient was noted a good therapeutic response with that medication.    Today patient presented via telehealth.  She was at her home and I was in my office.  Patient did endorse the above documentation from [facility].  She notes she saw her PCP the first of this month who stopped her Zomig.  He did not put her on Fioricet.  She notes she did take multiple doses of Fioricet to get relief.  She is also been experiencing chest pains.  She had an abnormal EKG performed in office.  She is set to see cardiology soon.  Regards to her migraines, they did increase in intensity.  However the quality of the pain felt the same.    Migraine preventative medications tried:  Propranolol 80 mg daily.  Good therapeutic response.     Migraine abortive medications tried:  Zomig 2.5 mg.  Good clinical benefit.  Fioricet.  Limited clinical benefit.     Migraine medications contraindicated:  Antidepressant medication class due to concurrent use of duloxetine, trazodone and Klonopin.  Triptan medication class due to current  unstable angina symptoms  Antiepileptic medication class due to strong history of anxiety and depression.     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    Nonsmoker    Family history of ovarian cancer    Mixed hyperlipidemia    Migraines    Anxiety    Snoring    Obstructive sleep apnea    Nocturnal hypoxemia    Insomnia        Past Medical History:   Diagnosis Date    Anxiety     Depression     History of transfusion     Hyperlipidemia     Migraines 06/08/2022    Ovarian cyst     x 2         Social History     Socioeconomic History    Marital status:    Tobacco Use    Smoking status: Never     Passive exposure: Past    Smokeless tobacco: Never   Vaping Use    Vaping status: Never Used   Substance and Sexual Activity    Alcohol use: No    Drug use: No    Sexual activity: Not Currently     Partners: Male     Birth control/protection: Post-menopausal        Allergies   Allergen Reactions    Crestor [Rosuvastatin] Myalgia     Body aches          Current Outpatient Medications:     aspirin 81 MG EC tablet, Take 1 tablet by mouth Daily., Disp: , Rfl:     atorvastatin (Lipitor) 40 MG tablet, Take 1 tablet by mouth Every Night., Disp: 90 tablet, Rfl: 3    butalbital-acetaminophen-caffeine (Esgic) -40 MG per tablet, 1 every 6 4 to 6 hours as needed for migraine, Disp: 25 tablet, Rfl: 1    Calcium-Vitamin D-Vitamin K (Viactiv Calcium Plus D) 650-12.5-40 MG-MCG-MCG chewable tablet, Chew 1 tablet 2 (two) times a day., Disp: , Rfl:     Docusate Sodium 100 MG capsule, Take 1 tablet by mouth Daily., Disp: , Rfl:     DULoxetine (CYMBALTA) 60 MG capsule, TAKE ONE CAPSULE BY MOUTH EVERY DAY (Patient taking differently: Take 1 capsule by mouth Daily.), Disp: 90 capsule, Rfl: 3    loratadine (CLARITIN) 10 MG tablet, Take 1 tablet by mouth As Needed., Disp: , Rfl:     mirtazapine (REMERON) 15 MG tablet, 1 nightly, Disp: 30 tablet, Rfl: 2    omeprazole OTC (PriLOSEC OTC) 20 MG EC  tablet, Take 1 tablet by mouth Daily., Disp: 84 tablet, Rfl: 3    propranolol LA (Inderal LA) 80 MG 24 hr capsule, Take 1 capsule by mouth Daily., Disp: 30 capsule, Rfl: 3    Rimegepant Sulfate (Nurtec) 75 MG tablet dispersible tablet, Take 1 tablet by mouth 1 (One) Time As Needed (migraine) for up to 180 days., Disp: 16 tablet, Rfl: 5       Objective   Vital Signs:   There were no vitals taken for this visit.    Limited exam due to telehealth  Physical Exam  Vitals and nursing note reviewed.   Constitutional:       Appearance: Normal appearance.   HENT:      Head: Normocephalic.   Pulmonary:      Effort: Pulmonary effort is normal. No respiratory distress.   Neurological:      Mental Status: She is alert.   Psychiatric:         Speech: Speech normal.        Neurological Exam  Mental Status  Alert. Oriented to person, place, time and situation. Speech is normal. Language is fluent with no aphasia.      Result Review :   The following data was reviewed by: MICHELLE Johnson on 07/12/2024:       Progress Notes by Pola Meneses APRN (06/11/2024 14:00)     EXTERNAL MEDICAL RECORDS - [facility] (07/12/2024)                Impression:  Shawnee Suarez is a 59 y.o. female who presents for follow-up of chronic migraine.  Patient did have a status migrainous situation a few weeks back.  Her PCP did stop Zomig.  Patient has also been having episodes of chest pain recently not in relation to using Zomig.  He did start her on Fioricet.  For Ms. Suarez, I would recommend against use of Fioricet due to risk of medication overuse.  Additionally I think she would better served using Nurtec due to mechanism and limited interactions.  Addition as far as prevention, we will decrease her back down to 80 mg of propranolol.  She reportedly had worsening migraines with increased dose.  80 mg did provide good benefit.  Additionally I will go ahead and refer her to our Botox clinic.  Again I think this is a good option for her  due to the limited side effect profile and no medication interactions.  I would be hesitant to use antiepileptic medications due to her strong history of anxiety and depression as well as being treated for these with multiple medications.    Diagnoses and all orders for this visit:    1. Chronic migraine without aura without status migrainosus, not intractable (Primary)  -     propranolol LA (Inderal LA) 80 MG 24 hr capsule; Take 1 capsule by mouth Daily.  Dispense: 30 capsule; Refill: 3  -     Rimegepant Sulfate (Nurtec) 75 MG tablet dispersible tablet; Take 1 tablet by mouth 1 (One) Time As Needed (migraine) for up to 180 days.  Dispense: 16 tablet; Refill: 5  -     Ambulatory Referral for In-Office Procedure Authorization        Plan:  Decrease propranolol to 80 mg daily for migraine prevention  Start Nurtec 75 mg as needed for migraine   Referral to Botox clinic for migraine prevention  Stop use of Fioricet  Follow-up with PCP as scheduled  Follow-up in our clinic in 3 months or sooner if needed    The patient and I have discussed the plan of care and she is in full agreement at this time.     Follow Up   Return in about 3 months (around 10/12/2024) for Migraine.            Pola Meneses, MICHELLE  24  15:35 CDT

## 2024-07-12 NOTE — PATIENT INSTRUCTIONS
Stop use of Fioricet  New prescription of propranolol sent into pharmacy.  We will take 80 mg daily  We will start Nurtec 75 mg as needed for migraine treatment.  Take 1 tablet at onset of migraine symptoms.  Max dose is 1 tablet per 24 hours.  I have sent a referral in for Botox.  We will contact you and let you know if approved.  Please provide us with an update in a few weeks

## 2024-07-15 ENCOUNTER — TELEPHONE (OUTPATIENT)
Dept: NEUROLOGY | Facility: CLINIC | Age: 59
End: 2024-07-15

## 2024-07-15 NOTE — TELEPHONE ENCOUNTER
Caller: Suarez Shawnee MCCRAY    Relationship: Self    Best call back number: [phone]    What was the call regarding: PT CALLING TO NOTIFY THE OFFICE THAT HER INSURANCE MAY BE REQUIRING A PRIOR AUTHORIZATION FOR FURTHER CONSIDERATION OF COVERAGE OF THE NURTEC MEDICATION. PT CALLING TO REQUEST THAT OFFICE INITIATE PRIOR AUTHORIZATION IF PRIOR AUTHORIZATION IS REQUIRED AND CONTACT HER WITH ANY STATUS UPDATES.    PT STATES HER PREVIOUS MIGRAINE HAD SUBSIDED FOR APPROXIMATELY 3 DAYS BUT PT FEELS THE MIGRAINE COMING BACK THIS MORNING. PT STATES ALL SHE HAS RIGHT NOW TO HELP HER MIGRAINE IS THE FIORICET WHICH MORALES RECOMMENDED AGAINIST USING THIS FOR MIGRAINES DUE TO RISK OF MEDICATION OVERUSE. ANY OTHER RECOMMENDATIONS WHILE WAITING FOR INSURANCE APPROVAL OF NURTEC?    Do you require a callback: YES, PLEASE.    PLEASE REVIEW AND ADVISE.

## 2024-07-15 NOTE — TELEPHONE ENCOUNTER
Caller: Erick Shawnee MAHENDRA    Relationship: Self    Best call back number: [phone]    What is the best time to reach you: ANY    Who are you requesting to speak with (clinical staff, provider,  specific staff member): ROMA    Do you know the name of the person who called: ROMA    What was the call regarding: NURTEC. STATES SHE LIVES ABOUT 50 MINUTES AWAY AND DOESN'T FEEL COMFORTABLE DRIVING THAT FAR WITH HER MIGRAINE NOW FOR THE SAMPLES. WANTED TO SEE IF THERE IS ANYTHING ELSE THAT DOESN'T NEED TO BE AUTHORIZED THAT CAN BE SENT IN WHILE WAITING FOR NURTEC. PLEASE ADVISE ASAP, PATIENT HAS HAD A MIGRAINE FOR A FEW DAYS, THANK YOU.

## 2024-07-16 ENCOUNTER — TELEPHONE (OUTPATIENT)
Dept: NEUROLOGY | Facility: CLINIC | Age: 59
End: 2024-07-16

## 2024-07-16 DIAGNOSIS — G43.709 CHRONIC MIGRAINE WITHOUT AURA WITHOUT STATUS MIGRAINOSUS, NOT INTRACTABLE: ICD-10-CM

## 2024-07-16 DIAGNOSIS — G43.709 CHRONIC MIGRAINE WITHOUT AURA WITHOUT STATUS MIGRAINOSUS, NOT INTRACTABLE: Primary | ICD-10-CM

## 2024-07-16 RX ORDER — DULOXETIN HYDROCHLORIDE 60 MG/1
CAPSULE, DELAYED RELEASE ORAL
Qty: 90 CAPSULE | Refills: 3 | Status: SHIPPED | OUTPATIENT
Start: 2024-07-16

## 2024-07-16 RX ORDER — LASMIDITAN 100 MG/1
100 TABLET ORAL DAILY PRN
Qty: 5 TABLET | Refills: 0 | Status: SHIPPED | OUTPATIENT
Start: 2024-07-16 | End: 2024-07-16 | Stop reason: SDUPTHER

## 2024-07-16 RX ORDER — LASMIDITAN 100 MG/1
100 TABLET ORAL DAILY PRN
Qty: 8 TABLET | Refills: 0 | Status: SHIPPED | OUTPATIENT
Start: 2024-07-16

## 2024-07-16 NOTE — TELEPHONE ENCOUNTER
PATIENT CALLING REGARDING REQUEST FOR UBRELVY.    PT STATES PHARMACY WON'T FILL. PT IS IN THE MIDDLE OF MIGRAINE AND NEEDS A MEDICATION SHE CAN TAKE FOR RELIEF.    PLEASE CONTACT PHARMACY AND SEE WHAT CAN BE DONE AND ADVISE PATIENT IF THERE IS ANOTHER MEDICATION OR IF SHE NEEDS TO COME TO THE OFFICE.    THANK YOU

## 2024-07-16 NOTE — TELEPHONE ENCOUNTER
Jane sent reyvow in for patient. She is to take 1 tablet today. Max dose 1 tablet daily. No driving for 8 hours after using. Can cause drowsiness and dizziness.

## 2024-07-16 NOTE — TELEPHONE ENCOUNTER
Rx Refill Note  Requested Prescriptions     Pending Prescriptions Disp Refills    DULoxetine (CYMBALTA) 60 MG capsule [Pharmacy Med Name: DULOXETINE HCL 60MG CPEP] 90 capsule 3     Sig: TAKE ONE CAPSULE BY MOUTH EVERY DAY      Last office visit with prescribing clinician: 7/2/2024   Last telemedicine visit with prescribing clinician: Visit date not found   Next office visit with prescribing clinician: 1/7/25    Bree Lockett MA  07/16/24, 10:30 CDT

## 2024-07-16 NOTE — TELEPHONE ENCOUNTER
Pharmacy Name:  [facility]      Pharmacy representative name: MERNA    Pharmacy phone number: [phone]    What medication are you calling in regards to:   Reyvow  Lasmiditan Succinate (Reyvow) 100 MG tablet    What question does the pharmacy have: SHE STATES THIS MEDICATION ONLY COMES IN PACKS OF EIGHT AND THEY CAN NOT BREAK APART, SHE STATES THE QUANTITY SENT WAS FIVE. PLEASE REVIEW AND ADVISE.

## 2024-07-17 ENCOUNTER — TELEPHONE (OUTPATIENT)
Dept: NEUROLOGY | Facility: CLINIC | Age: 59
End: 2024-07-17
Payer: COMMERCIAL

## 2024-07-17 ENCOUNTER — OFFICE VISIT (OUTPATIENT)
Dept: FAMILY MEDICINE CLINIC | Facility: CLINIC | Age: 59
End: 2024-07-17
Payer: COMMERCIAL

## 2024-07-17 ENCOUNTER — HOSPITAL ENCOUNTER (EMERGENCY)
Facility: HOSPITAL | Age: 59
Discharge: HOME | End: 2024-07-17
Payer: COMMERCIAL

## 2024-07-17 ENCOUNTER — APPOINTMENT (OUTPATIENT)
Dept: GENERAL RADIOLOGY | Facility: HOSPITAL | Age: 59
End: 2024-07-17
Payer: COMMERCIAL

## 2024-07-17 VITALS
DIASTOLIC BLOOD PRESSURE: 51 MMHG | TEMPERATURE: 98.1 F | WEIGHT: 178 LBS | HEIGHT: 63 IN | HEART RATE: 79 BPM | BODY MASS INDEX: 31.54 KG/M2 | SYSTOLIC BLOOD PRESSURE: 131 MMHG | OXYGEN SATURATION: 96 % | RESPIRATION RATE: 12 BRPM

## 2024-07-17 VITALS
HEART RATE: 96 BPM | WEIGHT: 178.4 LBS | OXYGEN SATURATION: 97 % | HEIGHT: 62 IN | TEMPERATURE: 98.2 F | SYSTOLIC BLOOD PRESSURE: 128 MMHG | BODY MASS INDEX: 32.83 KG/M2 | RESPIRATION RATE: 16 BRPM | DIASTOLIC BLOOD PRESSURE: 74 MMHG

## 2024-07-17 DIAGNOSIS — R07.9 CHEST PAIN, UNSPECIFIED TYPE: ICD-10-CM

## 2024-07-17 DIAGNOSIS — R07.9 CHEST PAIN, UNSPECIFIED TYPE: Primary | ICD-10-CM

## 2024-07-17 DIAGNOSIS — R51.9 NONINTRACTABLE HEADACHE, UNSPECIFIED CHRONICITY PATTERN, UNSPECIFIED HEADACHE TYPE: Primary | ICD-10-CM

## 2024-07-17 DIAGNOSIS — Z86.69 HISTORY OF MIGRAINE: ICD-10-CM

## 2024-07-17 LAB
ALBUMIN SERPL-MCNC: 4.2 G/DL (ref 3.5–5.2)
ALBUMIN/GLOB SERPL: 1.4 G/DL
ALP SERPL-CCNC: 99 U/L (ref 39–117)
ALT SERPL W P-5'-P-CCNC: 101 U/L (ref 1–33)
ANION GAP SERPL CALCULATED.3IONS-SCNC: 11 MMOL/L (ref 5–15)
AST SERPL-CCNC: 66 U/L (ref 1–32)
BASOPHILS # BLD AUTO: 0.07 10*3/MM3 (ref 0–0.2)
BASOPHILS NFR BLD AUTO: 1.1 % (ref 0–1.5)
BILIRUB SERPL-MCNC: 0.3 MG/DL (ref 0–1.2)
BUN SERPL-MCNC: 13 MG/DL (ref 8–23)
BUN/CREAT SERPL: 23.2 (ref 7–25)
CALCIUM SPEC-SCNC: 10.1 MG/DL (ref 8.6–10.5)
CHLORIDE SERPL-SCNC: 108 MMOL/L (ref 98–107)
CO2 SERPL-SCNC: 23 MMOL/L (ref 22–29)
CREAT SERPL-MCNC: 0.56 MG/DL (ref 0.57–1)
D DIMER PPP FEU-MCNC: 0.45 MCGFEU/ML (ref 0–0.61)
DEPRECATED RDW RBC AUTO: 41.9 FL (ref 37–54)
EGFRCR SERPLBLD CKD-EPI 2021: 104 ML/MIN/1.73
EOSINOPHIL # BLD AUTO: 0.21 10*3/MM3 (ref 0–0.4)
EOSINOPHIL NFR BLD AUTO: 3.2 % (ref 0.3–6.2)
ERYTHROCYTE [DISTWIDTH] IN BLOOD BY AUTOMATED COUNT: 12.9 % (ref 12.3–15.4)
GEN 5 2HR TROPONIN T REFLEX: <6 NG/L
GLOBULIN UR ELPH-MCNC: 3.1 GM/DL
GLUCOSE SERPL-MCNC: 96 MG/DL (ref 65–99)
HCT VFR BLD AUTO: 44.3 % (ref 34–46.6)
HGB BLD-MCNC: 14.8 G/DL (ref 12–15.9)
HOLD SPECIMEN: NORMAL
HOLD SPECIMEN: NORMAL
IMM GRANULOCYTES # BLD AUTO: 0.04 10*3/MM3 (ref 0–0.05)
IMM GRANULOCYTES NFR BLD AUTO: 0.6 % (ref 0–0.5)
LYMPHOCYTES # BLD AUTO: 1.86 10*3/MM3 (ref 0.7–3.1)
LYMPHOCYTES NFR BLD AUTO: 28.5 % (ref 19.6–45.3)
MAGNESIUM SERPL-MCNC: 2.3 MG/DL (ref 1.6–2.4)
MCH RBC QN AUTO: 30 PG (ref 26.6–33)
MCHC RBC AUTO-ENTMCNC: 33.4 G/DL (ref 31.5–35.7)
MCV RBC AUTO: 89.9 FL (ref 79–97)
MONOCYTES # BLD AUTO: 0.99 10*3/MM3 (ref 0.1–0.9)
MONOCYTES NFR BLD AUTO: 15.2 % (ref 5–12)
NEUTROPHILS NFR BLD AUTO: 3.35 10*3/MM3 (ref 1.7–7)
NEUTROPHILS NFR BLD AUTO: 51.4 % (ref 42.7–76)
NRBC BLD AUTO-RTO: 0 /100 WBC (ref 0–0.2)
NT-PROBNP SERPL-MCNC: <36 PG/ML (ref 0–900)
PLATELET # BLD AUTO: 230 10*3/MM3 (ref 140–450)
PMV BLD AUTO: 10.9 FL (ref 6–12)
POTASSIUM SERPL-SCNC: 4.3 MMOL/L (ref 3.5–5.2)
PROT SERPL-MCNC: 7.3 G/DL (ref 6–8.5)
RBC # BLD AUTO: 4.93 10*6/MM3 (ref 3.77–5.28)
SODIUM SERPL-SCNC: 142 MMOL/L (ref 136–145)
TROPONIN T DELTA: NORMAL
TROPONIN T SERPL HS-MCNC: <6 NG/L
WBC NRBC COR # BLD AUTO: 6.52 10*3/MM3 (ref 3.4–10.8)
WHOLE BLOOD HOLD COAG: NORMAL
WHOLE BLOOD HOLD SPECIMEN: NORMAL

## 2024-07-17 PROCEDURE — 36415 COLL VENOUS BLD VENIPUNCTURE: CPT

## 2024-07-17 PROCEDURE — 85379 FIBRIN DEGRADATION QUANT: CPT

## 2024-07-17 PROCEDURE — 96361 HYDRATE IV INFUSION ADD-ON: CPT

## 2024-07-17 PROCEDURE — 83735 ASSAY OF MAGNESIUM: CPT

## 2024-07-17 PROCEDURE — 71046 X-RAY EXAM CHEST 2 VIEWS: CPT

## 2024-07-17 PROCEDURE — 83880 ASSAY OF NATRIURETIC PEPTIDE: CPT

## 2024-07-17 PROCEDURE — 25010000002 METOCLOPRAMIDE PER 10 MG

## 2024-07-17 PROCEDURE — 96375 TX/PRO/DX INJ NEW DRUG ADDON: CPT

## 2024-07-17 PROCEDURE — 25810000003 SODIUM CHLORIDE 0.9 % SOLUTION

## 2024-07-17 PROCEDURE — 99284 EMERGENCY DEPT VISIT MOD MDM: CPT

## 2024-07-17 PROCEDURE — 25010000002 KETOROLAC TROMETHAMINE PER 15 MG

## 2024-07-17 PROCEDURE — 96374 THER/PROPH/DIAG INJ IV PUSH: CPT

## 2024-07-17 PROCEDURE — 25010000002 DIPHENHYDRAMINE PER 50 MG

## 2024-07-17 PROCEDURE — 25010000002 DEXAMETHASONE PER 1 MG

## 2024-07-17 PROCEDURE — 25010000002 PROCHLORPERAZINE 10 MG/2ML SOLUTION

## 2024-07-17 PROCEDURE — 85025 COMPLETE CBC W/AUTO DIFF WBC: CPT

## 2024-07-17 PROCEDURE — 80053 COMPREHEN METABOLIC PANEL: CPT

## 2024-07-17 PROCEDURE — 84484 ASSAY OF TROPONIN QUANT: CPT

## 2024-07-17 RX ORDER — DEXAMETHASONE SODIUM PHOSPHATE 10 MG/ML
10 INJECTION INTRAMUSCULAR; INTRAVENOUS ONCE
Status: COMPLETED | OUTPATIENT
Start: 2024-07-17 | End: 2024-07-17

## 2024-07-17 RX ORDER — SODIUM CHLORIDE 0.9 % (FLUSH) 0.9 %
10 SYRINGE (ML) INJECTION AS NEEDED
Status: DISCONTINUED | OUTPATIENT
Start: 2024-07-17 | End: 2024-07-17 | Stop reason: HOSPADM

## 2024-07-17 RX ORDER — ASPIRIN 81 MG/1
81 TABLET, CHEWABLE ORAL ONCE
Status: COMPLETED | OUTPATIENT
Start: 2024-07-17 | End: 2024-07-17

## 2024-07-17 RX ORDER — PROCHLORPERAZINE EDISYLATE 5 MG/ML
5 INJECTION INTRAMUSCULAR; INTRAVENOUS ONCE
Status: COMPLETED | OUTPATIENT
Start: 2024-07-17 | End: 2024-07-17

## 2024-07-17 RX ORDER — KETOROLAC TROMETHAMINE 15 MG/ML
15 INJECTION, SOLUTION INTRAMUSCULAR; INTRAVENOUS ONCE
Status: COMPLETED | OUTPATIENT
Start: 2024-07-17 | End: 2024-07-17

## 2024-07-17 RX ORDER — DIPHENHYDRAMINE HYDROCHLORIDE 50 MG/ML
25 INJECTION INTRAMUSCULAR; INTRAVENOUS ONCE
Status: COMPLETED | OUTPATIENT
Start: 2024-07-17 | End: 2024-07-17

## 2024-07-17 RX ORDER — METOCLOPRAMIDE HYDROCHLORIDE 5 MG/ML
10 INJECTION INTRAMUSCULAR; INTRAVENOUS ONCE
Status: COMPLETED | OUTPATIENT
Start: 2024-07-17 | End: 2024-07-17

## 2024-07-17 RX ORDER — CLONAZEPAM 1 MG/1
1 TABLET ORAL 3 TIMES DAILY PRN
COMMUNITY

## 2024-07-17 RX ADMIN — KETOROLAC TROMETHAMINE 15 MG: 15 INJECTION, SOLUTION INTRAMUSCULAR; INTRAVENOUS at 19:25

## 2024-07-17 RX ADMIN — METOCLOPRAMIDE HYDROCHLORIDE 10 MG: 5 INJECTION INTRAMUSCULAR; INTRAVENOUS at 19:27

## 2024-07-17 RX ADMIN — DIPHENHYDRAMINE HYDROCHLORIDE 25 MG: 50 INJECTION, SOLUTION INTRAMUSCULAR; INTRAVENOUS at 16:55

## 2024-07-17 RX ADMIN — SODIUM CHLORIDE 1000 ML: 9 INJECTION, SOLUTION INTRAVENOUS at 16:52

## 2024-07-17 RX ADMIN — ASPIRIN 81 MG: 81 TABLET, CHEWABLE ORAL at 14:09

## 2024-07-17 RX ADMIN — PROCHLORPERAZINE EDISYLATE 5 MG: 5 INJECTION INTRAMUSCULAR; INTRAVENOUS at 16:53

## 2024-07-17 RX ADMIN — DEXAMETHASONE SODIUM PHOSPHATE 10 MG: 10 INJECTION INTRAMUSCULAR; INTRAVENOUS at 16:57

## 2024-07-17 NOTE — TELEPHONE ENCOUNTER
Patient has again called in about Ubrelvy and the pharmacy having an issue with getting it filled.  I let her know that Escalona was aware once again of the insurance denial of the Ubrelvy and he ordered Reyvow.  We did send her notification of this yesterday.  We did get an approval through the insurance for the Reyvow.  I did give her instructions on how to take the medication.  She is to contact the pharmacy.

## 2024-07-17 NOTE — ED PROVIDER NOTES
Subjective   History of Present Illness  Patient is a 59-year-old female who presents emergency department with a migraine.  Reports that it has been ongoing for the past 2 weeks.  She does have history of migraines and states that she follows with neurology.  States that she has been trying out different medication for her migraines, but has not had full relief yet.  Reports she is waiting on insurance for the newest medication that has been prescribed.  She denies any visual disturbances.  Denies nausea or vomiting.  States that her headache currently feels like her typical migraines.  Patient also reports that approximately 1 week ago she noticed pain across her chest and upper back pain.  She was unsure if this could likely be due to the stress from her migraines.  She states that she went to her primary care provider where they did an EKG and she is supposed to be following with Dr. Rodas, cardiologist on an outpatient basis next Thursday.  Patient reports that today she developed pain on the right side of her chest that felt like a pressure-like sensation.  No shortness of breath.  No back pain currently.  She is currently not complaining of any chest pain at this time.  She is more concerned about her migraine currently.        Review of Systems   Cardiovascular:  Positive for chest pain.   Neurological:  Positive for headaches.   All other systems reviewed and are negative.      Past Medical History:   Diagnosis Date    Anxiety     Depression     History of transfusion     Hyperlipidemia     Migraines 06/08/2022    Ovarian cyst     x 2        Allergies   Allergen Reactions    Crestor [Rosuvastatin] Myalgia     Body aches       Past Surgical History:   Procedure Laterality Date    APPENDECTOMY      BELPHAROPTOSIS REPAIR Bilateral 2023    BREAST BIOPSY Right 11/1989    CHOLECYSTECTOMY      COLONOSCOPY  11/22/2013    Normal exam repeat in 10 years    COLONOSCOPY N/A 11/13/2018    Normal but prep was only fair  repeat in 2 years    COLONOSCOPY N/A 01/22/2021    The entire examined colon is normal on direct and retroflexion views; No specimens collected; Repeat 10 years    COLONOSCOPY N/A 5/7/2024    Procedure: COLONOSCOPY WITH ANESTHESIA;  Surgeon: Da Miles MD;  Location: [facility] ENDOSCOPY;  Service: Gastroenterology;  Laterality: N/A;  pre op genetic susceptibility to other disease  post  pcp Marlon Tran    D & C HYSTEROSCOPY ENDOMETRIAL ABLATION N/A 02/06/2017    Procedure: DILATATION AND CURETTAGE HYSTEROSCOPY NOVASURE ENDOMETRIAL ABLATION;  Surgeon: Ana Barrios MD;  Location: [facility] OR;  Service:     DIAGNOSTIC LAPAROSCOPY N/A 06/17/2019    Procedure: DIAGNOSTIC LAPAROSCOPY;  Surgeon: Ana Barrios MD;  Location: [facility] OR;  Service: Obstetrics/Gynecology    ENDOSCOPY N/A 5/7/2024    Procedure: ESOPHAGOGASTRODUODENOSCOPY WITH ANESTHESIA;  Surgeon: Da Miles MD;  Location: [facility] ENDOSCOPY;  Service: Gastroenterology;  Laterality: N/A;  pre op genetic susceptibility to other disease  post gastritis, esophagitis  pcp Marlon Tran    OVARIAN CYST REMOVAL      x 2    SALPINGO OOPHORECTOMY Bilateral 06/17/2019    Procedure: SALPINGO OOPHORECTOMY LAPAROSCOPIC;  Surgeon: Ana Barrios MD;  Location: [facility] OR;  Service: Obstetrics/Gynecology    WISDOM TOOTH EXTRACTION         Family History   Problem Relation Age of Onset    Ovarian cancer Mother 72    Brain cancer Father     No Known Problems Brother     Heart disease Brother     No Known Problems Maternal Aunt     No Known Problems Paternal Aunt     No Known Problems Maternal Grandmother     No Known Problems Maternal Grandfather     No Known Problems Paternal Grandmother     Brain cancer Paternal Grandfather     No Known Problems Daughter     No Known Problems Daughter     Breast cancer Other 28    Uterine cancer Neg Hx     Colon cancer Neg Hx     Melanoma Neg Hx     Prostate cancer Neg Hx     BRCA 1/2 Neg Hx     Endometrial cancer Neg Hx     Colon  polyps Neg Hx     Esophageal cancer Neg Hx     Liver cancer Neg Hx     Stomach cancer Neg Hx     Rectal cancer Neg Hx     Liver disease Neg Hx        Social History     Socioeconomic History    Marital status:    Tobacco Use    Smoking status: Never     Passive exposure: Past    Smokeless tobacco: Never   Vaping Use    Vaping status: Never Used   Substance and Sexual Activity    Alcohol use: No    Drug use: No    Sexual activity: Not Currently     Partners: Male     Birth control/protection: Post-menopausal           Objective   Physical Exam  Vitals and nursing note reviewed.   Constitutional:       General: She is not in acute distress.     Appearance: Normal appearance. She is normal weight. She is not ill-appearing or toxic-appearing.   HENT:      Head: Normocephalic and atraumatic.      Nose: Nose normal.   Eyes:      Extraocular Movements: Extraocular movements intact.      Conjunctiva/sclera: Conjunctivae normal.      Pupils: Pupils are equal, round, and reactive to light.   Cardiovascular:      Rate and Rhythm: Normal rate and regular rhythm.      Pulses: Normal pulses.      Heart sounds: Normal heart sounds.   Pulmonary:      Effort: Pulmonary effort is normal.      Breath sounds: Normal breath sounds.   Abdominal:      General: Abdomen is flat. Bowel sounds are normal. There is no distension.      Palpations: Abdomen is soft.      Tenderness: There is no abdominal tenderness.   Musculoskeletal:         General: Normal range of motion.      Cervical back: Normal range of motion and neck supple.   Skin:     General: Skin is warm and dry.   Neurological:      General: No focal deficit present.      Mental Status: She is alert and oriented to person, place, and time. Mental status is at baseline.      GCS: GCS eye subscore is 4. GCS verbal subscore is 5. GCS motor subscore is 6.      Cranial Nerves: Cranial nerves 2-12 are intact.      Sensory: Sensation is intact.      Motor: Motor function is intact.       Coordination: Coordination is intact.      Gait: Gait is intact.   Psychiatric:         Mood and Affect: Mood normal.         Behavior: Behavior normal.         Thought Content: Thought content normal.         Judgment: Judgment normal.         Procedures           ED Course  ED Course as of 07/18/24 1546   Wed Jul 17, 2024   1856 HEART Score for Major Cardiac Events - MDCalc  Calculated on Jul 17 2024 7:56 PM  2 points -> Low Score (0-3 points) Risk of MACE of 0.9-1.7%. [KR]      ED Course User Index  [KR] Lucy Michael APRN                                             Medical Decision Making  Patient is a 59-year-old female who presents emergency department with a migraine.  Reports that it has been ongoing for the past 2 weeks.  She does have history of migraines and states that she follows with neurology.  States that she has been trying out different medication for her migraines, but has not had full relief yet.  Reports she is waiting on insurance for the newest medication that has been prescribed.  She denies any visual disturbances.  Denies nausea or vomiting.  States that her headache currently feels like her typical migraines.  Patient also reports that approximately 1 week ago she noticed pain across her chest and upper back pain.  She was unsure if this could likely be due to the stress from her migraines.  She states that she went to her primary care provider where they did an EKG and she is supposed to be following with Dr. Rodas, cardiologist on an outpatient basis next Thursday.  Patient reports that today she developed pain on the right side of her chest that felt like a pressure-like sensation.  No shortness of breath.  No back pain currently.  She is currently not complaining of any chest pain at this time.  She is more concerned about her migraine currently.    Patient was non-toxic and not-ill appearing on arrival. Vital signs stable.     Patient's presentation raises suspicion for  differentials including, but not limited to, migraine, tension headache, ACS.     External (non-ED) record review: None    Given this, Shawnee was placed on the monitor. Laboratory studies and imaging studies were ordered including CBC, CMP, troponin, BNP, D-dimer, magnesium, EKG, chest x-ray.     Shawnee was given IV fluids, IV Decadron, IV Compazine, IV Benadryl, IV Toradol, IV Reglan for symptomatic relief.    Imaging was reviewed by radiologist. Chest x-ray negative for acute findings.    Decision rules/scores evaluated: Heart score 2     Labs were reviewed. CBC with no leukocytosis, stable H&H. CMP with , AST 66, otherwise unremarkable. Troponin unremarkable x2. BNP normal. Magnesium normal. D-dimer unremarkable. On re-evaluation, patient remained hemodynamically stable and appeared to be comfortable and in no acute distress. Remained neurologically intact. Reported her migraine had improved post treatment. Patient does follow with neurology on an outpatient basis and is currently following with them about different migraine medication. I have advised her to schedule an appointment as soon as possible to reassess symptoms. Advised to keep scheduled appointment with Dr. Rodas next Thursday.    I discussed all of the lab and imaging results with the patient during this visit in the emergency department. I answered all the questions regarding the emergency department evaluation, diagnosis, and treatment plan. We talked about how crucial it is for the patient to follow up by calling their primary care provider, cardiology, and neurology as soon as possible to schedule an appointment for within the next few days or as soon as possible so that the symptoms can be reassessed to see if they have improved or to answer any additional questions. I also provided the patient with advice on returning safely and urged the patient to visit the emergency department right away if any worsening or new symptoms appeared. The  patient verbalized understanding of the discharge instructions and agreed with them. Shawnee was discharged in stable condition.    Signed by:   MICHELLE Ibarra 7/18/2024 14:57 CDT     Dragon disclaimer:  Part of this note may be an electronic transcription/translation of spoken language to printed text using the Dragon Dictation System.    Problems Addressed:  Chest pain, unspecified type: complicated acute illness or injury  History of migraine: complicated acute illness or injury  Nonintractable headache, unspecified chronicity pattern, unspecified headache type: complicated acute illness or injury    Amount and/or Complexity of Data Reviewed  Labs: ordered.  Radiology: ordered.    Risk  Prescription drug management.        Final diagnoses:   Nonintractable headache, unspecified chronicity pattern, unspecified headache type   History of migraine   Chest pain, unspecified type       ED Disposition  ED Disposition       ED Disposition   Discharge    Condition   Stable    Comment   --               Marlon Tran MD  [address] [facility] [address] [phone]    Schedule an appointment as soon as possible for a visit in 1 day      [facility] EMERGENCY DEPARTMENT  [address] [place] [id number] [phone]  Go to   If symptoms worsen         Medication List      No changes were made to your prescriptions during this visit.            Lucy Michael, MICHELLE  07/18/24 1546

## 2024-07-17 NOTE — PROGRESS NOTES
Subjective   Shawnee Suarez is a 59 y.o. female.     History of Present Illness  59-year-old female with migraine headache and chest pain      The following portions of the patient's history were reviewed and updated as appropriate: allergies, current medications, past family history, past medical history, past social history, past surgical history, and problem list.    Review of Systems   Respiratory:  Negative for shortness of breath.    Cardiovascular:  Positive for chest pain. Negative for leg swelling.   Neurological:  Positive for headaches.       Objective   Physical Exam  Vitals and nursing note reviewed.   Constitutional:       Appearance: Normal appearance.   Eyes:      Extraocular Movements: Extraocular movements intact.      Pupils: Pupils are equal, round, and reactive to light.   Cardiovascular:      Rate and Rhythm: Normal rate and regular rhythm.   Pulmonary:      Effort: Pulmonary effort is normal.      Breath sounds: Normal breath sounds.   Abdominal:      Palpations: Abdomen is soft.   Musculoskeletal:      Right lower leg: No edema.      Left lower leg: No edema.   Skin:     General: Skin is warm and dry.   Neurological:      General: No focal deficit present.      Mental Status: She is alert and oriented to person, place, and time.   Psychiatric:         Mood and Affect: Mood normal.         Behavior: Behavior normal.         Thought Content: Thought content normal.         Judgment: Judgment normal.         Assessment & Plan   Diagnoses and all orders for this visit:    1. Chest pain, unspecified type (Primary)  -     aspirin chewable tablet 81 mg  -     ECG 12 Lead         EKG changed from previous EKGs-suspicious for ischemia-sent to [facility] urgently

## 2024-07-17 NOTE — TELEPHONE ENCOUNTER
Returned the patient's call in regards to picking up samples of Nurtec.  I explained to the patient that we cannot provide Nurtec samples as she will not be prescribed this medication.  Her insurance company has denied both Nurtec and Ubrelvy.  I again let her know that Pola had ordered Reyvow and this has been approved and has been sent to her pharmacy.  She states it will be tomorrow before she can pick that up.  She then states she had chest pain and saw her PCP.  Her EKG is abnormal and she is seeing a Cardiologist next week.  She states she is having some discomfort in her back and she did let her spouse know about this as he had a doctor's appointment today out of town.  I advised her to go to the ER for an evaluation and she states she does not have anyone to take her.  I did advise to call an ambulance and she states it is not that concerning. I did again encouraged her to call the [facility] line and speak to the nurse and let her advise her on what she needs to do.  She is in agreement.  Pola Meneses NP has been notified of the above.

## 2024-07-17 NOTE — TELEPHONE ENCOUNTER
Caller: GREGORIO     Relationship:      Best call back number: [phone]     What was the call regarding: PT RX WAS CALLED IN AS UBRELVY AND PHARMACY SAID SHE NEED TO HAVE GENERIC DUE TO INS  CAN YOU CHECK ON AND RESEND NEW SCRIPT ASAP SHE HAS A MAJOR HEADACHE AND IN PAIN     HER  SAID HE CAN DRIVE TO [place]  (TS A 2 HOUR ROUND TRIP )IF YOU HAVE ANY SAMPLES AND CAN'T GET RX CALLED IN ASAP.     Is it okay if the provider responds through [facility]:  PLEASE CALL AND LET THEM KNOW WHAT YOU WILL BE DOING PT IS IN PAIN  STATES         [facility] - [place], [place] - [address] [facility] - [phone] [facility] [phone]  [phone]

## 2024-07-17 NOTE — TELEPHONE ENCOUNTER
Provider: CARMEN FERNANDEZ    Caller: PATIENT    Relationship to Patient: SELF    Phone Number: [phone]    Reason for Call: WILL SEND HER COUSIN TO GET SAMPLES, RUDOLPH JACKIE, AS SHE WORKS IN THE AREA.

## 2024-07-18 NOTE — DISCHARGE INSTRUCTIONS
Today you are seen in the ER for your symptoms.  Your lab work was reassuring.  You will need to follow-up with primary care provider, cardiology, neurology on an outpatient basis as soon as possible to reassess symptoms.  Please return to the ER for any new or worsening symptoms.

## 2024-07-24 ENCOUNTER — TELEPHONE (OUTPATIENT)
Dept: NEUROLOGY | Facility: CLINIC | Age: 59
End: 2024-07-24
Payer: COMMERCIAL

## 2024-07-24 NOTE — TELEPHONE ENCOUNTER
Caller: Suarez Shawnee MCCRAY    Relationship: Self    Best call back number: [phone]    What was the call regarding: PT STATES SHE RECEIVED A LETTER FROM HER INSURANCE EARLIER THIS WEEK STATING THE BOTOX PA WAS DENIED. PT SCHEDULED FOR BOTOX INJECTIONS WITH DR. NAGY ON FRIDAY, 7/26/24, AND WANTING TO CONFIRM IF SHE IS STILL OKAY TO KEEP APPT AND IF OFFICE HAS RECEIVED APPROVAL FROM HER INSURANCE.    ADDITIONALLY, IF OKAY TO KEEP BOTOX APPT, PT IS WANTING TO CONFIRM IF SHE WILL NEED A  FOR HER APPT.    Do you require a callback: YES, PLEASE.    PLEASE REVIEW AND ADVISE.

## 2024-07-24 NOTE — PROGRESS NOTES
Botox injections for Chronic Migraine           Headache log data:  See scanned data           Once again, a written consent was obtained from the patient to receive repeat injections of Botulinum toxin type A into muscles of the scalp, face, and neck in standardized fashion according to recommended by American Headache Society and widely-accepted FDA approve PREEMT trial protocol. The procedure is performed in compliance with clean technique.     155 Units of Botox injected using 30 G needle and 1 ml syringes into 31 sites as follows (45 units of 200 unit vial used are lost in dilution and transition, or discarded):     5 units into each  muscle and into the procerus muscle (15 units total)     5 units each x 2 sites into each frontalis muscle (20 units total)     5 units each x 4 sites into each temporalis muscle (40 units total)     5 units each x 3 sites into each occipitalis muscle (30 units total)     5 units each x 2 sites into each splenius capitis (20 units total)     5 units each x 3 sites into each trapezius muscle (30 units total)     Patient tolerated injections well, without any immediate side effects or complications. Patient was instructed to monitor for potential late side effects from Botox treatment, which were explained to the patient in details. Patient is instructed to call if any unusual feeling develops at the sites of injections, if there is an unexpected muscle weakness, ptosis, or any difficulty with breathing. Otherwise, patient will be brought back for the next treatment (per protocol) in 3 months.

## 2024-07-25 NOTE — TELEPHONE ENCOUNTER
Caller: Shawnee Suarez    Relationship: Self    Best call back number: [phone]    What was the call regarding: PT CALLING AGAIN TO CHECK TO SEE IF BOTOX WAS APPROVED BY HER INSURANCE SO SHE IS AWARE IF SHE NEEDS TO KEEP HER SCHEDULED BOTOX APPT TOMORROW.    Do you require a callback: YES, PLEASE.    PLEASE REVIEW AND ADVISE.

## 2024-07-25 NOTE — TELEPHONE ENCOUNTER
I spoke with Mrs. Suarez about the Botox authorization. She knew that we had discussed this a few days ago, but was confused by receiving a letter from [employer]. The letter stated they were needing additional clinical information to review & without it, Botox would not be authorized. I explained our process of obtaining the authorization, part of that process if for our office to send in clinicals, & for some reason, [employer] will immediately send out those letters to the patients. By the time the letters are received, [employer] already has the information they requested; however, does not make patients aware of that. I did assure her we do have the authorization. She expressed appreciation & understanding.    I also let her know there is no need for her to have a .

## 2024-07-26 ENCOUNTER — TELEPHONE (OUTPATIENT)
Dept: NEUROLOGY | Facility: CLINIC | Age: 59
End: 2024-07-26

## 2024-07-26 ENCOUNTER — PROCEDURE VISIT (OUTPATIENT)
Dept: NEUROLOGY | Facility: CLINIC | Age: 59
End: 2024-07-26
Payer: COMMERCIAL

## 2024-07-26 DIAGNOSIS — G43.709 CHRONIC MIGRAINE WITHOUT AURA WITHOUT STATUS MIGRAINOSUS, NOT INTRACTABLE: Primary | ICD-10-CM

## 2024-07-26 NOTE — TELEPHONE ENCOUNTER
Maximum dose is 1 pill per 24 hours.  Botox may not be of benefit for several months.  This is a preventative medication.  Will not treat acute migraines.  I do think it would help her though in the long run.We will place order for MRI brain to be performed to assess due to increased migraine frequency and intensity. Mri to be done at [facility].

## 2024-07-26 NOTE — TELEPHONE ENCOUNTER
Caller: Shawnee Suarez    Relationship: Self    Best call back number:   Telephone Information:   Mobile [phone]         Which medication are you concerned about: Lasmiditan Succinate (Reyvow) 100 MG tablet     What are your concerns: PT STATES SHE TOOK THIS MEDICATION AT 8AM, SHE ALSO HAD BOTOX TODAY BUT STILL CONTINUES TO HAVE A MIGRAINE, SHE IS WONDERING IF SHE IS ALLOWED TO TAKE MORE THEN ONE TABLET IN A 24 HR RADIUS

## 2024-07-30 ENCOUNTER — TELEPHONE (OUTPATIENT)
Dept: FAMILY MEDICINE CLINIC | Facility: CLINIC | Age: 59
End: 2024-07-30
Payer: COMMERCIAL

## 2024-07-30 RX ORDER — AZITHROMYCIN 250 MG/1
TABLET, FILM COATED ORAL
Qty: 6 TABLET | Refills: 0 | Status: SHIPPED | OUTPATIENT
Start: 2024-07-30

## 2024-07-30 NOTE — TELEPHONE ENCOUNTER
Rx Refill Note  Requested Prescriptions     Pending Prescriptions Disp Refills    azithromycin (Zithromax Z-Ryan) 250 MG tablet 6 tablet 0     Sig: Take 2 tablets by mouth on day 1, then 1 tablet daily on days 2-5      Last office visit with prescribing clinician: 7/17/2024   Last telemedicine visit with prescribing clinician: Visit date not found   Next office visit with prescribing clinician: Visit date not found                         Would you like a call back once the refill request has been completed: [] Yes [] No    If the office needs to give you a call back, can they leave a voicemail: [] Yes [] No    Digna Beckwith, PCT  07/30/24, 09:03 CDT

## 2024-07-30 NOTE — TELEPHONE ENCOUNTER
Symptoms started over the weekend. Patient is going to do home covid test and call office back.  Patient also has nasal congestion and drainage is colored.

## 2024-07-30 NOTE — TELEPHONE ENCOUNTER
Caller: Shawnee Suarez    Relationship: Self    Best call back number: [phone]     What medication are you requesting: ANTIBIOTIC    What are your current symptoms: COUGH, SORE THROAT     If a prescription is needed, what is your preferred pharmacy and phone number: [facility] [place], [place] - [address] [phone]  - [phone] FX

## 2024-08-06 DIAGNOSIS — F41.9 ANXIETY: Primary | ICD-10-CM

## 2024-08-06 RX ORDER — CLONAZEPAM 1 MG/1
1 TABLET ORAL 3 TIMES DAILY PRN
Qty: 90 TABLET | Refills: 0 | Status: SHIPPED | OUTPATIENT
Start: 2024-08-06

## 2024-08-06 NOTE — TELEPHONE ENCOUNTER
Rx Refill Note  Requested Prescriptions     Pending Prescriptions Disp Refills    clonazePAM (KlonoPIN) 1 MG tablet [Pharmacy Med Name: CLONAZEPAM 1MG TABS] 90 tablet 0     Sig: TAKE ONE TABLET BY MOUTH THREE TIMES A DAY AS NEEDED FOR ANXIETY      Last office visit with office: 6/25/24  Next office visit with office: 1/7/25    UDS: 3/8/24    DATE OF LAST REFILL:     Controlled Substance Agreement: up to date        :  Bree Lockett MA  08/06/24, 09:05 CDT

## 2024-08-07 DIAGNOSIS — G43.709 CHRONIC MIGRAINE WITHOUT AURA WITHOUT STATUS MIGRAINOSUS, NOT INTRACTABLE: ICD-10-CM

## 2024-08-07 RX ORDER — LASMIDITAN 100 MG/1
TABLET ORAL
Qty: 8 TABLET | Refills: 2 | Status: SHIPPED | OUTPATIENT
Start: 2024-08-07

## 2024-08-20 NOTE — TELEPHONE ENCOUNTER
Caller: Suarez Shawnee MAHENDRA    Relationship: Self    Best call back number: [phone]     Requested Prescriptions:   Requested Prescriptions     Pending Prescriptions Disp Refills    mirtazapine (REMERON) 15 MG tablet 30 tablet 2     Si nightly        Pharmacy where request should be sent: [facility] [address] [phone] [facility] [phone]      Last office visit with prescribing clinician: 2024   Last telemedicine visit with prescribing clinician: Visit date not found   Next office visit with prescribing clinician: Visit date not found     Additional details provided by patient: PATIENT WOULD LIKE TO SEE IF YOU CAN INCREASE THE DOSE BECAUSE 15MG ISN'T WORKING.     Does the patient have less than a 3 day supply:  [x] Yes  [] No    Would you like a call back once the refill request has been completed: [x] Yes [] No    If the office needs to give you a call back, can they leave a voicemail: [x] Yes [] No    Lionel Louis Rep   24 10:30 CDT

## 2024-08-21 RX ORDER — MIRTAZAPINE 30 MG/1
TABLET, FILM COATED ORAL
Qty: 90 TABLET | Refills: 0 | Status: SHIPPED | OUTPATIENT
Start: 2024-08-21

## 2024-08-23 ENCOUNTER — HOSPITAL ENCOUNTER (OUTPATIENT)
Dept: MRI IMAGING | Facility: HOSPITAL | Age: 59
Discharge: HOME | End: 2024-08-23
Payer: COMMERCIAL

## 2024-08-23 DIAGNOSIS — G43.709 CHRONIC MIGRAINE WITHOUT AURA WITHOUT STATUS MIGRAINOSUS, NOT INTRACTABLE: ICD-10-CM

## 2024-08-23 PROCEDURE — 70551 MRI BRAIN STEM W/O DYE: CPT

## 2024-08-28 ENCOUNTER — OFFICE VISIT (OUTPATIENT)
Dept: CARDIOLOGY | Facility: CLINIC | Age: 59
End: 2024-08-28
Payer: COMMERCIAL

## 2024-08-28 VITALS
SYSTOLIC BLOOD PRESSURE: 117 MMHG | WEIGHT: 183 LBS | HEIGHT: 63 IN | DIASTOLIC BLOOD PRESSURE: 81 MMHG | BODY MASS INDEX: 32.43 KG/M2 | HEART RATE: 91 BPM

## 2024-08-28 DIAGNOSIS — Z78.9 NONSMOKER: ICD-10-CM

## 2024-08-28 DIAGNOSIS — Z82.49 FAMILY HISTORY OF EARLY CAD: ICD-10-CM

## 2024-08-28 DIAGNOSIS — R07.9 CHEST PAIN IN ADULT: Primary | ICD-10-CM

## 2024-08-28 DIAGNOSIS — Z77.22 SECOND HAND SMOKE EXPOSURE: ICD-10-CM

## 2024-08-28 DIAGNOSIS — E78.2 MIXED HYPERLIPIDEMIA: ICD-10-CM

## 2024-08-28 PROCEDURE — 99204 OFFICE O/P NEW MOD 45 MIN: CPT | Performed by: INTERNAL MEDICINE

## 2024-08-28 RX ORDER — METOPROLOL TARTRATE 25 MG/1
200 TABLET, FILM COATED ORAL ONCE
OUTPATIENT
Start: 2024-08-28 | End: 2024-08-28

## 2024-08-28 RX ORDER — SODIUM CHLORIDE 0.9 % (FLUSH) 0.9 %
10 SYRINGE (ML) INJECTION EVERY 12 HOURS SCHEDULED
OUTPATIENT
Start: 2024-08-28

## 2024-08-28 RX ORDER — SODIUM CHLORIDE 9 MG/ML
40 INJECTION, SOLUTION INTRAVENOUS AS NEEDED
OUTPATIENT
Start: 2024-08-28

## 2024-08-28 RX ORDER — METOPROLOL TARTRATE 50 MG
50 TABLET ORAL
OUTPATIENT
Start: 2024-08-28

## 2024-08-28 RX ORDER — METOPROLOL TARTRATE 25 MG/1
100 TABLET, FILM COATED ORAL ONCE
OUTPATIENT
Start: 2024-08-28

## 2024-08-28 RX ORDER — NITROGLYCERIN 0.4 MG/1
0.8 TABLET SUBLINGUAL
OUTPATIENT
Start: 2024-08-28

## 2024-08-28 RX ORDER — METOPROLOL TARTRATE 25 MG/1
25 TABLET, FILM COATED ORAL ONCE
OUTPATIENT
Start: 2024-08-28

## 2024-08-28 RX ORDER — METOPROLOL TARTRATE 1 MG/ML
5 INJECTION, SOLUTION INTRAVENOUS
OUTPATIENT
Start: 2024-08-28

## 2024-08-28 RX ORDER — NITROGLYCERIN 0.4 MG/1
0.4 TABLET SUBLINGUAL
OUTPATIENT
Start: 2024-08-28 | End: 2024-08-28

## 2024-08-28 RX ORDER — METOPROLOL TARTRATE 25 MG/1
150 TABLET, FILM COATED ORAL ONCE
OUTPATIENT
Start: 2024-08-28

## 2024-08-28 RX ORDER — METOPROLOL TARTRATE 25 MG/1
50 TABLET, FILM COATED ORAL ONCE
OUTPATIENT
Start: 2024-08-28

## 2024-08-28 RX ORDER — SODIUM CHLORIDE 0.9 % (FLUSH) 0.9 %
10 SYRINGE (ML) INJECTION AS NEEDED
OUTPATIENT
Start: 2024-08-28

## 2024-08-28 NOTE — PROGRESS NOTES
Shawnee Suarez  [id number]  1965  59 y.o.  female    Referring Provider: Marlon Tran MD    Reason for  Visit:  Initial visit       Subjective     Chest pain both with exertion as well as at rest.  Has had sharp pains and they come at times in waves   Feels episodes of chest pain occur more often with physical or mental stress  Moderate substernal,   Pressure like   Chest pain non pleuritic  Chest pain non positional and non gustatory   Lasts less than 5 minutes    Started more than 2 -3 weeks  ago  Occurs once or twice a week on the average but can be variable in frequency  No associated diaphoresis    No associated nausea  No radiation    Relieved with rest or spontaneously  Not positional    No change with intake of food or antacids  No change with breathing  Mild to moderate associated dyspnea    No similar chest pain episodes in the past     Easy fatiguability and increasing tired  Feels energy levels running low    No obstructive sleep apnea   Strong family history of early coronary artery disease        History of present illness:  Shawnee Suarez is a 59 y.o. yo female with Hyperlipidemia  who presents today for   Chief Complaint   Patient presents with    Establish Care     chest pain and recent abn ekg    Chest Pain     It felt like a line across her chest and the same in her back it was sharp and it would come and go a little less painful each time.    .    History  Past Medical History:   Diagnosis Date    Anxiety     Depression     History of transfusion     Hyperlipidemia     Migraines 06/08/2022    Ovarian cyst     x 2    ,   Past Surgical History:   Procedure Laterality Date    APPENDECTOMY      BELPHAROPTOSIS REPAIR Bilateral 2023    BREAST BIOPSY Right 11/1989    CHOLECYSTECTOMY      COLONOSCOPY  11/22/2013    Normal exam repeat in 10 years    COLONOSCOPY N/A 11/13/2018    Normal but prep was only fair repeat in 2 years    COLONOSCOPY N/A 01/22/2021    The entire examined colon is  normal on direct and retroflexion views; No specimens collected; Repeat 10 years    COLONOSCOPY N/A 5/7/2024    Procedure: COLONOSCOPY WITH ANESTHESIA;  Surgeon: Da Miles MD;  Location: [facility] ENDOSCOPY;  Service: Gastroenterology;  Laterality: N/A;  pre op genetic susceptibility to other disease  post  pcp Marlon Tran    D & C HYSTEROSCOPY ENDOMETRIAL ABLATION N/A 02/06/2017    Procedure: DILATATION AND CURETTAGE HYSTEROSCOPY NOVASURE ENDOMETRIAL ABLATION;  Surgeon: Ana Barrios MD;  Location: [facility] OR;  Service:     DIAGNOSTIC LAPAROSCOPY N/A 06/17/2019    Procedure: DIAGNOSTIC LAPAROSCOPY;  Surgeon: Ana Barrios MD;  Location: [facility] OR;  Service: Obstetrics/Gynecology    ENDOSCOPY N/A 5/7/2024    Procedure: ESOPHAGOGASTRODUODENOSCOPY WITH ANESTHESIA;  Surgeon: Da Miles MD;  Location: [facility] ENDOSCOPY;  Service: Gastroenterology;  Laterality: N/A;  pre op genetic susceptibility to other disease  post gastritis, esophagitis  pcp Marlon Tran    OVARIAN CYST REMOVAL      x 2    SALPINGO OOPHORECTOMY Bilateral 06/17/2019    Procedure: SALPINGO OOPHORECTOMY LAPAROSCOPIC;  Surgeon: Ana Barrios MD;  Location: [facility] OR;  Service: Obstetrics/Gynecology    WISDOM TOOTH EXTRACTION     ,   Family History   Problem Relation Age of Onset    Ovarian cancer Mother 72    Brain cancer Father     No Known Problems Brother     Heart disease Brother     No Known Problems Maternal Aunt     No Known Problems Paternal Aunt     No Known Problems Maternal Grandmother     No Known Problems Maternal Grandfather     No Known Problems Paternal Grandmother     Brain cancer Paternal Grandfather     No Known Problems Daughter     No Known Problems Daughter     Breast cancer Other 28    Uterine cancer Neg Hx     Colon cancer Neg Hx     Melanoma Neg Hx     Prostate cancer Neg Hx     BRCA 1/2 Neg Hx     Endometrial cancer Neg Hx     Colon polyps Neg Hx     Esophageal cancer Neg Hx     Liver cancer Neg Hx     Stomach  cancer Neg Hx     Rectal cancer Neg Hx     Liver disease Neg Hx    ,   Social History     Tobacco Use    Smoking status: Never     Passive exposure: Past    Smokeless tobacco: Never   Vaping Use    Vaping status: Never Used   Substance Use Topics    Alcohol use: No    Drug use: No   ,     Medications  Current Outpatient Medications   Medication Sig Dispense Refill    aspirin 81 MG EC tablet Take 1 tablet by mouth Daily.      atorvastatin (Lipitor) 40 MG tablet Take 1 tablet by mouth Every Night. 90 tablet 3    azithromycin (Zithromax Z-Ryan) 250 MG tablet Take 2 tablets by mouth on day 1, then 1 tablet daily on days 2-5 6 tablet 0    Calcium-Vitamin D-Vitamin K (Viactiv Calcium Plus D) 650-12.5-40 MG-MCG-MCG chewable tablet Chew 1 tablet 2 (two) times a day.      clonazePAM (KlonoPIN) 1 MG tablet TAKE ONE TABLET BY MOUTH THREE TIMES A DAY AS NEEDED FOR ANXIETY 90 tablet 0    Docusate Sodium 100 MG capsule Take 1 tablet by mouth Daily.      DULoxetine (CYMBALTA) 60 MG capsule TAKE ONE CAPSULE BY MOUTH EVERY DAY 90 capsule 3    loratadine (CLARITIN) 10 MG tablet Take 1 tablet by mouth As Needed.      mirtazapine (REMERON) 30 MG tablet 1 nightly 90 tablet 0    omeprazole OTC (PriLOSEC OTC) 20 MG EC tablet Take 1 tablet by mouth Daily. 84 tablet 3    propranolol LA (Inderal LA) 80 MG 24 hr capsule Take 1 capsule by mouth Daily. 30 capsule 3    Reyvow 100 MG tablet TAKE ONE TABLET BY MOUTH ONCE DAILY AS NEEDED TO STOP MIGRAINE 8 tablet 2     No current facility-administered medications for this visit.       Allergies:  Crestor [rosuvastatin]    Review of Systems  Review of Systems   Constitutional: Negative.   HENT: Negative.     Eyes: Negative.    Cardiovascular:  Positive for chest pain and dyspnea on exertion. Negative for claudication, cyanosis, irregular heartbeat, leg swelling, near-syncope, orthopnea, palpitations, paroxysmal nocturnal dyspnea and syncope.   Respiratory: Negative.     Endocrine: Negative.   "  Hematologic/Lymphatic: Negative.    Skin: Negative.    Musculoskeletal:  Positive for arthritis and joint pain.   Gastrointestinal:  Negative for anorexia.   Genitourinary: Negative.    Neurological: Negative.    Psychiatric/Behavioral: Negative.         Objective     Physical Exam:  /81   Pulse 91   Ht 160 cm (63\")   Wt 83 kg (183 lb)   LMP  (LMP Unknown)   BMI 32.42 kg/m²     Physical Exam  Constitutional:       Appearance: Normal appearance.   HENT:      Head: Normocephalic.   Eyes:      General: Lids are normal.   Neck:      Vascular: No carotid bruit.   Cardiovascular:      Rate and Rhythm: Regular rhythm.      Heart sounds: Normal heart sounds, S1 normal and S2 normal.      No systolic murmur is present.   Pulmonary:      Effort: Pulmonary effort is normal.   Abdominal:      Palpations: Abdomen is soft.   Neurological:      Mental Status: She is alert.   Psychiatric:         Speech: Speech normal.         Behavior: Behavior normal.         Thought Content: Thought content normal.         Results Review:     ____________________________________________________________________________________________________________________________________________  Health maintenance and recommendations    Low salt/ HTN/ Heart healthy carbohydrate restricted cardiac diet   The patient is advised to reduce or avoid caffeine or other cardiac stimulants.   Minimize or avoid  NSAID-type medications      Monitor for any signs of bleeding including red or dark stools. Fall precautions.   Advised staying uptodate with immunizations per established standard guidelines.    Offered to give patient  a copy of my notes     Questions were encouraged, asked and answered to the patient's  understanding and satisfaction. Questions if any regarding current medications and side effects, need for refills and importance of compliance to medications stressed.    Reviewed available prior notes, consults, prior visits, laboratory " findings, radiology and cardiology relevant reports. Updated chart as applicable. I have reviewed the patient's medical history in detail and updated the computerized patient record as relevant.      Updated patient regarding any new or relevant abnormalities on review of records or any new findings on physical exam. Mentioned to patient about purpose of visit and desirable health short and long term goals and objectives.    Primary to monitor CBC CMP Lipid panel and TSH as applicable    ___________________________________________________________________________________________________________________________________________   Procedures    Assessment & Plan   Diagnoses and all orders for this visit:    1. Chest pain in adult (Primary)  -     CT Angiogram Coronary; Future  -     CT Angiogram Coronary-Cardiology Interpretation; Future  -     metoprolol tartrate (LOPRESSOR) tablet 200 mg  -     metoprolol tartrate (LOPRESSOR) tablet 150 mg  -     metoprolol tartrate (LOPRESSOR) tablet 100 mg  -     metoprolol tartrate (LOPRESSOR) tablet 50 mg  -     metoprolol tartrate (LOPRESSOR) tablet 25 mg  -     metoprolol tartrate (LOPRESSOR) tablet 50 mg  -     metoprolol tartrate (LOPRESSOR) injection 5 mg  -     nitroglycerin (NITROSTAT) SL tablet 0.4 mg  -     nitroglycerin (NITROSTAT) SL tablet 0.8 mg  -     No Caffeine or Nicotine 4 Hours Prior to CTA Appointment  -     Nothing to Eat or Drink 4 Hours Prior to CTA Appointment  -     Obtain Informed Consent - Computed Tomography Angiography of Chest - Angiogram of Coronary Arteries; Standing  -     Vital Signs Upon Arrival; Standing  -     Cardiac Monitoring; Standing  -     Verify NPO Status - Patient to Be NPO at Least 4 Hours Prior to CTA; Standing  -     Notify CT After Administration of metoprolol tartrate (LOPRESSOR) tablet; Standing  -     Notify Provider If Total Metoprolol Given Equals 300mg & Heart Rate Not At Goal; Standing  -     Notify Provider Prior to  Administration of Nitroglycerin if Patient SBP <80; Standing  -     Creatinine Serum (kidney function) GFR component; Standing  -     Insert Peripheral IV; Standing  -     Saline Lock & Maintain IV Access; Standing  -     sodium chloride 0.9 % flush 10 mL  -     sodium chloride 0.9 % flush 10 mL  -     sodium chloride 0.9 % infusion 40 mL  -     Vital Signs - See Instructions; Standing  -     Hold Medication Metformin (Glucophage, Glucophage XR, Fortament, Glumetza);  Metglip (metformin/glipizide);  Glucovance (metformin/glyburide); Avandamet (metformin/rosiglitazone); Standing  -     Patient May Discharge Home After Procedure Complete (If Stable); Standing  -     Adult Transthoracic Echo Complete w/ Color, Spectral and Contrast if necessary per protocol; Future    2. Family history of early CAD    3. Nonsmoker    4. Mixed hyperlipidemia    5. Second hand smoke exposure          Plan    Patient expressed understanding  Encouraged and answered all questions   Discussed with the patient and all questioned fully answered. He will call me if any problems arise.   Discussed results of ECG 7/17/2024  Went to ER records from ER reviewed     Orders Placed This Encounter   Procedures    CT Angiogram Coronary     Standing Status:   Future     Standing Expiration Date:   8/28/2025     Order Specific Question:   Indication for Coronary CTA     Answer:   Chest Pain - Possibly Cardiac     Order Specific Question:   History of CABG or Coronary Stent     Answer:   No     Order Specific Question:   Release to patient     Answer:   Routine Release [[id number]]    No Caffeine or Nicotine 4 Hours Prior to CTA Appointment    Nothing to Eat or Drink 4 Hours Prior to CTA Appointment    Adult Transthoracic Echo Complete w/ Color, Spectral and Contrast if necessary per protocol     Standing Status:   Future     Standing Expiration Date:   8/28/2025     Scheduling Instructions:      Myocardial strain to be performed       Use newer echo  machine     Order Specific Question:   Reason for exam?     Answer:   Dyspnea     Order Specific Question:   Reason for exam?     Answer:   Chest Pain     Order Specific Question:   Release to patient     Answer:   Routine Release [[id number]]        Further workup and treatment pending results of above testing   Patient will call for results           Return in about 6 months (around 2/28/2025).

## 2024-09-19 ENCOUNTER — TELEMEDICINE (OUTPATIENT)
Dept: NEUROLOGY | Facility: CLINIC | Age: 59
End: 2024-09-19
Payer: COMMERCIAL

## 2024-09-19 DIAGNOSIS — G43.709 CHRONIC MIGRAINE WITHOUT AURA WITHOUT STATUS MIGRAINOSUS, NOT INTRACTABLE: Primary | ICD-10-CM

## 2024-09-25 ENCOUNTER — OFFICE VISIT (OUTPATIENT)
Dept: FAMILY MEDICINE CLINIC | Facility: CLINIC | Age: 59
End: 2024-09-25
Payer: COMMERCIAL

## 2024-09-25 VITALS
HEIGHT: 63 IN | WEIGHT: 185.2 LBS | BODY MASS INDEX: 32.82 KG/M2 | HEART RATE: 95 BPM | OXYGEN SATURATION: 96 % | DIASTOLIC BLOOD PRESSURE: 85 MMHG | TEMPERATURE: 98 F | SYSTOLIC BLOOD PRESSURE: 126 MMHG

## 2024-09-25 DIAGNOSIS — B37.2 YEAST DERMATITIS: ICD-10-CM

## 2024-09-25 DIAGNOSIS — F41.9 ANXIETY: ICD-10-CM

## 2024-09-25 DIAGNOSIS — B96.89 BACTERIAL VAGINOSIS: ICD-10-CM

## 2024-09-25 DIAGNOSIS — N76.0 BACTERIAL VAGINOSIS: ICD-10-CM

## 2024-09-25 DIAGNOSIS — G47.09 OTHER INSOMNIA: Primary | ICD-10-CM

## 2024-09-25 PROCEDURE — 99214 OFFICE O/P EST MOD 30 MIN: CPT | Performed by: NURSE PRACTITIONER

## 2024-09-25 RX ORDER — RAMELTEON 8 MG/1
8 TABLET ORAL NIGHTLY
Qty: 30 TABLET | Refills: 0 | Status: SHIPPED | OUTPATIENT
Start: 2024-09-25

## 2024-09-25 RX ORDER — FLUCONAZOLE 150 MG/1
TABLET ORAL
Qty: 2 TABLET | Refills: 0 | Status: SHIPPED | OUTPATIENT
Start: 2024-09-25

## 2024-09-25 RX ORDER — METRONIDAZOLE 500 MG/1
500 TABLET ORAL 3 TIMES DAILY
Qty: 21 TABLET | Refills: 0 | Status: SHIPPED | OUTPATIENT
Start: 2024-09-25 | End: 2024-10-02

## 2024-10-03 ENCOUNTER — CLINICAL SUPPORT (OUTPATIENT)
Dept: FAMILY MEDICINE CLINIC | Facility: CLINIC | Age: 59
End: 2024-10-03
Payer: COMMERCIAL

## 2024-10-03 ENCOUNTER — FLU SHOT (OUTPATIENT)
Dept: FAMILY MEDICINE CLINIC | Facility: CLINIC | Age: 59
End: 2024-10-03
Payer: COMMERCIAL

## 2024-10-03 DIAGNOSIS — Z23 NEED FOR INFLUENZA VACCINATION: Primary | ICD-10-CM

## 2024-10-03 DIAGNOSIS — Z23 NEED FOR COVID-19 VACCINE: Primary | ICD-10-CM

## 2024-10-03 PROCEDURE — 90656 IIV3 VACC NO PRSV 0.5 ML IM: CPT | Performed by: FAMILY MEDICINE

## 2024-10-03 PROCEDURE — 90471 IMMUNIZATION ADMIN: CPT | Performed by: FAMILY MEDICINE

## 2024-10-03 PROCEDURE — 90480 ADMN SARSCOV2 VAC 1/ONLY CMP: CPT | Performed by: FAMILY MEDICINE

## 2024-10-03 PROCEDURE — 91320 SARSCV2 VAC 30MCG TRS-SUC IM: CPT | Performed by: FAMILY MEDICINE

## 2024-10-14 ENCOUNTER — HOSPITAL ENCOUNTER (OUTPATIENT)
Dept: CT IMAGING | Facility: HOSPITAL | Age: 59
Discharge: HOME | End: 2024-10-14
Payer: COMMERCIAL

## 2024-10-14 ENCOUNTER — HOSPITAL ENCOUNTER (OUTPATIENT)
Dept: CARDIOLOGY | Facility: HOSPITAL | Age: 59
Discharge: HOME | End: 2024-10-14
Payer: COMMERCIAL

## 2024-10-14 VITALS
HEIGHT: 65 IN | WEIGHT: 187.61 LBS | BODY MASS INDEX: 31.26 KG/M2 | TEMPERATURE: 97 F | SYSTOLIC BLOOD PRESSURE: 108 MMHG | RESPIRATION RATE: 16 BRPM | OXYGEN SATURATION: 96 % | DIASTOLIC BLOOD PRESSURE: 67 MMHG | HEART RATE: 70 BPM

## 2024-10-14 VITALS
DIASTOLIC BLOOD PRESSURE: 85 MMHG | BODY MASS INDEX: 32.78 KG/M2 | HEIGHT: 63 IN | SYSTOLIC BLOOD PRESSURE: 126 MMHG | WEIGHT: 185 LBS

## 2024-10-14 DIAGNOSIS — F41.9 ANXIETY: ICD-10-CM

## 2024-10-14 DIAGNOSIS — R07.9 CHEST PAIN IN ADULT: ICD-10-CM

## 2024-10-14 LAB
BH CV ECHO LEFT VENTRICLE GLOBAL LONGITUDINAL STRAIN: -14.8 %
BH CV ECHO MEAS - AO MAX PG: 3.4 MMHG
BH CV ECHO MEAS - AO MEAN PG: 1.91 MMHG
BH CV ECHO MEAS - AO ROOT DIAM: 2.6 CM
BH CV ECHO MEAS - AO V2 MAX: 91.7 CM/SEC
BH CV ECHO MEAS - AO V2 VTI: 21.3 CM
BH CV ECHO MEAS - AVA(I,D): 2.38 CM2
BH CV ECHO MEAS - EDV(CUBED): 80 ML
BH CV ECHO MEAS - EDV(MOD-SP2): 89.5 ML
BH CV ECHO MEAS - EDV(MOD-SP4): 59.4 ML
BH CV ECHO MEAS - EF(MOD-SP2): 68.9 %
BH CV ECHO MEAS - EF(MOD-SP4): 69.4 %
BH CV ECHO MEAS - ESV(CUBED): 29 ML
BH CV ECHO MEAS - ESV(MOD-SP2): 27.8 ML
BH CV ECHO MEAS - ESV(MOD-SP4): 18.2 ML
BH CV ECHO MEAS - FS: 28.7 %
BH CV ECHO MEAS - IVS/LVPW: 0.98 CM
BH CV ECHO MEAS - IVSD: 0.95 CM
BH CV ECHO MEAS - LAT PEAK E' VEL: 11 CM/SEC
BH CV ECHO MEAS - LV DIASTOLIC VOL/BSA (35-75): 31.8 CM2
BH CV ECHO MEAS - LV MASS(C)D: 134.5 GRAMS
BH CV ECHO MEAS - LV MAX PG: 2.46 MMHG
BH CV ECHO MEAS - LV MEAN PG: 1.25 MMHG
BH CV ECHO MEAS - LV SYSTOLIC VOL/BSA (12-30): 9.7 CM2
BH CV ECHO MEAS - LV V1 MAX: 78.3 CM/SEC
BH CV ECHO MEAS - LV V1 VTI: 16.3 CM
BH CV ECHO MEAS - LVIDD: 4.3 CM
BH CV ECHO MEAS - LVIDS: 3.1 CM
BH CV ECHO MEAS - LVOT AREA: 3.1 CM2
BH CV ECHO MEAS - LVOT DIAM: 1.99 CM
BH CV ECHO MEAS - LVPWD: 0.97 CM
BH CV ECHO MEAS - MED PEAK E' VEL: 11 CM/SEC
BH CV ECHO MEAS - MV A MAX VEL: 65.9 CM/SEC
BH CV ECHO MEAS - MV DEC SLOPE: 465.2 CM/SEC2
BH CV ECHO MEAS - MV DEC TIME: 0.13 SEC
BH CV ECHO MEAS - MV E MAX VEL: 58 CM/SEC
BH CV ECHO MEAS - MV E/A: 0.88
BH CV ECHO MEAS - MV MAX PG: 1.93 MMHG
BH CV ECHO MEAS - MV MEAN PG: 0.9 MMHG
BH CV ECHO MEAS - MV V2 VTI: 17.6 CM
BH CV ECHO MEAS - MVA(VTI): 2.9 CM2
BH CV ECHO MEAS - PA V2 MAX: 89.6 CM/SEC
BH CV ECHO MEAS - SV(LVOT): 50.5 ML
BH CV ECHO MEAS - SV(MOD-SP2): 61.7 ML
BH CV ECHO MEAS - SV(MOD-SP4): 41.2 ML
BH CV ECHO MEAS - SVI(LVOT): 27 ML/M2
BH CV ECHO MEAS - SVI(MOD-SP2): 33 ML/M2
BH CV ECHO MEAS - SVI(MOD-SP4): 22 ML/M2
BH CV ECHO MEASUREMENTS AVERAGE E/E' RATIO: 5.27
CREAT SERPL-MCNC: 0.72 MG/DL (ref 0.57–1)
EGFRCR SERPLBLD CKD-EPI 2021: 95.3 ML/MIN/1.73

## 2024-10-14 PROCEDURE — 25510000001 PERFLUTREN 6.52 MG/ML SUSPENSION: Performed by: INTERNAL MEDICINE

## 2024-10-14 PROCEDURE — 75574 CT ANGIO HRT W/3D IMAGE: CPT

## 2024-10-14 PROCEDURE — 93306 TTE W/DOPPLER COMPLETE: CPT

## 2024-10-14 PROCEDURE — 82565 ASSAY OF CREATININE: CPT | Performed by: INTERNAL MEDICINE

## 2024-10-14 PROCEDURE — 25510000001 IOPAMIDOL PER 1 ML: Performed by: INTERNAL MEDICINE

## 2024-10-14 PROCEDURE — 93356 MYOCRD STRAIN IMG SPCKL TRCK: CPT

## 2024-10-14 RX ORDER — NITROGLYCERIN 0.4 MG/1
0.4 TABLET SUBLINGUAL
Status: COMPLETED | OUTPATIENT
Start: 2024-10-14 | End: 2024-10-14

## 2024-10-14 RX ORDER — METOPROLOL TARTRATE 1 MG/ML
5 INJECTION, SOLUTION INTRAVENOUS
Status: DISCONTINUED | OUTPATIENT
Start: 2024-10-14 | End: 2024-10-15 | Stop reason: HOSPADM

## 2024-10-14 RX ORDER — SODIUM CHLORIDE 9 MG/ML
40 INJECTION, SOLUTION INTRAVENOUS AS NEEDED
Status: DISCONTINUED | OUTPATIENT
Start: 2024-10-14 | End: 2024-10-15 | Stop reason: HOSPADM

## 2024-10-14 RX ORDER — CLONAZEPAM 1 MG/1
1 TABLET ORAL 3 TIMES DAILY PRN
Qty: 90 TABLET | Refills: 0 | Status: SHIPPED | OUTPATIENT
Start: 2024-10-14

## 2024-10-14 RX ORDER — SODIUM CHLORIDE 0.9 % (FLUSH) 0.9 %
10 SYRINGE (ML) INJECTION EVERY 12 HOURS SCHEDULED
Status: DISCONTINUED | OUTPATIENT
Start: 2024-10-14 | End: 2024-10-15 | Stop reason: HOSPADM

## 2024-10-14 RX ORDER — NITROGLYCERIN 0.4 MG/1
0.8 TABLET SUBLINGUAL
Status: COMPLETED | OUTPATIENT
Start: 2024-10-14 | End: 2024-10-14

## 2024-10-14 RX ORDER — METOPROLOL TARTRATE 50 MG
50 TABLET ORAL ONCE
Status: COMPLETED | OUTPATIENT
Start: 2024-10-14 | End: 2024-10-14

## 2024-10-14 RX ORDER — METOPROLOL TARTRATE 100 MG
100 TABLET ORAL ONCE
Status: COMPLETED | OUTPATIENT
Start: 2024-10-14 | End: 2024-10-14

## 2024-10-14 RX ORDER — METOPROLOL TARTRATE 50 MG
50 TABLET ORAL
Status: DISCONTINUED | OUTPATIENT
Start: 2024-10-14 | End: 2024-10-15 | Stop reason: HOSPADM

## 2024-10-14 RX ORDER — METOPROLOL TARTRATE 25 MG/1
25 TABLET, FILM COATED ORAL ONCE
Status: COMPLETED | OUTPATIENT
Start: 2024-10-14 | End: 2024-10-14

## 2024-10-14 RX ORDER — METOPROLOL TARTRATE 100 MG
200 TABLET ORAL ONCE
Status: COMPLETED | OUTPATIENT
Start: 2024-10-14 | End: 2024-10-14

## 2024-10-14 RX ORDER — SODIUM CHLORIDE 0.9 % (FLUSH) 0.9 %
10 SYRINGE (ML) INJECTION AS NEEDED
Status: DISCONTINUED | OUTPATIENT
Start: 2024-10-14 | End: 2024-10-15 | Stop reason: HOSPADM

## 2024-10-14 RX ORDER — IOPAMIDOL 755 MG/ML
100 INJECTION, SOLUTION INTRAVASCULAR
Status: COMPLETED | OUTPATIENT
Start: 2024-10-14 | End: 2024-10-14

## 2024-10-14 RX ADMIN — METOPROLOL TARTRATE 150 MG: 100 TABLET, FILM COATED ORAL at 09:08

## 2024-10-14 RX ADMIN — IOPAMIDOL 100 ML: 755 INJECTION, SOLUTION INTRAVENOUS at 10:41

## 2024-10-14 RX ADMIN — PERFLUTREN 6.52 MG: 6.52 INJECTION, SUSPENSION INTRAVENOUS at 07:53

## 2024-10-14 RX ADMIN — NITROGLYCERIN 0.8 MG: 0.4 TABLET SUBLINGUAL at 10:27

## 2024-10-14 NOTE — TELEPHONE ENCOUNTER
Rx Refill Note  Requested Prescriptions     Pending Prescriptions Disp Refills    clonazePAM (KlonoPIN) 1 MG tablet [Pharmacy Med Name: CLONAZEPAM 1MG TABS] 90 tablet 0     Sig: TAKE ONE TABLET BY MOUTH THREE TIMES A DAY AS NEEDED FOR ANXIETY      Last office visit with office: 9/25/24  Next office visit with office: 1/7/25    UDS: 3/8/24    DATE OF LAST REFILL: 8/6/24    Controlled Substance Agreement: up to date           {TIP  Is Refill Pharmacy correct?:  Bree Lockett MA  10/14/24, 07:47 CDT

## 2024-10-21 ENCOUNTER — OFFICE VISIT (OUTPATIENT)
Dept: CARDIOLOGY | Facility: CLINIC | Age: 59
End: 2024-10-21
Payer: COMMERCIAL

## 2024-10-21 VITALS
SYSTOLIC BLOOD PRESSURE: 118 MMHG | WEIGHT: 188 LBS | OXYGEN SATURATION: 96 % | BODY MASS INDEX: 33.31 KG/M2 | HEIGHT: 63 IN | HEART RATE: 82 BPM | DIASTOLIC BLOOD PRESSURE: 76 MMHG

## 2024-10-21 DIAGNOSIS — E78.2 MIXED HYPERLIPIDEMIA: ICD-10-CM

## 2024-10-21 DIAGNOSIS — R07.9 CHEST PAIN IN ADULT: Primary | ICD-10-CM

## 2024-10-21 DIAGNOSIS — E88.82 CLASS 1 OBESITY DUE TO DISRUPTION OF MC4R PATHWAY WITH SERIOUS COMORBIDITY AND BODY MASS INDEX (BMI) OF 33.0 TO 33.9 IN ADULT: ICD-10-CM

## 2024-10-21 DIAGNOSIS — E66.811 CLASS 1 OBESITY DUE TO DISRUPTION OF MC4R PATHWAY WITH SERIOUS COMORBIDITY AND BODY MASS INDEX (BMI) OF 33.0 TO 33.9 IN ADULT: ICD-10-CM

## 2024-10-21 DIAGNOSIS — Z15.2 CLASS 1 OBESITY DUE TO DISRUPTION OF MC4R PATHWAY WITH SERIOUS COMORBIDITY AND BODY MASS INDEX (BMI) OF 33.0 TO 33.9 IN ADULT: ICD-10-CM

## 2024-10-21 PROCEDURE — 99214 OFFICE O/P EST MOD 30 MIN: CPT | Performed by: NURSE PRACTITIONER

## 2024-10-21 NOTE — PROGRESS NOTES
"    Subjective:     Encounter Date:10/21/2024      Patient ID: Shawnee Suarez is a 59 y.o. female.    Chief Complaint:\"no complaints. Here for test results\"  Chest Pain   This is a new problem. The current episode started more than 1 month ago. The onset quality is sudden. The problem occurs rarely. The problem has been waxing and waning. The pain is present in the lateral region. Pertinent negatives include no cough, dizziness, irregular heartbeat, malaise/fatigue, near-syncope, orthopnea, palpitations, PND, shortness of breath, sputum production, syncope or weakness.     Patient presents today as a follow up for test results. She was referred to Dr. Rodas in August for further evaluation of chest pain. At that time she complained of occasional sharp stabbing chest pain that would last seconds and resolve. She underwent a CTA of the coronaries which revealed a calcium score of 0 and normal coronary arteries. CTA of the chest revealed a small hiatal hernia. She also had a 2D echo with results pending.     She presents today with her . She has had a few episodes of sharp, stabbing, fleeting chest pain that typically starts on the left side of her chest and radiates to the right as well as occasional mid back pain. She otherwise denies complaints.       The following portions of the patient's history were reviewed and updated as appropriate: allergies, current medications, past family history, past medical history, past social history, past surgical history and problem list.    Allergies   Allergen Reactions    Crestor [Rosuvastatin] Myalgia     Body aches       Current Outpatient Medications:     aspirin 81 MG EC tablet, Take 1 tablet by mouth Daily., Disp: , Rfl:     atorvastatin (Lipitor) 40 MG tablet, Take 1 tablet by mouth Every Night., Disp: 90 tablet, Rfl: 3    Calcium-Vitamin D-Vitamin K (Viactiv Calcium Plus D) 650-12.5-40 MG-MCG-MCG chewable tablet, Chew 1 tablet 2 (two) times a day., Disp: , Rfl:     " clonazePAM (KlonoPIN) 1 MG tablet, TAKE ONE TABLET BY MOUTH THREE TIMES A DAY AS NEEDED FOR ANXIETY, Disp: 90 tablet, Rfl: 0    Docusate Sodium 100 MG capsule, Take 1 tablet by mouth Daily., Disp: , Rfl:     DULoxetine (CYMBALTA) 60 MG capsule, TAKE ONE CAPSULE BY MOUTH EVERY DAY, Disp: 90 capsule, Rfl: 3    loratadine (CLARITIN) 10 MG tablet, Take 1 tablet by mouth As Needed., Disp: , Rfl:     omeprazole OTC (PriLOSEC OTC) 20 MG EC tablet, Take 1 tablet by mouth Daily., Disp: 84 tablet, Rfl: 3    propranolol LA (Inderal LA) 80 MG 24 hr capsule, Take 1 capsule by mouth Daily., Disp: 30 capsule, Rfl: 3    ramelteon (ROZEREM) 8 MG tablet, Take 1 tablet by mouth Every Night., Disp: 30 tablet, Rfl: 0    Reyvow 100 MG tablet, TAKE ONE TABLET BY MOUTH ONCE DAILY AS NEEDED TO STOP MIGRAINE, Disp: 8 tablet, Rfl: 2  Past Medical History:   Diagnosis Date    Anxiety     Depression     History of transfusion     Hyperlipidemia     Migraines 06/08/2022    Ovarian cyst     x 2        Social History     Socioeconomic History    Marital status:    Tobacco Use    Smoking status: Never     Passive exposure: Past    Smokeless tobacco: Never   Vaping Use    Vaping status: Never Used   Substance and Sexual Activity    Alcohol use: No    Drug use: No    Sexual activity: Not Currently     Partners: Male     Birth control/protection: Post-menopausal       Review of Systems   Constitutional: Negative for malaise/fatigue, weight gain and weight loss.   Cardiovascular:  Positive for chest pain. Negative for dyspnea on exertion, irregular heartbeat, leg swelling, near-syncope, orthopnea, palpitations, paroxysmal nocturnal dyspnea and syncope.   Respiratory:  Negative for cough, shortness of breath, sleep disturbances due to breathing, sputum production and wheezing.    Skin:  Negative for dry skin, flushing, itching and rash.   Gastrointestinal:  Negative for hematemesis and hematochezia.   Neurological:  Negative for dizziness,  "light-headedness, loss of balance and weakness.   All other systems reviewed and are negative.         Objective:     Vitals reviewed.   Constitutional:       General: Not in acute distress.     Appearance: Healthy appearance. Well-developed. Obese. Not diaphoretic.   Eyes:      General: No scleral icterus.     Conjunctiva/sclera: Conjunctivae normal.      Pupils: Pupils are equal, round, and reactive to light.   HENT:      Head: Normocephalic.    Mouth/Throat:      Pharynx: No oropharyngeal exudate.   Neck:      Vascular: No JVR.   Pulmonary:      Effort: Pulmonary effort is normal. No respiratory distress.      Breath sounds: Normal breath sounds. No wheezing. No rhonchi. No rales.   Chest:      Chest wall: Not tender to palpatation.   Cardiovascular:      Normal rate. Regular rhythm.   Pulses:     Intact distal pulses.   Edema:     Peripheral edema absent.   Abdominal:      General: Bowel sounds are normal. There is no distension.      Palpations: Abdomen is soft.      Tenderness: There is no abdominal tenderness.   Musculoskeletal: Normal range of motion.      Cervical back: Normal range of motion and neck supple. Skin:     General: Skin is warm and dry.      Coloration: Skin is not pale.      Findings: No erythema or rash.   Neurological:      Mental Status: Alert, oriented to person, place, and time and oriented to person, place and time.      Deep Tendon Reflexes: Reflexes are normal and symmetric.   Psychiatric:         Behavior: Behavior normal.           Procedures  /76 (BP Location: Left arm, Patient Position: Sitting, Cuff Size: Adult)   Pulse 82   Ht 160 cm (63\")   Wt 85.3 kg (188 lb)   LMP  (LMP Unknown)   SpO2 96%   BMI 33.30 kg/m²     Lab Review:   I have reviewed previous office notes, recent labs and recent cardiac testing.     Lab Results   Component Value Date    CHLPL 186 03/14/2024    TRIG 100 03/14/2024    HDL 69 (H) 03/14/2024    LDL 99 03/14/2024     Cardiac CT angiography " 10/14/2024     Impression:      Total calcium score : 0     Coronary CT Angiogram:     Normal left main coronary artery  Normal left anterior descending coronary artery  Normal diagonal branch  Normal left circumflex coronary artery  Normal obtuse marginal branch  Normal right coronary artery  Normal right posterior lateral artery  Normal right posterior descending coronary artery    IMPRESSION:  1. Limited evaluation of the chest due to field of view. Interpretation  of cardiac structures deferred to cardiology, which will be dictated  separately.  2. Mild bibasilar atelectasis and linear subsegmental atelectasis in the  anterior inferior lingula. No lung mass or suspicious pulmonary nodule,  no acute findings. There is a small sliding-type hiatal hernia and trace  pericardial fluid.         Assessment:          Diagnosis Plan   1. Chest pain in adult        2. Mixed hyperlipidemia        3. 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 If continues, would consider GI workup for noncardiac chest pain. Calcium score of 0 per CTA of coronaries. Does not require ASA use. Continue statin per PCP.   2. HLD- managed per PCP. On lipitor.   3. BMI- BMI is >= 30 and <35. (Class 1 Obesity). The following options were offered after discussion;: weight loss educational material (shared in after visit summary)      Follow up in PRN  if symptoms worsen.     I spent 30 minutes caring for Shawnee on this date of service. This time includes time spent by me in the following activities:preparing for the visit, reviewing tests, obtaining and/or reviewing a separately obtained history, performing a medically appropriate examination and/or evaluation , counseling and educating the patient/family/caregiver, and documenting information in the medical record

## 2024-10-25 ENCOUNTER — PROCEDURE VISIT (OUTPATIENT)
Dept: NEUROLOGY | Facility: CLINIC | Age: 59
End: 2024-10-25
Payer: COMMERCIAL

## 2024-10-25 DIAGNOSIS — G43.709 CHRONIC MIGRAINE WITHOUT AURA WITHOUT STATUS MIGRAINOSUS, NOT INTRACTABLE: Primary | ICD-10-CM

## 2024-10-26 LAB
BH CV ECHO LEFT VENTRICLE GLOBAL LONGITUDINAL STRAIN: -14.8 %
BH CV ECHO MEAS - AO MAX PG: 3.4 MMHG
BH CV ECHO MEAS - AO MEAN PG: 1.91 MMHG
BH CV ECHO MEAS - AO ROOT DIAM: 2.6 CM
BH CV ECHO MEAS - AO V2 MAX: 91.7 CM/SEC
BH CV ECHO MEAS - AO V2 VTI: 21.3 CM
BH CV ECHO MEAS - AVA(I,D): 2.38 CM2
BH CV ECHO MEAS - EDV(CUBED): 80 ML
BH CV ECHO MEAS - EDV(MOD-SP2): 89.5 ML
BH CV ECHO MEAS - EDV(MOD-SP4): 59.4 ML
BH CV ECHO MEAS - EF(MOD-SP2): 68.9 %
BH CV ECHO MEAS - EF(MOD-SP4): 69.4 %
BH CV ECHO MEAS - ESV(CUBED): 29 ML
BH CV ECHO MEAS - ESV(MOD-SP2): 27.8 ML
BH CV ECHO MEAS - ESV(MOD-SP4): 18.2 ML
BH CV ECHO MEAS - FS: 28.7 %
BH CV ECHO MEAS - IVS/LVPW: 0.98 CM
BH CV ECHO MEAS - IVSD: 0.95 CM
BH CV ECHO MEAS - LAT PEAK E' VEL: 11 CM/SEC
BH CV ECHO MEAS - LV DIASTOLIC VOL/BSA (35-75): 31.8 CM2
BH CV ECHO MEAS - LV MASS(C)D: 134.5 GRAMS
BH CV ECHO MEAS - LV MAX PG: 2.46 MMHG
BH CV ECHO MEAS - LV MEAN PG: 1.25 MMHG
BH CV ECHO MEAS - LV SYSTOLIC VOL/BSA (12-30): 9.7 CM2
BH CV ECHO MEAS - LV V1 MAX: 78.3 CM/SEC
BH CV ECHO MEAS - LV V1 VTI: 16.3 CM
BH CV ECHO MEAS - LVIDD: 4.3 CM
BH CV ECHO MEAS - LVIDS: 3.1 CM
BH CV ECHO MEAS - LVOT AREA: 3.1 CM2
BH CV ECHO MEAS - LVOT DIAM: 1.99 CM
BH CV ECHO MEAS - LVPWD: 0.97 CM
BH CV ECHO MEAS - MED PEAK E' VEL: 11 CM/SEC
BH CV ECHO MEAS - MV A MAX VEL: 65.9 CM/SEC
BH CV ECHO MEAS - MV DEC SLOPE: 465.2 CM/SEC2
BH CV ECHO MEAS - MV DEC TIME: 0.13 SEC
BH CV ECHO MEAS - MV E MAX VEL: 58 CM/SEC
BH CV ECHO MEAS - MV E/A: 0.88
BH CV ECHO MEAS - MV MAX PG: 1.93 MMHG
BH CV ECHO MEAS - MV MEAN PG: 0.9 MMHG
BH CV ECHO MEAS - MV V2 VTI: 17.6 CM
BH CV ECHO MEAS - MVA(VTI): 2.9 CM2
BH CV ECHO MEAS - PA V2 MAX: 89.6 CM/SEC
BH CV ECHO MEAS - SV(LVOT): 50.5 ML
BH CV ECHO MEAS - SV(MOD-SP2): 61.7 ML
BH CV ECHO MEAS - SV(MOD-SP4): 41.2 ML
BH CV ECHO MEAS - SVI(LVOT): 27 ML/M2
BH CV ECHO MEAS - SVI(MOD-SP2): 33 ML/M2
BH CV ECHO MEAS - SVI(MOD-SP4): 22 ML/M2
BH CV ECHO MEAS - TR MAX VEL: 125 CM/SEC
BH CV ECHO MEASUREMENTS AVERAGE E/E' RATIO: 5.27

## 2024-10-31 ENCOUNTER — TELEPHONE (OUTPATIENT)
Dept: FAMILY MEDICINE CLINIC | Facility: CLINIC | Age: 59
End: 2024-10-31
Payer: COMMERCIAL

## 2024-10-31 DIAGNOSIS — B37.2 YEAST DERMATITIS: Primary | ICD-10-CM

## 2024-10-31 RX ORDER — FLUCONAZOLE 150 MG/1
150 TABLET ORAL DAILY
Qty: 5 TABLET | Refills: 0 | Status: SHIPPED | OUTPATIENT
Start: 2024-10-31 | End: 2024-11-05

## 2024-10-31 NOTE — TELEPHONE ENCOUNTER
Caller: Shawnee Suarez    Relationship: Self    Best call back number: [phone]    What medication are you requesting: SOMETHING DIFFERENT FOR YEAST INFECTION     What are your current symptoms: HAS NEVER CLEARED UP     How long have you been experiencing symptoms: CLOSE TO A MONTH NOW     Have you had these symptoms before:    [x] Yes  [] No    Have you been treated for these symptoms before:   [x] Yes  [] No    If a prescription is needed, what is your preferred pharmacy and phone number: [facility] [place], [place] - [address] [place] [address] [phone] [facility] [phone]      Additional notes: PLEASE CALL IF PATIENT NEEDS TO BE SEEN

## 2024-11-06 ENCOUNTER — OFFICE VISIT (OUTPATIENT)
Dept: FAMILY MEDICINE CLINIC | Facility: CLINIC | Age: 59
End: 2024-11-06
Payer: COMMERCIAL

## 2024-11-06 VITALS
OXYGEN SATURATION: 95 % | TEMPERATURE: 96.9 F | HEIGHT: 63 IN | BODY MASS INDEX: 32.53 KG/M2 | DIASTOLIC BLOOD PRESSURE: 82 MMHG | HEART RATE: 82 BPM | WEIGHT: 183.6 LBS | SYSTOLIC BLOOD PRESSURE: 122 MMHG

## 2024-11-06 DIAGNOSIS — N95.2 ATROPHIC VAGINITIS: Primary | ICD-10-CM

## 2024-11-06 DIAGNOSIS — B37.2 CANDIDAL INTERTRIGO: ICD-10-CM

## 2024-11-06 DIAGNOSIS — N89.8 VAGINAL DISCHARGE: ICD-10-CM

## 2024-11-06 PROCEDURE — 99213 OFFICE O/P EST LOW 20 MIN: CPT | Performed by: NURSE PRACTITIONER

## 2024-11-06 RX ORDER — ESTRADIOL 10 UG/1
INSERT VAGINAL
Qty: 18 TABLET | Refills: 1 | Status: SHIPPED | OUTPATIENT
Start: 2024-11-07

## 2024-11-06 RX ORDER — NYSTATIN 100000 [USP'U]/G
POWDER TOPICAL 2 TIMES DAILY
Qty: 60 G | Refills: 1 | Status: SHIPPED | OUTPATIENT
Start: 2024-11-06

## 2024-11-06 NOTE — PROGRESS NOTES
CC:   Chief Complaint   Patient presents with    Vaginal Discharge     Has finished medication and has not cleared up symptoms        History:  Shawnee Suarez is a 59 y.o. female who presents today for evaluation of the above problems.      HPI    Patient c/o persistent vaginal discomfort and discharge for the last couple of months.  She has taken Diflucan 2 different times and Flagyl with no improvement in symptoms.  Describes vaginal discharge as white/clear and thin.  Patient reports she is not sexually active currently and some of that is because of pain with sex.  Patient sees Janett Davila and last Pap smear done in January and normal.  Patient has been on vaginal estrogen for dryness but states she did not take it consistently to see if it helped.  She has tried coconut oil with no relief in symptoms.      Allergies   Allergen Reactions    Crestor [Rosuvastatin] Myalgia     Body aches     Past Medical History:   Diagnosis Date    Anxiety     Depression     History of transfusion     Hyperlipidemia     Migraines 06/08/2022    Ovarian cyst     x 2      Past Surgical History:   Procedure Laterality Date    APPENDECTOMY      BELPHAROPTOSIS REPAIR Bilateral 2023    BREAST BIOPSY Right 11/1989    CHOLECYSTECTOMY      COLONOSCOPY  11/22/2013    Normal exam repeat in 10 years    COLONOSCOPY N/A 11/13/2018    Normal but prep was only fair repeat in 2 years    COLONOSCOPY N/A 01/22/2021    The entire examined colon is normal on direct and retroflexion views; No specimens collected; Repeat 10 years    COLONOSCOPY N/A 5/7/2024    Procedure: COLONOSCOPY WITH ANESTHESIA;  Surgeon: Da Miles MD;  Location: [facility] ENDOSCOPY;  Service: Gastroenterology;  Laterality: N/A;  pre op genetic susceptibility to other disease  post  pcp Marlon Tran    D & BILLY HYSTEROSCOPY ENDOMETRIAL ABLATION N/A 02/06/2017    Procedure: DILATATION AND CURETTAGE HYSTEROSCOPY NOVASURE ENDOMETRIAL ABLATION;  Surgeon: Ana Barrios MD;  Location:   [facility] OR;  Service:     DIAGNOSTIC LAPAROSCOPY N/A 06/17/2019    Procedure: DIAGNOSTIC LAPAROSCOPY;  Surgeon: Ana Barrios MD;  Location:  [facility] OR;  Service: Obstetrics/Gynecology    ENDOSCOPY N/A 5/7/2024    Procedure: ESOPHAGOGASTRODUODENOSCOPY WITH ANESTHESIA;  Surgeon: Da Miles MD;  Location:  [facility] ENDOSCOPY;  Service: Gastroenterology;  Laterality: N/A;  pre op genetic susceptibility to other disease  post gastritis, esophagitis  taurus Tran    OVARIAN CYST REMOVAL      x 2    SALPINGO OOPHORECTOMY Bilateral 06/17/2019    Procedure: SALPINGO OOPHORECTOMY LAPAROSCOPIC;  Surgeon: Ana Barrios MD;  Location:  [facility] OR;  Service: Obstetrics/Gynecology    WISDOM TOOTH EXTRACTION       Family History   Problem Relation Age of Onset    Ovarian cancer Mother 72    Brain cancer Father     No Known Problems Brother     Heart disease Brother     No Known Problems Maternal Aunt     No Known Problems Paternal Aunt     No Known Problems Maternal Grandmother     No Known Problems Maternal Grandfather     No Known Problems Paternal Grandmother     Brain cancer Paternal Grandfather     No Known Problems Daughter     No Known Problems Daughter     Breast cancer Other 28    Uterine cancer Neg Hx     Colon cancer Neg Hx     Melanoma Neg Hx     Prostate cancer Neg Hx     BRCA 1/2 Neg Hx     Endometrial cancer Neg Hx     Colon polyps Neg Hx     Esophageal cancer Neg Hx     Liver cancer Neg Hx     Stomach cancer Neg Hx     Rectal cancer Neg Hx     Liver disease Neg Hx       reports that she has never smoked. She has been exposed to tobacco smoke. She has never used smokeless tobacco. She reports that she does not drink alcohol and does not use drugs.      Current Outpatient Medications:     atorvastatin (Lipitor) 40 MG tablet, Take 1 tablet by mouth Every Night., Disp: 90 tablet, Rfl: 3    Calcium-Vitamin D-Vitamin K (Viactiv Calcium Plus D) 650-12.5-40 MG-MCG-MCG chewable tablet, Chew 1 tablet 2 (two) times a  "day., Disp: , Rfl:     clonazePAM (KlonoPIN) 1 MG tablet, TAKE ONE TABLET BY MOUTH THREE TIMES A DAY AS NEEDED FOR ANXIETY, Disp: 90 tablet, Rfl: 0    Docusate Sodium 100 MG capsule, Take 1 tablet by mouth Daily., Disp: , Rfl:     DULoxetine (CYMBALTA) 60 MG capsule, TAKE ONE CAPSULE BY MOUTH EVERY DAY, Disp: 90 capsule, Rfl: 3    loratadine (CLARITIN) 10 MG tablet, Take 1 tablet by mouth As Needed., Disp: , Rfl:     omeprazole OTC (PriLOSEC OTC) 20 MG EC tablet, Take 1 tablet by mouth Daily., Disp: 84 tablet, Rfl: 3    propranolol LA (Inderal LA) 80 MG 24 hr capsule, Take 1 capsule by mouth Daily., Disp: 30 capsule, Rfl: 3    Reyvow 100 MG tablet, TAKE ONE TABLET BY MOUTH ONCE DAILY AS NEEDED TO STOP MIGRAINE, Disp: 8 tablet, Rfl: 2    [START ON 11/7/2024] estradiol (Yuvafem) 10 MCG tablet vaginal tablet, Insert vaginally at bedtime x 2 weeks, then 2 times a week, Disp: 18 tablet, Rfl: 1    nystatin (MYCOSTATIN) 628303 UNIT/GM powder, Apply  topically to the appropriate area as directed 2 (Two) Times a Day., Disp: 60 g, Rfl: 1    OBJECTIVE:  /82 (BP Location: Left arm, Patient Position: Sitting, Cuff Size: Adult)   Pulse 82   Temp 96.9 °F (36.1 °C) (Temporal)   Ht 160 cm (63\")   Wt 83.3 kg (183 lb 9.6 oz)   LMP  (LMP Unknown)   SpO2 95%   BMI 32.52 kg/m²    Estimated body mass index is 32.52 kg/m² as calculated from the following:    Height as of this encounter: 160 cm (63\").    Weight as of this encounter: 83.3 kg (183 lb 9.6 oz).                  Physical Exam  Vitals reviewed.   Constitutional:       General: She is not in acute distress.     Appearance: Normal appearance.   HENT:      Head: Normocephalic and atraumatic.   Pulmonary:      Effort: Pulmonary effort is normal. No respiratory distress.      Breath sounds: Normal breath sounds.   Abdominal:      General: Bowel sounds are normal.      Palpations: Abdomen is soft.      Tenderness: There is no abdominal tenderness. There is no right CVA " tenderness or left CVA tenderness.   Genitourinary:     Vagina: No vaginal discharge or tenderness.      Cervix: No cervical motion tenderness.      Comments: Some slight redness and excoriation to bilateral groin  Musculoskeletal:         General: Normal range of motion.   Skin:     General: Skin is warm and dry.   Neurological:      Mental Status: She is alert and oriented to person, place, and time.   Psychiatric:         Mood and Affect: Mood normal.         Behavior: Behavior normal.         Thought Content: Thought content normal.              Assessment/Plan    Diagnoses and all orders for this visit:    1. Atrophic vaginitis (Primary)  -     estradiol (Yuvafem) 10 MCG tablet vaginal tablet; Insert vaginally at bedtime x 2 weeks, then 2 times a week  Dispense: 18 tablet; Refill: 1    2. Vaginal discharge    3. Candidal intertrigo  -     nystatin (MYCOSTATIN) [id number] UNIT/GM powder; Apply  topically to the appropriate area as directed 2 (Two) Times a Day.  Dispense: 60 g; Refill: 1    Based on symptoms and exam- believe symptoms are related to vaginal dryness. Discussed options to help with symptoms and decided to try estrogen suppository. Medication SE discussed.   Patient has appt with Janett Davila in January- will discuss this further with her as well. If any concerns- patient to let us know            An After Visit Summary was printed and given to the patient at discharge.  Return if symptoms worsen or fail to improve.

## 2024-11-07 DIAGNOSIS — E78.2 MIXED HYPERLIPIDEMIA: ICD-10-CM

## 2024-11-07 DIAGNOSIS — G43.709 CHRONIC MIGRAINE WITHOUT AURA WITHOUT STATUS MIGRAINOSUS, NOT INTRACTABLE: ICD-10-CM

## 2024-11-07 RX ORDER — ZOLMITRIPTAN 2.5 MG/1
TABLET, FILM COATED ORAL
Qty: 12 TABLET | Refills: 0 | OUTPATIENT
Start: 2024-11-07

## 2024-11-07 RX ORDER — ATORVASTATIN CALCIUM 40 MG/1
40 TABLET, FILM COATED ORAL
Qty: 90 TABLET | Refills: 0 | Status: SHIPPED | OUTPATIENT
Start: 2024-11-07

## 2024-11-07 NOTE — TELEPHONE ENCOUNTER
Rx Refill Note  Requested Prescriptions     Pending Prescriptions Disp Refills    atorvastatin (LIPITOR) 40 MG tablet [Pharmacy Med Name: ATORVASTATIN CALCIUM 40MG TABS] 90 tablet 3     Sig: TAKE ONE TABLET BY MOUTH EVERY NIGHT      Last office visit with prescribing clinician: 11/6/24   Last telemedicine visit with prescribing clinician: Visit date not found   Next office visit with prescribing clinician: 1/7/25    {TIP  Please add Last Relevant Lab 3/14/24    Bree Lockett MA  11/07/24, 09:29 CST

## 2024-11-14 ENCOUNTER — OFFICE VISIT (OUTPATIENT)
Dept: FAMILY MEDICINE CLINIC | Facility: CLINIC | Age: 59
End: 2024-11-14
Payer: COMMERCIAL

## 2024-11-14 VITALS
BODY MASS INDEX: 32.53 KG/M2 | HEIGHT: 63 IN | HEART RATE: 98 BPM | WEIGHT: 183.6 LBS | DIASTOLIC BLOOD PRESSURE: 81 MMHG | TEMPERATURE: 97.8 F | SYSTOLIC BLOOD PRESSURE: 116 MMHG | OXYGEN SATURATION: 97 %

## 2024-11-14 DIAGNOSIS — J00 ACUTE NASOPHARYNGITIS (COMMON COLD): Primary | ICD-10-CM

## 2024-11-14 DIAGNOSIS — R05.1 ACUTE COUGH: ICD-10-CM

## 2024-11-14 DIAGNOSIS — J02.9 SORE THROAT: ICD-10-CM

## 2024-11-14 PROCEDURE — 99213 OFFICE O/P EST LOW 20 MIN: CPT | Performed by: NURSE PRACTITIONER

## 2024-11-14 PROCEDURE — 96372 THER/PROPH/DIAG INJ SC/IM: CPT | Performed by: NURSE PRACTITIONER

## 2024-11-14 PROCEDURE — 87428 SARSCOV & INF VIR A&B AG IA: CPT | Performed by: NURSE PRACTITIONER

## 2024-11-14 RX ORDER — TRIAMCINOLONE ACETONIDE 40 MG/ML
40 INJECTION, SUSPENSION INTRA-ARTICULAR; INTRAMUSCULAR ONCE
Status: COMPLETED | OUTPATIENT
Start: 2024-11-14 | End: 2024-11-14

## 2024-11-14 RX ADMIN — TRIAMCINOLONE ACETONIDE 40 MG: 40 INJECTION, SUSPENSION INTRA-ARTICULAR; INTRAMUSCULAR at 10:03

## 2024-11-14 NOTE — PROGRESS NOTES
CC:   Chief Complaint   Patient presents with    Nasal Congestion     Symptoms started earlier this week    Cough    Eye Drainage    Sore Throat        History:  Shawnee Suarez is a 59 y.o. female who presents today for evaluation of the above problems.      HPI  Patient presents with complaint of illness.  Symptoms started 3 days ago with postnasal drainage, scratchy throat and cough.  Taking over-the-counter allergy medication and Flonase.  Denies any fever or sinus headaches.  Denies any difficulty breathing.  Patient has been exposed to children with viral colds but she wants to rule out flu and COVID since visiting family this weekend.    Allergies   Allergen Reactions    Crestor [Rosuvastatin] Myalgia     Body aches     Past Medical History:   Diagnosis Date    Anxiety     Depression     History of transfusion     Hyperlipidemia     Migraines 06/08/2022    Ovarian cyst     x 2      Past Surgical History:   Procedure Laterality Date    APPENDECTOMY      BELPHAROPTOSIS REPAIR Bilateral 2023    BREAST BIOPSY Right 11/1989    CHOLECYSTECTOMY      COLONOSCOPY  11/22/2013    Normal exam repeat in 10 years    COLONOSCOPY N/A 11/13/2018    Normal but prep was only fair repeat in 2 years    COLONOSCOPY N/A 01/22/2021    The entire examined colon is normal on direct and retroflexion views; No specimens collected; Repeat 10 years    COLONOSCOPY N/A 5/7/2024    Procedure: COLONOSCOPY WITH ANESTHESIA;  Surgeon: Da Miles MD;  Location: [facility] ENDOSCOPY;  Service: Gastroenterology;  Laterality: N/A;  pre op genetic susceptibility to other disease  post  pcp Marlon Tran    D & C HYSTEROSCOPY ENDOMETRIAL ABLATION N/A 02/06/2017    Procedure: DILATATION AND CURETTAGE HYSTEROSCOPY NOVASURE ENDOMETRIAL ABLATION;  Surgeon: Ana Barrios MD;  Location: [facility] OR;  Service:     DIAGNOSTIC LAPAROSCOPY N/A 06/17/2019    Procedure: DIAGNOSTIC LAPAROSCOPY;  Surgeon: Ana Barrios MD;  Location: [facility] OR;  Service:  Obstetrics/Gynecology    ENDOSCOPY N/A 5/7/2024    Procedure: ESOPHAGOGASTRODUODENOSCOPY WITH ANESTHESIA;  Surgeon: Da Miles MD;  Location: [facility] ENDOSCOPY;  Service: Gastroenterology;  Laterality: N/A;  pre op genetic susceptibility to other disease  post gastritis, esophagitis  taurus Tran    OVARIAN CYST REMOVAL      x 2    SALPINGO OOPHORECTOMY Bilateral 06/17/2019    Procedure: SALPINGO OOPHORECTOMY LAPAROSCOPIC;  Surgeon: Ana Barrios MD;  Location: [facility] OR;  Service: Obstetrics/Gynecology    WISDOM TOOTH EXTRACTION       Family History   Problem Relation Age of Onset    Ovarian cancer Mother 72    Brain cancer Father     No Known Problems Brother     Heart disease Brother     No Known Problems Maternal Aunt     No Known Problems Paternal Aunt     No Known Problems Maternal Grandmother     No Known Problems Maternal Grandfather     No Known Problems Paternal Grandmother     Brain cancer Paternal Grandfather     No Known Problems Daughter     No Known Problems Daughter     Breast cancer Other 28    Uterine cancer Neg Hx     Colon cancer Neg Hx     Melanoma Neg Hx     Prostate cancer Neg Hx     BRCA 1/2 Neg Hx     Endometrial cancer Neg Hx     Colon polyps Neg Hx     Esophageal cancer Neg Hx     Liver cancer Neg Hx     Stomach cancer Neg Hx     Rectal cancer Neg Hx     Liver disease Neg Hx       reports that she has never smoked. She has been exposed to tobacco smoke. She has never used smokeless tobacco. She reports that she does not drink alcohol and does not use drugs.      Current Outpatient Medications:     atorvastatin (LIPITOR) 40 MG tablet, TAKE ONE TABLET BY MOUTH EVERY NIGHT, Disp: 90 tablet, Rfl: 0    Calcium-Vitamin D-Vitamin K (Viactiv Calcium Plus D) 650-12.5-40 MG-MCG-MCG chewable tablet, Chew 1 tablet 2 (two) times a day., Disp: , Rfl:     clonazePAM (KlonoPIN) 1 MG tablet, TAKE ONE TABLET BY MOUTH THREE TIMES A DAY AS NEEDED FOR ANXIETY, Disp: 90 tablet, Rfl: 0    Docusate  "Sodium 100 MG capsule, Take 1 tablet by mouth Daily., Disp: , Rfl:     DULoxetine (CYMBALTA) 60 MG capsule, TAKE ONE CAPSULE BY MOUTH EVERY DAY, Disp: 90 capsule, Rfl: 3    estradiol (Yuvafem) 10 MCG tablet vaginal tablet, Insert vaginally at bedtime x 2 weeks, then 2 times a week, Disp: 18 tablet, Rfl: 1    loratadine (CLARITIN) 10 MG tablet, Take 1 tablet by mouth As Needed., Disp: , Rfl:     nystatin (MYCOSTATIN) 904667 UNIT/GM powder, Apply  topically to the appropriate area as directed 2 (Two) Times a Day., Disp: 60 g, Rfl: 1    omeprazole OTC (PriLOSEC OTC) 20 MG EC tablet, Take 1 tablet by mouth Daily., Disp: 84 tablet, Rfl: 3    propranolol LA (Inderal LA) 80 MG 24 hr capsule, Take 1 capsule by mouth Daily., Disp: 30 capsule, Rfl: 3    Reyvow 100 MG tablet, TAKE ONE TABLET BY MOUTH ONCE DAILY AS NEEDED TO STOP MIGRAINE, Disp: 8 tablet, Rfl: 2  No current facility-administered medications for this visit.    OBJECTIVE:  /81 (BP Location: Left arm, Patient Position: Sitting, Cuff Size: Adult)   Pulse 98   Temp 97.8 °F (36.6 °C) (Temporal)   Ht 160 cm (63\")   Wt 83.3 kg (183 lb 9.6 oz)   LMP  (LMP Unknown)   SpO2 97%   BMI 32.52 kg/m²    Estimated body mass index is 32.52 kg/m² as calculated from the following:    Height as of this encounter: 160 cm (63\").    Weight as of this encounter: 83.3 kg (183 lb 9.6 oz).                  Physical Exam  Vitals reviewed.   Constitutional:       General: She is not in acute distress.     Appearance: Normal appearance.   HENT:      Head: Normocephalic and atraumatic.      Right Ear: Tympanic membrane, ear canal and external ear normal.      Left Ear: Tympanic membrane, ear canal and external ear normal.      Nose:      Right Sinus: No maxillary sinus tenderness or frontal sinus tenderness.      Left Sinus: No maxillary sinus tenderness or frontal sinus tenderness.      Mouth/Throat:      Pharynx: Posterior oropharyngeal erythema and postnasal drip present. No " oropharyngeal exudate.   Cardiovascular:      Rate and Rhythm: Normal rate and regular rhythm.      Heart sounds: Normal heart sounds.   Pulmonary:      Effort: No respiratory distress.      Breath sounds: Normal breath sounds. No wheezing.   Musculoskeletal:      Cervical back: Normal range of motion.   Lymphadenopathy:      Cervical: No cervical adenopathy.   Skin:     General: Skin is warm and dry.   Neurological:      Mental Status: She is alert and oriented to person, place, and time.   Psychiatric:         Mood and Affect: Mood normal.         Behavior: Behavior normal.         Thought Content: Thought content normal.              Assessment/Plan    Diagnoses and all orders for this visit:    1. Acute nasopharyngitis (common cold) (Primary)  -     triamcinolone acetonide (KENALOG-40) injection 40 mg    2. Acute cough  -     POCT SARS-CoV-2 Antigen EVON + Flu    3. Sore throat  -     POCT SARS-CoV-2 Antigen EVON + Flu    Flu and covid are negative.   Discussed viral nature and to treat symptoms. Will do steroid injection to help with sinus inflammation and symptoms. SE discussed  If worse or no better by Monday- patient to let me know.             An After Visit Summary was printed and given to the patient at discharge.  Return if symptoms worsen or fail to improve.

## 2024-11-18 ENCOUNTER — TELEPHONE (OUTPATIENT)
Dept: FAMILY MEDICINE CLINIC | Facility: CLINIC | Age: 59
End: 2024-11-18

## 2024-11-18 NOTE — TELEPHONE ENCOUNTER
Caller: Shawnee Suarez    Relationship: Self    Best call back number: [phone]     What medication are you requesting: WHATEVER IS RECOMMENDED    What are your current symptoms: EAR PAIN, EAR DRAINAGE, SORE THROAT, SINUS DRAINAGE, COUGHING    How long have you been experiencing symptoms: PAST WEEK     Have you had these symptoms before:    [x] Yes  [] No    Have you been treated for these symptoms before:   [x] Yes  [] No    If a prescription is needed, what is your preferred pharmacy and phone number: [facility] [place], [place] - [address] [phone] [facility] [phone]      Additional notes: WAS SEEN IN OFFICE ON 11-14-24 ADVISED TO CALL BACK FOR MEDICATION IF NOT BETTER

## 2024-11-22 ENCOUNTER — TELEPHONE (OUTPATIENT)
Dept: NEUROLOGY | Facility: CLINIC | Age: 59
End: 2024-11-22
Payer: COMMERCIAL

## 2024-11-22 NOTE — TELEPHONE ENCOUNTER
Caller: Shawnee Suarez    Relationship: Self    Best call back number: [phone]    What medication are you requesting?: ZOLMITRIPTAN    What are your current symptoms?: MIGRAINES    Have you had these symptoms before?: [x] Yes  [] No    Have you been treated for these symptoms before?:  [x] Yes  [] No    If a prescription is needed, what is your preferred pharmacy and phone number?: [facility] - [place], [place] - [address] [place] [address] [phone] [facility] [phone]      Additional notes: PT STATES SHE HAD BEEN PRESCRIBED THE ZOLMITRIPTAN MEDICATION BY MICHELLE HOUSE EARLIER THIS YEAR. HOWEVER, PT WAS HAVING SOME CHEST PAIN CONCERNS OVER THE SUMMER, THEREFORE, CARDIOLOGY ORDERED A SERIES OF TESTS FOR PT TO HAVE COMPLETED TO RULE OUT POSSIBLE PROBLEM WITH HER HEART AND WAS ADVISED TO DISCONTINUE THE ZOLMITRIPTAN MEDICATION BECAUSE IF PT DID HAVE A HEART ISSUE, IT COULD MAKE HER SYMPTOMS WORSE. PT STATES SHE DISCONTINUED THE ZOLMITRIPTAN AND WAS PRESCRIBED REYVOW IN IT'S PLACE, BY MICHELLE HOUSE.    PT STATES AFTER THE SERIES OF TESTS, IT WAS RULED OUT ANY PROBLEMS WITH HER HEART. SHE WOULD LIKE TO RESTART THE ZOLMITRIPTAN MEDICATION AS IT WORKED WELL AS A MIGRAINE ABORTIVE. THE REYVOW MEDIATION DOES NOT WORK FOR PT. PT ASKS IF RX FOR ZOLMITRIPTAN CAN BE SENT TO [facility].    PLEASE REVIEW AND ADVISE.

## 2024-11-22 NOTE — TELEPHONE ENCOUNTER
CALLED PATIENT TO LET HER KNOW THAT I HAVE SPOKE WITH MORALES AND HE IS OKAY TO PRESCRIBE BUT TO BE SAFE HE WANTS TO GET CARDIOLOGY CLEARANCE. HE IS GOING TO SPEAK WITH A PROVIDER AND ONCE THAT IS CLEARED HE WILL SEND IN SCRIPT. I TOLD PATIENT THAT HE MAY SEND IN BEFORE THE WEEKEND IS OVER IF HE IS ABLE TO GET AHOLD OF HER BEFORE THEN BUT I AM LEAVING FOR THE DAY SO I WOULD CALL HER BACK MONDAY TO GIVE HER A FOR SURE UPDATE. PATIENT VOICED UNDERSTANDING

## 2024-11-25 ENCOUNTER — DOCUMENTATION (OUTPATIENT)
Dept: NEUROLOGY | Facility: CLINIC | Age: 59
End: 2024-11-25
Payer: COMMERCIAL

## 2024-11-25 RX ORDER — ZOLMITRIPTAN 5 MG/1
5 TABLET, FILM COATED ORAL TAKE AS DIRECTED
Qty: 9 TABLET | Refills: 6 | Status: SHIPPED | OUTPATIENT
Start: 2024-11-25 | End: 2024-12-25

## 2024-11-25 NOTE — TELEPHONE ENCOUNTER
CALLED PATIENT TO LET HER KNOW SHE WAS CLEARED THROUGH CARDIOLOGY AND MORALES HAS SENT IN THE MEDICATION. I DID MAKE HER AWARE THAT IN THE EVENT SHE EXPERIENCES ANY CHEST PAIN, SHORTNESS OF BREATH,PALPITATIONS,ARM OR NECK PAIN WHILE TAKING THE MEDICATION SHE WILL NO LONGER BE ALLOWED TO TAKE IT AND WE WILL HAVE TO SWITCH. I DID TELL HER TO LET US KNOW IF SHE EXPERIENCES ANY OF THESE THINGS NOTED. PATIENT VOICED UNDERSTANDING.

## 2024-12-11 DIAGNOSIS — G43.709 CHRONIC MIGRAINE WITHOUT AURA WITHOUT STATUS MIGRAINOSUS, NOT INTRACTABLE: ICD-10-CM

## 2024-12-11 RX ORDER — PROPRANOLOL HYDROCHLORIDE 80 MG/1
80 CAPSULE, EXTENDED RELEASE ORAL DAILY
Qty: 90 CAPSULE | Refills: 3 | Status: SHIPPED | OUTPATIENT
Start: 2024-12-11

## 2024-12-19 ENCOUNTER — TELEPHONE (OUTPATIENT)
Dept: NEUROLOGY | Age: 59
End: 2024-12-19

## 2024-12-19 NOTE — TELEPHONE ENCOUNTER
Patient called into the office stating that she is now having headaches every other day and is wanting something to be called in for her.

## 2024-12-24 DIAGNOSIS — F41.9 ANXIETY: ICD-10-CM

## 2024-12-26 RX ORDER — CLONAZEPAM 1 MG/1
1 TABLET ORAL 3 TIMES DAILY PRN
Qty: 90 TABLET | Refills: 0 | Status: SHIPPED | OUTPATIENT
Start: 2024-12-26

## 2024-12-26 NOTE — TELEPHONE ENCOUNTER
Rx Refill Note  Requested Prescriptions     Pending Prescriptions Disp Refills    clonazePAM (KlonoPIN) 1 MG tablet [Pharmacy Med Name: CLONAZEPAM 1MG TABS] 90 tablet 0     Sig: TAKE ONE TABLET BY MOUTH THREE TIMES A DAY AS NEEDED FOR ANXIETY      Last office visit with office: 9/25/24  Next office visit with office: 1/7/25    UDS: 3/8/24    DATE OF LAST REFILL: 10/14/24    Controlled Substance Agreement: up to date           {TIP  Is Refill Pharmacy correct?:  Bree Lockett MA  12/26/24, 07:50 CST

## 2024-12-30 ENCOUNTER — OFFICE VISIT (OUTPATIENT)
Dept: FAMILY MEDICINE CLINIC | Facility: CLINIC | Age: 59
End: 2024-12-30
Payer: COMMERCIAL

## 2024-12-30 VITALS
WEIGHT: 183 LBS | HEIGHT: 63 IN | BODY MASS INDEX: 32.43 KG/M2 | RESPIRATION RATE: 18 BRPM | DIASTOLIC BLOOD PRESSURE: 74 MMHG | HEART RATE: 80 BPM | TEMPERATURE: 98.6 F | OXYGEN SATURATION: 97 % | SYSTOLIC BLOOD PRESSURE: 122 MMHG

## 2024-12-30 DIAGNOSIS — J06.9 VIRAL URI WITH COUGH: Primary | ICD-10-CM

## 2024-12-30 PROCEDURE — 99213 OFFICE O/P EST LOW 20 MIN: CPT | Performed by: NURSE PRACTITIONER

## 2024-12-30 PROCEDURE — 87428 SARSCOV & INF VIR A&B AG IA: CPT | Performed by: NURSE PRACTITIONER

## 2024-12-30 NOTE — PROGRESS NOTES
CC:   Chief Complaint   Patient presents with    Cough     X2 days    sinus drainage        History:  Shawnee Suarez is a 59 y.o. female who presents today for evaluation of the above problems.      HPI  Patient presents for illness.  Reports symptoms started 2 days ago with cough.  Denies any fever or problems breathing.  Patient started Mucinex today.  Patient had chest congestion a little over a month ago with symptoms lasting 3 to 4 weeks so she wanted to get ahead of at this time.  Has been around grandchildren who are sick.        Allergies   Allergen Reactions    Crestor [Rosuvastatin] Myalgia     Body aches     Past Medical History:   Diagnosis Date    Anxiety     Depression     History of transfusion     Hyperlipidemia     Migraines 06/08/2022    Ovarian cyst     x 2      Past Surgical History:   Procedure Laterality Date    APPENDECTOMY      BELPHAROPTOSIS REPAIR Bilateral 2023    BREAST BIOPSY Right 11/1989    CHOLECYSTECTOMY      COLONOSCOPY  11/22/2013    Normal exam repeat in 10 years    COLONOSCOPY N/A 11/13/2018    Normal but prep was only fair repeat in 2 years    COLONOSCOPY N/A 01/22/2021    The entire examined colon is normal on direct and retroflexion views; No specimens collected; Repeat 10 years    COLONOSCOPY N/A 5/7/2024    Procedure: COLONOSCOPY WITH ANESTHESIA;  Surgeon: Da Miles MD;  Location: [facility] ENDOSCOPY;  Service: Gastroenterology;  Laterality: N/A;  pre op genetic susceptibility to other disease  post  pcp Marlon Tran    D & BILLY HYSTEROSCOPY ENDOMETRIAL ABLATION N/A 02/06/2017    Procedure: DILATATION AND CURETTAGE HYSTEROSCOPY NOVASURE ENDOMETRIAL ABLATION;  Surgeon: Ana Barrios MD;  Location: [facility] OR;  Service:     DIAGNOSTIC LAPAROSCOPY N/A 06/17/2019    Procedure: DIAGNOSTIC LAPAROSCOPY;  Surgeon: Ana Barrios MD;  Location: [facility] OR;  Service: Obstetrics/Gynecology    ENDOSCOPY N/A 5/7/2024    Procedure: ESOPHAGOGASTRODUODENOSCOPY WITH ANESTHESIA;  Surgeon:  Da Miles MD;  Location: [facility] ENDOSCOPY;  Service: Gastroenterology;  Laterality: N/A;  pre op genetic susceptibility to other disease  post gastritis, esophagitis  pcp Marlon rTan    OVARIAN CYST REMOVAL      x 2    SALPINGO OOPHORECTOMY Bilateral 06/17/2019    Procedure: SALPINGO OOPHORECTOMY LAPAROSCOPIC;  Surgeon: Ana Barrios MD;  Location: [facility] OR;  Service: Obstetrics/Gynecology    WISDOM TOOTH EXTRACTION       Family History   Problem Relation Age of Onset    Ovarian cancer Mother 72    Brain cancer Father     No Known Problems Brother     Heart disease Brother     No Known Problems Maternal Aunt     No Known Problems Paternal Aunt     No Known Problems Maternal Grandmother     No Known Problems Maternal Grandfather     No Known Problems Paternal Grandmother     Brain cancer Paternal Grandfather     No Known Problems Daughter     No Known Problems Daughter     Breast cancer Other 28    Uterine cancer Neg Hx     Colon cancer Neg Hx     Melanoma Neg Hx     Prostate cancer Neg Hx     BRCA 1/2 Neg Hx     Endometrial cancer Neg Hx     Colon polyps Neg Hx     Esophageal cancer Neg Hx     Liver cancer Neg Hx     Stomach cancer Neg Hx     Rectal cancer Neg Hx     Liver disease Neg Hx       reports that she has never smoked. She has been exposed to tobacco smoke. She has never used smokeless tobacco. She reports that she does not drink alcohol and does not use drugs.      Current Outpatient Medications:     atorvastatin (LIPITOR) 40 MG tablet, TAKE ONE TABLET BY MOUTH EVERY NIGHT, Disp: 90 tablet, Rfl: 0    Calcium-Vitamin D-Vitamin K (Viactiv Calcium Plus D) 650-12.5-40 MG-MCG-MCG chewable tablet, Chew 1 tablet 2 (two) times a day., Disp: , Rfl:     clonazePAM (KlonoPIN) 1 MG tablet, TAKE ONE TABLET BY MOUTH THREE TIMES A DAY AS NEEDED FOR ANXIETY, Disp: 90 tablet, Rfl: 0    Docusate Sodium 100 MG capsule, Take 1 tablet by mouth Daily., Disp: , Rfl:     DULoxetine (CYMBALTA) 60 MG capsule, TAKE ONE  "CAPSULE BY MOUTH EVERY DAY, Disp: 90 capsule, Rfl: 3    estradiol (Yuvafem) 10 MCG tablet vaginal tablet, Insert vaginally at bedtime x 2 weeks, then 2 times a week, Disp: 18 tablet, Rfl: 1    loratadine (CLARITIN) 10 MG tablet, Take 1 tablet by mouth As Needed., Disp: , Rfl:     nystatin (MYCOSTATIN) 862193 UNIT/GM powder, Apply  topically to the appropriate area as directed 2 (Two) Times a Day., Disp: 60 g, Rfl: 1    omeprazole OTC (PriLOSEC OTC) 20 MG EC tablet, Take 1 tablet by mouth Daily., Disp: 84 tablet, Rfl: 3    propranolol LA (INDERAL LA) 80 MG 24 hr capsule, TAKE ONE CAPSULE BY MOUTH EVERY DAY, Disp: 90 capsule, Rfl: 3    ZOLMitriptan (ZOMIG) 5 MG tablet, Take 1 tablet by mouth Take As Directed for 30 days. Take 1 tablet at initial symptom onset, may repeat once in 2 hours if needed., Disp: 9 tablet, Rfl: 6    OBJECTIVE:  /74 (BP Location: Left arm, Patient Position: Sitting, Cuff Size: Adult)   Pulse 80   Temp 98.6 °F (37 °C) (Oral)   Resp 18   Ht 160 cm (63\")   Wt 83 kg (183 lb)   LMP  (LMP Unknown)   SpO2 97%   BMI 32.42 kg/m²    Estimated body mass index is 32.42 kg/m² as calculated from the following:    Height as of this encounter: 160 cm (63\").    Weight as of this encounter: 83 kg (183 lb).                  Physical Exam  Vitals reviewed.   Constitutional:       General: She is not in acute distress.     Appearance: Normal appearance.   HENT:      Head: Normocephalic and atraumatic.      Right Ear: Tympanic membrane, ear canal and external ear normal.      Left Ear: Tympanic membrane, ear canal and external ear normal.      Nose: Nose normal.      Mouth/Throat:      Mouth: Mucous membranes are moist.      Pharynx: Oropharynx is clear.   Cardiovascular:      Rate and Rhythm: Normal rate and regular rhythm.      Heart sounds: Normal heart sounds.   Pulmonary:      Effort: No respiratory distress.      Breath sounds: Normal breath sounds. No wheezing or rales.   Musculoskeletal:      " Cervical back: Normal range of motion.   Lymphadenopathy:      Cervical: No cervical adenopathy.   Skin:     General: Skin is warm and dry.   Neurological:      Mental Status: She is alert and oriented to person, place, and time.   Psychiatric:         Mood and Affect: Mood normal.         Behavior: Behavior normal.              Assessment/Plan    Diagnoses and all orders for this visit:    1. Viral URI with cough (Primary)    Negative for flu and COVID    Discussed viral illness and treating symptoms.  Continue over-the-counter Mucinex and hydration.  If worsening symptoms in the next 3 to 5 days-patient to follow-up.            An After Visit Summary was printed and given to the patient at discharge.  Return if symptoms worsen or fail to improve.

## 2025-01-07 ENCOUNTER — PRIOR AUTHORIZATION (OUTPATIENT)
Dept: FAMILY MEDICINE CLINIC | Facility: CLINIC | Age: 60
End: 2025-01-07

## 2025-01-07 ENCOUNTER — OFFICE VISIT (OUTPATIENT)
Dept: FAMILY MEDICINE CLINIC | Facility: CLINIC | Age: 60
End: 2025-01-07
Payer: COMMERCIAL

## 2025-01-07 VITALS
HEIGHT: 63 IN | BODY MASS INDEX: 32.71 KG/M2 | OXYGEN SATURATION: 97 % | SYSTOLIC BLOOD PRESSURE: 124 MMHG | WEIGHT: 184.6 LBS | DIASTOLIC BLOOD PRESSURE: 87 MMHG | TEMPERATURE: 97.8 F | HEART RATE: 78 BPM

## 2025-01-07 DIAGNOSIS — F41.9 ANXIETY: ICD-10-CM

## 2025-01-07 DIAGNOSIS — Z00.00 ANNUAL PHYSICAL EXAM: Primary | ICD-10-CM

## 2025-01-07 DIAGNOSIS — R53.83 FATIGUE, UNSPECIFIED TYPE: ICD-10-CM

## 2025-01-07 DIAGNOSIS — E66.811 CLASS 1 OBESITY WITH SERIOUS COMORBIDITY AND BODY MASS INDEX (BMI) OF 32.0 TO 32.9 IN ADULT, UNSPECIFIED OBESITY TYPE: ICD-10-CM

## 2025-01-07 DIAGNOSIS — E78.2 MIXED HYPERLIPIDEMIA: ICD-10-CM

## 2025-01-07 DIAGNOSIS — G47.33 OBSTRUCTIVE SLEEP APNEA: ICD-10-CM

## 2025-01-07 PROCEDURE — 81003 URINALYSIS AUTO W/O SCOPE: CPT | Performed by: NURSE PRACTITIONER

## 2025-01-07 PROCEDURE — 99396 PREV VISIT EST AGE 40-64: CPT | Performed by: NURSE PRACTITIONER

## 2025-01-07 NOTE — TELEPHONE ENCOUNTER
Outcome  Approved today by [facility] 2017  Your PA request has been approved. Additional information will be provided in the approval communication. (Message 1144)  Authorization Expiration Date: 9/6/2025

## 2025-01-07 NOTE — PROGRESS NOTES
CC: annual physical     History:  Shawnee Suarez is a 59 y.o. female who presents today for evaluation of the above problems.      Patient presents for annual physical.  Reports that she has been doing fairly well.  Does continue on clonazepam for anxiety.  This continues to work well.  Contract and urine drug screen are current.  Ludin is reviewed and appropriate.  She is not due for a refill today.  Patient's weight is elevated with a BMI of 32.7.  She does have comorbid hyperlipidemia and anxiety.  She is interested in pursuing weight loss efforts at this time.  She has used exercise and dieting in the past and while she has been somewhat successful she has never been able to maintain her loss.  She sees GYN for pelvic and breast exams.  Her colon screening is current.  She has had her flu and COVID vaccines.    HPI  ROS:  Review of Systems   Respiratory:  Negative for shortness of breath.    Cardiovascular:  Negative for chest pain.   Gastrointestinal:  Negative for constipation and diarrhea.       Allergies   Allergen Reactions    Crestor [Rosuvastatin] Myalgia     Body aches     Past Medical History:   Diagnosis Date    Anxiety     Class 1 obesity with serious comorbidity and body mass index (BMI) of 32.0 to 32.9 in adult 1/7/2025    Depression     History of transfusion     Hyperlipidemia     Migraines 06/08/2022    Ovarian cyst     x 2      Past Surgical History:   Procedure Laterality Date    APPENDECTOMY      BELPHAROPTOSIS REPAIR Bilateral 2023    BREAST BIOPSY Right 11/1989    CHOLECYSTECTOMY      COLONOSCOPY  11/22/2013    Normal exam repeat in 10 years    COLONOSCOPY N/A 11/13/2018    Normal but prep was only fair repeat in 2 years    COLONOSCOPY N/A 01/22/2021    The entire examined colon is normal on direct and retroflexion views; No specimens collected; Repeat 10 years    COLONOSCOPY N/A 5/7/2024    Procedure: COLONOSCOPY WITH ANESTHESIA;  Surgeon: Da Miles MD;  Location: [facility] ENDOSCOPY;   Service: Gastroenterology;  Laterality: N/A;  pre op genetic susceptibility to other disease  post  pcp Marlon Tran    D & C HYSTEROSCOPY ENDOMETRIAL ABLATION N/A 02/06/2017    Procedure: DILATATION AND CURETTAGE HYSTEROSCOPY NOVASURE ENDOMETRIAL ABLATION;  Surgeon: Ana Barrios MD;  Location:  [facility] OR;  Service:     DIAGNOSTIC LAPAROSCOPY N/A 06/17/2019    Procedure: DIAGNOSTIC LAPAROSCOPY;  Surgeon: Ana Barrios MD;  Location:  [facility] OR;  Service: Obstetrics/Gynecology    ENDOSCOPY N/A 5/7/2024    Procedure: ESOPHAGOGASTRODUODENOSCOPY WITH ANESTHESIA;  Surgeon: Da Miles MD;  Location: [facility] ENDOSCOPY;  Service: Gastroenterology;  Laterality: N/A;  pre op genetic susceptibility to other disease  post gastritis, esophagitis  pcp Marlon Tran    OVARIAN CYST REMOVAL      x 2    SALPINGO OOPHORECTOMY Bilateral 06/17/2019    Procedure: SALPINGO OOPHORECTOMY LAPAROSCOPIC;  Surgeon: Ana Barrios MD;  Location: [facility] OR;  Service: Obstetrics/Gynecology    WISDOM TOOTH EXTRACTION       Family History   Problem Relation Age of Onset    Ovarian cancer Mother 72    Brain cancer Father     No Known Problems Brother     Heart disease Brother     No Known Problems Maternal Aunt     No Known Problems Paternal Aunt     No Known Problems Maternal Grandmother     No Known Problems Maternal Grandfather     No Known Problems Paternal Grandmother     Brain cancer Paternal Grandfather     No Known Problems Daughter     No Known Problems Daughter     Breast cancer Other 28    Uterine cancer Neg Hx     Colon cancer Neg Hx     Melanoma Neg Hx     Prostate cancer Neg Hx     BRCA 1/2 Neg Hx     Endometrial cancer Neg Hx     Colon polyps Neg Hx     Esophageal cancer Neg Hx     Liver cancer Neg Hx     Stomach cancer Neg Hx     Rectal cancer Neg Hx     Liver disease Neg Hx       reports that she has never smoked. She has been exposed to tobacco smoke. She has never used smokeless tobacco. She reports that she does not  "drink alcohol and does not use drugs.      Current Outpatient Medications:     atorvastatin (LIPITOR) 40 MG tablet, TAKE ONE TABLET BY MOUTH EVERY NIGHT, Disp: 90 tablet, Rfl: 0    Calcium-Vitamin D-Vitamin K (Viactiv Calcium Plus D) 650-12.5-40 MG-MCG-MCG chewable tablet, Chew 1 tablet 2 (two) times a day., Disp: , Rfl:     clonazePAM (KlonoPIN) 1 MG tablet, TAKE ONE TABLET BY MOUTH THREE TIMES A DAY AS NEEDED FOR ANXIETY, Disp: 90 tablet, Rfl: 0    Docusate Sodium 100 MG capsule, Take 1 tablet by mouth Daily., Disp: , Rfl:     DULoxetine (CYMBALTA) 60 MG capsule, TAKE ONE CAPSULE BY MOUTH EVERY DAY, Disp: 90 capsule, Rfl: 3    estradiol (Yuvafem) 10 MCG tablet vaginal tablet, Insert vaginally at bedtime x 2 weeks, then 2 times a week, Disp: 18 tablet, Rfl: 1    loratadine (CLARITIN) 10 MG tablet, Take 1 tablet by mouth As Needed., Disp: , Rfl:     nystatin (MYCOSTATIN) 723960 UNIT/GM powder, Apply  topically to the appropriate area as directed 2 (Two) Times a Day., Disp: 60 g, Rfl: 1    omeprazole OTC (PriLOSEC OTC) 20 MG EC tablet, Take 1 tablet by mouth Daily., Disp: 84 tablet, Rfl: 3    propranolol LA (INDERAL LA) 80 MG 24 hr capsule, TAKE ONE CAPSULE BY MOUTH EVERY DAY, Disp: 90 capsule, Rfl: 3    ZOLMitriptan (ZOMIG) 5 MG tablet, Take 1 tablet by mouth Take As Directed for 30 days. Take 1 tablet at initial symptom onset, may repeat once in 2 hours if needed., Disp: 9 tablet, Rfl: 6    Tirzepatide-Weight Management (ZEPBOUND) 2.5 MG/0.5ML solution auto-injector, Inject 0.5 mL under the skin into the appropriate area as directed 1 (One) Time Per Week., Disp: 2 mL, Rfl: 0    OBJECTIVE:  /87 (BP Location: Left arm, Patient Position: Sitting, Cuff Size: Adult)   Pulse 78   Temp 97.8 °F (36.6 °C) (Temporal)   Ht 160 cm (63\")   Wt 83.7 kg (184 lb 9.6 oz)   LMP  (LMP Unknown)   SpO2 97%   BMI 32.70 kg/m²    Physical Exam  Vitals reviewed.   Constitutional:       Appearance: She is well-developed.   Neck: "      Vascular: No carotid bruit.   Cardiovascular:      Rate and Rhythm: Normal rate and regular rhythm.      Heart sounds: Normal heart sounds.   Pulmonary:      Effort: Pulmonary effort is normal.      Breath sounds: Normal breath sounds.   Chest:      Comments: Deferred-done by GYN  Abdominal:      General: Abdomen is flat. Bowel sounds are normal.      Palpations: Abdomen is soft.   Genitourinary:     Comments: Deferred-done by GYN  Neurological:      Mental Status: She is alert and oriented to person, place, and time.   Psychiatric:         Behavior: Behavior normal.         Assessment/Plan    Diagnoses and all orders for this visit:    1. Annual physical exam (Primary)    2. Mixed hyperlipidemia  -     Lipid Panel  -     Tirzepatide-Weight Management (ZEPBOUND) 2.5 MG/0.5ML solution auto-injector; Inject 0.5 mL under the skin into the appropriate area as directed 1 (One) Time Per Week.  Dispense: 2 mL; Refill: 0    3. Obstructive sleep apnea  -     Tirzepatide-Weight Management (ZEPBOUND) 2.5 MG/0.5ML solution auto-injector; Inject 0.5 mL under the skin into the appropriate area as directed 1 (One) Time Per Week.  Dispense: 2 mL; Refill: 0    4. Anxiety    5. Fatigue, unspecified type  -     CBC & Differential  -     TSH  -     T4, free  -     Comprehensive Metabolic Panel  -     POC Urinalysis Dipstick, Multipro  -     Tirzepatide-Weight Management (ZEPBOUND) 2.5 MG/0.5ML solution auto-injector; Inject 0.5 mL under the skin into the appropriate area as directed 1 (One) Time Per Week.  Dispense: 2 mL; Refill: 0    6. Class 1 obesity with serious comorbidity and body mass index (BMI) of 32.0 to 32.9 in adult, unspecified obesity type  -     Tirzepatide-Weight Management (ZEPBOUND) 2.5 MG/0.5ML solution auto-injector; Inject 0.5 mL under the skin into the appropriate area as directed 1 (One) Time Per Week.  Dispense: 2 mL; Refill: 0    Health maintenance  Start Zepbound for obesity management.  Follow-up in 1  month.  Discussed side effects with patient.  She will see GYN for pelvic and breast exam.  She has already had her flu and COVID vaccines.  Lipid screening today.    An After Visit Summary was printed and given to the patient at discharge.  Return in about 1 month (around 2/7/2025).       Yajaira MARTINEZ 1/7/2025    Electronically signed.

## 2025-01-08 LAB
ALBUMIN SERPL-MCNC: 4.3 G/DL (ref 3.5–5.2)
ALBUMIN/GLOB SERPL: 1.3 G/DL
ALP SERPL-CCNC: 113 U/L (ref 39–117)
ALT SERPL-CCNC: 44 U/L (ref 1–33)
AST SERPL-CCNC: 26 U/L (ref 1–32)
BASOPHILS # BLD AUTO: 0.07 10*3/MM3 (ref 0–0.2)
BASOPHILS NFR BLD AUTO: 1.1 % (ref 0–1.5)
BILIRUB SERPL-MCNC: 0.6 MG/DL (ref 0–1.2)
BUN SERPL-MCNC: 12 MG/DL (ref 8–23)
BUN/CREAT SERPL: 11.4 (ref 7–25)
CALCIUM SERPL-MCNC: 10.5 MG/DL (ref 8.6–10.5)
CHLORIDE SERPL-SCNC: 105 MMOL/L (ref 98–107)
CHOLEST SERPL-MCNC: 214 MG/DL (ref 0–200)
CO2 SERPL-SCNC: 25.7 MMOL/L (ref 22–29)
CREAT SERPL-MCNC: 1.05 MG/DL (ref 0.57–1)
EGFRCR SERPLBLD CKD-EPI 2021: 60.6 ML/MIN/1.73
EOSINOPHIL # BLD AUTO: 0.16 10*3/MM3 (ref 0–0.4)
EOSINOPHIL NFR BLD AUTO: 2.6 % (ref 0.3–6.2)
ERYTHROCYTE [DISTWIDTH] IN BLOOD BY AUTOMATED COUNT: 13.1 % (ref 12.3–15.4)
GLOBULIN SER CALC-MCNC: 3.3 GM/DL
GLUCOSE SERPL-MCNC: 121 MG/DL (ref 65–99)
HCT VFR BLD AUTO: 46.1 % (ref 34–46.6)
HDLC SERPL-MCNC: 67 MG/DL (ref 40–60)
HGB BLD-MCNC: 15.7 G/DL (ref 12–15.9)
IMM GRANULOCYTES # BLD AUTO: 0.02 10*3/MM3 (ref 0–0.05)
IMM GRANULOCYTES NFR BLD AUTO: 0.3 % (ref 0–0.5)
LDLC SERPL CALC-MCNC: 112 MG/DL (ref 0–100)
LYMPHOCYTES # BLD AUTO: 1.95 10*3/MM3 (ref 0.7–3.1)
LYMPHOCYTES NFR BLD AUTO: 31.3 % (ref 19.6–45.3)
MCH RBC QN AUTO: 30.6 PG (ref 26.6–33)
MCHC RBC AUTO-ENTMCNC: 34.1 G/DL (ref 31.5–35.7)
MCV RBC AUTO: 89.9 FL (ref 79–97)
MONOCYTES # BLD AUTO: 0.62 10*3/MM3 (ref 0.1–0.9)
MONOCYTES NFR BLD AUTO: 9.9 % (ref 5–12)
NEUTROPHILS # BLD AUTO: 3.42 10*3/MM3 (ref 1.7–7)
NEUTROPHILS NFR BLD AUTO: 54.8 % (ref 42.7–76)
NRBC BLD AUTO-RTO: 0 /100 WBC (ref 0–0.2)
PLATELET # BLD AUTO: 246 10*3/MM3 (ref 140–450)
POTASSIUM SERPL-SCNC: 4.3 MMOL/L (ref 3.5–5.2)
PROT SERPL-MCNC: 7.6 G/DL (ref 6–8.5)
RBC # BLD AUTO: 5.13 10*6/MM3 (ref 3.77–5.28)
SODIUM SERPL-SCNC: 142 MMOL/L (ref 136–145)
T4 FREE SERPL-MCNC: 1.17 NG/DL (ref 0.92–1.68)
TRIGL SERPL-MCNC: 202 MG/DL (ref 0–150)
TSH SERPL DL<=0.005 MIU/L-ACNC: 1.17 UIU/ML (ref 0.27–4.2)
VLDLC SERPL CALC-MCNC: 35 MG/DL (ref 5–40)
WBC # BLD AUTO: 6.24 10*3/MM3 (ref 3.4–10.8)

## 2025-01-08 NOTE — PROGRESS NOTES
Cholesterol is coming up some. Continue atorvastatin and level should improve some with weight loss. Otherwise, labs are ok.

## 2025-01-14 NOTE — TELEPHONE ENCOUNTER
H&P reviewed. The patient was examined and there are no changes to the H&P.   Spoke with patient and informed me her fingers around the nails are cracked and very sore and told her she could try some OTC ointments for dry cracked hands but she is wanting a Rx for an ointment that is stronger.  I told her I would send you a message.

## 2025-01-16 ENCOUNTER — OFFICE VISIT (OUTPATIENT)
Dept: OBSTETRICS AND GYNECOLOGY | Age: 60
End: 2025-01-16
Payer: COMMERCIAL

## 2025-01-16 VITALS
DIASTOLIC BLOOD PRESSURE: 84 MMHG | WEIGHT: 185 LBS | HEIGHT: 63 IN | SYSTOLIC BLOOD PRESSURE: 130 MMHG | BODY MASS INDEX: 32.78 KG/M2

## 2025-01-16 DIAGNOSIS — Z13.820 ENCOUNTER FOR SCREENING FOR OSTEOPOROSIS: ICD-10-CM

## 2025-01-16 DIAGNOSIS — Z12.31 ENCOUNTER FOR SCREENING MAMMOGRAM FOR BREAST CANCER: ICD-10-CM

## 2025-01-16 DIAGNOSIS — F32.9 REACTIVE DEPRESSION: ICD-10-CM

## 2025-01-16 DIAGNOSIS — Z80.41 FAMILY HISTORY OF OVARIAN CANCER: ICD-10-CM

## 2025-01-16 DIAGNOSIS — N95.2 ATROPHIC VAGINITIS: ICD-10-CM

## 2025-01-16 DIAGNOSIS — Z01.419 WELL WOMAN EXAM WITH ROUTINE GYNECOLOGICAL EXAM: Primary | ICD-10-CM

## 2025-01-16 PROCEDURE — G0123 SCREEN CERV/VAG THIN LAYER: HCPCS | Performed by: NURSE PRACTITIONER

## 2025-01-16 PROCEDURE — 87624 HPV HI-RISK TYP POOLED RSLT: CPT | Performed by: NURSE PRACTITIONER

## 2025-01-16 RX ORDER — ESTRADIOL 10 UG/1
INSERT VAGINAL
Qty: 30 TABLET | Refills: 2 | Status: SHIPPED | OUTPATIENT
Start: 2025-01-16

## 2025-01-16 NOTE — PROGRESS NOTES
"Subjective     Shawnee Suarez is a 59 y.o. female    History of Present Illness  Patient is here today for yearly checkup.  She has no GYN complaints.  She is very teary-eyed today as she is having some worsening symptoms missing her mom and dad.  She was very close to them they are both  now.  Gynecologic Exam  The patient's pertinent negatives include no pelvic pain, vaginal bleeding or vaginal discharge. The patient is experiencing no pain. Pertinent negatives include no abdominal pain, anorexia, back pain, chills, constipation, diarrhea, discolored urine, dysuria, fever, flank pain, frequency, headaches, hematuria, joint pain, joint swelling, nausea, painful intercourse, rash, sore throat, urgency or vomiting. She is sexually active. She is postmenopausal.         /84   Ht 160 cm (63\")   Wt 83.9 kg (185 lb)   LMP  (LMP Unknown)   BMI 32.77 kg/m²     Outpatient Encounter Medications as of 2025   Medication Sig Dispense Refill    atorvastatin (LIPITOR) 40 MG tablet TAKE ONE TABLET BY MOUTH EVERY NIGHT 90 tablet 0    Calcium-Vitamin D-Vitamin K (Viactiv Calcium Plus D) 650-12.5-40 MG-MCG-MCG chewable tablet Chew 1 tablet 2 (two) times a day.      clonazePAM (KlonoPIN) 1 MG tablet TAKE ONE TABLET BY MOUTH THREE TIMES A DAY AS NEEDED FOR ANXIETY 90 tablet 0    Docusate Sodium 100 MG capsule Take 1 tablet by mouth Daily.      DULoxetine (CYMBALTA) 60 MG capsule TAKE ONE CAPSULE BY MOUTH EVERY DAY 90 capsule 3    estradiol (Yuvafem) 10 MCG tablet vaginal tablet Insert vaginally 2 times a week 30 tablet 2    loratadine (CLARITIN) 10 MG tablet Take 1 tablet by mouth As Needed.      nystatin (MYCOSTATIN) 803482 UNIT/GM powder Apply  topically to the appropriate area as directed 2 (Two) Times a Day. 60 g 1    omeprazole OTC (PriLOSEC OTC) 20 MG EC tablet Take 1 tablet by mouth Daily. 84 tablet 3    propranolol LA (INDERAL LA) 80 MG 24 hr capsule TAKE ONE CAPSULE BY MOUTH EVERY DAY 90 capsule 3    " Tirzepatide-Weight Management (ZEPBOUND) 2.5 MG/0.5ML solution auto-injector Inject 0.5 mL under the skin into the appropriate area as directed 1 (One) Time Per Week. 2 mL 0    ZOLMitriptan (ZOMIG) 5 MG tablet Take 1 tablet by mouth Take As Directed for 30 days. Take 1 tablet at initial symptom onset, may repeat once in 2 hours if needed. 9 tablet 6    [DISCONTINUED] estradiol (Yuvafem) 10 MCG tablet vaginal tablet Insert vaginally at bedtime x 2 weeks, then 2 times a week (Patient not taking: Reported on 1/16/2025) 18 tablet 1     No facility-administered encounter medications on file as of 1/16/2025.       Past Medical History  Past Medical History:   Diagnosis Date    Anxiety     Class 1 obesity with serious comorbidity and body mass index (BMI) of 32.0 to 32.9 in adult 1/7/2025    Depression     History of transfusion     Hyperlipidemia     Migraines 06/08/2022    Ovarian cyst        Surgical History  Past Surgical History:   Procedure Laterality Date    APPENDECTOMY      BELPHAROPTOSIS REPAIR Bilateral 2023    BREAST BIOPSY Right 11/1989    CHOLECYSTECTOMY      COLONOSCOPY  11/22/2013    Normal exam repeat in 10 years    COLONOSCOPY N/A 11/13/2018    Normal but prep was only fair repeat in 2 years    COLONOSCOPY N/A 01/22/2021    The entire examined colon is normal on direct and retroflexion views; No specimens collected; Repeat 10 years    COLONOSCOPY N/A 5/7/2024    Procedure: COLONOSCOPY WITH ANESTHESIA;  Surgeon: Da Miles MD;  Location: [facility] ENDOSCOPY;  Service: Gastroenterology;  Laterality: N/A;  pre op genetic susceptibility to other disease  post  pcp Marlon Tran    D & C HYSTEROSCOPY ENDOMETRIAL ABLATION N/A 02/06/2017    Procedure: DILATATION AND CURETTAGE HYSTEROSCOPY NOVASURE ENDOMETRIAL ABLATION;  Surgeon: Ana Bariros MD;  Location: [facility] OR;  Service:     DIAGNOSTIC LAPAROSCOPY N/A 06/17/2019    Procedure: DIAGNOSTIC LAPAROSCOPY;  Surgeon: Ana Barrios MD;  Location: [facility] OR;   Service: Obstetrics/Gynecology    ENDOSCOPY N/A 5/7/2024    Procedure: ESOPHAGOGASTRODUODENOSCOPY WITH ANESTHESIA;  Surgeon: Da Miles MD;  Location: [facility] ENDOSCOPY;  Service: Gastroenterology;  Laterality: N/A;  pre op genetic susceptibility to other disease  post gastritis, esophagitis  pcp Marlon Tran    OVARIAN CYST REMOVAL      x 2    SALPINGO OOPHORECTOMY Bilateral 06/17/2019    Procedure: SALPINGO OOPHORECTOMY LAPAROSCOPIC;  Surgeon: Ana Barrios MD;  Location: [facility] OR;  Service: Obstetrics/Gynecology    WISDOM TOOTH EXTRACTION         Family History  Family History   Problem Relation Age of Onset    Ovarian cancer Mother 72    Brain cancer Father     No Known Problems Brother     Heart disease Brother     No Known Problems Maternal Aunt     No Known Problems Paternal Aunt     No Known Problems Maternal Grandmother     No Known Problems Maternal Grandfather     No Known Problems Paternal Grandmother     Brain cancer Paternal Grandfather     No Known Problems Daughter     No Known Problems Daughter     Breast cancer Other 28    Uterine cancer Neg Hx     Colon cancer Neg Hx     Melanoma Neg Hx     Prostate cancer Neg Hx     BRCA 1/2 Neg Hx     Endometrial cancer Neg Hx     Colon polyps Neg Hx     Esophageal cancer Neg Hx     Liver cancer Neg Hx     Stomach cancer Neg Hx     Rectal cancer Neg Hx     Liver disease Neg Hx        The following portions of the patient's history were reviewed and updated as appropriate: allergies, current medications, past family history, past medical history, past social history, past surgical history, and problem list.    Review of Systems   Constitutional:  Negative for activity change, appetite change, chills, diaphoresis, fatigue, fever, unexpected weight gain and unexpected weight loss.   HENT:  Negative for congestion, dental problem, drooling, ear discharge, ear pain, facial swelling, hearing loss, mouth sores, nosebleeds, postnasal drip, rhinorrhea, sinus  pressure, sneezing, sore throat, swollen glands, tinnitus, trouble swallowing and voice change.    Eyes:  Negative for blurred vision, double vision, photophobia, pain, discharge, redness, itching and visual disturbance.   Respiratory:  Negative for apnea, cough, choking, chest tightness, shortness of breath, wheezing and stridor.    Cardiovascular:  Negative for chest pain, palpitations and leg swelling.   Gastrointestinal:  Negative for abdominal distention, abdominal pain, anal bleeding, anorexia, blood in stool, constipation, diarrhea, nausea, rectal pain, vomiting, GERD and indigestion.   Endocrine: Negative for cold intolerance, heat intolerance, polydipsia, polyphagia and polyuria.   Genitourinary:  Positive for dyspareunia. Negative for amenorrhea, breast discharge, breast lump, breast pain, decreased libido, decreased urine volume, difficulty urinating, dysuria, flank pain, frequency, genital sores, hematuria, menstrual problem, pelvic pain, pelvic pressure, urgency, urinary incontinence, vaginal bleeding, vaginal discharge and vaginal pain.   Musculoskeletal:  Negative for arthralgias, back pain, gait problem, joint pain, joint swelling, myalgias, neck pain, neck stiffness and bursitis.   Skin:  Negative for color change, dry skin and rash.   Allergic/Immunologic: Negative for environmental allergies, food allergies and immunocompromised state.   Neurological:  Negative for dizziness, tremors, seizures, syncope, facial asymmetry, speech difficulty, weakness, light-headedness, numbness, headache, memory problem and confusion.   Hematological:  Negative for adenopathy. Does not bruise/bleed easily.   Psychiatric/Behavioral:  Positive for depressed mood. Negative for agitation, behavioral problems, decreased concentration, dysphoric mood, hallucinations, self-injury, sleep disturbance, suicidal ideas, negative for hyperactivity and stress. The patient is not nervous/anxious.        Objective   Physical  Exam  Vitals and nursing note reviewed. Exam conducted with a chaperone present.   Constitutional:       General: She is not in acute distress.     Appearance: She is well-developed. She is not diaphoretic.   HENT:      Head: Normocephalic.      Right Ear: External ear normal.      Left Ear: External ear normal.      Nose: Nose normal.   Eyes:      General: No scleral icterus.        Right eye: No discharge.         Left eye: No discharge.      Conjunctiva/sclera: Conjunctivae normal.      Pupils: Pupils are equal, round, and reactive to light.   Neck:      Thyroid: No thyromegaly.      Vascular: No carotid bruit.      Trachea: No tracheal deviation.   Cardiovascular:      Rate and Rhythm: Normal rate and regular rhythm.      Heart sounds: Normal heart sounds. No murmur heard.  Pulmonary:      Effort: Pulmonary effort is normal. No respiratory distress.      Breath sounds: Normal breath sounds. No wheezing.   Chest:   Breasts:     Breasts are symmetrical.      Right: Normal. No swelling, bleeding, inverted nipple, mass, nipple discharge, skin change or tenderness.      Left: Normal. No swelling, bleeding, inverted nipple, mass, nipple discharge, skin change or tenderness.   Abdominal:      General: There is no distension.      Palpations: Abdomen is soft. There is no mass.      Tenderness: There is no abdominal tenderness. There is no right CVA tenderness, left CVA tenderness or guarding.      Hernia: No hernia is present. There is no hernia in the left inguinal area or right inguinal area.   Genitourinary:     General: Normal vulva.      Exam position: Lithotomy position.      Labia:         Right: No rash, tenderness, lesion or injury.         Left: No rash, tenderness, lesion or injury.       Vagina: Normal. No signs of injury and foreign body. No vaginal discharge, erythema, tenderness or bleeding.      Cervix: Normal.      Uterus: Normal. Not enlarged, not fixed and not tender.       Adnexa:         Right: No  mass, tenderness or fullness.          Left: No mass, tenderness or fullness.        Rectum: Normal. No mass.      Comments: Adnexa surgically absent  BSU normal  Urethral meatus  Normal  Perineum  Normal  Musculoskeletal:         General: No tenderness. Normal range of motion.      Cervical back: Normal range of motion and neck supple.   Lymphadenopathy:      Head:      Right side of head: No submental, submandibular, tonsillar, preauricular, posterior auricular or occipital adenopathy.      Left side of head: No submental, submandibular, tonsillar, preauricular, posterior auricular or occipital adenopathy.      Cervical: No cervical adenopathy.      Right cervical: No superficial, deep or posterior cervical adenopathy.     Left cervical: No superficial, deep or posterior cervical adenopathy.      Upper Body:      Right upper body: No supraclavicular, axillary or pectoral adenopathy.      Left upper body: No supraclavicular, axillary or pectoral adenopathy.      Lower Body: No right inguinal adenopathy. No left inguinal adenopathy.   Skin:     General: Skin is warm and dry.      Findings: No bruising, erythema or rash.   Neurological:      Mental Status: She is alert and oriented to person, place, and time.      Coordination: Coordination normal.   Psychiatric:         Mood and Affect: Mood normal.         Behavior: Behavior normal.         Thought Content: Thought content normal.         Judgment: Judgment normal.         PHQ-9 Depression Screening  Little interest or pleasure in doing things? Not at all   Feeling down, depressed, or hopeless? Not at all   PHQ-2 Total Score 0   Trouble falling or staying asleep, or sleeping too much?     Feeling tired or having little energy?     Poor appetite or overeating?     Feeling bad about yourself - or that you are a failure or have let yourself or your family down?     Trouble concentrating on things, such as reading the newspaper or watching television?     Moving or  speaking so slowly that other people could have noticed? Or the opposite - being so fidgety or restless that you have been moving around a lot more than usual?     Thoughts that you would be better off dead, or of hurting yourself in some way?     PHQ-9 Total Score     If you checked off any problems, how difficult have these problems made it for you to do your work, take care of things at home, or get along with other people?         Assessment & Plan   Diagnoses and all orders for this visit:    1. Well woman exam with routine gynecological exam (Primary)  Normal GYN exam. Will have lab work with PCP. Encouraged SBE, pt is aware how to do self breast exam and the importance of same. Discussed weight management and importance of maintaining a healthy weight. Discussed Vitamin D intake and the importance of adequate vitamin D for both Bone Health and a healthy immune system.  Discussed Daily exercise and the importance of same, in regards to a healthy heart as well as helping to maintain her weight and improving her mental health.  BMI 32.8.  Colonoscopy is up to date.  Mammogram bone density will be scheduled at [facility].  Pap smear is done after  we discussed ASCCP guidelines.  After informed decision patient wishes to proceed with the Pap smear.        -     Liquid-based Pap Smear, Screening    2. Encounter for screening mammogram for breast cancer  Comments:  Patient will have a mammogram at [facility].  Orders:  -     Mammo Screening Digital Tomosynthesis Bilateral With CAD; Future    3. Family history of ovarian cancer  Comments:  Patient's mother had ovarian cancer.  She has had genetic screening done and she has MUT YH gene.  Increases risk of colon cancer.  She is up-to-date on colonoscopy.    4. Encounter for screening for osteoporosis  Comments:  Patient will have a bone density at [facility].  Orders:  -     DEXA Bone Density Axial; Future    5. Atrophic  vaginitis  Comments:  Patient has not been using her Vagifem tablets.  She would like to resume.  Prescription is sent to her pharmacy.  Orders:  -     estradiol (Yuvafem) 10 MCG tablet vaginal tablet; Insert vaginally 2 times a week  Dispense: 30 tablet; Refill: 2    6. Reactive depression  Comments:  Is currently on Cymbalta and Klonopin.  States most of the time she is doing okay.  She is very teary-eyed today.  Her parents are both  and she was extremely close to both of them.  She is followed by Yajaira Barrow.       7.  Weight gain  Patient is concerned regarding weight gain.  Her PCP has recently started her on Zepbound.  States she just started it this week.  She is hoping getting some weight off will make her feel better.         Janett Davila, APRN  2025

## 2025-01-17 ENCOUNTER — PROCEDURE VISIT (OUTPATIENT)
Dept: NEUROLOGY | Facility: CLINIC | Age: 60
End: 2025-01-17
Payer: COMMERCIAL

## 2025-01-17 DIAGNOSIS — G43.709 CHRONIC MIGRAINE WITHOUT AURA WITHOUT STATUS MIGRAINOSUS, NOT INTRACTABLE: Primary | ICD-10-CM

## 2025-01-17 LAB
GEN CATEG CVX/VAG CYTO-IMP: NORMAL
HPV I/H RISK 4 DNA CVX QL PROBE+SIG AMP: NOT DETECTED
LAB AP CASE REPORT: NORMAL
LAB AP GYN ADDITIONAL INFORMATION: NORMAL
Lab: NORMAL
PATH INTERP SPEC-IMP: NORMAL
STAT OF ADQ CVX/VAG CYTO-IMP: NORMAL

## 2025-01-21 NOTE — TELEPHONE ENCOUNTER
CALLED PATIENT TO LET HER KNOW THAT WE HAVE SOME OPENINGS ON A DIFFERENT PROVIDERS SCHEDULE AND I WAS GOING TO SEE IF SHE WOULD WANT A SOONER APPOINTMENT. PATIENT DID NOT ANSWER BUT I LEFT A VOICEMAIL FOR A RETURN CALL   Carcinoma of breast metastatic to bone

## 2025-01-30 ENCOUNTER — HOSPITAL ENCOUNTER (OUTPATIENT)
Dept: MAMMOGRAPHY | Facility: HOSPITAL | Age: 60
Discharge: HOME | End: 2025-01-30
Payer: COMMERCIAL

## 2025-01-30 ENCOUNTER — HOSPITAL ENCOUNTER (OUTPATIENT)
Dept: BONE DENSITY | Facility: HOSPITAL | Age: 60
Discharge: HOME | End: 2025-01-30
Payer: COMMERCIAL

## 2025-01-30 DIAGNOSIS — Z12.31 ENCOUNTER FOR SCREENING MAMMOGRAM FOR BREAST CANCER: ICD-10-CM

## 2025-01-30 DIAGNOSIS — Z13.820 ENCOUNTER FOR SCREENING FOR OSTEOPOROSIS: ICD-10-CM

## 2025-01-30 PROCEDURE — 77080 DXA BONE DENSITY AXIAL: CPT

## 2025-01-30 PROCEDURE — 77067 SCR MAMMO BI INCL CAD: CPT

## 2025-01-30 PROCEDURE — 77063 BREAST TOMOSYNTHESIS BI: CPT

## 2025-02-05 DIAGNOSIS — E78.2 MIXED HYPERLIPIDEMIA: ICD-10-CM

## 2025-02-05 RX ORDER — ATORVASTATIN CALCIUM 40 MG/1
40 TABLET, FILM COATED ORAL
Qty: 90 TABLET | Refills: 3 | Status: SHIPPED | OUTPATIENT
Start: 2025-02-05

## 2025-02-05 NOTE — TELEPHONE ENCOUNTER
Rx Refill Note  Requested Prescriptions     Pending Prescriptions Disp Refills    atorvastatin (LIPITOR) 40 MG tablet [Pharmacy Med Name: ATORVASTATIN CALCIUM 40MG TABS] 90 tablet 0     Sig: TAKE ONE TABLET BY MOUTH EVERY NIGHT      Last office visit with prescribing clinician: 1/7/25   Last telemedicine visit with prescribing clinician: Visit date not found   Next office visit with prescribing clinician: 2/6/25    {TIP  Please add Last Relevant Lab 1/7/25    Bree Lockett MA  02/05/25, 14:39 CST

## 2025-02-07 ENCOUNTER — OFFICE VISIT (OUTPATIENT)
Dept: FAMILY MEDICINE CLINIC | Facility: CLINIC | Age: 60
End: 2025-02-07
Payer: COMMERCIAL

## 2025-02-07 VITALS
BODY MASS INDEX: 31.89 KG/M2 | SYSTOLIC BLOOD PRESSURE: 124 MMHG | HEIGHT: 63 IN | TEMPERATURE: 98.4 F | HEART RATE: 84 BPM | DIASTOLIC BLOOD PRESSURE: 84 MMHG | OXYGEN SATURATION: 96 % | WEIGHT: 180 LBS

## 2025-02-07 DIAGNOSIS — E66.811 CLASS 1 OBESITY WITH SERIOUS COMORBIDITY AND BODY MASS INDEX (BMI) OF 31.0 TO 31.9 IN ADULT, UNSPECIFIED OBESITY TYPE: ICD-10-CM

## 2025-02-07 DIAGNOSIS — G47.33 OBSTRUCTIVE SLEEP APNEA: ICD-10-CM

## 2025-02-07 DIAGNOSIS — R53.83 FATIGUE, UNSPECIFIED TYPE: ICD-10-CM

## 2025-02-07 DIAGNOSIS — E78.2 MIXED HYPERLIPIDEMIA: ICD-10-CM

## 2025-02-07 PROCEDURE — 99213 OFFICE O/P EST LOW 20 MIN: CPT | Performed by: NURSE PRACTITIONER

## 2025-02-07 NOTE — PROGRESS NOTES
CC: Obesity    History:  Shawnee Suarez is a 59 y.o. female who presents today for evaluation of the above problems.      Patient started 1 month ago on tirzepatide for weight management.  Her weight has gone from 185 down to 180.  She occasionally eats a prunes to help with constipation.  Can tell that the medication is wearing off by the end of the week.  Does have some nausea if she overeats.    HPI  ROS:  Review of Systems   Gastrointestinal:  Positive for constipation and nausea.       Allergies   Allergen Reactions    Crestor [Rosuvastatin] Myalgia     Body aches     Past Medical History:   Diagnosis Date    Anxiety     Class 1 obesity with serious comorbidity and body mass index (BMI) of 32.0 to 32.9 in adult 1/7/2025    Depression     History of transfusion     Hyperlipidemia     Migraines 06/08/2022    Ovarian cyst     x 2      Past Surgical History:   Procedure Laterality Date    APPENDECTOMY      BELPHAROPTOSIS REPAIR Bilateral 2023    BREAST BIOPSY Right 11/1989    CHOLECYSTECTOMY      COLONOSCOPY  11/22/2013    Normal exam repeat in 10 years    COLONOSCOPY N/A 11/13/2018    Normal but prep was only fair repeat in 2 years    COLONOSCOPY N/A 01/22/2021    The entire examined colon is normal on direct and retroflexion views; No specimens collected; Repeat 10 years    COLONOSCOPY N/A 5/7/2024    Procedure: COLONOSCOPY WITH ANESTHESIA;  Surgeon: Da Miles MD;  Location: [facility] ENDOSCOPY;  Service: Gastroenterology;  Laterality: N/A;  pre op genetic susceptibility to other disease  post  pcp Marlon Tran    D & C HYSTEROSCOPY ENDOMETRIAL ABLATION N/A 02/06/2017    Procedure: DILATATION AND CURETTAGE HYSTEROSCOPY NOVASURE ENDOMETRIAL ABLATION;  Surgeon: Ana Barrios MD;  Location: [facility] OR;  Service:     DIAGNOSTIC LAPAROSCOPY N/A 06/17/2019    Procedure: DIAGNOSTIC LAPAROSCOPY;  Surgeon: Ana Barrios MD;  Location: [facility] OR;  Service: Obstetrics/Gynecology    ENDOSCOPY N/A 5/7/2024    Procedure:  ESOPHAGOGASTRODUODENOSCOPY WITH ANESTHESIA;  Surgeon: Da Miles MD;  Location: [facility] ENDOSCOPY;  Service: Gastroenterology;  Laterality: N/A;  pre op genetic susceptibility to other disease  post gastritis, esophagitis  pcp Marlon Tran    OVARIAN CYST REMOVAL      x 2    SALPINGO OOPHORECTOMY Bilateral 06/17/2019    Procedure: SALPINGO OOPHORECTOMY LAPAROSCOPIC;  Surgeon: Ana Barrios MD;  Location: [facility] OR;  Service: Obstetrics/Gynecology    WISDOM TOOTH EXTRACTION       Family History   Problem Relation Age of Onset    Ovarian cancer Mother 72    Brain cancer Father     No Known Problems Brother     Heart disease Brother     No Known Problems Maternal Aunt     No Known Problems Paternal Aunt     No Known Problems Maternal Grandmother     No Known Problems Maternal Grandfather     No Known Problems Paternal Grandmother     Brain cancer Paternal Grandfather     No Known Problems Daughter     No Known Problems Daughter     Breast cancer Other 28    Uterine cancer Neg Hx     Colon cancer Neg Hx     Melanoma Neg Hx     Prostate cancer Neg Hx     BRCA 1/2 Neg Hx     Endometrial cancer Neg Hx     Colon polyps Neg Hx     Esophageal cancer Neg Hx     Liver cancer Neg Hx     Stomach cancer Neg Hx     Rectal cancer Neg Hx     Liver disease Neg Hx       reports that she has never smoked. She has been exposed to tobacco smoke. She has never used smokeless tobacco. She reports that she does not drink alcohol and does not use drugs.      Current Outpatient Medications:     atorvastatin (LIPITOR) 40 MG tablet, TAKE ONE TABLET BY MOUTH EVERY NIGHT, Disp: 90 tablet, Rfl: 3    Calcium-Vitamin D-Vitamin K (Viactiv Calcium Plus D) 650-12.5-40 MG-MCG-MCG chewable tablet, Chew 1 tablet 2 (two) times a day., Disp: , Rfl:     clonazePAM (KlonoPIN) 1 MG tablet, TAKE ONE TABLET BY MOUTH THREE TIMES A DAY AS NEEDED FOR ANXIETY, Disp: 90 tablet, Rfl: 0    Docusate Sodium 100 MG capsule, Take 1 tablet by mouth Daily., Disp: ,  "Rfl:     DULoxetine (CYMBALTA) 60 MG capsule, TAKE ONE CAPSULE BY MOUTH EVERY DAY, Disp: 90 capsule, Rfl: 3    estradiol (Yuvafem) 10 MCG tablet vaginal tablet, Insert vaginally 2 times a week, Disp: 30 tablet, Rfl: 2    loratadine (CLARITIN) 10 MG tablet, Take 1 tablet by mouth As Needed., Disp: , Rfl:     nystatin (MYCOSTATIN) [id number] UNIT/GM powder, Apply  topically to the appropriate area as directed 2 (Two) Times a Day., Disp: 60 g, Rfl: 1    omeprazole OTC (PriLOSEC OTC) 20 MG EC tablet, Take 1 tablet by mouth Daily., Disp: 84 tablet, Rfl: 3    propranolol LA (INDERAL LA) 80 MG 24 hr capsule, TAKE ONE CAPSULE BY MOUTH EVERY DAY, Disp: 90 capsule, Rfl: 3    ZOLMitriptan (ZOMIG) 5 MG tablet, Take 1 tablet by mouth Take As Directed for 30 days. Take 1 tablet at initial symptom onset, may repeat once in 2 hours if needed., Disp: 9 tablet, Rfl: 6    Tirzepatide-Weight Management (ZEPBOUND) 5 MG/0.5ML solution auto-injector, Inject 0.5 mL under the skin into the appropriate area as directed 1 (One) Time Per Week., Disp: 2 mL, Rfl: 0    OBJECTIVE:  /84 (BP Location: Left arm, Patient Position: Sitting, Cuff Size: Adult)   Pulse 84   Temp 98.4 °F (36.9 °C) (Temporal)   Ht 160 cm (63\")   Wt 81.6 kg (180 lb)   LMP  (LMP Unknown)   SpO2 96%   BMI 31.89 kg/m²    Physical Exam  Vitals reviewed.   Constitutional:       Appearance: She is well-developed.   Cardiovascular:      Rate and Rhythm: Normal rate.   Pulmonary:      Effort: Pulmonary effort is normal.   Neurological:      Mental Status: She is alert and oriented to person, place, and time.   Psychiatric:         Behavior: Behavior normal.         Assessment/Plan    Diagnoses and all orders for this visit:    1. Mixed hyperlipidemia    2. Obstructive sleep apnea    3. Fatigue, unspecified type    4. Class 1 obesity with serious comorbidity and body mass index (BMI) of 31.0 to 31.9 in adult, unspecified obesity type  -     Tirzepatide-Weight Management " (ZEPBOUND) 5 MG/0.5ML solution auto-injector; Inject 0.5 mL under the skin into the appropriate area as directed 1 (One) Time Per Week.  Dispense: 2 mL; Refill: 0        An After Visit Summary was printed and given to the patient at discharge.  Return in about 1 month (around 3/7/2025) for Recheck.       Yajaira MARTINEZ 2/7/2025    Electronically signed.

## 2025-02-14 ENCOUNTER — TELEPHONE (OUTPATIENT)
Dept: FAMILY MEDICINE CLINIC | Facility: CLINIC | Age: 60
End: 2025-02-14
Payer: COMMERCIAL

## 2025-02-14 NOTE — TELEPHONE ENCOUNTER
Send in Paxlovid, regular dose, and advise on isolation guidelines. Hold atorvastatin while taking.

## 2025-02-14 NOTE — TELEPHONE ENCOUNTER
Caller: Shawnee Suarez    Relationship: Self    Best call back number [phone]    What medication are you requesting:     PAXLOVID - HOME TEST TAKEN THIS MORNING INDICATING POSITIVE FOR COVID.    What are your current symptoms:     STARTED LAST NIGHT, CHILLS, ACHES ALL OVER, BLOWING NOSE A LOT, HEADACHE    If a prescription is needed, what is your preferred pharmacy and phone number:      [facility] [place], [place] - [address] [phone] [facility] [phone]

## 2025-03-04 ENCOUNTER — OFFICE VISIT (OUTPATIENT)
Dept: FAMILY MEDICINE CLINIC | Facility: CLINIC | Age: 60
End: 2025-03-04
Payer: COMMERCIAL

## 2025-03-04 VITALS
HEART RATE: 87 BPM | HEIGHT: 63 IN | WEIGHT: 174.4 LBS | TEMPERATURE: 98 F | DIASTOLIC BLOOD PRESSURE: 76 MMHG | OXYGEN SATURATION: 96 % | SYSTOLIC BLOOD PRESSURE: 113 MMHG | BODY MASS INDEX: 30.9 KG/M2

## 2025-03-04 DIAGNOSIS — E66.811 CLASS 1 OBESITY WITH SERIOUS COMORBIDITY AND BODY MASS INDEX (BMI) OF 30.0 TO 30.9 IN ADULT, UNSPECIFIED OBESITY TYPE: Primary | ICD-10-CM

## 2025-03-04 DIAGNOSIS — F41.9 ANXIETY: ICD-10-CM

## 2025-03-04 PROCEDURE — 99213 OFFICE O/P EST LOW 20 MIN: CPT | Performed by: NURSE PRACTITIONER

## 2025-03-04 RX ORDER — CLONAZEPAM 1 MG/1
1 TABLET ORAL 3 TIMES DAILY PRN
Qty: 90 TABLET | Refills: 0 | Status: SHIPPED | OUTPATIENT
Start: 2025-03-04

## 2025-03-04 NOTE — PROGRESS NOTES
CC: Weight check    History:  Shawnee Suarez is a 59 y.o. female who presents today for evaluation of the above problems.      Patient presents for 1 month weight check.  Weight is down 6 pounds from previous visit.  Patient continues on tirzepatide.  Been on 5 mg dose for the past month.  Is having mild constipation.  No other side effects.    HPI  ROS:  Review of Systems   Gastrointestinal:  Positive for constipation.       Allergies   Allergen Reactions    Crestor [Rosuvastatin] Myalgia     Body aches     Past Medical History:   Diagnosis Date    Anxiety     Class 1 obesity with serious comorbidity and body mass index (BMI) of 32.0 to 32.9 in adult 1/7/2025    Depression     History of transfusion     Hyperlipidemia     Migraines 06/08/2022    Ovarian cyst     x 2      Past Surgical History:   Procedure Laterality Date    APPENDECTOMY      BELPHAROPTOSIS REPAIR Bilateral 2023    BREAST BIOPSY Right 11/1989    CHOLECYSTECTOMY      COLONOSCOPY  11/22/2013    Normal exam repeat in 10 years    COLONOSCOPY N/A 11/13/2018    Normal but prep was only fair repeat in 2 years    COLONOSCOPY N/A 01/22/2021    The entire examined colon is normal on direct and retroflexion views; No specimens collected; Repeat 10 years    COLONOSCOPY N/A 5/7/2024    Procedure: COLONOSCOPY WITH ANESTHESIA;  Surgeon: Da Miles MD;  Location: [facility] ENDOSCOPY;  Service: Gastroenterology;  Laterality: N/A;  pre op genetic susceptibility to other disease  post  pcp Marlon Tran    D & C HYSTEROSCOPY ENDOMETRIAL ABLATION N/A 02/06/2017    Procedure: DILATATION AND CURETTAGE HYSTEROSCOPY NOVASURE ENDOMETRIAL ABLATION;  Surgeon: Ana Barrios MD;  Location: [facility] OR;  Service:     DIAGNOSTIC LAPAROSCOPY N/A 06/17/2019    Procedure: DIAGNOSTIC LAPAROSCOPY;  Surgeon: Ana Barrios MD;  Location: [facility] OR;  Service: Obstetrics/Gynecology    ENDOSCOPY N/A 5/7/2024    Procedure: ESOPHAGOGASTRODUODENOSCOPY WITH ANESTHESIA;  Surgeon: Jd  Da AMEZQUITA MD;  Location: [facility] ENDOSCOPY;  Service: Gastroenterology;  Laterality: N/A;  pre op genetic susceptibility to other disease  post gastritis, esophagitis  pcp Marlon Tran    OVARIAN CYST REMOVAL      x 2    SALPINGO OOPHORECTOMY Bilateral 06/17/2019    Procedure: SALPINGO OOPHORECTOMY LAPAROSCOPIC;  Surgeon: Ana Barrios MD;  Location: [facility] OR;  Service: Obstetrics/Gynecology    WISDOM TOOTH EXTRACTION       Family History   Problem Relation Age of Onset    Ovarian cancer Mother 72    Brain cancer Father     No Known Problems Brother     Heart disease Brother     No Known Problems Maternal Aunt     No Known Problems Paternal Aunt     No Known Problems Maternal Grandmother     No Known Problems Maternal Grandfather     No Known Problems Paternal Grandmother     Brain cancer Paternal Grandfather     No Known Problems Daughter     No Known Problems Daughter     Breast cancer Other 28    Uterine cancer Neg Hx     Colon cancer Neg Hx     Melanoma Neg Hx     Prostate cancer Neg Hx     BRCA 1/2 Neg Hx     Endometrial cancer Neg Hx     Colon polyps Neg Hx     Esophageal cancer Neg Hx     Liver cancer Neg Hx     Stomach cancer Neg Hx     Rectal cancer Neg Hx     Liver disease Neg Hx       reports that she has never smoked. She has been exposed to tobacco smoke. She has never used smokeless tobacco. She reports that she does not drink alcohol and does not use drugs.      Current Outpatient Medications:     atorvastatin (LIPITOR) 40 MG tablet, TAKE ONE TABLET BY MOUTH EVERY NIGHT, Disp: 90 tablet, Rfl: 3    Calcium-Vitamin D-Vitamin K (Viactiv Calcium Plus D) 650-12.5-40 MG-MCG-MCG chewable tablet, Chew 1 tablet 2 (two) times a day., Disp: , Rfl:     clonazePAM (KlonoPIN) 1 MG tablet, Take 1 tablet by mouth 3 (Three) Times a Day As Needed for Anxiety. for anxiety, Disp: 90 tablet, Rfl: 0    Docusate Sodium 100 MG capsule, Take 1 tablet by mouth Daily., Disp: , Rfl:     DULoxetine (CYMBALTA) 60 MG capsule, TAKE  "ONE CAPSULE BY MOUTH EVERY DAY, Disp: 90 capsule, Rfl: 3    loratadine (CLARITIN) 10 MG tablet, Take 1 tablet by mouth As Needed., Disp: , Rfl:     nystatin (MYCOSTATIN) 135593 UNIT/GM powder, Apply  topically to the appropriate area as directed 2 (Two) Times a Day., Disp: 60 g, Rfl: 1    omeprazole OTC (PriLOSEC OTC) 20 MG EC tablet, Take 1 tablet by mouth Daily., Disp: 84 tablet, Rfl: 3    propranolol LA (INDERAL LA) 80 MG 24 hr capsule, TAKE ONE CAPSULE BY MOUTH EVERY DAY, Disp: 90 capsule, Rfl: 3    ZOLMitriptan (ZOMIG) 5 MG tablet, Take 1 tablet by mouth Take As Directed for 30 days. Take 1 tablet at initial symptom onset, may repeat once in 2 hours if needed., Disp: 9 tablet, Rfl: 6    estradiol (Yuvafem) 10 MCG tablet vaginal tablet, Insert vaginally 2 times a week (Patient not taking: Reported on 3/4/2025), Disp: 30 tablet, Rfl: 2    Tirzepatide-Weight Management (ZEPBOUND) 7.5 MG/0.5ML solution auto-injector, Inject 0.5 mL under the skin into the appropriate area as directed 1 (One) Time Per Week., Disp: 2 mL, Rfl: 0    OBJECTIVE:  /76 (BP Location: Right arm, Patient Position: Sitting, Cuff Size: Adult)   Pulse 87   Temp 98 °F (36.7 °C) (Temporal)   Ht 160 cm (63\")   Wt 79.1 kg (174 lb 6.4 oz)   LMP  (LMP Unknown)   SpO2 96%   BMI 30.89 kg/m²    Physical Exam  Vitals reviewed.   Constitutional:       Appearance: She is well-developed.   Cardiovascular:      Rate and Rhythm: Normal rate.   Pulmonary:      Effort: Pulmonary effort is normal.   Neurological:      Mental Status: She is alert and oriented to person, place, and time.   Psychiatric:         Behavior: Behavior normal.         Assessment/Plan    Diagnoses and all orders for this visit:    1. Class 1 obesity with serious comorbidity and body mass index (BMI) of 30.0 to 30.9 in adult, unspecified obesity type (Primary)  -     Tirzepatide-Weight Management (ZEPBOUND) 7.5 MG/0.5ML solution auto-injector; Inject 0.5 mL under the skin into " the appropriate area as directed 1 (One) Time Per Week.  Dispense: 2 mL; Refill: 0    2. Anxiety  -     clonazePAM (KlonoPIN) 1 MG tablet; Take 1 tablet by mouth 3 (Three) Times a Day As Needed for Anxiety. for anxiety  Dispense: 90 tablet; Refill: 0    Patient advised to use daily MiraLAX to prevent constipation.    An After Visit Summary was printed and given to the patient at discharge.  Return in about 1 month (around 4/4/2025) for Next scheduled follow up.       Yajaira MARTINEZ 3/4/2025    Electronically signed.

## 2025-03-28 ENCOUNTER — OFFICE VISIT (OUTPATIENT)
Dept: FAMILY MEDICINE CLINIC | Facility: CLINIC | Age: 60
End: 2025-03-28
Payer: COMMERCIAL

## 2025-03-28 VITALS
BODY MASS INDEX: 29.52 KG/M2 | SYSTOLIC BLOOD PRESSURE: 105 MMHG | DIASTOLIC BLOOD PRESSURE: 75 MMHG | WEIGHT: 166.6 LBS | TEMPERATURE: 97.8 F | HEART RATE: 92 BPM | OXYGEN SATURATION: 98 % | HEIGHT: 63 IN

## 2025-03-28 DIAGNOSIS — G47.00 INSOMNIA, UNSPECIFIED TYPE: ICD-10-CM

## 2025-03-28 DIAGNOSIS — E66.3 OVERWEIGHT (BMI 25.0-29.9): ICD-10-CM

## 2025-03-28 DIAGNOSIS — Z51.81 THERAPEUTIC DRUG MONITORING: Primary | ICD-10-CM

## 2025-03-28 NOTE — PROGRESS NOTES
CC: controlled med monitoring    History:  Shawnee Suarez is a 59 y.o. female who presents today for evaluation of the above problems.      Patient presents for controlled medication monitoring. Clonazepam does continue to work well for anxiety, though she notes she is having trouble sleeping. Reports that she has tried taking her clonazepam prior to bed and has also tried trazodone and these have not helped.     She is due for contract and UDS today. Ludin is reviewed and appropriate.     Patient is also currently on zepbound for weightloss. She has lost 12 pounds since her last visit. She is doing well with the medication and does not need a dose increase at this time.           6/14/2023     7:00 AM 10/9/2023    11:00 AM 3/8/2024    12:00 PM 6/25/2024     3:00 PM 9/25/2024    12:00 PM 1/7/2025     8:00 AM 3/28/2025     8:00 AM   CONTROLLED SUBSTANCE TRACKING   Last Ludin 6/14/2023 10/9/2023 3/8/2024 6/25/2024 9/25/2024 1/7/2025 3/28/2025   Report Number reviewed through epic  reviewed through epic reviewed through [facility] reviewed through [facility] reviewed through [facility] reviewed through [facility]   Last UDS 3/6/2023 3/6/2023 3/8/2024 3/8/2024 3/8/2024 3/8/2024 3/28/2025   Last Controlled Substance Agreement 3/6/2023 3/6/2023 3/8/2024 3/8/2024 3/8/2024 3/8/2024 3/28/2025         HPI  ROS:  Review of Systems   Psychiatric/Behavioral:  Positive for sleep disturbance. The patient is not nervous/anxious.        Allergies   Allergen Reactions    Crestor [Rosuvastatin] Myalgia     Body aches     Past Medical History:   Diagnosis Date    Allergic     Anxiety     Class 1 obesity with serious comorbidity and body mass index (BMI) of 32.0 to 32.9 in adult 01/07/2025    Depression     History of transfusion     Hyperlipidemia     Migraines 06/08/2022    Ovarian cyst     x 2      Past Surgical History:   Procedure Laterality Date    APPENDECTOMY      BELPHAROPTOSIS REPAIR Bilateral 2023    BREAST BIOPSY Right 11/1989    CHOLECYSTECTOMY       COLONOSCOPY  11/22/2013    Normal exam repeat in 10 years    COLONOSCOPY N/A 11/13/2018    Normal but prep was only fair repeat in 2 years    COLONOSCOPY N/A 01/22/2021    The entire examined colon is normal on direct and retroflexion views; No specimens collected; Repeat 10 years    COLONOSCOPY N/A 5/7/2024    Procedure: COLONOSCOPY WITH ANESTHESIA;  Surgeon: Da Miles MD;  Location: [facility] ENDOSCOPY;  Service: Gastroenterology;  Laterality: N/A;  pre op genetic susceptibility to other disease  post  pcp Marlon Tran    D & C HYSTEROSCOPY ENDOMETRIAL ABLATION N/A 02/06/2017    Procedure: DILATATION AND CURETTAGE HYSTEROSCOPY NOVASURE ENDOMETRIAL ABLATION;  Surgeon: Ana Barrios MD;  Location: [facility] OR;  Service:     DIAGNOSTIC LAPAROSCOPY N/A 06/17/2019    Procedure: DIAGNOSTIC LAPAROSCOPY;  Surgeon: Ana Barrios MD;  Location: [facility] OR;  Service: Obstetrics/Gynecology    ENDOSCOPY N/A 5/7/2024    Procedure: ESOPHAGOGASTRODUODENOSCOPY WITH ANESTHESIA;  Surgeon: Da Miles MD;  Location: [facility] ENDOSCOPY;  Service: Gastroenterology;  Laterality: N/A;  pre op genetic susceptibility to other disease  post gastritis, esophagitis  pcp Marlonmacarnea Tran    OVARIAN CYST REMOVAL      x 2    SALPINGO OOPHORECTOMY Bilateral 06/17/2019    Procedure: SALPINGO OOPHORECTOMY LAPAROSCOPIC;  Surgeon: Ana Barrios MD;  Location: [facility] OR;  Service: Obstetrics/Gynecology    WISDOM TOOTH EXTRACTION       Family History   Problem Relation Age of Onset    Ovarian cancer Mother 72    Anxiety disorder Mother     Brain cancer Father     No Known Problems Brother     Heart disease Brother     No Known Problems Maternal Aunt     No Known Problems Paternal Aunt     No Known Problems Maternal Grandmother     No Known Problems Maternal Grandfather     No Known Problems Paternal Grandmother     Brain cancer Paternal Grandfather     No Known Problems Daughter     No Known Problems Daughter     Breast cancer Other 28     Uterine cancer Neg Hx     Colon cancer Neg Hx     Melanoma Neg Hx     Prostate cancer Neg Hx     BRCA 1/2 Neg Hx     Endometrial cancer Neg Hx     Colon polyps Neg Hx     Esophageal cancer Neg Hx     Liver cancer Neg Hx     Stomach cancer Neg Hx     Rectal cancer Neg Hx     Liver disease Neg Hx       reports that she has never smoked. She has been exposed to tobacco smoke. She has never used smokeless tobacco. She reports that she does not drink alcohol and does not use drugs.      Current Outpatient Medications:     atorvastatin (LIPITOR) 40 MG tablet, TAKE ONE TABLET BY MOUTH EVERY NIGHT, Disp: 90 tablet, Rfl: 3    Calcium-Vitamin D-Vitamin K (Viactiv Calcium Plus D) 650-12.5-40 MG-MCG-MCG chewable tablet, Chew 1 tablet 2 (two) times a day., Disp: , Rfl:     clonazePAM (KlonoPIN) 1 MG tablet, Take 1 tablet by mouth 3 (Three) Times a Day As Needed for Anxiety. for anxiety, Disp: 90 tablet, Rfl: 0    Docusate Sodium 100 MG capsule, Take 1 tablet by mouth Daily., Disp: , Rfl:     DULoxetine (CYMBALTA) 60 MG capsule, TAKE ONE CAPSULE BY MOUTH EVERY DAY, Disp: 90 capsule, Rfl: 3    loratadine (CLARITIN) 10 MG tablet, Take 1 tablet by mouth As Needed., Disp: , Rfl:     nystatin (MYCOSTATIN) 966998 UNIT/GM powder, Apply  topically to the appropriate area as directed 2 (Two) Times a Day., Disp: 60 g, Rfl: 1    omeprazole OTC (PriLOSEC OTC) 20 MG EC tablet, Take 1 tablet by mouth Daily., Disp: 84 tablet, Rfl: 3    propranolol LA (INDERAL LA) 80 MG 24 hr capsule, TAKE ONE CAPSULE BY MOUTH EVERY DAY, Disp: 90 capsule, Rfl: 3    Tirzepatide-Weight Management (ZEPBOUND) 7.5 MG/0.5ML solution auto-injector, Inject 0.5 mL under the skin into the appropriate area as directed 1 (One) Time Per Week., Disp: 2 mL, Rfl: 0    ZOLMitriptan (ZOMIG) 5 MG tablet, Take 1 tablet by mouth Take As Directed for 30 days. Take 1 tablet at initial symptom onset, may repeat once in 2 hours if needed., Disp: 9 tablet, Rfl: 6    estradiol (Yuvafem)  "10 MCG tablet vaginal tablet, Insert vaginally 2 times a week (Patient not taking: Reported on 3/28/2025), Disp: 30 tablet, Rfl: 2    OBJECTIVE:  /75 (BP Location: Left arm, Patient Position: Sitting, Cuff Size: Adult)   Pulse 92   Temp 97.8 °F (36.6 °C) (Temporal)   Ht 160 cm (63\")   Wt 75.6 kg (166 lb 9.6 oz)   LMP  (LMP Unknown)   SpO2 98%   BMI 29.51 kg/m²    Physical Exam  Vitals reviewed.   Constitutional:       Appearance: She is well-developed.   Cardiovascular:      Rate and Rhythm: Normal rate.   Pulmonary:      Effort: Pulmonary effort is normal.   Neurological:      Mental Status: She is alert and oriented to person, place, and time.   Psychiatric:         Behavior: Behavior normal.         Assessment/Plan    Diagnoses and all orders for this visit:    1. Therapeutic drug monitoring (Primary)  -     ToxAssure Flex 15, Ur - Urine, Clean Catch    2. Overweight (BMI 25.0-29.9)    3. Insomnia, unspecified type    Continue zepbound at current dose. Patient will advise when ready for increase.     UDS today. Contract signed. Ludin appropriate.     Discussed sleep hygiene with patient.     An After Visit Summary was printed and given to the patient at discharge.  Return in about 3 months (around 6/28/2025) for Next scheduled follow up.       MICHELLE Arnold 3/28/25    Electronically signed.  "

## 2025-04-01 LAB
1OH-MIDAZOLAM UR QL SCN: NOT DETECTED NG/MG CREAT
6MAM UR QL SCN: NEGATIVE NG/ML
7AMINOCLONAZEPAM/CREAT UR: 105 NG/MG CREAT
A-OH ALPRAZ/CREAT UR: NOT DETECTED NG/MG CREAT
A-OH-TRIAZOLAM/CREAT UR CFM: NOT DETECTED NG/MG CREAT
ALPRAZ/CREAT UR CFM: NOT DETECTED NG/MG CREAT
AMPHETAMINES UR QL SCN: NEGATIVE NG/ML
BARBITURATES UR QL SCN: NEGATIVE NG/ML
BENZODIAZ SCN METH UR: NORMAL
BUPRENORPHINE UR QL SCN: NEGATIVE
BUPRENORPHINE/CREAT UR: NOT DETECTED NG/MG CREAT
CANNABINOIDS UR QL SCN: NEGATIVE NG/ML
CLONAZEPAM/CREAT UR CFM: 8 NG/MG CREAT
COCAINE+BZE UR QL SCN: NEGATIVE NG/ML
CREAT UR-MCNC: 275 MG/DL
DESALKYLFLURAZ/CREAT UR: NOT DETECTED NG/MG CREAT
DIAZEPAM/CREAT UR: NOT DETECTED NG/MG CREAT
ETHANOL UR QL SCN: NEGATIVE G/DL
FENTANYL CTO UR SCN-MCNC: NEGATIVE NG/ML
FENTANYL/CREAT UR: NOT DETECTED NG/MG CREAT
FLUNITRAZEPAM UR QL SCN: NOT DETECTED NG/MG CREAT
LORAZEPAM/CREAT UR: NOT DETECTED NG/MG CREAT
METHADONE UR QL SCN: NEGATIVE NG/ML
METHADONE+METAB UR QL SCN: NEGATIVE NG/ML
MIDAZOLAM/CREAT UR CFM: NOT DETECTED NG/MG CREAT
NORBUPRENORPHINE/CREAT UR: NOT DETECTED NG/MG CREAT
NORDIAZEPAM/CREAT UR: NOT DETECTED NG/MG CREAT
NORFENTANYL/CREAT UR: NOT DETECTED NG/MG CREAT
NORFLUNITRAZEPAM UR-MCNC: NOT DETECTED NG/MG CREAT
OPIATES UR SCN-MCNC: NEGATIVE NG/ML
OXAZEPAM/CREAT UR: NOT DETECTED NG/MG CREAT
OXYCODONE CTO UR SCN-MCNC: NEGATIVE NG/ML
PCP UR QL SCN: NEGATIVE NG/ML
PRESCRIBED MEDICATIONS: NORMAL
TAPENTADOL CTO UR SCN-MCNC: NEGATIVE NG/ML
TEMAZEPAM/CREAT UR: NOT DETECTED NG/MG CREAT
TRAMADOL UR QL SCN: NEGATIVE NG/ML

## 2025-04-07 DIAGNOSIS — E66.811 CLASS 1 OBESITY WITH SERIOUS COMORBIDITY AND BODY MASS INDEX (BMI) OF 30.0 TO 30.9 IN ADULT, UNSPECIFIED OBESITY TYPE: ICD-10-CM

## 2025-04-07 DIAGNOSIS — K21.9 GASTROESOPHAGEAL REFLUX DISEASE, UNSPECIFIED WHETHER ESOPHAGITIS PRESENT: Primary | ICD-10-CM

## 2025-04-07 RX ORDER — ALUMINUM ZIRCONIUM TRICHLOROHYDREX GLY 0.19 G/G
20 STICK TOPICAL DAILY
Qty: 84 TABLET | Refills: 3 | Status: SHIPPED | OUTPATIENT
Start: 2025-04-07

## 2025-04-07 RX ORDER — TIRZEPATIDE 7.5 MG/.5ML
INJECTION, SOLUTION SUBCUTANEOUS
Qty: 2 ML | Refills: 0 | Status: SHIPPED | OUTPATIENT
Start: 2025-04-07

## 2025-04-07 NOTE — TELEPHONE ENCOUNTER
Rx Refill Note  Requested Prescriptions     Pending Prescriptions Disp Refills    Zepbound 7.5 MG/0.5ML solution auto-injector [Pharmacy Med Name: ZEPBOUND 7.5MG/0.5ML SOAJ] 2 mL 0     Sig: INJECT 0.5 ML (7.5 MG) UNDER THE SKIN INTO THE APPROPRIATE AREA AS DIRECTED ONCE WEEKLY      Last office visit with prescribing clinician: 3/28/2025   Last telemedicine visit with prescribing clinician: Visit date not found   Next office visit with prescribing clinician: 7/9/2025       Bree Lockett MA  04/07/25, 09:13 CDT

## 2025-04-11 ENCOUNTER — PROCEDURE VISIT (OUTPATIENT)
Dept: NEUROLOGY | Facility: CLINIC | Age: 60
End: 2025-04-11
Payer: COMMERCIAL

## 2025-04-11 DIAGNOSIS — G43.709 CHRONIC MIGRAINE WITHOUT AURA WITHOUT STATUS MIGRAINOSUS, NOT INTRACTABLE: Primary | ICD-10-CM

## 2025-05-01 ENCOUNTER — TELEPHONE (OUTPATIENT)
Dept: FAMILY MEDICINE CLINIC | Facility: CLINIC | Age: 60
End: 2025-05-01
Payer: COMMERCIAL

## 2025-05-06 ENCOUNTER — TELEPHONE (OUTPATIENT)
Dept: FAMILY MEDICINE CLINIC | Facility: CLINIC | Age: 60
End: 2025-05-06
Payer: COMMERCIAL

## 2025-05-06 NOTE — TELEPHONE ENCOUNTER
Caller: Shawnee Suarez    Relationship: Self    Best call back number: [phone]     Caller requesting test results: SELF    What test was performed: BLOOD WORK    When was the test performed: 05.04.2025    Where was the test performed: [facility]    Additional notes: PATIENT STATED SHE WAS TOLD THE RESULTS WILL BE SENT TO DR. WEBSTER. SHE IS CALLING TO GO OVER THE RESULTS.     PLEASE CALL TO DISCUSS

## 2025-05-06 NOTE — TELEPHONE ENCOUNTER
Called and spoke with Tiffany in Lab at [facility] and she is going to fax over the lab results they have so far.  Still waiting on some tick panels to come back.  Tiffany told me that someone from the ER should call the patient when the results come in.  I told Tiffany if she doesn't hear from them in a few days call and I'll see if I can get the results.

## 2025-05-07 ENCOUNTER — TELEPHONE (OUTPATIENT)
Dept: FAMILY MEDICINE CLINIC | Facility: CLINIC | Age: 60
End: 2025-05-07

## 2025-05-07 NOTE — TELEPHONE ENCOUNTER
Called pt and she states zepbound is being dropped from her insurance.  They mentioned trying wegovy, qsymia or saxenda

## 2025-05-07 NOTE — TELEPHONE ENCOUNTER
Stay with tirzepatide as long as she can and then we can switch her to corresponding dose of semaglutide (Wegovy.)

## 2025-05-07 NOTE — TELEPHONE ENCOUNTER
Caller: Herson Suarez    Relationship: Self    Best call back number: [phone]     What is the best time to reach you: ANYTIME    Who are you requesting to speak with (clinical staff, provider,  specific staff member): TC    Do you know the name of the person who called: HESRON    What was the call regarding: HERSON WOULD LIKE TO TALK TO TC BECAUSE SHE HAS RECEIVED A LETTER FROM INSURANCE STATING THAT THEY WOULD NO LONGER COVER HER ZEPBOUND 7/1/25 AND SHE SAID THEY SENT HER A LIST OF MEDICATIONS THEY MAY WORK.    PLEASE CALL BACK    Is it okay if the provider responds through MyChart: NO

## 2025-05-20 DIAGNOSIS — F41.9 ANXIETY: ICD-10-CM

## 2025-05-20 RX ORDER — CLONAZEPAM 1 MG/1
1 TABLET ORAL 3 TIMES DAILY PRN
Qty: 90 TABLET | Refills: 0 | Status: SHIPPED | OUTPATIENT
Start: 2025-05-20

## 2025-05-20 NOTE — TELEPHONE ENCOUNTER
Rx Refill Note  Requested Prescriptions     Pending Prescriptions Disp Refills    clonazePAM (KlonoPIN) 1 MG tablet [Pharmacy Med Name: CLONAZEPAM 1MG TABS] 90 tablet 0     Sig: TAKE ONE TABLET BY MOUTH THREE TIMES A DAY AS NEEDED FOR ANXIETY      Last office visit with prescribing clinician: 3/28/2025   Last telemedicine visit with prescribing clinician: Visit date not found   Next office visit with prescribing clinician: 7/9/2025   CPE done 1/07/2025  Pain therapy appt 3/28/25  Contract & UDS 3/28/25                      Would you like a call back once the refill request has been completed: [] Yes [] No    If the office needs to give you a call back, can they leave a voicemail: [] Yes [] No    Bree Pate MA  05/20/25, 14:49 CDT

## 2025-05-29 ENCOUNTER — TELEPHONE (OUTPATIENT)
Dept: FAMILY MEDICINE CLINIC | Facility: CLINIC | Age: 60
End: 2025-05-29
Payer: COMMERCIAL

## 2025-05-29 NOTE — TELEPHONE ENCOUNTER
Caller: Shawnee Suarez    Relationship: Self    Best call back number: [phone]     Requested Prescriptions:   Requested Prescriptions     Pending Prescriptions Disp Refills    Tirzepatide-Weight Management (ZEPBOUND) 10 MG/0.5ML solution auto-injector 2 mL 0     Sig: Inject 0.5 mL under the skin into the appropriate area as directed 1 (One) Time Per Week.        Pharmacy where request should be sent: [facility] [address] [phone] [facility] [phone]      Last office visit with prescribing clinician: 3/28/2025   Last telemedicine visit with prescribing clinician: Visit date not found   Next office visit with prescribing clinician: 7/9/2025     Additional details provided by patient: DOSAGE INCREASE    Does the patient have less than a 3 day supply:  [x] Yes  [] No    Would you like a call back once the refill request has been completed: [] Yes [x] No    If the office needs to give you a call back, can they leave a voicemail: [] Yes [x] No    Andrae Ann III, RegSched Rep   05/29/25 14:12 CDT

## 2025-05-30 ENCOUNTER — OFFICE VISIT (OUTPATIENT)
Dept: FAMILY MEDICINE CLINIC | Facility: CLINIC | Age: 60
End: 2025-05-30
Payer: COMMERCIAL

## 2025-05-30 VITALS
HEART RATE: 88 BPM | DIASTOLIC BLOOD PRESSURE: 81 MMHG | OXYGEN SATURATION: 98 % | SYSTOLIC BLOOD PRESSURE: 111 MMHG | WEIGHT: 147.4 LBS | HEIGHT: 63 IN | BODY MASS INDEX: 26.12 KG/M2 | TEMPERATURE: 98 F

## 2025-05-30 DIAGNOSIS — R30.0 DYSURIA: Primary | ICD-10-CM

## 2025-05-30 DIAGNOSIS — R82.90 ABNORMAL URINALYSIS: ICD-10-CM

## 2025-05-30 LAB
BILIRUB BLD-MCNC: ABNORMAL MG/DL
CLARITY, POC: ABNORMAL
COLOR UR: ABNORMAL
GLUCOSE UR STRIP-MCNC: NEGATIVE MG/DL
KETONES UR QL: ABNORMAL
LEUKOCYTE EST, POC: ABNORMAL
NITRITE UR-MCNC: NEGATIVE MG/ML
PH UR: 5.5 [PH] (ref 5–8)
PROT UR STRIP-MCNC: ABNORMAL MG/DL
RBC # UR STRIP: ABNORMAL /UL
SP GR UR: 1.02 (ref 1–1.03)
UROBILINOGEN UR QL: ABNORMAL

## 2025-05-30 PROCEDURE — 99213 OFFICE O/P EST LOW 20 MIN: CPT | Performed by: NURSE PRACTITIONER

## 2025-05-30 PROCEDURE — 81003 URINALYSIS AUTO W/O SCOPE: CPT | Performed by: NURSE PRACTITIONER

## 2025-05-30 RX ORDER — NITROFURANTOIN 25; 75 MG/1; MG/1
100 CAPSULE ORAL 2 TIMES DAILY
Qty: 14 CAPSULE | Refills: 0 | Status: SHIPPED | OUTPATIENT
Start: 2025-05-30

## 2025-05-30 NOTE — PROGRESS NOTES
CC: dysuria    History:  Shawnee Suarez is a 60 y.o. female who presents today for evaluation of the above problems.        Urinary Tract Infection  Chronicity:  New  Onset:  Yesterday  Frequency:  Intermittently  Pain quality:  Burning  Pain severity:  No pain  Associated symptoms: no chills and no nausea    Treatments tried:  Nothing    ROS:  Review of Systems   Constitutional:  Negative for chills.   Gastrointestinal:  Negative for nausea.       Allergies   Allergen Reactions    Crestor [Rosuvastatin] Myalgia     Body aches     Past Medical History:   Diagnosis Date    Allergic     Anxiety     Class 1 obesity with serious comorbidity and body mass index (BMI) of 32.0 to 32.9 in adult 01/07/2025    Depression     History of transfusion     Hyperlipidemia     Migraines 06/08/2022    Ovarian cyst     x 2      Past Surgical History:   Procedure Laterality Date    APPENDECTOMY      BELPHAROPTOSIS REPAIR Bilateral 2023    BREAST BIOPSY Right 11/1989    CHOLECYSTECTOMY      COLONOSCOPY  11/22/2013    Normal exam repeat in 10 years    COLONOSCOPY N/A 11/13/2018    Normal but prep was only fair repeat in 2 years    COLONOSCOPY N/A 01/22/2021    The entire examined colon is normal on direct and retroflexion views; No specimens collected; Repeat 10 years    COLONOSCOPY N/A 5/7/2024    Procedure: COLONOSCOPY WITH ANESTHESIA;  Surgeon: Da Miles MD;  Location: [facility] ENDOSCOPY;  Service: Gastroenterology;  Laterality: N/A;  pre op genetic susceptibility to other disease  post  pcp Marlon Tran    D & C HYSTEROSCOPY ENDOMETRIAL ABLATION N/A 02/06/2017    Procedure: DILATATION AND CURETTAGE HYSTEROSCOPY NOVASURE ENDOMETRIAL ABLATION;  Surgeon: Ana Barrios MD;  Location: [facility] OR;  Service:     DIAGNOSTIC LAPAROSCOPY N/A 06/17/2019    Procedure: DIAGNOSTIC LAPAROSCOPY;  Surgeon: Ana Barrios MD;  Location: [facility] OR;  Service: Obstetrics/Gynecology    ENDOSCOPY N/A 5/7/2024    Procedure: ESOPHAGOGASTRODUODENOSCOPY  WITH ANESTHESIA;  Surgeon: Da Miles MD;  Location: [facility] ENDOSCOPY;  Service: Gastroenterology;  Laterality: N/A;  pre op genetic susceptibility to other disease  post gastritis, esophagitis  pcp Marlon Tran    OVARIAN CYST REMOVAL      x 2    SALPINGO OOPHORECTOMY Bilateral 06/17/2019    Procedure: SALPINGO OOPHORECTOMY LAPAROSCOPIC;  Surgeon: Ana Barrios MD;  Location: [facility] OR;  Service: Obstetrics/Gynecology    WISDOM TOOTH EXTRACTION       Family History   Problem Relation Age of Onset    Ovarian cancer Mother 72    Anxiety disorder Mother     Brain cancer Father     No Known Problems Brother     Heart disease Brother     No Known Problems Maternal Aunt     No Known Problems Paternal Aunt     No Known Problems Maternal Grandmother     No Known Problems Maternal Grandfather     No Known Problems Paternal Grandmother     Brain cancer Paternal Grandfather     No Known Problems Daughter     No Known Problems Daughter     Breast cancer Other 28    Uterine cancer Neg Hx     Colon cancer Neg Hx     Melanoma Neg Hx     Prostate cancer Neg Hx     BRCA 1/2 Neg Hx     Endometrial cancer Neg Hx     Colon polyps Neg Hx     Esophageal cancer Neg Hx     Liver cancer Neg Hx     Stomach cancer Neg Hx     Rectal cancer Neg Hx     Liver disease Neg Hx       reports that she has never smoked. She has been exposed to tobacco smoke. She has never used smokeless tobacco. She reports that she does not drink alcohol and does not use drugs.      Current Outpatient Medications:     atorvastatin (LIPITOR) 40 MG tablet, TAKE ONE TABLET BY MOUTH EVERY NIGHT, Disp: 90 tablet, Rfl: 3    Calcium-Vitamin D-Vitamin K (Viactiv Calcium Plus D) 650-12.5-40 MG-MCG-MCG chewable tablet, Chew 1 tablet 2 (two) times a day., Disp: , Rfl:     clonazePAM (KlonoPIN) 1 MG tablet, TAKE ONE TABLET BY MOUTH THREE TIMES A DAY AS NEEDED FOR ANXIETY, Disp: 90 tablet, Rfl: 0    Docusate Sodium 100 MG capsule, Take 1 tablet by mouth Daily., Disp: ,  "Rfl:     DULoxetine (CYMBALTA) 60 MG capsule, TAKE ONE CAPSULE BY MOUTH EVERY DAY, Disp: 90 capsule, Rfl: 3    loratadine (CLARITIN) 10 MG tablet, Take 1 tablet by mouth As Needed., Disp: , Rfl:     PrilOSEC OTC 20 MG EC tablet, TAKE ONE TABLET BY MOUTH EVERY DAY, Disp: 84 tablet, Rfl: 3    propranolol LA (INDERAL LA) 80 MG 24 hr capsule, TAKE ONE CAPSULE BY MOUTH EVERY DAY, Disp: 90 capsule, Rfl: 3    Tirzepatide-Weight Management (ZEPBOUND) 10 MG/0.5ML solution auto-injector, Inject 0.5 mL under the skin into the appropriate area as directed 1 (One) Time Per Week., Disp: 2 mL, Rfl: 0    ZOLMitriptan (ZOMIG) 5 MG tablet, Take 1 tablet by mouth Take As Directed for 30 days. Take 1 tablet at initial symptom onset, may repeat once in 2 hours if needed., Disp: 9 tablet, Rfl: 6    estradiol (Yuvafem) 10 MCG tablet vaginal tablet, Insert vaginally 2 times a week (Patient not taking: Reported on 5/30/2025), Disp: 30 tablet, Rfl: 2    nitrofurantoin, macrocrystal-monohydrate, (Macrobid) 100 MG capsule, Take 1 capsule by mouth 2 (Two) Times a Day., Disp: 14 capsule, Rfl: 0    nystatin (MYCOSTATIN) 423276 UNIT/GM powder, Apply  topically to the appropriate area as directed 2 (Two) Times a Day. (Patient not taking: Reported on 5/30/2025), Disp: 60 g, Rfl: 1    OBJECTIVE:  /81 (BP Location: Left arm, Patient Position: Sitting, Cuff Size: Adult)   Pulse 88   Temp 98 °F (36.7 °C) (Temporal)   Ht 160 cm (63\")   Wt 66.9 kg (147 lb 6.4 oz)   LMP  (LMP Unknown)   SpO2 98%   BMI 26.11 kg/m²    Physical Exam  Vitals reviewed.   Constitutional:       Appearance: She is well-developed.   Cardiovascular:      Rate and Rhythm: Normal rate.   Pulmonary:      Effort: Pulmonary effort is normal.   Neurological:      Mental Status: She is alert and oriented to person, place, and time.   Psychiatric:         Behavior: Behavior normal.         Assessment/Plan    Diagnoses and all orders for this visit:    1. Dysuria (Primary)  -    "  POC Urinalysis Dipstick, Multipro  -     nitrofurantoin, macrocrystal-monohydrate, (Macrobid) 100 MG capsule; Take 1 capsule by mouth 2 (Two) Times a Day.  Dispense: 14 capsule; Refill: 0  -     Urine Culture - Urine, Urine, Clean Catch    2. Abnormal urinalysis  -     Urine Culture - Urine, Urine, Clean Catch        An After Visit Summary was printed and given to the patient at discharge.  Return if symptoms worsen or fail to improve, for Next scheduled follow up.       MICHELLE Arnold 5/30/25    Electronically signed.

## 2025-06-04 LAB
BACTERIA UR CULT: ABNORMAL
BACTERIA UR CULT: ABNORMAL
OTHER ANTIBIOTIC SUSC ISLT: ABNORMAL

## 2025-06-18 ENCOUNTER — TELEPHONE (OUTPATIENT)
Dept: FAMILY MEDICINE CLINIC | Facility: CLINIC | Age: 60
End: 2025-06-18

## 2025-06-18 NOTE — TELEPHONE ENCOUNTER
Caller: Shawnee Suarez    Relationship to patient: Self    Best call back number:     [phone]       Patient is needing: SEEMS THERE WAS A MEDICATION CHANGE, AND PATIENT IS CONFUSED (REGARDING ZEPBOUND AND WEGOVY) PLEASE CALL PATIENT BACK

## 2025-07-09 ENCOUNTER — OFFICE VISIT (OUTPATIENT)
Dept: FAMILY MEDICINE CLINIC | Facility: CLINIC | Age: 60
End: 2025-07-09
Payer: COMMERCIAL

## 2025-07-09 VITALS
WEIGHT: 139.4 LBS | BODY MASS INDEX: 24.7 KG/M2 | HEIGHT: 63 IN | OXYGEN SATURATION: 99 % | TEMPERATURE: 97.8 F | DIASTOLIC BLOOD PRESSURE: 71 MMHG | HEART RATE: 80 BPM | SYSTOLIC BLOOD PRESSURE: 101 MMHG

## 2025-07-09 DIAGNOSIS — F41.9 ANXIETY: ICD-10-CM

## 2025-07-09 DIAGNOSIS — E78.2 MIXED HYPERLIPIDEMIA: Primary | ICD-10-CM

## 2025-07-09 PROBLEM — E66.3 OVERWEIGHT (BMI 25.0-29.9): Status: RESOLVED | Noted: 2025-01-07 | Resolved: 2025-07-09

## 2025-07-09 PROCEDURE — 99214 OFFICE O/P EST MOD 30 MIN: CPT | Performed by: NURSE PRACTITIONER

## 2025-07-09 NOTE — PROGRESS NOTES
CC: 6 month check - hyperlipidemia, anxiety; controlled med monitoring    History:  Shwanee Suarez is a 60 y.o. female who presents today for evaluation of the above problems.      Patient presents for 6 month check. BP is appropriate. Weight is down by 8 pounds. Her BMI is now 24.7, placing her in a normal weight range. Patient has had significant side effects of nausea with tirzepatide and states that she feels unable to eat. Her insurance recently changed to covering only Wegovy, so she has started that now and says that she has had less side effects with this. She is also due for controlled med monitoring. Continues on clonazepam for anxiety. No problems with this. Ludin is reviewed and appropriate.     HPI  ROS:  Review of Systems   Respiratory:  Negative for shortness of breath.    Cardiovascular:  Negative for chest pain.   Gastrointestinal:  Positive for nausea.       Allergies   Allergen Reactions    Crestor [Rosuvastatin] Myalgia     Body aches     Past Medical History:   Diagnosis Date    Allergic     Anxiety     Class 1 obesity with serious comorbidity and body mass index (BMI) of 32.0 to 32.9 in adult 01/07/2025    Depression     History of transfusion     Hyperlipidemia     Migraines 06/08/2022    Ovarian cyst     x 2      Past Surgical History:   Procedure Laterality Date    APPENDECTOMY      BELPHAROPTOSIS REPAIR Bilateral 2023    BREAST BIOPSY Right 11/1989    CHOLECYSTECTOMY      COLONOSCOPY  11/22/2013    Normal exam repeat in 10 years    COLONOSCOPY N/A 11/13/2018    Normal but prep was only fair repeat in 2 years    COLONOSCOPY N/A 01/22/2021    The entire examined colon is normal on direct and retroflexion views; No specimens collected; Repeat 10 years    COLONOSCOPY N/A 5/7/2024    Procedure: COLONOSCOPY WITH ANESTHESIA;  Surgeon: Da Miles MD;  Location: [facility] ENDOSCOPY;  Service: Gastroenterology;  Laterality: N/A;  pre op genetic susceptibility to other disease  post  pcp Marlon  Chelsea    D & C HYSTEROSCOPY ENDOMETRIAL ABLATION N/A 02/06/2017    Procedure: DILATATION AND CURETTAGE HYSTEROSCOPY NOVASURE ENDOMETRIAL ABLATION;  Surgeon: Ana Barrios MD;  Location: [facility] OR;  Service:     DIAGNOSTIC LAPAROSCOPY N/A 06/17/2019    Procedure: DIAGNOSTIC LAPAROSCOPY;  Surgeon: Ana Barrios MD;  Location: [facility] OR;  Service: Obstetrics/Gynecology    ENDOSCOPY N/A 5/7/2024    Procedure: ESOPHAGOGASTRODUODENOSCOPY WITH ANESTHESIA;  Surgeon: Da Miles MD;  Location: [facility] ENDOSCOPY;  Service: Gastroenterology;  Laterality: N/A;  pre op genetic susceptibility to other disease  post gastritis, esophagitis  taurus Tran    OVARIAN CYST REMOVAL      x 2    SALPINGO OOPHORECTOMY Bilateral 06/17/2019    Procedure: SALPINGO OOPHORECTOMY LAPAROSCOPIC;  Surgeon: Ana Barrios MD;  Location: [facility] OR;  Service: Obstetrics/Gynecology    WISDOM TOOTH EXTRACTION       Family History   Problem Relation Age of Onset    Ovarian cancer Mother 72    Anxiety disorder Mother     Brain cancer Father     No Known Problems Brother     Heart disease Brother     No Known Problems Maternal Aunt     No Known Problems Paternal Aunt     No Known Problems Maternal Grandmother     No Known Problems Maternal Grandfather     No Known Problems Paternal Grandmother     Brain cancer Paternal Grandfather     No Known Problems Daughter     No Known Problems Daughter     Breast cancer Other 28    Uterine cancer Neg Hx     Colon cancer Neg Hx     Melanoma Neg Hx     Prostate cancer Neg Hx     BRCA 1/2 Neg Hx     Endometrial cancer Neg Hx     Colon polyps Neg Hx     Esophageal cancer Neg Hx     Liver cancer Neg Hx     Stomach cancer Neg Hx     Rectal cancer Neg Hx     Liver disease Neg Hx       reports that she has never smoked. She has been exposed to tobacco smoke. She has never used smokeless tobacco. She reports that she does not drink alcohol and does not use drugs.      Current Outpatient Medications:      "atorvastatin (LIPITOR) 40 MG tablet, TAKE ONE TABLET BY MOUTH EVERY NIGHT, Disp: 90 tablet, Rfl: 3    Calcium-Vitamin D-Vitamin K (Viactiv Calcium Plus D) 650-12.5-40 MG-MCG-MCG chewable tablet, Chew 1 tablet 2 (two) times a day., Disp: , Rfl:     clonazePAM (KlonoPIN) 1 MG tablet, TAKE ONE TABLET BY MOUTH THREE TIMES A DAY AS NEEDED FOR ANXIETY, Disp: 90 tablet, Rfl: 0    Docusate Sodium 100 MG capsule, Take 1 tablet by mouth Daily., Disp: , Rfl:     DULoxetine (CYMBALTA) 60 MG capsule, TAKE ONE CAPSULE BY MOUTH EVERY DAY, Disp: 90 capsule, Rfl: 3    loratadine (CLARITIN) 10 MG tablet, Take 1 tablet by mouth As Needed., Disp: , Rfl:     nystatin (MYCOSTATIN) [id number] UNIT/GM powder, Apply  topically to the appropriate area as directed 2 (Two) Times a Day., Disp: 60 g, Rfl: 1    PrilOSEC OTC 20 MG EC tablet, TAKE ONE TABLET BY MOUTH EVERY DAY, Disp: 84 tablet, Rfl: 3    propranolol LA (INDERAL LA) 80 MG 24 hr capsule, TAKE ONE CAPSULE BY MOUTH EVERY DAY, Disp: 90 capsule, Rfl: 3    Semaglutide-Weight Management (Wegovy) 1.7 MG/0.75ML solution auto-injector, Inject 0.75 mL under the skin into the appropriate area as directed 1 (One) Time Per Week., Disp: 3 mL, Rfl: 0    ZOLMitriptan (ZOMIG) 5 MG tablet, Take 1 tablet by mouth Take As Directed for 30 days. Take 1 tablet at initial symptom onset, may repeat once in 2 hours if needed., Disp: 9 tablet, Rfl: 6    OBJECTIVE:  /71 (BP Location: Left arm, Patient Position: Sitting, Cuff Size: Adult)   Pulse 80   Temp 97.8 °F (36.6 °C) (Temporal)   Ht 160 cm (63\")   Wt 63.2 kg (139 lb 6.4 oz)   LMP  (LMP Unknown)   SpO2 99%   BMI 24.69 kg/m²    Physical Exam  Vitals reviewed.   Constitutional:       Appearance: She is well-developed.   Cardiovascular:      Rate and Rhythm: Normal rate and regular rhythm.      Heart sounds: Normal heart sounds.   Pulmonary:      Effort: Pulmonary effort is normal.      Breath sounds: Normal breath sounds.   Neurological:      Mental " Status: She is alert and oriented to person, place, and time.   Psychiatric:         Behavior: Behavior normal.         Assessment/Plan    Diagnoses and all orders for this visit:    1. Mixed hyperlipidemia (Primary)  -     Lipid Panel    2. Anxiety  -     Comprehensive Metabolic Panel    Ludin reviewed and appropriate. Contract and UDS are current.   Lab today. Will recheck on tolerance to Wegovy in one month. Patient is at normal BMI now, so she doesn't need to continue to lose a large amount. More interested in maintenance.     An After Visit Summary was printed and given to the patient at discharge.  Return in about 1 month (around 8/9/2025) for Recheck - weogvy tolerance.       MICHELLE Arnold 7/9/25    Electronically signed.

## 2025-07-10 LAB
ALBUMIN SERPL-MCNC: 4.4 G/DL (ref 3.5–5.2)
ALBUMIN/GLOB SERPL: 1.6 G/DL
ALP SERPL-CCNC: 74 U/L (ref 39–117)
ALT SERPL-CCNC: 18 U/L (ref 1–33)
AST SERPL-CCNC: 21 U/L (ref 1–32)
BILIRUB SERPL-MCNC: 0.6 MG/DL (ref 0–1.2)
BUN SERPL-MCNC: 10 MG/DL (ref 8–23)
BUN/CREAT SERPL: 15.9 (ref 7–25)
CALCIUM SERPL-MCNC: 10.4 MG/DL (ref 8.6–10.5)
CHLORIDE SERPL-SCNC: 105 MMOL/L (ref 98–107)
CHOLEST SERPL-MCNC: 155 MG/DL (ref 0–200)
CO2 SERPL-SCNC: 28.7 MMOL/L (ref 22–29)
CREAT SERPL-MCNC: 0.63 MG/DL (ref 0.57–1)
EGFRCR SERPLBLD CKD-EPI 2021: 100.4 ML/MIN/1.73
GLOBULIN SER CALC-MCNC: 2.8 GM/DL
GLUCOSE SERPL-MCNC: 96 MG/DL (ref 65–99)
HDLC SERPL-MCNC: 57 MG/DL (ref 40–60)
LDLC SERPL CALC-MCNC: 71 MG/DL (ref 0–100)
POTASSIUM SERPL-SCNC: 3.7 MMOL/L (ref 3.5–5.2)
PROT SERPL-MCNC: 7.2 G/DL (ref 6–8.5)
SODIUM SERPL-SCNC: 145 MMOL/L (ref 136–145)
TRIGL SERPL-MCNC: 158 MG/DL (ref 0–150)
VLDLC SERPL CALC-MCNC: 27 MG/DL (ref 5–40)

## 2025-07-11 ENCOUNTER — PROCEDURE VISIT (OUTPATIENT)
Dept: NEUROLOGY | Facility: CLINIC | Age: 60
End: 2025-07-11
Payer: COMMERCIAL

## 2025-07-11 DIAGNOSIS — G43.009 MIGRAINE WITHOUT AURA AND WITHOUT STATUS MIGRAINOSUS, NOT INTRACTABLE: Primary | ICD-10-CM

## 2025-07-11 PROCEDURE — 64615 CHEMODENERV MUSC MIGRAINE: CPT | Performed by: PSYCHIATRY & NEUROLOGY

## 2025-07-18 RX ORDER — DULOXETIN HYDROCHLORIDE 60 MG/1
60 CAPSULE, DELAYED RELEASE ORAL DAILY
Qty: 90 CAPSULE | Refills: 1 | Status: SHIPPED | OUTPATIENT
Start: 2025-07-18

## 2025-07-25 DIAGNOSIS — E66.811 CLASS 1 OBESITY WITH SERIOUS COMORBIDITY AND BODY MASS INDEX (BMI) OF 30.0 TO 30.9 IN ADULT, UNSPECIFIED OBESITY TYPE: ICD-10-CM

## 2025-07-25 DIAGNOSIS — G47.33 OBSTRUCTIVE SLEEP APNEA: ICD-10-CM

## 2025-07-25 RX ORDER — SEMAGLUTIDE 1.7 MG/.75ML
1.7 INJECTION, SOLUTION SUBCUTANEOUS WEEKLY
Qty: 3 ML | Refills: 0 | Status: SHIPPED | OUTPATIENT
Start: 2025-07-25

## 2025-07-25 NOTE — TELEPHONE ENCOUNTER
Caller: Shawnee Suarez    Relationship: Self    Best call back number:     [phone]       Requested Prescriptions:   Requested Prescriptions     Pending Prescriptions Disp Refills    Semaglutide-Weight Management (Wegovy) 1.7 MG/0.75ML solution auto-injector 3 mL 0     Sig: Inject 0.75 mL under the skin into the appropriate area as directed 1 (One) Time Per Week.        Pharmacy where request should be sent: [facility] [address] [phone] [facility] [phone]      Last office visit with prescribing clinician: 7/17/2024   Last telemedicine visit with prescribing clinician: Visit date not found   Next office visit with prescribing clinician: Visit date not found     Additional details provided by patient: WHEN INSERTING THE LAST DOSAGE THE NEEDLE CAME OUT OF LEG & THE  LIQUID RAN DOWN HER LEG    Does the patient have less than a 3 day supply:  [x] Yes  [] No    Would you like a call back once the refill request has been completed: [] Yes [x] No    If the office needs to give you a call back, can they leave a voicemail: [] Yes [x] No    Lionel Aj Rep   07/25/25 09:44 CDT

## 2025-08-05 DIAGNOSIS — F41.9 ANXIETY: ICD-10-CM

## 2025-08-06 ENCOUNTER — OFFICE VISIT (OUTPATIENT)
Dept: FAMILY MEDICINE CLINIC | Facility: CLINIC | Age: 60
End: 2025-08-06
Payer: COMMERCIAL

## 2025-08-06 VITALS
HEIGHT: 63 IN | HEART RATE: 80 BPM | SYSTOLIC BLOOD PRESSURE: 109 MMHG | TEMPERATURE: 97.8 F | BODY MASS INDEX: 24.56 KG/M2 | DIASTOLIC BLOOD PRESSURE: 77 MMHG | OXYGEN SATURATION: 99 % | WEIGHT: 138.6 LBS

## 2025-08-06 DIAGNOSIS — G47.33 OBSTRUCTIVE SLEEP APNEA: ICD-10-CM

## 2025-08-06 DIAGNOSIS — E66.811 CLASS 1 OBESITY WITH SERIOUS COMORBIDITY AND BODY MASS INDEX (BMI) OF 30.0 TO 30.9 IN ADULT, UNSPECIFIED OBESITY TYPE: ICD-10-CM

## 2025-08-06 PROCEDURE — 99213 OFFICE O/P EST LOW 20 MIN: CPT | Performed by: NURSE PRACTITIONER

## 2025-08-06 RX ORDER — CLONAZEPAM 1 MG/1
1 TABLET ORAL 3 TIMES DAILY PRN
Qty: 90 TABLET | Refills: 0 | Status: SHIPPED | OUTPATIENT
Start: 2025-08-06

## 2025-08-06 RX ORDER — SEMAGLUTIDE 1.7 MG/.75ML
1.7 INJECTION, SOLUTION SUBCUTANEOUS WEEKLY
Qty: 3 ML | Refills: 5 | Status: SHIPPED | OUTPATIENT
Start: 2025-08-06

## 2025-08-19 ENCOUNTER — PROCEDURE VISIT (OUTPATIENT)
Dept: FAMILY MEDICINE CLINIC | Facility: CLINIC | Age: 60
End: 2025-08-19
Payer: COMMERCIAL

## 2025-08-19 DIAGNOSIS — M25.511 CHRONIC RIGHT SHOULDER PAIN: Primary | ICD-10-CM

## 2025-08-19 DIAGNOSIS — G89.29 CHRONIC RIGHT SHOULDER PAIN: Primary | ICD-10-CM

## 2025-08-19 PROCEDURE — 20610 DRAIN/INJ JOINT/BURSA W/O US: CPT | Performed by: FAMILY MEDICINE

## 2025-08-19 RX ORDER — TRIAMCINOLONE ACETONIDE 40 MG/ML
80 INJECTION, SUSPENSION INTRA-ARTICULAR; INTRAMUSCULAR
Status: COMPLETED | OUTPATIENT
Start: 2025-08-19 | End: 2025-08-19

## 2025-08-19 RX ORDER — LIDOCAINE HYDROCHLORIDE 10 MG/ML
1 INJECTION, SOLUTION INFILTRATION; PERINEURAL
Status: COMPLETED | OUTPATIENT
Start: 2025-08-19 | End: 2025-08-19

## 2025-08-19 RX ADMIN — LIDOCAINE HYDROCHLORIDE 1 ML: 10 INJECTION, SOLUTION INFILTRATION; PERINEURAL at 15:49

## 2025-08-19 RX ADMIN — TRIAMCINOLONE ACETONIDE 80 MG: 40 INJECTION, SUSPENSION INTRA-ARTICULAR; INTRAMUSCULAR at 15:49

## (undated) DEVICE — MASK,OXYGEN,MED CONC,ADLT,7' TUB, UC: Brand: PENDING

## (undated) DEVICE — CAP CONN RED

## (undated) DEVICE — GLV SURG NEOLON 2G PF LF 6 STRL

## (undated) DEVICE — YANKAUER,BULB TIP WITH VENT: Brand: ARGYLE

## (undated) DEVICE — ENDOPATH XCEL WITH OPTIVIEW TECHNOLOGY BLADELESS TROCARS WITH STABILITY SLEEVES: Brand: ENDOPATH XCEL OPTIVIEW

## (undated) DEVICE — ANTIBACTERIAL UNDYED BRAIDED (POLYGLACTIN 910), SYNTHETIC ABSORBABLE SUTURE: Brand: COATED VICRYL

## (undated) DEVICE — PK LAP GYN 30

## (undated) DEVICE — CYSTO/BLADDER IRRIGATION SET, REGULATING CLAMP

## (undated) DEVICE — PROB ABL ENDOMTRL NOVASURE/G1 W/SURESND BIP

## (undated) DEVICE — SENSR O2 OXIMAX FNGR A/ 18IN NONSTR

## (undated) DEVICE — UTILITY MARKER W/MED LABELS: Brand: MEDLINE

## (undated) DEVICE — CUFF,BP,DISP,1 TUBE,ADULT,HP: Brand: MEDLINE

## (undated) DEVICE — PAD D&C: Brand: MEDLINE INDUSTRIES, INC.

## (undated) DEVICE — TOTAL TRAY, 16FR 10ML SIL FOLEY, URN: Brand: MEDLINE

## (undated) DEVICE — Device: Brand: DEFENDO AIR/WATER/SUCTION AND BIOPSY VALVE

## (undated) DEVICE — GLV SURG TRIUMPH MICRO PF LTX 6.5 STRL

## (undated) DEVICE — PK TURNOVER RM ADV

## (undated) DEVICE — MONOPOLAR METZENBAUM SCISSOR, MINI BLADE TIP, DISPOSABLE: Brand: MONOPOLAR METZENBAUM SCISSOR, MINI BLADE TIP, DISPOSABLE

## (undated) DEVICE — TRY PREP SCRB VAG PVP

## (undated) DEVICE — TUBING, SUCTION, 1/4" X 12', STRAIGHT: Brand: MEDLINE

## (undated) DEVICE — SKIN AFFIX SURG ADHESIVE 72/CS 0.55ML: Brand: MEDLINE

## (undated) DEVICE — TBG SMPL FLTR LINE NASL 02/C02 A/ BX/100

## (undated) DEVICE — LAPAROSCOPY WOUND CLOSURE SYSTEM KIT CONSISTS OF:05391529640002; NEOCLOSE ANCHORGUIDE 5/12 & 8/15 US; MODEL NUMBER NCA515-U; QTY 10 AND05391529640019; NEOCLOSE AUTOANCHOR X2 PACK US; MODEL NUMBER NCA2-U;  QTY 10: Brand: NEOCLOSE ANCHORGUIDE REGULAR PORT CLOSURE KIT US

## (undated) DEVICE — HARMONIC ACE +7 LAPAROSCOPIC SHEARS ADVANCED HEMOSTASIS 5MM DIAMETER 36CM SHAFT LENGTH  FOR USE WITH GRAY HAND PIECE ONLY: Brand: HARMONIC ACE

## (undated) DEVICE — ENDOGATOR AUXILIARY WATER JET CONNECTOR: Brand: ENDOGATOR

## (undated) DEVICE — THE CHANNEL CLEANING BRUSH IS A NYLON FLEXI BRUSH ATTACHED TO A FLEXIBLE PLASTIC SHEATH DESIGNED TO SAFELY REMOVE DEBRIS FROM FLEXIBLE ENDOSCOPES.

## (undated) DEVICE — ENDOPATH XCEL BLADELESS TROCARS WITH STABILITY SLEEVES: Brand: ENDOPATH XCEL

## (undated) DEVICE — CLTH CLENS READYCLEANSE PERI CARE PK/5

## (undated) DEVICE — GLV SURG SENSICARE W/ALOE PF LF SZ6 STRL

## (undated) DEVICE — ENDOCUFF VISION PRP SM 10.4

## (undated) DEVICE — ENDOPOUCH RETRIEVER SPECIMEN RETRIEVAL BAGS: Brand: ENDOPOUCH RETRIEVER

## (undated) DEVICE — FRCP BX RADJAW4 NDL 2.8 240 STD OG

## (undated) DEVICE — ENDOPATH XCEL WITH OPTIVIEW TECHNOLOGY UNIVERSAL TROCAR STABILITY SLEEVES: Brand: ENDOPATH XCEL OPTIVIEW

## (undated) DEVICE — INTENDED FOR TISSUE SEPARATION, AND OTHER PROCEDURES THAT REQUIRE A SHARP SURGICAL BLADE TO PUNCTURE OR CUT.: Brand: BARD-PARKER ® STAINLESS STEEL BLADES

## (undated) DEVICE — CONMED SCOPE SAVER BITE BLOCK, 20X27 MM: Brand: SCOPE SAVER